# Patient Record
Sex: FEMALE | Race: WHITE | Employment: FULL TIME | URBAN - METROPOLITAN AREA
[De-identification: names, ages, dates, MRNs, and addresses within clinical notes are randomized per-mention and may not be internally consistent; named-entity substitution may affect disease eponyms.]

---

## 2018-09-12 ENCOUNTER — OFFICE VISIT (OUTPATIENT)
Dept: FAMILY MEDICINE CLINIC | Age: 48
End: 2018-09-12

## 2018-09-12 VITALS
BODY MASS INDEX: 31.01 KG/M2 | WEIGHT: 175 LBS | HEIGHT: 63 IN | DIASTOLIC BLOOD PRESSURE: 70 MMHG | TEMPERATURE: 98.4 F | SYSTOLIC BLOOD PRESSURE: 110 MMHG

## 2018-09-12 DIAGNOSIS — F32.9 REACTIVE DEPRESSION: ICD-10-CM

## 2018-09-12 PROCEDURE — 99203 OFFICE O/P NEW LOW 30 MIN: CPT | Performed by: INTERNAL MEDICINE

## 2018-09-12 RX ORDER — IBUPROFEN 800 MG/1
800 TABLET ORAL EVERY 6 HOURS PRN
Status: ON HOLD | COMMUNITY
End: 2019-07-05 | Stop reason: SDDI

## 2018-09-12 RX ORDER — LANOLIN ALCOHOL/MO/W.PET/CERES
3 CREAM (GRAM) TOPICAL DAILY
Status: ON HOLD | COMMUNITY
End: 2018-10-15 | Stop reason: HOSPADM

## 2018-09-12 ASSESSMENT — ENCOUNTER SYMPTOMS
SHORTNESS OF BREATH: 0
SINUS PAIN: 0
COUGH: 0
SINUS PRESSURE: 0
SORE THROAT: 0
NAUSEA: 0
APNEA: 0
BLOOD IN STOOL: 0
DIARRHEA: 0
ABDOMINAL PAIN: 0
CONSTIPATION: 0
RHINORRHEA: 0
VOMITING: 0
WHEEZING: 0

## 2018-10-11 ENCOUNTER — APPOINTMENT (OUTPATIENT)
Dept: GENERAL RADIOLOGY | Age: 48
DRG: 897 | End: 2018-10-11
Payer: COMMERCIAL

## 2018-10-11 ENCOUNTER — HOSPITAL ENCOUNTER (INPATIENT)
Age: 48
LOS: 2 days | Discharge: PSYCHIATRIC OR SUBSTANCE USE FACILITY | DRG: 897 | End: 2018-10-13
Attending: EMERGENCY MEDICINE | Admitting: INTERNAL MEDICINE
Payer: COMMERCIAL

## 2018-10-11 DIAGNOSIS — R00.0 TACHYCARDIA: Primary | ICD-10-CM

## 2018-10-11 DIAGNOSIS — E87.20 LACTIC ACIDOSIS: ICD-10-CM

## 2018-10-11 DIAGNOSIS — R45.851 SUICIDAL IDEATION: ICD-10-CM

## 2018-10-11 DIAGNOSIS — F10.930 ALCOHOL WITHDRAWAL SYNDROME WITHOUT COMPLICATION (HCC): ICD-10-CM

## 2018-10-11 PROBLEM — G25.1 TREMOR DUE TO DRUG WITHDRAWAL (HCC): Status: ACTIVE | Noted: 2018-10-11

## 2018-10-11 PROBLEM — F10.939 ALCOHOL WITHDRAWAL (HCC): Status: ACTIVE | Noted: 2018-10-11

## 2018-10-11 PROBLEM — F19.939 TREMOR DUE TO DRUG WITHDRAWAL (HCC): Status: ACTIVE | Noted: 2018-10-11

## 2018-10-11 PROBLEM — F10.920 ALCOHOLIC INTOXICATION WITHOUT COMPLICATION (HCC): Status: ACTIVE | Noted: 2018-10-11

## 2018-10-11 LAB
ACETAMINOPHEN LEVEL: <5 UG/ML (ref 10–30)
ALBUMIN SERPL-MCNC: 4.6 G/DL (ref 3.4–5)
ALP BLD-CCNC: 78 U/L (ref 40–129)
ALT SERPL-CCNC: 37 U/L (ref 10–40)
AMPHETAMINE SCREEN, URINE: NORMAL
ANION GAP SERPL CALCULATED.3IONS-SCNC: 24 MMOL/L (ref 3–16)
AST SERPL-CCNC: 56 U/L (ref 15–37)
BARBITURATE SCREEN URINE: NORMAL
BENZODIAZEPINE SCREEN, URINE: NORMAL
BILIRUB SERPL-MCNC: <0.2 MG/DL (ref 0–1)
BILIRUBIN DIRECT: <0.2 MG/DL (ref 0–0.3)
BILIRUBIN URINE: NEGATIVE
BILIRUBIN, INDIRECT: ABNORMAL MG/DL (ref 0–1)
BLOOD, URINE: NEGATIVE
BUN BLDV-MCNC: 7 MG/DL (ref 7–20)
CALCIUM SERPL-MCNC: 8.6 MG/DL (ref 8.3–10.6)
CANNABINOID SCREEN URINE: NORMAL
CARBOXYHEMOGLOBIN: 4.2 %
CHLORIDE BLD-SCNC: 100 MMOL/L (ref 99–110)
CLARITY: CLEAR
CO2: 15 MMOL/L (ref 21–32)
COCAINE METABOLITE SCREEN URINE: NORMAL
COLOR: YELLOW
CREAT SERPL-MCNC: 0.5 MG/DL (ref 0.6–1.1)
ETHANOL: 259 MG/DL (ref 0–0.08)
ETHANOL: 415 MG/DL (ref 0–0.08)
GFR AFRICAN AMERICAN: >60
GFR NON-AFRICAN AMERICAN: >60
GLUCOSE BLD-MCNC: 99 MG/DL (ref 70–99)
GLUCOSE URINE: NEGATIVE MG/DL
HCG(URINE) PREGNANCY TEST: NEGATIVE
HCT VFR BLD CALC: 40 % (ref 36–48)
HEMOGLOBIN: 12.7 G/DL (ref 12–16)
KETONES, URINE: 15 MG/DL
LACTIC ACID: 6.5 MMOL/L (ref 0.4–2)
LACTIC ACID: 7.2 MMOL/L (ref 0.4–2)
LEUKOCYTE ESTERASE, URINE: NEGATIVE
Lab: NORMAL
MCH RBC QN AUTO: 22.4 PG (ref 26–34)
MCHC RBC AUTO-ENTMCNC: 31.8 G/DL (ref 31–36)
MCV RBC AUTO: 70.3 FL (ref 80–100)
METHADONE SCREEN, URINE: NORMAL
MICROSCOPIC EXAMINATION: ABNORMAL
NITRITE, URINE: NEGATIVE
OPIATE SCREEN URINE: NORMAL
OXYCODONE URINE: NORMAL
PDW BLD-RTO: 29.9 % (ref 12.4–15.4)
PH UA: 5
PH UA: 5
PHENCYCLIDINE SCREEN URINE: NORMAL
PLATELET # BLD: 494 K/UL (ref 135–450)
PMV BLD AUTO: 7.8 FL (ref 5–10.5)
POTASSIUM SERPL-SCNC: 4.2 MMOL/L (ref 3.5–5.1)
PROPOXYPHENE SCREEN: NORMAL
PROTEIN UA: NEGATIVE MG/DL
RBC # BLD: 5.69 M/UL (ref 4–5.2)
SALICYLATE, SERUM: 0.4 MG/DL (ref 15–30)
SODIUM BLD-SCNC: 139 MMOL/L (ref 136–145)
SPECIFIC GRAVITY UA: 1.01
TOTAL PROTEIN: 7.5 G/DL (ref 6.4–8.2)
URINE TYPE: ABNORMAL
UROBILINOGEN, URINE: 0.2 E.U./DL
WBC # BLD: 14.1 K/UL (ref 4–11)

## 2018-10-11 PROCEDURE — 2580000003 HC RX 258: Performed by: EMERGENCY MEDICINE

## 2018-10-11 PROCEDURE — 80076 HEPATIC FUNCTION PANEL: CPT

## 2018-10-11 PROCEDURE — 96361 HYDRATE IV INFUSION ADD-ON: CPT

## 2018-10-11 PROCEDURE — G0480 DRUG TEST DEF 1-7 CLASSES: HCPCS

## 2018-10-11 PROCEDURE — 93005 ELECTROCARDIOGRAM TRACING: CPT | Performed by: EMERGENCY MEDICINE

## 2018-10-11 PROCEDURE — 81003 URINALYSIS AUTO W/O SCOPE: CPT

## 2018-10-11 PROCEDURE — 96375 TX/PRO/DX INJ NEW DRUG ADDON: CPT

## 2018-10-11 PROCEDURE — 85027 COMPLETE CBC AUTOMATED: CPT

## 2018-10-11 PROCEDURE — 82375 ASSAY CARBOXYHB QUANT: CPT

## 2018-10-11 PROCEDURE — 1200000000 HC SEMI PRIVATE

## 2018-10-11 PROCEDURE — 83605 ASSAY OF LACTIC ACID: CPT

## 2018-10-11 PROCEDURE — 82550 ASSAY OF CK (CPK): CPT

## 2018-10-11 PROCEDURE — 96374 THER/PROPH/DIAG INJ IV PUSH: CPT

## 2018-10-11 PROCEDURE — 80307 DRUG TEST PRSMV CHEM ANLYZR: CPT

## 2018-10-11 PROCEDURE — 71046 X-RAY EXAM CHEST 2 VIEWS: CPT

## 2018-10-11 PROCEDURE — 84703 CHORIONIC GONADOTROPIN ASSAY: CPT

## 2018-10-11 PROCEDURE — 6360000002 HC RX W HCPCS: Performed by: EMERGENCY MEDICINE

## 2018-10-11 PROCEDURE — 80048 BASIC METABOLIC PNL TOTAL CA: CPT

## 2018-10-11 PROCEDURE — 36415 COLL VENOUS BLD VENIPUNCTURE: CPT

## 2018-10-11 PROCEDURE — 99285 EMERGENCY DEPT VISIT HI MDM: CPT

## 2018-10-11 RX ORDER — SODIUM CHLORIDE 0.9 % (FLUSH) 0.9 %
10 SYRINGE (ML) INJECTION EVERY 12 HOURS SCHEDULED
Status: DISCONTINUED | OUTPATIENT
Start: 2018-10-11 | End: 2018-10-11

## 2018-10-11 RX ORDER — SODIUM CHLORIDE 0.9 % (FLUSH) 0.9 %
10 SYRINGE (ML) INJECTION PRN
Status: DISCONTINUED | OUTPATIENT
Start: 2018-10-11 | End: 2018-10-11

## 2018-10-11 RX ORDER — DIAZEPAM 5 MG/1
10 TABLET ORAL EVERY 6 HOURS PRN
Status: DISCONTINUED | OUTPATIENT
Start: 2018-10-11 | End: 2018-10-13 | Stop reason: HOSPADM

## 2018-10-11 RX ORDER — M-VIT,TX,IRON,MINS/CALC/FOLIC 27MG-0.4MG
1 TABLET ORAL DAILY
Status: DISCONTINUED | OUTPATIENT
Start: 2018-10-12 | End: 2018-10-13 | Stop reason: HOSPADM

## 2018-10-11 RX ORDER — SODIUM CHLORIDE 0.9 % (FLUSH) 0.9 %
10 SYRINGE (ML) INJECTION PRN
Status: DISCONTINUED | OUTPATIENT
Start: 2018-10-11 | End: 2018-10-12

## 2018-10-11 RX ORDER — THIAMINE MONONITRATE (VIT B1) 100 MG
100 TABLET ORAL DAILY
Status: DISCONTINUED | OUTPATIENT
Start: 2018-10-12 | End: 2018-10-13 | Stop reason: HOSPADM

## 2018-10-11 RX ORDER — LORAZEPAM 1 MG/1
2 TABLET ORAL
Status: DISCONTINUED | OUTPATIENT
Start: 2018-10-11 | End: 2018-10-12

## 2018-10-11 RX ORDER — SODIUM CHLORIDE 9 MG/ML
INJECTION, SOLUTION INTRAVENOUS CONTINUOUS
Status: DISCONTINUED | OUTPATIENT
Start: 2018-10-11 | End: 2018-10-13 | Stop reason: HOSPADM

## 2018-10-11 RX ORDER — ONDANSETRON 2 MG/ML
4 INJECTION INTRAMUSCULAR; INTRAVENOUS EVERY 6 HOURS PRN
Status: DISCONTINUED | OUTPATIENT
Start: 2018-10-11 | End: 2018-10-11

## 2018-10-11 RX ORDER — LORAZEPAM 2 MG/ML
3 INJECTION INTRAMUSCULAR
Status: DISCONTINUED | OUTPATIENT
Start: 2018-10-11 | End: 2018-10-12

## 2018-10-11 RX ORDER — LORAZEPAM 1 MG/1
3 TABLET ORAL
Status: DISCONTINUED | OUTPATIENT
Start: 2018-10-11 | End: 2018-10-12

## 2018-10-11 RX ORDER — SODIUM CHLORIDE 0.9 % (FLUSH) 0.9 %
10 SYRINGE (ML) INJECTION EVERY 12 HOURS SCHEDULED
Status: DISCONTINUED | OUTPATIENT
Start: 2018-10-11 | End: 2018-10-12

## 2018-10-11 RX ORDER — NICOTINE 21 MG/24HR
1 PATCH, TRANSDERMAL 24 HOURS TRANSDERMAL DAILY
Status: DISCONTINUED | OUTPATIENT
Start: 2018-10-12 | End: 2018-10-12

## 2018-10-11 RX ORDER — FOLIC ACID 1 MG/1
1 TABLET ORAL DAILY
Status: DISCONTINUED | OUTPATIENT
Start: 2018-10-12 | End: 2018-10-13 | Stop reason: HOSPADM

## 2018-10-11 RX ORDER — 0.9 % SODIUM CHLORIDE 0.9 %
1000 INTRAVENOUS SOLUTION INTRAVENOUS ONCE
Status: COMPLETED | OUTPATIENT
Start: 2018-10-11 | End: 2018-10-11

## 2018-10-11 RX ORDER — LORAZEPAM 1 MG/1
4 TABLET ORAL
Status: DISCONTINUED | OUTPATIENT
Start: 2018-10-11 | End: 2018-10-12

## 2018-10-11 RX ORDER — THIAMINE HYDROCHLORIDE 100 MG/ML
100 INJECTION, SOLUTION INTRAMUSCULAR; INTRAVENOUS DAILY
Status: DISCONTINUED | OUTPATIENT
Start: 2018-10-12 | End: 2018-10-12 | Stop reason: SDUPTHER

## 2018-10-11 RX ORDER — ONDANSETRON 2 MG/ML
4 INJECTION INTRAMUSCULAR; INTRAVENOUS ONCE
Status: COMPLETED | OUTPATIENT
Start: 2018-10-11 | End: 2018-10-11

## 2018-10-11 RX ORDER — LORAZEPAM 2 MG/ML
1 INJECTION INTRAMUSCULAR
Status: DISCONTINUED | OUTPATIENT
Start: 2018-10-11 | End: 2018-10-12

## 2018-10-11 RX ORDER — ONDANSETRON 2 MG/ML
4 INJECTION INTRAMUSCULAR; INTRAVENOUS EVERY 6 HOURS PRN
Status: DISCONTINUED | OUTPATIENT
Start: 2018-10-11 | End: 2018-10-13 | Stop reason: HOSPADM

## 2018-10-11 RX ORDER — ACETAMINOPHEN 325 MG/1
650 TABLET ORAL ONCE
Status: DISCONTINUED | OUTPATIENT
Start: 2018-10-11 | End: 2018-10-11

## 2018-10-11 RX ORDER — LORAZEPAM 2 MG/ML
1 INJECTION INTRAMUSCULAR ONCE
Status: COMPLETED | OUTPATIENT
Start: 2018-10-11 | End: 2018-10-11

## 2018-10-11 RX ORDER — LORAZEPAM 1 MG/1
1 TABLET ORAL
Status: DISCONTINUED | OUTPATIENT
Start: 2018-10-11 | End: 2018-10-12

## 2018-10-11 RX ORDER — LORAZEPAM 2 MG/ML
4 INJECTION INTRAMUSCULAR
Status: DISCONTINUED | OUTPATIENT
Start: 2018-10-11 | End: 2018-10-12

## 2018-10-11 RX ORDER — 0.9 % SODIUM CHLORIDE 0.9 %
1000 INTRAVENOUS SOLUTION INTRAVENOUS ONCE
Status: COMPLETED | OUTPATIENT
Start: 2018-10-11 | End: 2018-10-12

## 2018-10-11 RX ORDER — LORAZEPAM 2 MG/ML
2 INJECTION INTRAMUSCULAR
Status: DISCONTINUED | OUTPATIENT
Start: 2018-10-11 | End: 2018-10-12

## 2018-10-11 RX ADMIN — SODIUM CHLORIDE 1000 ML: 9 INJECTION, SOLUTION INTRAVENOUS at 20:00

## 2018-10-11 RX ADMIN — ONDANSETRON 4 MG: 2 INJECTION INTRAMUSCULAR; INTRAVENOUS at 21:21

## 2018-10-11 RX ADMIN — SODIUM CHLORIDE 1000 ML: 9 INJECTION, SOLUTION INTRAVENOUS at 21:13

## 2018-10-11 RX ADMIN — LORAZEPAM 1 MG: 2 INJECTION INTRAMUSCULAR; INTRAVENOUS at 21:21

## 2018-10-11 ASSESSMENT — PATIENT HEALTH QUESTIONNAIRE - PHQ9: SUM OF ALL RESPONSES TO PHQ QUESTIONS 1-9: 27

## 2018-10-11 ASSESSMENT — ENCOUNTER SYMPTOMS
SHORTNESS OF BREATH: 0
TROUBLE SWALLOWING: 0
ABDOMINAL PAIN: 0
COLOR CHANGE: 0
COUGH: 0
NAUSEA: 0
PHOTOPHOBIA: 0
RHINORRHEA: 0
VOMITING: 0

## 2018-10-11 ASSESSMENT — PAIN SCALES - GENERAL: PAINLEVEL_OUTOF10: 3

## 2018-10-11 ASSESSMENT — PAIN DESCRIPTION - LOCATION: LOCATION: BACK

## 2018-10-11 NOTE — ED PROVIDER NOTES
absence of a cardiologist.    EKG demonstrates a sinus rhythm with a ventricular rate of 88. Beats per minute.   Patient's axes are within normal limits, soreness that he can ST elevations or depressions is nondiagnostic for ACS     RADIOLOGY:   Non-plain filmimages such as CT, Ultrasound and MRI are read by the radiologist. Plain radiographic images are visualized and preliminarily interpreted by the emergency physician with the below findings:      Interpretation per the Radiologist below, if available at the time ofthis note:    No orders to display         ED BEDSIDE ULTRASOUND:   Performed by ED Physician - none    LABS:  Labs Reviewed   CBC - Abnormal; Notable for the following:        Result Value    WBC 14.1 (*)     RBC 5.69 (*)     MCV 70.3 (*)     MCH 22.4 (*)     RDW 29.9 (*)     Platelets 918 (*)     All other components within normal limits    Narrative:     Performed at:  [address] [employer] [address]   Phone [phone]   BASIC METABOLIC PANEL - Abnormal; Notable for the following:     CO2 15 (*)     Anion Gap 24 (*)     CREATININE 0.5 (*)     All other components within normal limits    Narrative:     Performed at:  [address] [employer] [address]   Phone [phone]   URINALYSIS - Abnormal; Notable for the following:     Ketones, Urine 15 (*)     All other components within normal limits    Narrative:     Performed at:  [address] [facility] [address]   Phone [phone]   SALICYLATE LEVEL - Abnormal; Notable for the following:     Salicylate, Serum 0.4 (*)     All other components within normal limits    Narrative:     Performed at:  [address] [employer] [address]   Phone [phone]   PREGNANCY, URINE    Narrative:     Performed at:  [facility]

## 2018-10-12 PROBLEM — F32.A DEPRESSION: Status: ACTIVE | Noted: 2018-09-12

## 2018-10-12 LAB
ANION GAP SERPL CALCULATED.3IONS-SCNC: 11 MMOL/L (ref 3–16)
BUN BLDV-MCNC: 7 MG/DL (ref 7–20)
CALCIUM SERPL-MCNC: 7.8 MG/DL (ref 8.3–10.6)
CHLORIDE BLD-SCNC: 105 MMOL/L (ref 99–110)
CO2: 22 MMOL/L (ref 21–32)
CREAT SERPL-MCNC: <0.5 MG/DL (ref 0.6–1.1)
ETHANOL: NORMAL MG/DL (ref 0–0.08)
GFR AFRICAN AMERICAN: >60
GFR NON-AFRICAN AMERICAN: >60
GLUCOSE BLD-MCNC: 74 MG/DL (ref 70–99)
LACTIC ACID: 0.7 MMOL/L (ref 0.4–2)
LACTIC ACID: 1.4 MMOL/L (ref 0.4–2)
LACTIC ACID: 4.1 MMOL/L (ref 0.4–2)
LACTIC ACID: 6.1 MMOL/L (ref 0.4–2)
MAGNESIUM: 1.5 MG/DL (ref 1.8–2.4)
POTASSIUM SERPL-SCNC: 3.9 MMOL/L (ref 3.5–5.1)
SODIUM BLD-SCNC: 138 MMOL/L (ref 136–145)
TOTAL CK: 567 U/L (ref 26–192)

## 2018-10-12 PROCEDURE — 83605 ASSAY OF LACTIC ACID: CPT

## 2018-10-12 PROCEDURE — G0480 DRUG TEST DEF 1-7 CLASSES: HCPCS

## 2018-10-12 PROCEDURE — 2580000003 HC RX 258: Performed by: NURSE PRACTITIONER

## 2018-10-12 PROCEDURE — 2500000003 HC RX 250 WO HCPCS: Performed by: NURSE PRACTITIONER

## 2018-10-12 PROCEDURE — 36415 COLL VENOUS BLD VENIPUNCTURE: CPT

## 2018-10-12 PROCEDURE — 83735 ASSAY OF MAGNESIUM: CPT

## 2018-10-12 PROCEDURE — 6370000000 HC RX 637 (ALT 250 FOR IP): Performed by: NURSE PRACTITIONER

## 2018-10-12 PROCEDURE — 6360000002 HC RX W HCPCS: Performed by: INTERNAL MEDICINE

## 2018-10-12 PROCEDURE — 1200000000 HC SEMI PRIVATE

## 2018-10-12 PROCEDURE — 6360000002 HC RX W HCPCS: Performed by: NURSE PRACTITIONER

## 2018-10-12 PROCEDURE — 80048 BASIC METABOLIC PNL TOTAL CA: CPT

## 2018-10-12 PROCEDURE — 94760 N-INVAS EAR/PLS OXIMETRY 1: CPT

## 2018-10-12 PROCEDURE — 2580000003 HC RX 258: Performed by: INTERNAL MEDICINE

## 2018-10-12 PROCEDURE — 6370000000 HC RX 637 (ALT 250 FOR IP): Performed by: INTERNAL MEDICINE

## 2018-10-12 PROCEDURE — 99223 1ST HOSP IP/OBS HIGH 75: CPT | Performed by: PSYCHIATRY & NEUROLOGY

## 2018-10-12 PROCEDURE — 93010 ELECTROCARDIOGRAM REPORT: CPT | Performed by: INTERNAL MEDICINE

## 2018-10-12 RX ORDER — MAGNESIUM SULFATE IN WATER 40 MG/ML
2 INJECTION, SOLUTION INTRAVENOUS ONCE
Status: COMPLETED | OUTPATIENT
Start: 2018-10-12 | End: 2018-10-12

## 2018-10-12 RX ORDER — LORAZEPAM 2 MG/ML
2 INJECTION INTRAMUSCULAR
Status: DISCONTINUED | OUTPATIENT
Start: 2018-10-12 | End: 2018-10-13 | Stop reason: HOSPADM

## 2018-10-12 RX ORDER — LORAZEPAM 2 MG/ML
3 INJECTION INTRAMUSCULAR
Status: DISCONTINUED | OUTPATIENT
Start: 2018-10-12 | End: 2018-10-13 | Stop reason: HOSPADM

## 2018-10-12 RX ORDER — NICOTINE 21 MG/24HR
1 PATCH, TRANSDERMAL 24 HOURS TRANSDERMAL DAILY
Status: DISCONTINUED | OUTPATIENT
Start: 2018-10-12 | End: 2018-10-13

## 2018-10-12 RX ORDER — LORAZEPAM 1 MG/1
1 TABLET ORAL
Status: DISCONTINUED | OUTPATIENT
Start: 2018-10-12 | End: 2018-10-13 | Stop reason: HOSPADM

## 2018-10-12 RX ORDER — LORAZEPAM 2 MG/ML
4 INJECTION INTRAMUSCULAR
Status: DISCONTINUED | OUTPATIENT
Start: 2018-10-12 | End: 2018-10-13 | Stop reason: HOSPADM

## 2018-10-12 RX ORDER — LORAZEPAM 1 MG/1
3 TABLET ORAL
Status: DISCONTINUED | OUTPATIENT
Start: 2018-10-12 | End: 2018-10-13 | Stop reason: HOSPADM

## 2018-10-12 RX ORDER — SODIUM CHLORIDE 0.9 % (FLUSH) 0.9 %
10 SYRINGE (ML) INJECTION EVERY 12 HOURS SCHEDULED
Status: DISCONTINUED | OUTPATIENT
Start: 2018-10-12 | End: 2018-10-13 | Stop reason: HOSPADM

## 2018-10-12 RX ORDER — SODIUM CHLORIDE 9 MG/ML
INJECTION, SOLUTION INTRAVENOUS
Status: DISPENSED
Start: 2018-10-12 | End: 2018-10-13

## 2018-10-12 RX ORDER — SODIUM CHLORIDE 0.9 % (FLUSH) 0.9 %
10 SYRINGE (ML) INJECTION PRN
Status: DISCONTINUED | OUTPATIENT
Start: 2018-10-12 | End: 2018-10-13 | Stop reason: HOSPADM

## 2018-10-12 RX ORDER — IBUPROFEN 400 MG/1
400 TABLET ORAL EVERY 6 HOURS PRN
Status: DISCONTINUED | OUTPATIENT
Start: 2018-10-12 | End: 2018-10-13 | Stop reason: HOSPADM

## 2018-10-12 RX ORDER — LORAZEPAM 1 MG/1
2 TABLET ORAL
Status: DISCONTINUED | OUTPATIENT
Start: 2018-10-12 | End: 2018-10-13 | Stop reason: HOSPADM

## 2018-10-12 RX ORDER — LORAZEPAM 1 MG/1
4 TABLET ORAL
Status: DISCONTINUED | OUTPATIENT
Start: 2018-10-12 | End: 2018-10-13 | Stop reason: HOSPADM

## 2018-10-12 RX ORDER — LORAZEPAM 2 MG/ML
1 INJECTION INTRAMUSCULAR
Status: DISCONTINUED | OUTPATIENT
Start: 2018-10-12 | End: 2018-10-13 | Stop reason: HOSPADM

## 2018-10-12 RX ADMIN — ENOXAPARIN SODIUM 40 MG: 40 INJECTION SUBCUTANEOUS at 09:12

## 2018-10-12 RX ADMIN — DIAZEPAM 10 MG: 5 TABLET ORAL at 21:41

## 2018-10-12 RX ADMIN — SODIUM CHLORIDE: 9 INJECTION, SOLUTION INTRAVENOUS at 21:42

## 2018-10-12 RX ADMIN — IBUPROFEN 400 MG: 400 TABLET ORAL at 21:41

## 2018-10-12 RX ADMIN — MAGNESIUM SULFATE HEPTAHYDRATE 2 G: 40 INJECTION, SOLUTION INTRAVENOUS at 15:01

## 2018-10-12 RX ADMIN — SODIUM CHLORIDE: 9 INJECTION, SOLUTION INTRAVENOUS at 00:00

## 2018-10-12 RX ADMIN — FOLIC ACID 1 MG: 1 TABLET ORAL at 09:12

## 2018-10-12 RX ADMIN — Medication 10 ML: at 09:20

## 2018-10-12 RX ADMIN — Medication 10 ML: at 00:00

## 2018-10-12 RX ADMIN — LORAZEPAM 2 MG: 1 TABLET ORAL at 00:00

## 2018-10-12 RX ADMIN — Medication 10 ML: at 09:12

## 2018-10-12 RX ADMIN — LORAZEPAM 2 MG: 1 TABLET ORAL at 05:01

## 2018-10-12 RX ADMIN — Medication 100 MG: at 09:12

## 2018-10-12 RX ADMIN — SERTRALINE HYDROCHLORIDE 50 MG: 50 TABLET ORAL at 09:13

## 2018-10-12 RX ADMIN — IBUPROFEN 400 MG: 400 TABLET ORAL at 16:14

## 2018-10-12 RX ADMIN — LORAZEPAM 3 MG: 1 TABLET ORAL at 01:12

## 2018-10-12 RX ADMIN — MULTIPLE VITAMINS W/ MINERALS TAB 1 TABLET: TAB at 09:13

## 2018-10-12 RX ADMIN — FOLIC ACID: 5 INJECTION, SOLUTION INTRAMUSCULAR; INTRAVENOUS; SUBCUTANEOUS at 10:12

## 2018-10-12 ASSESSMENT — PAIN SCALES - GENERAL
PAINLEVEL_OUTOF10: 6
PAINLEVEL_OUTOF10: 5

## 2018-10-12 NOTE — CONSULTS
LORazepam **OR** LORazepam **OR** LORazepam **OR** LORazepam **OR** LORazepam, magnesium hydroxide, ondansetron, diazepam    Examination  Review of Systems - craving a cigarette, no tremor    Recent Results (from the past 168 hour(s))   CBC    Collection Time: 10/11/18  5:35 PM   Result Value Ref Range    WBC 14.1 (H) 4.0 - 11.0 K/uL    RBC 5.69 (H) 4.00 - 5.20 M/uL    Hemoglobin 12.7 12.0 - 16.0 g/dL    Hematocrit 40.0 36.0 - 48.0 %    MCV 70.3 (L) 80.0 - 100.0 fL    MCH 22.4 (L) 26.0 - 34.0 pg    MCHC 31.8 31.0 - 36.0 g/dL    RDW 29.9 (H) 12.4 - 15.4 %    Platelets 767 (H) 683 - 450 K/uL    MPV 7.8 5.0 - 10.5 fL   Acetaminophen Level    Collection Time: 10/11/18  5:35 PM   Result Value Ref Range    Acetaminophen Level <5 (L) 10 - 30 ug/mL   Hepatic Function Panel    Collection Time: 10/11/18  5:35 PM   Result Value Ref Range    Total Protein 7.5 6.4 - 8.2 g/dL    Alb 4.6 3.4 - 5.0 g/dL    Alkaline Phosphatase 78 40 - 129 U/L    ALT 37 10 - 40 U/L    AST 56 (H) 15 - 37 U/L    Total Bilirubin <0.2 0.0 - 1.0 mg/dL    Bilirubin, Direct <0.2 0.0 - 0.3 mg/dL    Bilirubin, Indirect see below 0.0 - 1.0 mg/dL   Basic Metabolic Panel    Collection Time: 10/11/18  5:36 PM   Result Value Ref Range    Sodium 139 136 - 145 mmol/L    Potassium 4.2 3.5 - 5.1 mmol/L    Chloride 100 99 - 110 mmol/L    CO2 15 (L) 21 - 32 mmol/L    Anion Gap 24 (H) 3 - 16    Glucose 99 70 - 99 mg/dL    BUN 7 7 - 20 mg/dL    CREATININE 0.5 (L) 0.6 - 1.1 mg/dL    GFR Non-African American >60 >60    GFR African American >60 >60    Calcium 8.6 8.3 - 10.6 mg/dL   Urinalysis    Collection Time: 10/11/18  5:36 PM   Result Value Ref Range    Color, UA YELLOW Straw/Yellow    Clarity, UA Clear Clear    Glucose, Ur Negative Negative mg/dL    Bilirubin Urine Negative Negative    Ketones, Urine 15 (A) Negative mg/dL    Specific Gravity, UA 1.008 1.005 - 1.030    Blood, Urine Negative Negative    pH, UA 5.0 5.0 - 8.0    Protein, UA Negative Negative mg/dL Urobilinogen, Urine 0.2 <2.0 E.U./dL    Nitrite, Urine Negative Negative    Leukocyte Esterase, Urine Negative Negative    Microscopic Examination Not Indicated     Urine Type Not Specified    Pregnancy, Urine    Collection Time: 10/11/18  5:36 PM   Result Value Ref Range    HCG(Urine) Pregnancy Test Negative Detects HCG level >20 MIU/mL   Ethanol    Collection Time: 10/11/18  5:36 PM   Result Value Ref Range    Ethanol Lvl 387 mg/dL   Salicylate    Collection Time: 10/11/18  5:36 PM   Result Value Ref Range    Salicylate, Serum 0.4 (L) 15.0 - 30.0 mg/dL   Urine Drug Screen    Collection Time: 10/11/18  5:36 PM   Result Value Ref Range    Amphetamine Screen, Urine Neg Negative <1000ng/mL    Barbiturate Screen, Ur Neg Negative <200 ng/mL    Benzodiazepine Screen, Urine Neg Negative <200 ng/mL    Cannabinoid Scrn, Ur Neg Negative <50 ng/mL    Cocaine Metabolite Screen, Urine Neg Negative <300 ng/mL    Opiate Scrn, Ur Neg Negative <300 ng/mL    PCP Screen, Urine Neg Negative <25 ng/mL    Methadone Screen, Urine Neg Negative <300 ng/mL    Propoxyphene Scrn, Ur Neg Negative <300 ng/mL    pH, UA 5.0     Drug Screen Comment: see below     Oxycodone Urine Neg Negative <100 ng/ml   EKG 12 Lead    Collection Time: 10/11/18  6:40 PM   Result Value Ref Range    Ventricular Rate 88 BPM    Atrial Rate 88 BPM    P-R Interval 124 ms    QRS Duration 76 ms    Q-T Interval 370 ms    QTc Calculation (Bazett) 447 ms    P Axis 61 degrees    R Axis 65 degrees    T Axis 55 degrees    Diagnosis       Normal sinus rhythmNormal ECGNo previous ECGs availableConfirmed by WOLF CONTRERAS MD ([id number]) on 10/12/2018 8:04:31 AM   Lactic Acid, Plasma    Collection Time: 10/11/18  6:59 PM   Result Value Ref Range    Lactic Acid 7.2 (HH) 0.4 - 2.0 mmol/L   Carboxyhemoglobin    Collection Time: 10/11/18  8:11 PM   Result Value Ref Range    Carboxyhemoglobin 4.2 %   Lactic Acid, Plasma    Collection Time: 10/11/18  9:44 PM   Result Value Ref Range

## 2018-10-12 NOTE — ED NOTES
Pt undergoing mood swings. Calm to aggressive to ripping off monitor stickers.        Emaline Garland  10/11/18 2029

## 2018-10-12 NOTE — H&P
Hospital Medicine History & Physical      PCP: Kolton Paris DO    Date of Admission: 10/11/2018    Date of Service: Pt seen/examined on 10/11/2018  and Admitted to Inpatient with expected LOS greater than two midnights due to medical therapy. Chief Complaint:    Chief Complaint   Patient presents with    Suicidal     Mom went to patient house, patient had knife in her hand. Mom is concerned that patient sarita harm self. Patient had her last drink about one hour ago          History Of Present Illness: The patient is a 46 y.o. female alcohol abuse, anxiety depression who presents with history of expressing self-harm and alcohol intoxication. She denies any pain anywhere. While in the ER she started expressing some anxiety with tremors concerning for alcohol withdrawal.    Past Medical History:        Diagnosis Date    Anxiety     Depression     Sleeping difficulties        Past Surgical History:        Procedure Laterality Date    BREAST SURGERY      sailine breast      SECTION     [facility] FRACTURE SURGERY      right wrist       Medications Prior to Admission:    Prior to Admission medications    Medication Sig Start Date End Date Taking? Authorizing Provider   Multiple Vitamins-Minerals (MULTIVITAL-M PO) Take by mouth   Yes Historical Provider, MD   ibuprofen (ADVIL;MOTRIN) 800 MG tablet Take 800 mg by mouth every 6 hours as needed for Pain   Yes Historical Provider, MD   melatonin 3 MG TABS tablet Take 3 mg by mouth daily   Yes Historical Provider, MD   sertraline (ZOLOFT) 50 MG tablet Take 1 tablet by mouth daily 18  Yes Madhavi Abarca DO       Allergies:  Patient has no known allergies. Social History:  The patient currently lives With mother    TOBACCO:   reports that she has been smoking. She has a 4.00 pack-year smoking history. She has never used smokeless tobacco.  ETOH:   reports that she drinks alcohol.       Family History:  Reviewed in detail and negative

## 2018-10-12 NOTE — ED NOTES
Bedside report given to Keesha Vaz. Pt is drinking soda waiting for a room.      Bessy Frazier  10/11/18 7727

## 2018-10-12 NOTE — PROGRESS NOTES
Report received from Stephanie Kaur for . Assisted patient back into bed from toileting. Gait steady. Will continue to monitor for suicide and safety reasons.     Electronically signed by Ronna Cabrera RN on 10/12/2018 at 3:33 PM

## 2018-10-13 ENCOUNTER — HOSPITAL ENCOUNTER (INPATIENT)
Age: 48
LOS: 2 days | Discharge: HOME | DRG: 885 | End: 2018-10-15
Attending: PSYCHIATRY & NEUROLOGY | Admitting: PSYCHIATRY & NEUROLOGY
Payer: COMMERCIAL

## 2018-10-13 VITALS
SYSTOLIC BLOOD PRESSURE: 138 MMHG | RESPIRATION RATE: 18 BRPM | HEART RATE: 80 BPM | OXYGEN SATURATION: 96 % | WEIGHT: 180.56 LBS | HEIGHT: 64 IN | TEMPERATURE: 97.7 F | DIASTOLIC BLOOD PRESSURE: 87 MMHG | BODY MASS INDEX: 30.83 KG/M2

## 2018-10-13 PROBLEM — F33.9 EPISODE OF RECURRENT MAJOR DEPRESSIVE DISORDER (HCC): Status: ACTIVE | Noted: 2018-10-13

## 2018-10-13 LAB
ANION GAP SERPL CALCULATED.3IONS-SCNC: 10 MMOL/L (ref 3–16)
BASOPHILS ABSOLUTE: 0 K/UL (ref 0–0.2)
BASOPHILS RELATIVE PERCENT: 0.5 %
BUN BLDV-MCNC: 2 MG/DL (ref 7–20)
CALCIUM SERPL-MCNC: 7.9 MG/DL (ref 8.3–10.6)
CHLORIDE BLD-SCNC: 105 MMOL/L (ref 99–110)
CO2: 23 MMOL/L (ref 21–32)
CREAT SERPL-MCNC: <0.5 MG/DL (ref 0.6–1.1)
EOSINOPHILS ABSOLUTE: 0.2 K/UL (ref 0–0.6)
EOSINOPHILS RELATIVE PERCENT: 2.9 %
GFR AFRICAN AMERICAN: >60
GFR NON-AFRICAN AMERICAN: >60
GLUCOSE BLD-MCNC: 87 MG/DL (ref 70–99)
HCT VFR BLD CALC: 30.7 % (ref 36–48)
HEMOGLOBIN: 9.7 G/DL (ref 12–16)
LYMPHOCYTES ABSOLUTE: 1.8 K/UL (ref 1–5.1)
LYMPHOCYTES RELATIVE PERCENT: 32.5 %
MAGNESIUM: 1.7 MG/DL (ref 1.8–2.4)
MAGNESIUM: 1.7 MG/DL (ref 1.8–2.4)
MCH RBC QN AUTO: 22.8 PG (ref 26–34)
MCHC RBC AUTO-ENTMCNC: 31.7 G/DL (ref 31–36)
MCV RBC AUTO: 71.9 FL (ref 80–100)
MONOCYTES ABSOLUTE: 0.4 K/UL (ref 0–1.3)
MONOCYTES RELATIVE PERCENT: 6.5 %
NEUTROPHILS ABSOLUTE: 3.2 K/UL (ref 1.7–7.7)
NEUTROPHILS RELATIVE PERCENT: 57.6 %
PDW BLD-RTO: 28.8 % (ref 12.4–15.4)
PLATELET # BLD: 295 K/UL (ref 135–450)
PMV BLD AUTO: 7.9 FL (ref 5–10.5)
POTASSIUM SERPL-SCNC: 3.7 MMOL/L (ref 3.5–5.1)
RBC # BLD: 4.27 M/UL (ref 4–5.2)
SODIUM BLD-SCNC: 138 MMOL/L (ref 136–145)
WBC # BLD: 5.6 K/UL (ref 4–11)

## 2018-10-13 PROCEDURE — 36415 COLL VENOUS BLD VENIPUNCTURE: CPT

## 2018-10-13 PROCEDURE — 6370000000 HC RX 637 (ALT 250 FOR IP): Performed by: INTERNAL MEDICINE

## 2018-10-13 PROCEDURE — 83540 ASSAY OF IRON: CPT

## 2018-10-13 PROCEDURE — 83735 ASSAY OF MAGNESIUM: CPT

## 2018-10-13 PROCEDURE — 94760 N-INVAS EAR/PLS OXIMETRY 1: CPT

## 2018-10-13 PROCEDURE — 82746 ASSAY OF FOLIC ACID SERUM: CPT

## 2018-10-13 PROCEDURE — 6370000000 HC RX 637 (ALT 250 FOR IP): Performed by: NURSE PRACTITIONER

## 2018-10-13 PROCEDURE — 1240000000 HC EMOTIONAL WELLNESS R&B

## 2018-10-13 PROCEDURE — 83550 IRON BINDING TEST: CPT

## 2018-10-13 PROCEDURE — 80048 BASIC METABOLIC PNL TOTAL CA: CPT

## 2018-10-13 PROCEDURE — 2580000003 HC RX 258: Performed by: INTERNAL MEDICINE

## 2018-10-13 PROCEDURE — 85025 COMPLETE CBC W/AUTO DIFF WBC: CPT

## 2018-10-13 PROCEDURE — 82607 VITAMIN B-12: CPT

## 2018-10-13 PROCEDURE — 82728 ASSAY OF FERRITIN: CPT

## 2018-10-13 RX ORDER — LANOLIN ALCOHOL/MO/W.PET/CERES
100 CREAM (GRAM) TOPICAL DAILY
Qty: 30 TABLET | Refills: 0 | Status: SHIPPED | OUTPATIENT
Start: 2018-10-13 | End: 2018-10-24

## 2018-10-13 RX ORDER — LORAZEPAM 1 MG/1
1 TABLET ORAL EVERY 4 HOURS PRN
Qty: 18 TABLET | Refills: 0 | Status: ON HOLD | OUTPATIENT
Start: 2018-10-13 | End: 2018-10-14

## 2018-10-13 RX ORDER — TRAZODONE HYDROCHLORIDE 50 MG/1
50 TABLET ORAL NIGHTLY
Qty: 30 TABLET | Refills: 0 | Status: ON HOLD | OUTPATIENT
Start: 2018-10-13 | End: 2018-10-15 | Stop reason: HOSPADM

## 2018-10-13 RX ORDER — NICOTINE 21 MG/24HR
1 PATCH, TRANSDERMAL 24 HOURS TRANSDERMAL DAILY
Status: DISCONTINUED | OUTPATIENT
Start: 2018-10-13 | End: 2018-10-13 | Stop reason: HOSPADM

## 2018-10-13 RX ORDER — NICOTINE 21 MG/24HR
1 PATCH, TRANSDERMAL 24 HOURS TRANSDERMAL DAILY
Qty: 30 PATCH | Refills: 0 | Status: SHIPPED | OUTPATIENT
Start: 2018-10-13 | End: 2018-10-24

## 2018-10-13 RX ORDER — FOLIC ACID 1 MG/1
1 TABLET ORAL DAILY
Qty: 30 TABLET | Refills: 0 | Status: SHIPPED | OUTPATIENT
Start: 2018-10-13 | End: 2019-07-05

## 2018-10-13 RX ADMIN — Medication 400 MG: at 20:02

## 2018-10-13 RX ADMIN — SODIUM CHLORIDE: 9 INJECTION, SOLUTION INTRAVENOUS at 05:59

## 2018-10-13 RX ADMIN — FOLIC ACID 1 MG: 1 TABLET ORAL at 10:04

## 2018-10-13 RX ADMIN — Medication 400 MG: at 10:04

## 2018-10-13 RX ADMIN — DIAZEPAM 10 MG: 5 TABLET ORAL at 20:02

## 2018-10-13 RX ADMIN — DIAZEPAM 10 MG: 5 TABLET ORAL at 05:58

## 2018-10-13 RX ADMIN — IBUPROFEN 400 MG: 400 TABLET ORAL at 05:58

## 2018-10-13 RX ADMIN — Medication 100 MG: at 10:04

## 2018-10-13 RX ADMIN — LORAZEPAM 1 MG: 1 TABLET ORAL at 10:11

## 2018-10-13 RX ADMIN — MULTIPLE VITAMINS W/ MINERALS TAB 1 TABLET: TAB at 10:03

## 2018-10-13 RX ADMIN — SERTRALINE HYDROCHLORIDE 50 MG: 50 TABLET ORAL at 10:04

## 2018-10-13 RX ADMIN — NICOTINE POLACRILEX 2 MG: 2 GUM, CHEWING BUCCAL at 11:48

## 2018-10-13 ASSESSMENT — PAIN SCALES - GENERAL: PAINLEVEL_OUTOF10: 7

## 2018-10-13 NOTE — PROGRESS NOTES
Attempted to contact the patient's mother Ascencion Zhao ([phone]), at the patient's request, to discuss the plan to DC to  psych for continued treatment. I attempted to reach her twice this morning but was unable to get a hold of her.      DWIGHT Boland - ONDINA  10/13/18  9:47 AM

## 2018-10-13 NOTE — CARE COORDINATION
Pre-certed inpatient behavioral health admission for [address] [place] [address] - reference # [id number]    Clinical update 10/17/18  Tate Joe RN

## 2018-10-13 NOTE — PROGRESS NOTES
Pt resting in bed calmly. Appears comfortable. Sitter at bedside. Aware of  time around 6pm. Pt requesting gum be switched back to patch d/t gum leaving bad taste in mouth.  Will send message to MD.

## 2018-10-13 NOTE — DISCHARGE SUMMARY
Associated Diagnoses: Alcohol withdrawal syndrome without complication (HCC)      traZODone (DESYREL) 50 MG tablet Take 1 tablet by mouth nightly  Qty: 30 tablet, Refills: 0              Details   Multiple Vitamins-Minerals (MULTIVITAL-M PO) Take by mouth      ibuprofen (ADVIL;MOTRIN) 800 MG tablet Take 800 mg by mouth every 6 hours as needed for Pain      melatonin 3 MG TABS tablet Take 3 mg by mouth daily      sertraline (ZOLOFT) 50 MG tablet Take 1 tablet by mouth daily  Qty: 30 tablet, Refills: 3             The patient was seen and examined on day of discharge and this discharge summary is in conjunction with any daily progress note from day of discharge. Hospital Course: The patient is a 46year old,  female, with a hx of ETOH abuse, anxiety and depression, who presented to [facility] after being seen with a knife and expressing suicidal ideation to her mother, as well as ETOH abuse/intoxication. In the ER, the patient began to experience anxiety and tremors concerning for ETOH withdrawal. She was admitted for further workup and treatment. Psych was consulted. Psych noted that the patient is very depressed, and has been thinking more and more about suicide. This culminated with her threatening her own life with a knife. Psych also recommended that the patient reinvest herself in ETOH treatment, as this has become a progressively worse problem for the patient. Psych noted that the patient may be a candidate for naltrexone treatment. I personally discussed the patient's case with Psych Dr. Brian Mcfarland. He had discussed with both the patient and her mother and felt that because this was becoming a progressively worsening problem, she would benefit from IP psychiatric treatment at the time of discharge. The patient's anxiety and depression has been very severe. She is taking Zoloft. ETOH abuse/WD was treated with folic acid, thiamine, MVI and PRN ativan.  She did not show and S/S of ETOH WD while

## 2018-10-14 PROBLEM — D50.9 MICROCYTIC ANEMIA: Status: ACTIVE | Noted: 2018-10-14

## 2018-10-14 LAB
FERRITIN: 72.1 NG/ML (ref 15–150)
FOLATE: 14.59 NG/ML (ref 4.78–24.2)
IRON SATURATION: 14 % (ref 15–50)
IRON: 43 UG/DL (ref 37–145)
TOTAL IRON BINDING CAPACITY: 301 UG/DL (ref 260–445)
VITAMIN B-12: 482 PG/ML (ref 211–911)

## 2018-10-14 PROCEDURE — 6370000000 HC RX 637 (ALT 250 FOR IP): Performed by: PSYCHIATRY & NEUROLOGY

## 2018-10-14 PROCEDURE — 1240000000 HC EMOTIONAL WELLNESS R&B

## 2018-10-14 PROCEDURE — 99223 1ST HOSP IP/OBS HIGH 75: CPT | Performed by: PSYCHIATRY & NEUROLOGY

## 2018-10-14 RX ORDER — ACETAMINOPHEN 325 MG/1
650 TABLET ORAL EVERY 4 HOURS PRN
Status: DISCONTINUED | OUTPATIENT
Start: 2018-10-14 | End: 2018-10-14

## 2018-10-14 RX ORDER — NALTREXONE HYDROCHLORIDE 50 MG/1
50 TABLET, FILM COATED ORAL
Status: DISCONTINUED | OUTPATIENT
Start: 2018-10-14 | End: 2018-10-15

## 2018-10-14 RX ORDER — IBUPROFEN 800 MG/1
800 TABLET ORAL EVERY 6 HOURS PRN
Status: DISCONTINUED | OUTPATIENT
Start: 2018-10-14 | End: 2018-10-15 | Stop reason: HOSPADM

## 2018-10-14 RX ORDER — FOLIC ACID 1 MG/1
1 TABLET ORAL DAILY
Status: DISCONTINUED | OUTPATIENT
Start: 2018-10-14 | End: 2018-10-15 | Stop reason: HOSPADM

## 2018-10-14 RX ORDER — QUETIAPINE FUMARATE 25 MG/1
50 TABLET, FILM COATED ORAL NIGHTLY
Status: DISCONTINUED | OUTPATIENT
Start: 2018-10-14 | End: 2018-10-15 | Stop reason: HOSPADM

## 2018-10-14 RX ORDER — DOCUSATE SODIUM 100 MG/1
100 CAPSULE, LIQUID FILLED ORAL 2 TIMES DAILY
Status: DISCONTINUED | OUTPATIENT
Start: 2018-10-14 | End: 2018-10-15 | Stop reason: HOSPADM

## 2018-10-14 RX ORDER — TRAZODONE HYDROCHLORIDE 50 MG/1
50 TABLET ORAL NIGHTLY
Status: DISCONTINUED | OUTPATIENT
Start: 2018-10-14 | End: 2018-10-14

## 2018-10-14 RX ORDER — NICOTINE 21 MG/24HR
1 PATCH, TRANSDERMAL 24 HOURS TRANSDERMAL DAILY
Status: DISCONTINUED | OUTPATIENT
Start: 2018-10-14 | End: 2018-10-15 | Stop reason: HOSPADM

## 2018-10-14 RX ORDER — BUPROPION HYDROCHLORIDE 150 MG/1
150 TABLET ORAL DAILY
Status: DISCONTINUED | OUTPATIENT
Start: 2018-10-14 | End: 2018-10-15 | Stop reason: HOSPADM

## 2018-10-14 RX ORDER — MAGNESIUM HYDROXIDE/ALUMINUM HYDROXICE/SIMETHICONE 120; 1200; 1200 MG/30ML; MG/30ML; MG/30ML
30 SUSPENSION ORAL EVERY 6 HOURS PRN
Status: DISCONTINUED | OUTPATIENT
Start: 2018-10-14 | End: 2018-10-15 | Stop reason: HOSPADM

## 2018-10-14 RX ORDER — BISACODYL 10 MG
10 SUPPOSITORY, RECTAL RECTAL DAILY PRN
Status: DISCONTINUED | OUTPATIENT
Start: 2018-10-14 | End: 2018-10-15 | Stop reason: HOSPADM

## 2018-10-14 RX ORDER — THIAMINE MONONITRATE (VIT B1) 100 MG
100 TABLET ORAL DAILY
Status: DISCONTINUED | OUTPATIENT
Start: 2018-10-14 | End: 2018-10-15 | Stop reason: HOSPADM

## 2018-10-14 RX ORDER — HYDROXYZINE PAMOATE 50 MG/1
50 CAPSULE ORAL 3 TIMES DAILY PRN
Status: DISCONTINUED | OUTPATIENT
Start: 2018-10-14 | End: 2018-10-15 | Stop reason: HOSPADM

## 2018-10-14 RX ADMIN — Medication 100 MG: at 10:19

## 2018-10-14 RX ADMIN — IBUPROFEN 800 MG: 800 TABLET ORAL at 11:40

## 2018-10-14 RX ADMIN — SERTRALINE HYDROCHLORIDE 50 MG: 50 TABLET ORAL at 10:20

## 2018-10-14 RX ADMIN — BUPROPION HYDROCHLORIDE 150 MG: 150 TABLET, FILM COATED, EXTENDED RELEASE ORAL at 13:03

## 2018-10-14 RX ADMIN — DOCUSATE SODIUM 100 MG: 100 CAPSULE, LIQUID FILLED ORAL at 10:19

## 2018-10-14 RX ADMIN — FOLIC ACID 1 MG: 1 TABLET ORAL at 10:19

## 2018-10-14 RX ADMIN — NALTREXONE HYDROCHLORIDE 50 MG: 50 TABLET, FILM COATED ORAL at 13:03

## 2018-10-14 RX ADMIN — QUETIAPINE FUMARATE 50 MG: 25 TABLET ORAL at 21:22

## 2018-10-14 ASSESSMENT — SLEEP AND FATIGUE QUESTIONNAIRES
DO YOU HAVE DIFFICULTY SLEEPING: YES
RESTFUL SLEEP: NO
DIFFICULTY FALLING ASLEEP: YES
DO YOU USE A SLEEP AID: YES
DIFFICULTY STAYING ASLEEP: YES
DIFFICULTY ARISING: NO
AVERAGE NUMBER OF SLEEP HOURS: 6
SLEEP PATTERN: DIFFICULTY FALLING ASLEEP;DISTURBED/INTERRUPTED SLEEP;INSOMNIA;RESTLESSNESS

## 2018-10-14 ASSESSMENT — LIFESTYLE VARIABLES: HISTORY_ALCOHOL_USE: YES

## 2018-10-14 ASSESSMENT — PAIN SCALES - GENERAL: PAINLEVEL_OUTOF10: 6

## 2018-10-14 NOTE — H&P
INITIAL PSYCHIATRIC HISTORY AND PHYSICAL      Patient name: Kash Ross  Admit date: 10/13/2018  Today's date: 10/14/2018           CC:  depression    HPI:   Patient seen in room on Adult Behavioral Unit. Patient is a 46 y.o. female who presents  After been stabilized at [address] [place] [address] for alcohol w/d. Pt reported feeling depressed. When I spoke with pt she stated that she is aware that she has drinking problem and stated that she started drinking at age 41 when she was having relationsla problems with . Pt stated that it was a good relationship but they grew apart. Pt stated that she has had failed relationships and currently ended a relationship with a jace she met on line form [facility]. Pt stated that he was more interested in coming to [place] than having a relationship. Pt stated that once here he started lying to her and cheating on her. Pt stated that she feels betrayed and not worthy. Pt stated that she feels she has lost her life and herself in the process of divorce and this dysfunctional toxic relationships. Pt stated that she feels alone and stated that she is scared of doing things on her own and feels she needs someone by her side. Pt stated that she drinks 6 pack and stated that she is willing to go to [facility]. Pt stated that  She feels dep, anhedonia, hopeless, no si and stated that she was never suicidal she wanted for her mom to stay with her, dec sleep, inc anxiety. No crow, no psychosis. PAST PSYCHIATRIC HISTORY:  No previous hospitalizations and no previous attempts. FAMILY PSYCHIATRIC HISTORY:     No completed suicides and no mental illness reported.    Family History   Problem Relation Age of Onset    No Known Problems Mother     No Known Problems Father     Other Brother         anxiety    Cancer Maternal Grandmother         lung cancer    Cancer Maternal Grandfather         lung       ALLERGIES:  No Known Allergies    CURRENT MEDICATIONS:     nicotine  1 patch Transdermal Daily    docusate sodium  100 mg Oral BID    folic acid  1 mg Oral Daily    thiamine  100 mg Oral Daily    naltrexone  50 mg Oral Daily with breakfast    QUEtiapine  50 mg Oral Nightly    buPROPion  150 mg Oral Daily       PAST MEDICAL HISTORY:    Past Medical History:   Diagnosis Date    Anxiety     Depression     Sleeping difficulties         PAST SURGICAL HISTORY:    Past Surgical History:   Procedure Laterality Date    BREAST SURGERY      sailine breast      SECTION  1988    FRACTURE SURGERY  2006    right wrist       PROBLEM LIST:  Active Problems:    Episode of recurrent major depressive disorder ([address]  Resolved Problems:    * No resolved hospital problems. *       SOCIAL HISTORY:  Pt lives by herself in apt. And she works full time. Pt is rn but needs to reinstate license. Pt denies hx of abuse. Pt is  and she has 4 grown kids which she is estrange due to alcoholism. No legal issues. Social History     Social History    Marital status: Single     Spouse name: N/A    Number of children: N/A    Years of education: N/A     Occupational History    Not on file. Social History Main Topics    Smoking status: Current Every Day Smoker     Packs/day: 0.50     Years: 8.00     Types: Cigarettes    Smokeless tobacco: Never Used    Alcohol use 3.6 oz/week     6 Cans of beer per week    Drug use: No    Sexual activity: Not Currently     Other Topics Concern    Not on file     Social History Narrative    No narrative on file       OBJECTIVE:   Vitals:    10/14/18 1046   BP:    Pulse: 88   Resp:    Temp:        REVIEW OF SYSTEMS:   Reports no current cardiovascular, respiratory, gastrointestinal, genitourinary,   integumentary, neurological, musculoskeletal, or immunological symptoms today.   PSYCHIATRIC:  See HPI above     PSYCHIATRIC EXAMINATION / MENTAL STATUS EXAM:     CONSTITUTIONAL:    Vitals:    Blood pressure 139/87, pulse 88, temperature 98.7 °F (37.1 °C), temperature source Oral, resp. rate 16, last menstrual period 09/06/2018, not currently breastfeeding.   General appearance:  [x]  appears age, []  appears older than stated age,     [x]  adequately dressed and groomed, [] disheveled,                [x]  in no acute distress, [] appears mildly distressed,      []  Other:      MUSCULOSKELETAL:   Gait:   [x] normal, [] antalgic, [] unsteady, [] in bed, gait not evaluated   Station:  [] erect, [] sitting, [] recumbent, [] other        Strength/tone:  [x] muscle strength and tone appear consistent with age and condition     [] atrophy      [] abnormal movements  PSYCHIATRIC:    Relatedness:  [x] cooperative, [] guarded, [] indifferent, [] hostile,      [] sedated  Speech:  [x] normal prosody, [] pressured, [] decreased volume,    [] slurred, [] halting, [] slowed, [] other     [] echolalia, [] incoherent, [] stuttering   Eye contact:  [x] direct, [] avoidant, [] intense  Kinetics:  [x] normal, [] increased, [] decreased  Mood:   [] stable, [x] depressed, [] anxious, [] irritable,     [] labile  Affect:   [] normal range, [] constricted, [x] depressed, [] anxious,     [] angry, [] blunted  Hallucinations  [] denies, [] auditory,  [] visual,  [] olfactory, [] tactile  Delusions  [x] none, [] grandiose,  [] jealous,  [] persecutory,  [] somatic,     [] bizarre  Preoccupations  [x] none, [] violence, [] obsessions, [] other     Suicidal ideation  [x] denies, [] endorses  Homicidal ideation [x] denies, [] endorses  Thought process: [x] logical, [] circumstantial, [] tangential, [] EDUARDO,     [] simplistic, [] disorganized  Associations:  [x] logical and coherent, [] loosening, [] disorganized  Insight:   [] good, [x] fair, [] poor  Judgment:  [] good, [] fair, [x] poor  Attention and concentration:     [x] intact, [] limited, [] able to focus on interview,     [] grossly impaired  Orientation:  [x] person, place, time, situation     [] disoriented to:     Memory:  Remote memory

## 2018-10-14 NOTE — PROGRESS NOTES
First care transport here at this time. Discharge paperwork and patients belongings/bags collected and packed with patient. Report given to transports. Patient discharged to [facility] unit at this time.

## 2018-10-14 NOTE — BH NOTE
(benefits of quitting, techniques in how to quit, available resources  ( ) Referral for counseling faxed to [facility]                                           ( ) Patient refused counseling  ( ) Patient has not smoked in the last 30 days    Metabolic Screening:    No results found for: LABA1C    No results for input(s): CHOL, TRIG, HDL, LDLCALC, LABVLDL in the last 72 hours. There is no height or weight on file to calculate BMI. BP Readings from Last 2 Encounters:   10/13/18 (!) 141/94   10/13/18 138/87           Pt admitted with followings belongings:  Dentures: None  Vision - Corrective Lenses: Glasses  Hearing Aid: None  Jewelry: None  Body Piercings Removed: N/A  Clothing: Footwear, Pants, Shirt, Sweater, Socks, Undergarments (Comment), Pajamas, Other (Comment) (2 baseball caps)  Were All Patient Medications Collected?: Yes  Other Valuables: Cell phone, Purse, [address], Other (Comment) (hair dryer, makeup, carry on bag, hospital supplies)     Valuables in [place]. Patient's box of nicotine gum placed in pharmacy bag in basket in med. Room. No Valuables in the safe. Patient oriented to surroundings and program expectations and copy of patient rights given. Received admission packet Yes. Consents reviewed, signed *Yes**. Patient verbalize understanding:  Yes. Patient education on precautions: Yes.                    Tremaine Echevarria RN

## 2018-10-15 VITALS
DIASTOLIC BLOOD PRESSURE: 92 MMHG | RESPIRATION RATE: 16 BRPM | TEMPERATURE: 97.4 F | HEART RATE: 108 BPM | SYSTOLIC BLOOD PRESSURE: 135 MMHG

## 2018-10-15 LAB
EKG ATRIAL RATE: 88 BPM
EKG DIAGNOSIS: NORMAL
EKG P AXIS: 61 DEGREES
EKG P-R INTERVAL: 124 MS
EKG Q-T INTERVAL: 370 MS
EKG QRS DURATION: 76 MS
EKG QTC CALCULATION (BAZETT): 447 MS
EKG R AXIS: 65 DEGREES
EKG T AXIS: 55 DEGREES
EKG VENTRICULAR RATE: 88 BPM

## 2018-10-15 PROCEDURE — 6370000000 HC RX 637 (ALT 250 FOR IP): Performed by: PSYCHIATRY & NEUROLOGY

## 2018-10-15 PROCEDURE — 5130000000 HC BRIDGE APPOINTMENT

## 2018-10-15 PROCEDURE — 99239 HOSP IP/OBS DSCHRG MGMT >30: CPT | Performed by: PSYCHIATRY & NEUROLOGY

## 2018-10-15 RX ORDER — QUETIAPINE FUMARATE 50 MG/1
50 TABLET, FILM COATED ORAL NIGHTLY
Qty: 30 TABLET | Refills: 0 | Status: SHIPPED | OUTPATIENT
Start: 2018-10-15 | End: 2019-07-05

## 2018-10-15 RX ORDER — BUPROPION HYDROCHLORIDE 150 MG/1
150 TABLET ORAL DAILY
Qty: 30 TABLET | Refills: 0 | Status: ON HOLD | OUTPATIENT
Start: 2018-10-16 | End: 2019-07-05 | Stop reason: SDDI

## 2018-10-15 RX ADMIN — BUPROPION HYDROCHLORIDE 150 MG: 150 TABLET, FILM COATED, EXTENDED RELEASE ORAL at 09:36

## 2018-10-15 RX ADMIN — Medication 100 MG: at 09:36

## 2018-10-15 RX ADMIN — NALTREXONE HYDROCHLORIDE 50 MG: 50 TABLET, FILM COATED ORAL at 09:36

## 2018-10-15 RX ADMIN — FOLIC ACID 1 MG: 1 TABLET ORAL at 09:36

## 2018-10-15 NOTE — PLAN OF CARE
Problem: Depressive Behavior With or Without Suicide Precautions:  Goal: Able to verbalize acceptance of life and situations over which he or she has no control  Able to verbalize acceptance of life and situations over which he or she has no control   Outcome: Ongoing                                                                      Group Therapy Note    Date: 10/15/2018  Start Time: 11:00 am   End Time:  11:50 am   Number of Participants: 12     Type of Group: Psychotherapy     Patient's Goal:  Pt will improve interpersonal interactions through group processing and agenda setting.      Notes:  Pt participated in group discussion, provided supportive feedback, and addressed her agenda within the group. Pt believes she can be her own higher power. Status After Intervention:  Unchanged    Participation Level:  Active Listener and Interactive    Participation Quality: Appropriate, Attentive and Sharing      Speech:  normal      Thought Process/Content: Logical  Linear      Affective Functioning: Congruent      Mood: depressed      Level of consciousness:  Alert      Response to Learning: Able to verbalize current knowledge/experience and Resistant      Endings: None Reported    Modes of Intervention: Education, Support, Socialization, Exploration, Clarifying, Problem-solving, Activity and Movement      Discipline Responsible: /Counselor      Signature:  Sheyla Us, [facility]

## 2018-10-15 NOTE — PROGRESS NOTES
Patient with microcytic anemia and borderline low iron. I discussed with José Luis Dozier prior to her discharge. She will f/u with PCP, I recommended GI eval, she stated understanding.       Shawn Queen PA-C  10/15/2018 2:39 PM

## 2018-10-16 ENCOUNTER — TELEPHONE (OUTPATIENT)
Dept: FAMILY MEDICINE CLINIC | Age: 48
End: 2018-10-16

## 2018-10-24 ENCOUNTER — OFFICE VISIT (OUTPATIENT)
Dept: FAMILY MEDICINE CLINIC | Age: 48
End: 2018-10-24
Payer: COMMERCIAL

## 2018-10-24 VITALS
BODY MASS INDEX: 29.74 KG/M2 | TEMPERATURE: 98.1 F | DIASTOLIC BLOOD PRESSURE: 70 MMHG | SYSTOLIC BLOOD PRESSURE: 110 MMHG | HEIGHT: 64 IN | WEIGHT: 174.2 LBS

## 2018-10-24 DIAGNOSIS — F33.1 MODERATE EPISODE OF RECURRENT MAJOR DEPRESSIVE DISORDER (HCC): Primary | ICD-10-CM

## 2018-10-24 PROCEDURE — 99213 OFFICE O/P EST LOW 20 MIN: CPT | Performed by: INTERNAL MEDICINE

## 2018-10-24 ASSESSMENT — ENCOUNTER SYMPTOMS
CONSTIPATION: 0
APNEA: 0
STRIDOR: 0
ABDOMINAL PAIN: 0
COUGH: 0
SINUS PAIN: 0
RHINORRHEA: 0
SHORTNESS OF BREATH: 0
BLOOD IN STOOL: 0

## 2019-06-25 ENCOUNTER — HOSPITAL ENCOUNTER (EMERGENCY)
Age: 49
Discharge: HOME | End: 2019-06-25
Payer: MEDICAID

## 2019-06-25 VITALS
WEIGHT: 180.6 LBS | BODY MASS INDEX: 30.83 KG/M2 | HEIGHT: 64 IN | SYSTOLIC BLOOD PRESSURE: 145 MMHG | DIASTOLIC BLOOD PRESSURE: 81 MMHG | RESPIRATION RATE: 20 BRPM | TEMPERATURE: 98.9 F | HEART RATE: 107 BPM | OXYGEN SATURATION: 95 %

## 2019-06-25 DIAGNOSIS — R11.2 NON-INTRACTABLE VOMITING WITH NAUSEA, UNSPECIFIED VOMITING TYPE: Primary | ICD-10-CM

## 2019-06-25 DIAGNOSIS — F10.10 ALCOHOL ABUSE: ICD-10-CM

## 2019-06-25 LAB
A/G RATIO: 1.5 (ref 1.1–2.2)
ALBUMIN SERPL-MCNC: 4.1 G/DL (ref 3.4–5)
ALP BLD-CCNC: 82 U/L (ref 40–129)
ALT SERPL-CCNC: 107 U/L (ref 10–40)
ANION GAP SERPL CALCULATED.3IONS-SCNC: 17 MMOL/L (ref 3–16)
AST SERPL-CCNC: 134 U/L (ref 15–37)
BASOPHILS ABSOLUTE: 0 K/UL (ref 0–0.2)
BASOPHILS RELATIVE PERCENT: 0.3 %
BILIRUB SERPL-MCNC: 1.4 MG/DL (ref 0–1)
BILIRUBIN URINE: NEGATIVE
BLOOD, URINE: ABNORMAL
BUN BLDV-MCNC: 2 MG/DL (ref 7–20)
CALCIUM SERPL-MCNC: 8.4 MG/DL (ref 8.3–10.6)
CHLORIDE BLD-SCNC: 85 MMOL/L (ref 99–110)
CLARITY: CLEAR
CO2: 24 MMOL/L (ref 21–32)
COLOR: YELLOW
CREAT SERPL-MCNC: <0.5 MG/DL (ref 0.6–1.1)
EOSINOPHILS ABSOLUTE: 0 K/UL (ref 0–0.6)
EOSINOPHILS RELATIVE PERCENT: 0.3 %
ETHANOL: NORMAL MG/DL (ref 0–0.08)
GFR AFRICAN AMERICAN: >60
GFR NON-AFRICAN AMERICAN: >60
GLOBULIN: 2.7 G/DL
GLUCOSE BLD-MCNC: 93 MG/DL (ref 70–99)
GLUCOSE URINE: NEGATIVE MG/DL
HCT VFR BLD CALC: 38.6 % (ref 36–48)
HEMOGLOBIN: 13.2 G/DL (ref 12–16)
KETONES, URINE: 40 MG/DL
LACTIC ACID: 1.2 MMOL/L (ref 0.4–2)
LEUKOCYTE ESTERASE, URINE: NEGATIVE
LYMPHOCYTES ABSOLUTE: 1.5 K/UL (ref 1–5.1)
LYMPHOCYTES RELATIVE PERCENT: 13.6 %
MAGNESIUM: 1.1 MG/DL (ref 1.8–2.4)
MCH RBC QN AUTO: 30.1 PG (ref 26–34)
MCHC RBC AUTO-ENTMCNC: 34.2 G/DL (ref 31–36)
MCV RBC AUTO: 88 FL (ref 80–100)
MICROSCOPIC EXAMINATION: YES
MONOCYTES ABSOLUTE: 0.7 K/UL (ref 0–1.3)
MONOCYTES RELATIVE PERCENT: 6.3 %
NEUTROPHILS ABSOLUTE: 8.6 K/UL (ref 1.7–7.7)
NEUTROPHILS RELATIVE PERCENT: 79.5 %
NITRITE, URINE: NEGATIVE
PDW BLD-RTO: 13.1 % (ref 12.4–15.4)
PH UA: 7 (ref 5–8)
PLATELET # BLD: 239 K/UL (ref 135–450)
PLATELET SLIDE REVIEW: ADEQUATE
PMV BLD AUTO: 8.2 FL (ref 5–10.5)
POTASSIUM REFLEX MAGNESIUM: 3 MMOL/L (ref 3.5–5.1)
PROTEIN UA: ABNORMAL MG/DL
RBC # BLD: 4.39 M/UL (ref 4–5.2)
RBC UA: ABNORMAL /HPF (ref 0–2)
SLIDE REVIEW: ABNORMAL
SODIUM BLD-SCNC: 126 MMOL/L (ref 136–145)
SPECIFIC GRAVITY UA: <1.005 (ref 1–1.03)
TOTAL PROTEIN: 6.8 G/DL (ref 6.4–8.2)
URINE REFLEX TO CULTURE: ABNORMAL
URINE TYPE: ABNORMAL
UROBILINOGEN, URINE: 0.2 E.U./DL
WBC # BLD: 10.9 K/UL (ref 4–11)
WBC UA: ABNORMAL /HPF (ref 0–5)

## 2019-06-25 PROCEDURE — 96365 THER/PROPH/DIAG IV INF INIT: CPT

## 2019-06-25 PROCEDURE — 83605 ASSAY OF LACTIC ACID: CPT

## 2019-06-25 PROCEDURE — 81001 URINALYSIS AUTO W/SCOPE: CPT

## 2019-06-25 PROCEDURE — G0480 DRUG TEST DEF 1-7 CLASSES: HCPCS

## 2019-06-25 PROCEDURE — 96361 HYDRATE IV INFUSION ADD-ON: CPT

## 2019-06-25 PROCEDURE — 85025 COMPLETE CBC W/AUTO DIFF WBC: CPT

## 2019-06-25 PROCEDURE — 80053 COMPREHEN METABOLIC PANEL: CPT

## 2019-06-25 PROCEDURE — 96375 TX/PRO/DX INJ NEW DRUG ADDON: CPT

## 2019-06-25 PROCEDURE — 2580000003 HC RX 258: Performed by: PHYSICIAN ASSISTANT

## 2019-06-25 PROCEDURE — 99284 EMERGENCY DEPT VISIT MOD MDM: CPT

## 2019-06-25 PROCEDURE — 6370000000 HC RX 637 (ALT 250 FOR IP): Performed by: PHYSICIAN ASSISTANT

## 2019-06-25 PROCEDURE — 83735 ASSAY OF MAGNESIUM: CPT

## 2019-06-25 PROCEDURE — 6360000002 HC RX W HCPCS: Performed by: PHYSICIAN ASSISTANT

## 2019-06-25 RX ORDER — ONDANSETRON 4 MG/1
4 TABLET, ORALLY DISINTEGRATING ORAL EVERY 8 HOURS PRN
Qty: 20 TABLET | Refills: 0 | Status: SHIPPED | OUTPATIENT
Start: 2019-06-25 | End: 2019-06-25 | Stop reason: SDUPTHER

## 2019-06-25 RX ORDER — CHLORDIAZEPOXIDE HYDROCHLORIDE 25 MG/1
50 CAPSULE, GELATIN COATED ORAL 3 TIMES DAILY PRN
Qty: 18 CAPSULE | Refills: 3 | Status: SHIPPED | OUTPATIENT
Start: 2019-06-25 | End: 2019-06-25 | Stop reason: SDUPTHER

## 2019-06-25 RX ORDER — LORAZEPAM 1 MG/1
1 TABLET ORAL ONCE
Status: COMPLETED | OUTPATIENT
Start: 2019-06-25 | End: 2019-06-25

## 2019-06-25 RX ORDER — ONDANSETRON 4 MG/1
4 TABLET, ORALLY DISINTEGRATING ORAL EVERY 8 HOURS PRN
Qty: 20 TABLET | Refills: 0 | Status: SHIPPED | OUTPATIENT
Start: 2019-06-25 | End: 2019-07-05

## 2019-06-25 RX ORDER — ONDANSETRON 2 MG/ML
4 INJECTION INTRAMUSCULAR; INTRAVENOUS ONCE
Status: COMPLETED | OUTPATIENT
Start: 2019-06-25 | End: 2019-06-25

## 2019-06-25 RX ORDER — CHLORDIAZEPOXIDE HYDROCHLORIDE 25 MG/1
50 CAPSULE, GELATIN COATED ORAL 3 TIMES DAILY PRN
Qty: 18 CAPSULE | Refills: 3 | Status: SHIPPED | OUTPATIENT
Start: 2019-06-25 | End: 2019-06-28

## 2019-06-25 RX ORDER — 0.9 % SODIUM CHLORIDE 0.9 %
1000 INTRAVENOUS SOLUTION INTRAVENOUS ONCE
Status: COMPLETED | OUTPATIENT
Start: 2019-06-25 | End: 2019-06-25

## 2019-06-25 RX ORDER — MAGNESIUM SULFATE 1 G/100ML
1 INJECTION INTRAVENOUS ONCE
Status: COMPLETED | OUTPATIENT
Start: 2019-06-25 | End: 2019-06-25

## 2019-06-25 RX ADMIN — ONDANSETRON 4 MG: 2 INJECTION INTRAMUSCULAR; INTRAVENOUS at 15:45

## 2019-06-25 RX ADMIN — LORAZEPAM 1 MG: 1 TABLET ORAL at 15:46

## 2019-06-25 RX ADMIN — SODIUM CHLORIDE 1000 ML: 9 INJECTION, SOLUTION INTRAVENOUS at 15:46

## 2019-06-25 RX ADMIN — MAGNESIUM SULFATE HEPTAHYDRATE 1 G: 1 INJECTION, SOLUTION INTRAVENOUS at 17:17

## 2019-06-25 ASSESSMENT — ENCOUNTER SYMPTOMS
DIARRHEA: 1
ABDOMINAL PAIN: 0
CHEST TIGHTNESS: 0
COLOR CHANGE: 0
VOMITING: 1
SHORTNESS OF BREATH: 0
NAUSEA: 1

## 2019-06-25 ASSESSMENT — PAIN DESCRIPTION - PAIN TYPE: TYPE: ACUTE PAIN

## 2019-06-25 ASSESSMENT — PAIN SCALES - GENERAL
PAINLEVEL_OUTOF10: 6
PAINLEVEL_OUTOF10: 3
PAINLEVEL_OUTOF10: 2

## 2019-06-25 ASSESSMENT — PAIN - FUNCTIONAL ASSESSMENT: PAIN_FUNCTIONAL_ASSESSMENT: 0-10

## 2019-06-25 ASSESSMENT — PAIN DESCRIPTION - PROGRESSION: CLINICAL_PROGRESSION: GRADUALLY IMPROVING

## 2019-06-25 ASSESSMENT — PAIN DESCRIPTION - LOCATION
LOCATION: GENERALIZED

## 2019-06-25 ASSESSMENT — PAIN DESCRIPTION - DESCRIPTORS
DESCRIPTORS: ACHING
DESCRIPTORS: DISCOMFORT

## 2019-06-25 NOTE — ED TRIAGE NOTES
Patient arrived to ED via [facility]. EMS reports patient is here for alcohol withdrawal and her last drink was 6pm last night. Patient denies daily drinking and states last night she \"just drank too much. \" Patient states she \"feels like something bad is going to happen\" and has generalized pain all over. Patient is alert and oriented X4 at this time. Sinus tach on the monitor and warm to touch. Oral temp of 100.3 at this time.

## 2019-06-25 NOTE — ED NOTES
Patient ambulated to restroom with steady gait and returned to bed without incident. Denies additional needs at this time. Will continue to monitor.      Iva Awad RN  06/25/19 5588

## 2019-06-25 NOTE — ED NOTES
D/C: Order noted for d/c. Pt confirmed d/c paperwork and two prescriptions have correct name. Discharge and education instructions reviewed with patient. Teach-back successful. Pt verbalized understanding and signed d/c papers. Pt denied questions at this time. No acute distress noted. Patient instructed to follow-up as noted - return to emergency department if symptoms worsen. Patient verbalized understanding. Discharged per EDMD with discharge instructions. Pt discharged to private vehicle. Patient stable upon departure. Thanked patient for choosing [facility] for care.         Kenia Wheeler, RN  06/25/19 4410

## 2019-06-25 NOTE — LETTER
[facility] Emergency Department  [facility] [facility] [address]  Phone: [phone]             June 25, 2019    Patient: Helen Claudio   YOB: 1970   Date of Visit: 6/25/2019       To Whom It May Concern:    Helen Claudio was seen and treated in our emergency department on 6/25/2019 for nausea and vomiting. She may return to work on 06/27/2019.       Sincerely,             Signature:__________________________________

## 2019-06-25 NOTE — ED NOTES
Patient resting comfortably, respirations easy, unlabored. Patient in no acute distress. Denies needs at this time. Call light within reach, bed in lowest position, side rails up x 2.         Hillary Bliss RN  06/25/19 8088

## 2019-06-25 NOTE — ED NOTES
Addiction Navigator discussed with the pt her drinking habits. Pt does not want to go to inpatient or outpatient and doesn't like AA. Pt was very interested in counseling and psychiatry. Addiction Navigator gave the pt information about [facility]. Addiction navigator will follow up within the week.       Trevor Frank  06/25/19 1522

## 2019-06-25 NOTE — ED NOTES
Patient resting comfortably, respirations easy, unlabored. Patient in no acute distress. Denies needs at this time. Call light within reach, bed in lowest position, side rails up x 2.         Tiffanie Gu RN  06/25/19 3903

## 2019-06-25 NOTE — ED NOTES
Patient resting comfortably, respirations easy, unlabored. Patient in no acute distress. Denies needs at this time. Call light within reach, bed in lowest position, side rails up x 2.         Maribell Aponte RN  06/25/19 5361

## 2019-06-25 NOTE — ED PROVIDER NOTES
[address] [facility]        Pt Name: Kenrick Santos  MRN: [id number]  [place] 1970  Date of evaluation: 6/25/2019  Provider: Marisabel Dickson PA-C  PCP: Tremaine Roman DO    This patient was not seen and evaluated by the attending physician     [address] [place] [address]       Chief Complaint   Patient presents with    Other     Patient states \"I feel like something bad is going to happen. \" EMS report was alcohol withdrawl. Patient states she drank \"too much last night\" but denies daily drinking       HISTORY OF PRESENT ILLNESS   (Location/Symptom, Timing/Onset, Context/Setting, Quality, Duration, Modifying Factors, Severity)  Note limiting factors. Kenrick Santos is a 46 y.o. female presents emergency department by EMS with concerns for alcohol withdrawal.  Patient with history of alcohol abuse. States she began drinking alcohol around age 34. Patient was previously a daily drinker, went to inpatient rehab for 2 months last July. Has been clean from alcohol for 6 months. Saturday and Sunday patient drank a small bottle of vodka each day. States she recently switched jobs and has been under a lot of stress. Has not drank since Sunday. Yesterday she has had numerous episodes of nausea, vomiting diarrhea. Patient feels slightly dehydrated. Patient has some numbness tingling to her face and fingers last night, denies currently. Patient feels slightly anxious. Did have tremors to her hands yesterday. Patient does not want to go to inpatient alcohol rehab again. Patient plans on not drinking again. No history of alcohol withdrawal seizures or DTs. Nursing Notes were all reviewed and agreed with or any disagreements were addressed  in the HPI. REVIEW OF SYSTEMS    (2-9 systems for level 4, 10 or more for level 5)     Review of Systems   Constitutional: Negative for chills and fever. HENT: Negative.     Respiratory: Negative for chest tightness and shortness of breath. Cardiovascular: Negative. Gastrointestinal: Positive for diarrhea, nausea and vomiting. Negative for abdominal pain. Genitourinary: Negative. Musculoskeletal: Negative for arthralgias and myalgias. Skin: Negative for color change, pallor, rash and wound. Neurological: Negative. Psychiatric/Behavioral: Negative for behavioral problems and confusion. The patient is nervous/anxious. Positives and Pertinent negatives as per HPI. Except as noted abovein the ROS, all other systems were reviewed and negative. PAST MEDICAL HISTORY     Past Medical History:   Diagnosis Date    Anxiety     Depression     Sleeping difficulties          SURGICAL HISTORY     Past Surgical History:   Procedure Laterality Date    BREAST SURGERY      sailine breast      SECTION  1988    FRACTURE SURGERY      right wrist         CURRENTMEDICATIONS       Discharge Medication List as of 2019  6:54 PM      CONTINUE these medications which have NOT CHANGED    Details   buPROPion (WELLBUTRIN XL) 150 MG extended release tablet Take 1 tablet by mouth daily, Disp-30 tablet, R-0Normal      QUEtiapine (SEROQUEL) 50 MG tablet Take 1 tablet by mouth nightly, Disp-30 tablet, [id number]      folic acid (FOLVITE) 1 MG tablet Take 1 tablet by mouth daily, Disp-30 tablet, R-0Normal      magnesium oxide (MAG-OX) 400 (241.3 Mg) MG TABS tablet Take 1 tablet by mouth 2 times daily, Disp-60 tablet, R-0Normal      ibuprofen (ADVIL;MOTRIN) 800 MG tablet Take 800 mg by mouth every 6 hours as needed for PainHistorical Med               ALLERGIES     Patient has no known allergies.     FAMILYHISTORY       Family History   Problem Relation Age of Onset    No Known Problems Mother     No Known Problems Father     Other Brother         anxiety    Cancer Maternal Grandmother         lung cancer    Cancer Maternal Grandfather         lung          SOCIAL HISTORY       Social History     Socioeconomic History    Marital status: Single     Spouse name: None    Number of children: None    Years of education: None    Highest education level: None   Occupational History    None   Social Needs    Financial resource strain: None    Food insecurity:     Worry: None     Inability: None    Transportation needs:     Medical: None     Non-medical: None   Tobacco Use    Smoking status: Current Every Day Smoker     Packs/day: 0.50     Years: 8.00     Pack years: 4.00     Types: Cigarettes    Smokeless tobacco: Never Used   Substance and Sexual Activity    Alcohol use: Yes     Alcohol/week: 3.6 oz     Types: 6 Cans of beer per week    Drug use: No    Sexual activity: Not Currently   Lifestyle    Physical activity:     Days per week: None     Minutes per session: None    Stress: None   Relationships    Social connections:     Talks on phone: None     Gets together: None     Attends Scientologist service: None     Active member of club or organization: None     Attends meetings of clubs or organizations: None     Relationship status: None    Intimate partner violence:     Fear of current or ex partner: None     Emotionally abused: None     Physically abused: None     Forced sexual activity: None   Other Topics Concern    None   Social History Narrative    None       SCREENINGS    [place] Coma Scale  Eye Opening: Spontaneous  Best Verbal Response: Oriented  Best Motor Response: Obeys commands  [place] Coma Scale Score: 15        PHYSICAL EXAM    (up to 7 for level 4, 8 or more for level 5)     ED Triage Vitals [06/25/19 1349]   BP Temp Temp Source Pulse Resp SpO2 Height Weight   124/79 100.3 °F (37.9 °C) Oral 107 16 98 % 5' 4\" (1.626 m) 180 lb 9.6 oz (81.9 kg)       Physical Exam   Constitutional: She is oriented to person, place, and time. She appears well-developed and well-nourished. HENT:   Head: Normocephalic and atraumatic.    Eyes: Conjunctivae and EOM are normal.   Neck: Normal range of motion. Neck supple. Cardiovascular: Normal rate and regular rhythm. Pulmonary/Chest: Effort normal and breath sounds normal. No stridor. No respiratory distress. She has no wheezes. Abdominal: Soft. There is no tenderness. There is no rebound and no guarding. Musculoskeletal: Normal range of motion. Neurological: She is alert and oriented to person, place, and time. Skin: Skin is warm. She is not diaphoretic. Psychiatric: She has a normal mood and affect. Her behavior is normal.   Vitals reviewed.       DIAGNOSTIC RESULTS   LABS:    Labs Reviewed   CBC WITH AUTO DIFFERENTIAL - Abnormal; Notable for the following components:       Result Value    Neutrophils # 8.6 (*)     All other components within normal limits    Narrative:     Performed at:  [address] [place] [address]   Phone [phone]   COMPREHENSIVE METABOLIC PANEL W/ REFLEX TO MG FOR LOW K - Abnormal; Notable for the following components:    Sodium 126 (*)     Potassium reflex Magnesium 3.0 (*)     Chloride 85 (*)     Anion Gap 17 (*)     BUN 2 (*)     CREATININE <0.5 (*)     Total Bilirubin 1.4 (*)      (*)      (*)     All other components within normal limits    Narrative:     Performed at:  [address] [place] [address]   Phone [phone]   URINE RT REFLEX TO CULTURE - Abnormal; Notable for the following components:    Ketones, Urine 40 (*)     Blood, Urine LARGE (*)     Protein, UA TRACE (*)     All other components within normal limits    Narrative:     Performed at:  [address] [place] [address]   Phone [phone]   MICROSCOPIC URINALYSIS - Abnormal; Notable for the following components:    RBC, UA 5-10 (*)     All other components within normal limits    Narrative:     Performed at:  [facility]  [address] [facility] [place] [address]   Phone [phone]   MAGNESIUM - Abnormal; Notable for the following components:    Magnesium 1.10 (*)     All other components within normal limits    Narrative:     Performed at:  [facility]  [address] [place] [address]   Phone [phone]   LACTIC ACID, PLASMA    Narrative:     Performed at:  [facility]  [address] [facility] [place] [facility] [address]   Phone [phone]   ETHANOL    Narrative:     Performed at:  [facility]  [address] [facility] [address]   Phone [phone]       All other labs were within normal range or not returned as of this dictation. EKG: All EKG's are interpreted by the Emergency Department Physician in the absence of a cardiologist.  Please see their note for interpretation of EKG. RADIOLOGY:   Non-plain film images such as CT, Ultrasound and MRI are read by the radiologist. Plain radiographic images are visualized andpreliminarily interpreted by the  ED Provider with the below findings:        Interpretation perthe Radiologist below, if available at the time of this note:    No orders to display     No results found.         PROCEDURES   Unless otherwise noted below, none     Procedures    CRITICAL CARE TIME   N/A    CONSULTS:  None      EMERGENCY DEPARTMENT COURSE and DIFFERENTIAL DIAGNOSIS/MDM:   Vitals:    Vitals:    06/25/19 1746 06/25/19 1801 06/25/19 1817 06/25/19 1920   BP: 114/68 112/60 112/66 (!) 145/81   Pulse: 102 96 95 107   Resp: 20 23 19 20   Temp:       TempSrc:       SpO2: 96%  96% 95%   Weight:       Height:           Patient was given thefollowing medications:  Medications   0.9 % sodium chloride bolus (0 mLs Intravenous Stopped 6/25/19 1824)   ondansetron (ZOFRAN) injection 4 mg (4 mg Intravenous Given 6/25/19 1545)   LORazepam (ATIVAN) tablet 1 mg (1 mg Oral Given 6/25/19 1546) magnesium sulfate 1 g in dextrose 5% 100 mL IVPB (0 g Intravenous Stopped 6/25/19 8050)       Patient presents ED with HPI noted above. She nontoxic-appearing. Tachycardic upon arrival but that resolved in the ED with IV fluid and rest.  Temperature initially noted to be 100.3. Patient denies any reported fevers at home. Repeat temperature after reevaluation without antipyretics 98.9. Possible impairment rating. Apart from nausea and vomiting which I suspect secondary to mild alcohol withdrawal no symptoms consistent with acute illness. She is not hypoxic with oxygen saturation of 98% on room air. Patient with no history of withdrawal seizures or DTs. At this time patient does not appear anxious, she has no active emesis or tremor. She is well-appearing. Patient states she feels anxious and that she is having mild withdrawal, given 1 mg oral Ativan. Patient did have symptoms consistent with withdrawal last night including mild hallucinations, mild nausea vomiting, paresthesias. Patient would not this at this time. Patient given 1 L IV fluid, 1 mg Ativan in the ED as well as Zofran. Patient with significant improvement of symptoms at reevaluation. Labs as above. CBC showed no leukocytosis or anemia. Ethanol showed none detected. CMP showed hyponatremia with a sodium 126, given 1 L IV fluid in the ED, patient has been vomiting. Patient with hypo kalemia and hypomagnesia. Given IV mag in ED and educated on potassium . No renal impairment. Addiction specialist spoke with patient regarding her education services. Patient plans on quitting alcohol at this time, resources provided to patient. Patient does not want inpatient treatment. Will give a couple days of librium with education on use, will only take if begins with symptoms of withdrawal.  Zofran given for nausea. Patient voiced improvement of symptoms and that she is ready to be discharged home. Will provide note for work.      Patient return to ED should she have any acute worsening symptoms. Patient comfortable plan. Discharged home in stable condition. Follow up with PCP in 2 days for reevaluation or return to ED if worsening. FINAL IMPRESSION      1. Non-intractable vomiting with nausea, unspecified vomiting type    2. Alcohol abuse          DISPOSITION/PLAN   DISPOSITION Decision To Discharge 06/25/2019 06:51:47 PM      PATIENT REFERREDTO:  [facility] Emergency Department  [address] [address]  [phone]  Go to   If symptoms worsen    Diane Burns [address] [facility]  [phone]    Schedule an appointment as soon as possible for a visit in 3 days  For follow up and reevaluation. DISCHARGE MEDICATIONS:  Discharge Medication List as of 6/25/2019  6:54 PM      START taking these medications    Details   ondansetron (ZOFRAN ODT) 4 MG disintegrating tablet Take 1 tablet by mouth every 8 hours as needed for Nausea, Disp-20 tablet, R-0Print      chlordiazePOXIDE (LIBRIUM) 25 MG capsule Take 2 capsules by mouth 3 times daily as needed (alcohol withdrawal) for up to 3 days. , Disp-18 capsule, R-3Print             DISCONTINUED MEDICATIONS:  Discharge Medication List as of 6/25/2019  6:54 PM                 (Please note that portions ofthis note were completed with a voice recognition program.  Efforts were made to edit the dictations but occasionally words are mis-transcribed.)    Morris Mcgowan PA-C (electronically signed)         Morris Mcgowan PA-C  06/25/19 4747

## 2019-07-05 ENCOUNTER — HOSPITAL ENCOUNTER (INPATIENT)
Age: 49
LOS: 4 days | Discharge: PSYCHIATRIC OR SUBSTANCE USE FACILITY | End: 2019-07-09
Attending: EMERGENCY MEDICINE | Admitting: INTERNAL MEDICINE
Payer: MEDICAID

## 2019-07-05 DIAGNOSIS — F10.239 ALCOHOL DEPENDENCE WITH WITHDRAWAL WITH COMPLICATION (HCC): ICD-10-CM

## 2019-07-05 DIAGNOSIS — R45.851 SUICIDAL IDEATION: Primary | ICD-10-CM

## 2019-07-05 LAB
A/G RATIO: 1.4 (ref 1.1–2.2)
ACETAMINOPHEN LEVEL: <5 UG/ML (ref 10–30)
ALBUMIN SERPL-MCNC: 4.3 G/DL (ref 3.4–5)
ALP BLD-CCNC: 108 U/L (ref 40–129)
ALT SERPL-CCNC: 204 U/L (ref 10–40)
AMPHETAMINE SCREEN, URINE: ABNORMAL
ANION GAP SERPL CALCULATED.3IONS-SCNC: 20 MMOL/L (ref 3–16)
AST SERPL-CCNC: 118 U/L (ref 15–37)
BACTERIA: ABNORMAL /HPF
BARBITURATE SCREEN URINE: ABNORMAL
BASOPHILS ABSOLUTE: 0.1 K/UL (ref 0–0.2)
BASOPHILS RELATIVE PERCENT: 1.1 %
BENZODIAZEPINE SCREEN, URINE: POSITIVE
BILIRUB SERPL-MCNC: <0.2 MG/DL (ref 0–1)
BILIRUBIN URINE: NEGATIVE
BLOOD, URINE: NEGATIVE
BUN BLDV-MCNC: 5 MG/DL (ref 7–20)
CALCIUM SERPL-MCNC: 8.3 MG/DL (ref 8.3–10.6)
CANNABINOID SCREEN URINE: ABNORMAL
CASTS 2: ABNORMAL /LPF
CASTS: ABNORMAL /LPF
CHLORIDE BLD-SCNC: 97 MMOL/L (ref 99–110)
CLARITY: ABNORMAL
CO2: 19 MMOL/L (ref 21–32)
COCAINE METABOLITE SCREEN URINE: ABNORMAL
COLOR: YELLOW
CREAT SERPL-MCNC: 0.6 MG/DL (ref 0.6–1.1)
EOSINOPHILS ABSOLUTE: 0.1 K/UL (ref 0–0.6)
EOSINOPHILS RELATIVE PERCENT: 0.7 %
EPITHELIAL CELLS, UA: ABNORMAL /HPF
ETHANOL: 204 MG/DL (ref 0–0.08)
GFR AFRICAN AMERICAN: >60
GFR NON-AFRICAN AMERICAN: >60
GLOBULIN: 3 G/DL
GLUCOSE BLD-MCNC: 89 MG/DL (ref 70–99)
GLUCOSE URINE: NEGATIVE MG/DL
HCG QUALITATIVE: NEGATIVE
HCT VFR BLD CALC: 41.8 % (ref 36–48)
HEMOGLOBIN: 14.3 G/DL (ref 12–16)
KETONES, URINE: NEGATIVE MG/DL
LEUKOCYTE ESTERASE, URINE: NEGATIVE
LIPASE: 28 U/L (ref 13–60)
LYMPHOCYTES ABSOLUTE: 1.9 K/UL (ref 1–5.1)
LYMPHOCYTES RELATIVE PERCENT: 22.7 %
Lab: ABNORMAL
MCH RBC QN AUTO: 30.9 PG (ref 26–34)
MCHC RBC AUTO-ENTMCNC: 34.1 G/DL (ref 31–36)
MCV RBC AUTO: 90.6 FL (ref 80–100)
METHADONE SCREEN, URINE: ABNORMAL
MICROSCOPIC EXAMINATION: YES
MONOCYTES ABSOLUTE: 0.8 K/UL (ref 0–1.3)
MONOCYTES RELATIVE PERCENT: 10.3 %
MUCUS: ABNORMAL /LPF
NEUTROPHILS ABSOLUTE: 5.3 K/UL (ref 1.7–7.7)
NEUTROPHILS RELATIVE PERCENT: 65.2 %
NITRITE, URINE: POSITIVE
OPIATE SCREEN URINE: ABNORMAL
OXYCODONE URINE: ABNORMAL
PDW BLD-RTO: 13.8 % (ref 12.4–15.4)
PH UA: 5.5
PH UA: 5.5 (ref 5–8)
PHENCYCLIDINE SCREEN URINE: ABNORMAL
PLATELET # BLD: 341 K/UL (ref 135–450)
PMV BLD AUTO: 7.3 FL (ref 5–10.5)
POTASSIUM REFLEX MAGNESIUM: 4.1 MMOL/L (ref 3.5–5.1)
PROPOXYPHENE SCREEN: ABNORMAL
PROTEIN UA: NEGATIVE MG/DL
RBC # BLD: 4.61 M/UL (ref 4–5.2)
RBC UA: ABNORMAL /HPF (ref 0–2)
SALICYLATE, SERUM: <0.3 MG/DL (ref 15–30)
SODIUM BLD-SCNC: 136 MMOL/L (ref 136–145)
SPECIFIC GRAVITY UA: 1.01 (ref 1–1.03)
TOTAL PROTEIN: 7.3 G/DL (ref 6.4–8.2)
URINE REFLEX TO CULTURE: YES
URINE TYPE: ABNORMAL
UROBILINOGEN, URINE: 0.2 E.U./DL
WBC # BLD: 8.2 K/UL (ref 4–11)
WBC UA: ABNORMAL /HPF (ref 0–5)

## 2019-07-05 PROCEDURE — G0480 DRUG TEST DEF 1-7 CLASSES: HCPCS

## 2019-07-05 PROCEDURE — 6370000000 HC RX 637 (ALT 250 FOR IP): Performed by: INTERNAL MEDICINE

## 2019-07-05 PROCEDURE — 80053 COMPREHEN METABOLIC PANEL: CPT

## 2019-07-05 PROCEDURE — 2580000003 HC RX 258: Performed by: PHYSICIAN ASSISTANT

## 2019-07-05 PROCEDURE — 85025 COMPLETE CBC W/AUTO DIFF WBC: CPT

## 2019-07-05 PROCEDURE — 87077 CULTURE AEROBIC IDENTIFY: CPT

## 2019-07-05 PROCEDURE — 83690 ASSAY OF LIPASE: CPT

## 2019-07-05 PROCEDURE — 80307 DRUG TEST PRSMV CHEM ANLYZR: CPT

## 2019-07-05 PROCEDURE — 87086 URINE CULTURE/COLONY COUNT: CPT

## 2019-07-05 PROCEDURE — 2060000000 HC ICU INTERMEDIATE R&B

## 2019-07-05 PROCEDURE — 96365 THER/PROPH/DIAG IV INF INIT: CPT

## 2019-07-05 PROCEDURE — 87186 SC STD MICRODIL/AGAR DIL: CPT

## 2019-07-05 PROCEDURE — 96366 THER/PROPH/DIAG IV INF ADDON: CPT

## 2019-07-05 PROCEDURE — 2580000003 HC RX 258: Performed by: INTERNAL MEDICINE

## 2019-07-05 PROCEDURE — 81001 URINALYSIS AUTO W/SCOPE: CPT

## 2019-07-05 PROCEDURE — 6370000000 HC RX 637 (ALT 250 FOR IP): Performed by: PHYSICIAN ASSISTANT

## 2019-07-05 PROCEDURE — 6360000002 HC RX W HCPCS: Performed by: PHYSICIAN ASSISTANT

## 2019-07-05 PROCEDURE — 96375 TX/PRO/DX INJ NEW DRUG ADDON: CPT

## 2019-07-05 PROCEDURE — 96376 TX/PRO/DX INJ SAME DRUG ADON: CPT

## 2019-07-05 PROCEDURE — 99285 EMERGENCY DEPT VISIT HI MDM: CPT

## 2019-07-05 PROCEDURE — 94760 N-INVAS EAR/PLS OXIMETRY 1: CPT

## 2019-07-05 PROCEDURE — 2500000003 HC RX 250 WO HCPCS: Performed by: PHYSICIAN ASSISTANT

## 2019-07-05 PROCEDURE — 84703 CHORIONIC GONADOTROPIN ASSAY: CPT

## 2019-07-05 RX ORDER — SODIUM CHLORIDE 9 MG/ML
INJECTION, SOLUTION INTRAVENOUS CONTINUOUS
Status: DISCONTINUED | OUTPATIENT
Start: 2019-07-05 | End: 2019-07-08

## 2019-07-05 RX ORDER — LORAZEPAM 2 MG/ML
1 INJECTION INTRAMUSCULAR
Status: DISCONTINUED | OUTPATIENT
Start: 2019-07-05 | End: 2019-07-05 | Stop reason: SDUPTHER

## 2019-07-05 RX ORDER — LORAZEPAM 2 MG/ML
2 INJECTION INTRAMUSCULAR
Status: DISCONTINUED | OUTPATIENT
Start: 2019-07-05 | End: 2019-07-10 | Stop reason: HOSPADM

## 2019-07-05 RX ORDER — ONDANSETRON 2 MG/ML
4 INJECTION INTRAMUSCULAR; INTRAVENOUS ONCE
Status: COMPLETED | OUTPATIENT
Start: 2019-07-05 | End: 2019-07-05

## 2019-07-05 RX ORDER — 0.9 % SODIUM CHLORIDE 0.9 %
1000 INTRAVENOUS SOLUTION INTRAVENOUS ONCE
Status: COMPLETED | OUTPATIENT
Start: 2019-07-05 | End: 2019-07-05

## 2019-07-05 RX ORDER — LORAZEPAM 1 MG/1
4 TABLET ORAL
Status: DISCONTINUED | OUTPATIENT
Start: 2019-07-05 | End: 2019-07-05 | Stop reason: SDUPTHER

## 2019-07-05 RX ORDER — NICOTINE 21 MG/24HR
1 PATCH, TRANSDERMAL 24 HOURS TRANSDERMAL DAILY
Status: DISCONTINUED | OUTPATIENT
Start: 2019-07-05 | End: 2019-07-10 | Stop reason: HOSPADM

## 2019-07-05 RX ORDER — LORAZEPAM 1 MG/1
2 TABLET ORAL
Status: DISCONTINUED | OUTPATIENT
Start: 2019-07-05 | End: 2019-07-10 | Stop reason: HOSPADM

## 2019-07-05 RX ORDER — LORAZEPAM 1 MG/1
4 TABLET ORAL
Status: DISCONTINUED | OUTPATIENT
Start: 2019-07-05 | End: 2019-07-10 | Stop reason: HOSPADM

## 2019-07-05 RX ORDER — LORAZEPAM 2 MG/ML
1 INJECTION INTRAMUSCULAR
Status: DISCONTINUED | OUTPATIENT
Start: 2019-07-05 | End: 2019-07-10 | Stop reason: HOSPADM

## 2019-07-05 RX ORDER — LORAZEPAM 1 MG/1
3 TABLET ORAL
Status: DISCONTINUED | OUTPATIENT
Start: 2019-07-05 | End: 2019-07-10 | Stop reason: HOSPADM

## 2019-07-05 RX ORDER — LORAZEPAM 2 MG/ML
3 INJECTION INTRAMUSCULAR
Status: DISCONTINUED | OUTPATIENT
Start: 2019-07-05 | End: 2019-07-05 | Stop reason: SDUPTHER

## 2019-07-05 RX ORDER — LORAZEPAM 1 MG/1
1 TABLET ORAL
Status: DISCONTINUED | OUTPATIENT
Start: 2019-07-05 | End: 2019-07-05 | Stop reason: SDUPTHER

## 2019-07-05 RX ORDER — ONDANSETRON 2 MG/ML
4 INJECTION INTRAMUSCULAR; INTRAVENOUS EVERY 6 HOURS PRN
Status: DISCONTINUED | OUTPATIENT
Start: 2019-07-05 | End: 2019-07-10 | Stop reason: HOSPADM

## 2019-07-05 RX ORDER — SODIUM CHLORIDE 0.9 % (FLUSH) 0.9 %
10 SYRINGE (ML) INJECTION EVERY 12 HOURS SCHEDULED
Status: DISCONTINUED | OUTPATIENT
Start: 2019-07-05 | End: 2019-07-10 | Stop reason: HOSPADM

## 2019-07-05 RX ORDER — ACETAMINOPHEN 500 MG
1000 TABLET ORAL EVERY 6 HOURS PRN
Status: DISCONTINUED | OUTPATIENT
Start: 2019-07-05 | End: 2019-07-10 | Stop reason: HOSPADM

## 2019-07-05 RX ORDER — SODIUM CHLORIDE 0.9 % (FLUSH) 0.9 %
10 SYRINGE (ML) INJECTION PRN
Status: DISCONTINUED | OUTPATIENT
Start: 2019-07-05 | End: 2019-07-10 | Stop reason: HOSPADM

## 2019-07-05 RX ORDER — LORAZEPAM 1 MG/1
3 TABLET ORAL
Status: DISCONTINUED | OUTPATIENT
Start: 2019-07-05 | End: 2019-07-05 | Stop reason: SDUPTHER

## 2019-07-05 RX ORDER — BUPROPION HYDROCHLORIDE 150 MG/1
150 TABLET ORAL DAILY
Status: DISCONTINUED | OUTPATIENT
Start: 2019-07-06 | End: 2019-07-08

## 2019-07-05 RX ORDER — LORAZEPAM 2 MG/ML
4 INJECTION INTRAMUSCULAR
Status: DISCONTINUED | OUTPATIENT
Start: 2019-07-05 | End: 2019-07-05 | Stop reason: SDUPTHER

## 2019-07-05 RX ORDER — LORAZEPAM 1 MG/1
1 TABLET ORAL
Status: DISCONTINUED | OUTPATIENT
Start: 2019-07-05 | End: 2019-07-10 | Stop reason: HOSPADM

## 2019-07-05 RX ORDER — LORAZEPAM 2 MG/ML
4 INJECTION INTRAMUSCULAR
Status: DISCONTINUED | OUTPATIENT
Start: 2019-07-05 | End: 2019-07-10 | Stop reason: HOSPADM

## 2019-07-05 RX ORDER — LORAZEPAM 2 MG/ML
3 INJECTION INTRAMUSCULAR
Status: DISCONTINUED | OUTPATIENT
Start: 2019-07-05 | End: 2019-07-10 | Stop reason: HOSPADM

## 2019-07-05 RX ORDER — LORAZEPAM 1 MG/1
2 TABLET ORAL
Status: DISCONTINUED | OUTPATIENT
Start: 2019-07-05 | End: 2019-07-05 | Stop reason: SDUPTHER

## 2019-07-05 RX ORDER — LORAZEPAM 2 MG/ML
2 INJECTION INTRAMUSCULAR
Status: DISCONTINUED | OUTPATIENT
Start: 2019-07-05 | End: 2019-07-05 | Stop reason: SDUPTHER

## 2019-07-05 RX ADMIN — ACETAMINOPHEN 1000 MG: 500 TABLET, FILM COATED ORAL at 23:07

## 2019-07-05 RX ADMIN — LORAZEPAM 4 MG: 2 INJECTION INTRAMUSCULAR; INTRAVENOUS at 14:36

## 2019-07-05 RX ADMIN — SODIUM CHLORIDE: 9 INJECTION, SOLUTION INTRAVENOUS at 20:12

## 2019-07-05 RX ADMIN — LORAZEPAM 1 MG: 1 TABLET ORAL at 20:12

## 2019-07-05 RX ADMIN — FOLIC ACID: 5 INJECTION, SOLUTION INTRAMUSCULAR; INTRAVENOUS; SUBCUTANEOUS at 14:31

## 2019-07-05 RX ADMIN — FAMOTIDINE 20 MG: 10 INJECTION, SOLUTION INTRAVENOUS at 14:31

## 2019-07-05 RX ADMIN — ONDANSETRON 4 MG: 2 INJECTION INTRAMUSCULAR; INTRAVENOUS at 14:31

## 2019-07-05 RX ADMIN — SODIUM CHLORIDE 1000 ML: 9 INJECTION, SOLUTION INTRAVENOUS at 14:31

## 2019-07-05 RX ADMIN — LORAZEPAM 3 MG: 1 TABLET ORAL at 16:29

## 2019-07-05 RX ADMIN — LORAZEPAM 4 MG: 2 INJECTION INTRAMUSCULAR; INTRAVENOUS at 15:37

## 2019-07-05 ASSESSMENT — ENCOUNTER SYMPTOMS
DIARRHEA: 0
BLOOD IN STOOL: 0
CONSTIPATION: 0
NAUSEA: 1
VOMITING: 0
ABDOMINAL PAIN: 0
RESPIRATORY NEGATIVE: 1
SHORTNESS OF BREATH: 0
COUGH: 0

## 2019-07-05 ASSESSMENT — PAIN DESCRIPTION - LOCATION: LOCATION: HEAD

## 2019-07-05 ASSESSMENT — PAIN DESCRIPTION - PAIN TYPE: TYPE: ACUTE PAIN

## 2019-07-05 ASSESSMENT — PAIN SCALES - GENERAL
PAINLEVEL_OUTOF10: 0
PAINLEVEL_OUTOF10: 5
PAINLEVEL_OUTOF10: 0

## 2019-07-05 NOTE — H&P
Hospital Medicine History & Physical      PCP: Javon Mckeon DO    Date of Admission: 7/5/2019    Date of Service: Pt seen/examined on 7/5/2019 and Admitted to Inpatient \    Chief Complaint: Alcohol detox and suicidal ideation      History Of Present Illness:      Aleisha Ayers is a 46 y.o. female with past medical history of anxiety, depression and sleeping difficulties who presents to the ED with complaint of alcohol intoxication and possible detox and also associated suicide ideation. Patient states she is a daily drinker. Patient states she drinks about 6-9 \"tall\" beers a day. Patient states she been doing this for about 12 years. Patient states she started drinking after significant other cheated on her. Patient states has only progressed. Patient states she has stopped in the past and was recently clean for about 3 months about a year ago. Patient states she stopped going to [place] [address] and started drinking again. Patient states her family is not talking to her and her children \"do not want to be around her\". Patient states she is in \"embarrassment\" to her family and she does not want to be here. Patient states she has no point in living and wishes she was dead. Patient apparently wrote a suicidal note to her children. Patient states she has had numerous plans of harming herself including hanging herself, overdosing or cutting her wrist.  Patient states she has tried cutting her wrists over the past couple of days but states the knife has been \"dull\". Patient states she has had suicidal thoughts and attempt in the past and has been admitted to a psychiatric damian she states numerous years ago. States that the time she was diagnosed with depression. Patient states she was placed on Wellbutrin. Patient states she has been off the Wellbutrin for the past month. home  TOBACCO:   reports that she has been smoking cigarettes. She has a 4.00 pack-year smoking history. She has never used smokeless tobacco.  ETOH:   reports that she drinks about 3.6 oz of alcohol per week. Family History:  Reviewed in detail and negative for DM, Early CAD, Cancer, CVA. Positive as follows:        Problem Relation Age of Onset    No Known Problems Mother     No Known Problems Father     Other Brother         anxiety    Cancer Maternal Grandmother         lung cancer    Cancer Maternal Grandfather         lung       REVIEW OF SYSTEMS:   Positive for depression and suicidal ideations she is not showing any physical signs of withdrawal at the moment and as noted in the HPI. All other systems reviewed and negative. PHYSICAL EXAM:    /69   Pulse 98   Temp 97.6 °F (36.4 °C) (Temporal)   Resp 16   Ht 5' 4\" (1.626 m)   Wt 168 lb (76.2 kg)   LMP 06/24/2019   SpO2 95%   BMI 28.84 kg/m²     General appearance: No apparent distress appears stated age and cooperative. HEENT Normal cephalic, atraumatic without obvious deformity. Pupils equal, round, and reactive to light. Extra ocular muscles intact. Conjunctivae/corneas clear. Neck: Supple, No jugular venous distention/bruits. Trachea midline without thyromegaly or adenopathy with full range of motion. Lungs: Clear to auscultation, bilaterally without Rales/Wheezes/Rhonchi with good respiratory effort. Heart: Regular rate and rhythm with Normal S1/S2 without murmurs, rubs or gallops, point of maximum impulse non-displaced  Abdomen: Soft, non-tender or non-distended without rigidity or guarding and positive bowel sounds all four quadrants. Extremities: No clubbing, cyanosis, or edema bilaterally. Full range of motion without deformity and normal gait intact. Skin: Skin color, texture, turgor normal.  No rashes or lesions.   Neurologic: Alert and oriented X 3, neurovascularly intact with sensory/motor intact upper

## 2019-07-05 NOTE — ED NOTES
PT asking to leave AMA. Talked to PT and agreed to let the medications try to work. Let PT know we have a nicotine patch ordered for her and pharmacy has been called because our pyxis is out. PT remains very anxious and asking to go out and smoke. PT family remains at bedside. 1:1 remains at bedside.       Abraham Bernard RN  07/05/19 0917

## 2019-07-05 NOTE — ED PROVIDER NOTES
distress. She has no wheezes. She has no rales. She exhibits no tenderness. Abdominal: Soft. Bowel sounds are normal. She exhibits no distension and no mass. There is no hepatosplenomegaly. There is no tenderness. There is no rigidity, no rebound, no guarding, no CVA tenderness, no tenderness at McBurney's point and negative Layton's sign. No hernia. Musculoskeletal: Normal range of motion. Lymphadenopathy:     She has no cervical adenopathy. Neurological: She is alert and oriented to person, place, and time. She has normal strength. She displays tremor. No cranial nerve deficit or sensory deficit. GCS eye subscore is 4. GCS verbal subscore is 5. GCS motor subscore is 6. Gait deferred. Skin: Skin is warm and dry. She is not diaphoretic. No erythema. No pallor. Psychiatric: Her behavior is normal. Judgment normal. Her speech is not rapid and/or pressured, not delayed, not tangential and not slurred. She is not agitated, not aggressive, not slowed and not withdrawn. Cognition and memory are normal. She exhibits a depressed mood. She expresses suicidal ideation. She expresses no homicidal ideation. She expresses suicidal plans. She expresses no homicidal plans. She is communicative. Patient tearful and crying upon exam.  Appears depressed with thoughts of suicide and plan in place.        DIAGNOSTIC RESULTS   LABS:    Labs Reviewed   COMPREHENSIVE METABOLIC PANEL W/ REFLEX TO MG FOR LOW K - Abnormal; Notable for the following components:       Result Value    Chloride 97 (*)     CO2 19 (*)     Anion Gap 20 (*)     BUN 5 (*)      (*)      (*)     All other components within normal limits    Narrative:     Performed at:  [facility] [address], [place], [address]   Phone [phone]   URINE RT REFLEX TO CULTURE - Abnormal; Notable for the following components:    Clarity, UA CLOUDY (*)     Nitrite, Urine POSITIVE (*)     All other components within require admission for further management of alcohol withdrawal and management of suicidal ideation/plan. Patient on 72-hour hold at this time. Will talk to hospitalist service given the fact the patient is suffering some mild withdrawal and will not be clear for psychiatric admission at this time. Patient was given Pepcid, Zofran, fluids, banana bag, CIWA protocol, and nicotine patch at this time. FINAL IMPRESSION      1. Suicidal ideation    2. Alcohol dependence with withdrawal with complication [facility])          DISPOSITION/PLAN   DISPOSITION Decision To Admit 07/05/2019 03:41:00 PM      PATIENT REFERREDTO:  No follow-up provider specified.     DISCHARGE MEDICATIONS:  New Prescriptions    No medications on file       DISCONTINUED MEDICATIONS:  Discontinued Medications    FOLIC ACID (FOLVITE) 1 MG TABLET    Take 1 tablet by mouth daily    MAGNESIUM OXIDE (MAG-OX) 400 (241.3 MG) MG TABS TABLET    Take 1 tablet by mouth 2 times daily    ONDANSETRON (ZOFRAN ODT) 4 MG DISINTEGRATING TABLET    Take 1 tablet by mouth every 8 hours as needed for Nausea    QUETIAPINE (SEROQUEL) 50 MG TABLET    Take 1 tablet by mouth nightly              (Please note that portions ofthis note were completed with a voice recognition program.  Efforts were made to edit the dictations but occasionally words are mis-transcribed.)    LUPILLO Garber (electronically signed)          LUPILLO Parikh  07/05/19 1329

## 2019-07-06 LAB
ANION GAP SERPL CALCULATED.3IONS-SCNC: 11 MMOL/L (ref 3–16)
BUN BLDV-MCNC: 5 MG/DL (ref 7–20)
CALCIUM SERPL-MCNC: 8 MG/DL (ref 8.3–10.6)
CHLORIDE BLD-SCNC: 104 MMOL/L (ref 99–110)
CO2: 22 MMOL/L (ref 21–32)
CREAT SERPL-MCNC: 0.6 MG/DL (ref 0.6–1.1)
GFR AFRICAN AMERICAN: >60
GFR NON-AFRICAN AMERICAN: >60
GLUCOSE BLD-MCNC: 93 MG/DL (ref 70–99)
HCT VFR BLD CALC: 38.4 % (ref 36–48)
HEMOGLOBIN: 12.7 G/DL (ref 12–16)
MCH RBC QN AUTO: 29.9 PG (ref 26–34)
MCHC RBC AUTO-ENTMCNC: 33.1 G/DL (ref 31–36)
MCV RBC AUTO: 90.3 FL (ref 80–100)
PDW BLD-RTO: 14.2 % (ref 12.4–15.4)
PLATELET # BLD: 273 K/UL (ref 135–450)
PMV BLD AUTO: 7.4 FL (ref 5–10.5)
POTASSIUM REFLEX MAGNESIUM: 3.9 MMOL/L (ref 3.5–5.1)
RBC # BLD: 4.25 M/UL (ref 4–5.2)
SODIUM BLD-SCNC: 137 MMOL/L (ref 136–145)
WBC # BLD: 5.4 K/UL (ref 4–11)

## 2019-07-06 PROCEDURE — 85027 COMPLETE CBC AUTOMATED: CPT

## 2019-07-06 PROCEDURE — 6370000000 HC RX 637 (ALT 250 FOR IP): Performed by: INTERNAL MEDICINE

## 2019-07-06 PROCEDURE — 2580000003 HC RX 258: Performed by: INTERNAL MEDICINE

## 2019-07-06 PROCEDURE — 2060000000 HC ICU INTERMEDIATE R&B

## 2019-07-06 PROCEDURE — 94760 N-INVAS EAR/PLS OXIMETRY 1: CPT

## 2019-07-06 PROCEDURE — 6370000000 HC RX 637 (ALT 250 FOR IP): Performed by: PHYSICIAN ASSISTANT

## 2019-07-06 PROCEDURE — 36415 COLL VENOUS BLD VENIPUNCTURE: CPT

## 2019-07-06 PROCEDURE — 2500000003 HC RX 250 WO HCPCS: Performed by: INTERNAL MEDICINE

## 2019-07-06 PROCEDURE — 6360000002 HC RX W HCPCS: Performed by: INTERNAL MEDICINE

## 2019-07-06 PROCEDURE — 80048 BASIC METABOLIC PNL TOTAL CA: CPT

## 2019-07-06 RX ORDER — CHLORDIAZEPOXIDE HYDROCHLORIDE 10 MG/1
10 CAPSULE, GELATIN COATED ORAL 3 TIMES DAILY
Status: DISCONTINUED | OUTPATIENT
Start: 2019-07-06 | End: 2019-07-07

## 2019-07-06 RX ORDER — LANOLIN ALCOHOL/MO/W.PET/CERES
3 CREAM (GRAM) TOPICAL NIGHTLY PRN
Status: DISCONTINUED | OUTPATIENT
Start: 2019-07-06 | End: 2019-07-06 | Stop reason: DRUGHIGH

## 2019-07-06 RX ORDER — IBUPROFEN 400 MG/1
400 TABLET ORAL EVERY 4 HOURS PRN
Status: DISCONTINUED | OUTPATIENT
Start: 2019-07-06 | End: 2019-07-10 | Stop reason: HOSPADM

## 2019-07-06 RX ORDER — ZOLPIDEM TARTRATE 5 MG/1
5 TABLET ORAL NIGHTLY PRN
Status: DISCONTINUED | OUTPATIENT
Start: 2019-07-07 | End: 2019-07-06

## 2019-07-06 RX ORDER — LANOLIN ALCOHOL/MO/W.PET/CERES
6 CREAM (GRAM) TOPICAL NIGHTLY PRN
Status: DISCONTINUED | OUTPATIENT
Start: 2019-07-07 | End: 2019-07-06

## 2019-07-06 RX ORDER — LANOLIN ALCOHOL/MO/W.PET/CERES
6 CREAM (GRAM) TOPICAL NIGHTLY PRN
Status: DISCONTINUED | OUTPATIENT
Start: 2019-07-06 | End: 2019-07-10 | Stop reason: HOSPADM

## 2019-07-06 RX ADMIN — ACETAMINOPHEN 1000 MG: 500 TABLET, FILM COATED ORAL at 10:04

## 2019-07-06 RX ADMIN — LORAZEPAM 4 MG: 1 TABLET ORAL at 14:20

## 2019-07-06 RX ADMIN — LORAZEPAM 3 MG: 1 TABLET ORAL at 10:03

## 2019-07-06 RX ADMIN — ACETAMINOPHEN 1000 MG: 500 TABLET, FILM COATED ORAL at 16:37

## 2019-07-06 RX ADMIN — Medication 10 ML: at 21:58

## 2019-07-06 RX ADMIN — SODIUM CHLORIDE: 9 INJECTION, SOLUTION INTRAVENOUS at 04:59

## 2019-07-06 RX ADMIN — LORAZEPAM 3 MG: 1 TABLET ORAL at 16:37

## 2019-07-06 RX ADMIN — Medication 1 G: at 10:53

## 2019-07-06 RX ADMIN — CHLORDIAZEPOXIDE HYDROCHLORIDE 10 MG: 10 CAPSULE ORAL at 21:57

## 2019-07-06 RX ADMIN — ONDANSETRON 4 MG: 2 INJECTION INTRAMUSCULAR; INTRAVENOUS at 10:04

## 2019-07-06 RX ADMIN — LORAZEPAM 1 MG: 1 TABLET ORAL at 19:17

## 2019-07-06 RX ADMIN — LORAZEPAM 2 MG: 1 TABLET ORAL at 20:45

## 2019-07-06 RX ADMIN — CHLORDIAZEPOXIDE HYDROCHLORIDE 10 MG: 10 CAPSULE ORAL at 14:20

## 2019-07-06 RX ADMIN — BUPROPION HYDROCHLORIDE 150 MG: 150 TABLET, FILM COATED, EXTENDED RELEASE ORAL at 10:04

## 2019-07-06 RX ADMIN — ENOXAPARIN SODIUM 40 MG: 40 INJECTION SUBCUTANEOUS at 21:58

## 2019-07-06 RX ADMIN — IBUPROFEN 400 MG: 400 TABLET, FILM COATED ORAL at 14:20

## 2019-07-06 RX ADMIN — MELATONIN TAB 3 MG 6 MG: 3 TAB at 21:57

## 2019-07-06 RX ADMIN — ONDANSETRON 4 MG: 2 INJECTION INTRAMUSCULAR; INTRAVENOUS at 16:37

## 2019-07-06 RX ADMIN — LORAZEPAM 2 MG: 1 TABLET ORAL at 18:08

## 2019-07-06 RX ADMIN — THIAMINE HYDROCHLORIDE: 100 INJECTION, SOLUTION INTRAMUSCULAR; INTRAVENOUS at 12:54

## 2019-07-06 ASSESSMENT — PAIN SCALES - GENERAL
PAINLEVEL_OUTOF10: 4
PAINLEVEL_OUTOF10: 1
PAINLEVEL_OUTOF10: 4
PAINLEVEL_OUTOF10: 0
PAINLEVEL_OUTOF10: 0
PAINLEVEL_OUTOF10: 4
PAINLEVEL_OUTOF10: 0
PAINLEVEL_OUTOF10: 3

## 2019-07-06 ASSESSMENT — PAIN DESCRIPTION - PROGRESSION
CLINICAL_PROGRESSION: RESOLVED
CLINICAL_PROGRESSION: RESOLVED

## 2019-07-06 ASSESSMENT — PAIN DESCRIPTION - DESCRIPTORS
DESCRIPTORS: PRESSURE
DESCRIPTORS: ACHING

## 2019-07-06 ASSESSMENT — PAIN DESCRIPTION - PAIN TYPE
TYPE: CHRONIC PAIN
TYPE: ACUTE PAIN

## 2019-07-06 ASSESSMENT — PAIN DESCRIPTION - LOCATION
LOCATION: GENERALIZED
LOCATION: HEAD

## 2019-07-06 NOTE — CARE COORDINATION
Psychiatrist recommended Inpatient Behavioral unit when patient is medically stable for discharge. Once documented by the MD, please notify [phone] ([phone]). Patient may need a follow up psychiatric visit within 24 hours prior to discharge.   Thank you

## 2019-07-06 NOTE — PLAN OF CARE
Problem: Falls - Risk of:  Goal: Will remain free from falls  Description  Will remain free from falls  Outcome: Ongoing   Pt remains free from falls. Pt is asleep in bed with 2/4 side rails up, bed in lowest position with brakes locked and bedside table and call light within reach. Bed alarm on. Sitter at bedside  Problem: Suicide risk  Goal: Provide patient with safe environment  Description  Provide patient with safe environment  Outcome: Ongoing   Suicide precautions placed for pt. Constant observer at bedside. Pt denies any feelings of suicidal ideations throughout the shift saying she only feels that way when she is drinking.

## 2019-07-06 NOTE — PLAN OF CARE
Assessment complete, see flow sheet. /84   Pulse 80   Temp 97.6 °F (36.4 °C) (Oral)   Resp 16   Ht 5' 4\" (1.626 m)   Wt 174 lb 11.2 oz (79.2 kg)   LMP 06/24/2019   SpO2 94%   BMI 29.99 kg/m² . Lung sounds diminished in posterior bases with fine rales in right posterior base. Discussed plan of care regarding turn, cough & deep breath every 2 hours while awake. Patient Verbalized understanding, turned coughed & deep breathed with good effort & decreased rales in lungs. CIWA score 16 with complaints of head pressure & nausea. Given zofran, tylenol & ativan 3 mg.  remains at bedside to to suicide precautions. Will continue to monitor. Sharyle Cota RN, BSN, PCCN.

## 2019-07-07 LAB
ORGANISM: ABNORMAL
URINE CULTURE, ROUTINE: ABNORMAL
URINE CULTURE, ROUTINE: ABNORMAL

## 2019-07-07 PROCEDURE — 94760 N-INVAS EAR/PLS OXIMETRY 1: CPT

## 2019-07-07 PROCEDURE — 6360000002 HC RX W HCPCS: Performed by: INTERNAL MEDICINE

## 2019-07-07 PROCEDURE — 2060000000 HC ICU INTERMEDIATE R&B

## 2019-07-07 PROCEDURE — 2580000003 HC RX 258: Performed by: INTERNAL MEDICINE

## 2019-07-07 PROCEDURE — 6370000000 HC RX 637 (ALT 250 FOR IP): Performed by: INTERNAL MEDICINE

## 2019-07-07 PROCEDURE — 6370000000 HC RX 637 (ALT 250 FOR IP): Performed by: PHYSICIAN ASSISTANT

## 2019-07-07 PROCEDURE — 2500000003 HC RX 250 WO HCPCS: Performed by: INTERNAL MEDICINE

## 2019-07-07 RX ORDER — NITROFURANTOIN 25; 75 MG/1; MG/1
100 CAPSULE ORAL EVERY 12 HOURS SCHEDULED
Status: DISCONTINUED | OUTPATIENT
Start: 2019-07-07 | End: 2019-07-10 | Stop reason: HOSPADM

## 2019-07-07 RX ORDER — CHLORDIAZEPOXIDE HYDROCHLORIDE 5 MG/1
5 CAPSULE, GELATIN COATED ORAL 3 TIMES DAILY
Status: DISCONTINUED | OUTPATIENT
Start: 2019-07-07 | End: 2019-07-10 | Stop reason: HOSPADM

## 2019-07-07 RX ADMIN — Medication 10 ML: at 10:14

## 2019-07-07 RX ADMIN — LORAZEPAM 4 MG: 2 INJECTION INTRAMUSCULAR; INTRAVENOUS at 12:49

## 2019-07-07 RX ADMIN — Medication 10 ML: at 18:44

## 2019-07-07 RX ADMIN — LORAZEPAM 2 MG: 2 INJECTION INTRAMUSCULAR; INTRAVENOUS at 04:44

## 2019-07-07 RX ADMIN — NITROFURANTOIN (MONOHYDRATE/MACROCRYSTALS) 100 MG: 75; 25 CAPSULE ORAL at 10:27

## 2019-07-07 RX ADMIN — Medication 10 ML: at 16:49

## 2019-07-07 RX ADMIN — IBUPROFEN 400 MG: 400 TABLET, FILM COATED ORAL at 10:27

## 2019-07-07 RX ADMIN — LORAZEPAM 4 MG: 1 TABLET ORAL at 12:04

## 2019-07-07 RX ADMIN — CHLORDIAZEPOXIDE HYDROCHLORIDE 5 MG: 10 CAPSULE ORAL at 16:49

## 2019-07-07 RX ADMIN — LORAZEPAM 4 MG: 1 TABLET ORAL at 21:52

## 2019-07-07 RX ADMIN — NITROFURANTOIN (MONOHYDRATE/MACROCRYSTALS) 100 MG: 75; 25 CAPSULE ORAL at 22:03

## 2019-07-07 RX ADMIN — THIAMINE HYDROCHLORIDE: 100 INJECTION, SOLUTION INTRAMUSCULAR; INTRAVENOUS at 14:00

## 2019-07-07 RX ADMIN — LORAZEPAM 1 MG: 1 TABLET ORAL at 01:04

## 2019-07-07 RX ADMIN — ONDANSETRON 4 MG: 2 INJECTION INTRAMUSCULAR; INTRAVENOUS at 21:52

## 2019-07-07 RX ADMIN — Medication 10 ML: at 04:44

## 2019-07-07 RX ADMIN — ONDANSETRON 4 MG: 2 INJECTION INTRAMUSCULAR; INTRAVENOUS at 10:14

## 2019-07-07 RX ADMIN — LORAZEPAM 4 MG: 2 INJECTION INTRAMUSCULAR; INTRAVENOUS at 16:49

## 2019-07-07 RX ADMIN — LORAZEPAM 4 MG: 2 INJECTION INTRAMUSCULAR; INTRAVENOUS at 14:00

## 2019-07-07 RX ADMIN — CHLORDIAZEPOXIDE HYDROCHLORIDE 5 MG: 10 CAPSULE ORAL at 21:52

## 2019-07-07 RX ADMIN — CHLORDIAZEPOXIDE HYDROCHLORIDE 5 MG: 10 CAPSULE ORAL at 10:16

## 2019-07-07 RX ADMIN — Medication 10 ML: at 16:51

## 2019-07-07 RX ADMIN — SODIUM CHLORIDE: 9 INJECTION, SOLUTION INTRAVENOUS at 04:57

## 2019-07-07 RX ADMIN — Medication 10 ML: at 21:52

## 2019-07-07 RX ADMIN — BUPROPION HYDROCHLORIDE 150 MG: 150 TABLET, FILM COATED, EXTENDED RELEASE ORAL at 10:17

## 2019-07-07 RX ADMIN — Medication 10 ML: at 12:49

## 2019-07-07 RX ADMIN — ONDANSETRON 4 MG: 2 INJECTION INTRAMUSCULAR; INTRAVENOUS at 04:57

## 2019-07-07 RX ADMIN — MELATONIN TAB 3 MG 6 MG: 3 TAB at 21:51

## 2019-07-07 RX ADMIN — LORAZEPAM 4 MG: 2 INJECTION INTRAMUSCULAR; INTRAVENOUS at 18:44

## 2019-07-07 RX ADMIN — ENOXAPARIN SODIUM 40 MG: 40 INJECTION SUBCUTANEOUS at 21:52

## 2019-07-07 RX ADMIN — LORAZEPAM 2 MG: 2 INJECTION INTRAMUSCULAR; INTRAVENOUS at 10:14

## 2019-07-07 ASSESSMENT — PAIN SCALES - GENERAL
PAINLEVEL_OUTOF10: 4
PAINLEVEL_OUTOF10: 0

## 2019-07-07 NOTE — PLAN OF CARE
Problem: Falls - Risk of:  Goal: Absence of physical injury  Description  Absence of physical injury  7/7/2019 0608 by Elan Souza RN  Outcome: Met This Shift  Note:   Pt remains free from falls and accidental injury at this time. Bed in lowest position with wheels locked. Non skid footwear on feet. Pt instructed to call out prior to ambulating, verbalizes understanding. Denies needs at this time. Call light within reach. Will cont to monitor.    Javier Acevedo RN

## 2019-07-08 PROCEDURE — 6360000002 HC RX W HCPCS: Performed by: INTERNAL MEDICINE

## 2019-07-08 PROCEDURE — 99231 SBSQ HOSP IP/OBS SF/LOW 25: CPT | Performed by: PSYCHIATRY & NEUROLOGY

## 2019-07-08 PROCEDURE — 6370000000 HC RX 637 (ALT 250 FOR IP): Performed by: INTERNAL MEDICINE

## 2019-07-08 PROCEDURE — 6370000000 HC RX 637 (ALT 250 FOR IP): Performed by: PHYSICIAN ASSISTANT

## 2019-07-08 PROCEDURE — 2580000003 HC RX 258: Performed by: INTERNAL MEDICINE

## 2019-07-08 PROCEDURE — 2060000000 HC ICU INTERMEDIATE R&B

## 2019-07-08 RX ORDER — LORAZEPAM 2 MG/ML
2 INJECTION INTRAMUSCULAR ONCE
Status: COMPLETED | OUTPATIENT
Start: 2019-07-08 | End: 2019-07-08

## 2019-07-08 RX ORDER — MULTIVITAMIN WITH FOLIC ACID 400 MCG
1 TABLET ORAL DAILY
Status: DISCONTINUED | OUTPATIENT
Start: 2019-07-08 | End: 2019-07-10 | Stop reason: HOSPADM

## 2019-07-08 RX ORDER — FOLIC ACID 1 MG/1
1 TABLET ORAL DAILY
Status: DISCONTINUED | OUTPATIENT
Start: 2019-07-08 | End: 2019-07-10 | Stop reason: HOSPADM

## 2019-07-08 RX ORDER — THIAMINE HCL 100 MG
100 TABLET ORAL DAILY
Status: DISCONTINUED | OUTPATIENT
Start: 2019-07-08 | End: 2019-07-10 | Stop reason: HOSPADM

## 2019-07-08 RX ORDER — NICOTINE 21 MG/24HR
1 PATCH, TRANSDERMAL 24 HOURS TRANSDERMAL ONCE
Status: COMPLETED | OUTPATIENT
Start: 2019-07-08 | End: 2019-07-09

## 2019-07-08 RX ADMIN — Medication 10 ML: at 21:08

## 2019-07-08 RX ADMIN — LORAZEPAM 2 MG: 1 TABLET ORAL at 00:54

## 2019-07-08 RX ADMIN — IBUPROFEN 400 MG: 400 TABLET, FILM COATED ORAL at 10:53

## 2019-07-08 RX ADMIN — LORAZEPAM 3 MG: 1 TABLET ORAL at 04:02

## 2019-07-08 RX ADMIN — NITROFURANTOIN (MONOHYDRATE/MACROCRYSTALS) 100 MG: 75; 25 CAPSULE ORAL at 21:07

## 2019-07-08 RX ADMIN — FOLIC ACID 1 MG: 1 TABLET ORAL at 10:54

## 2019-07-08 RX ADMIN — LORAZEPAM 1 MG: 1 TABLET ORAL at 12:33

## 2019-07-08 RX ADMIN — Medication 10 ML: at 00:54

## 2019-07-08 RX ADMIN — CHLORDIAZEPOXIDE HYDROCHLORIDE 5 MG: 10 CAPSULE ORAL at 10:54

## 2019-07-08 RX ADMIN — LORAZEPAM 2 MG: 2 INJECTION INTRAMUSCULAR; INTRAVENOUS at 17:38

## 2019-07-08 RX ADMIN — Medication 100 MG: at 10:54

## 2019-07-08 RX ADMIN — ONDANSETRON 4 MG: 2 INJECTION INTRAMUSCULAR; INTRAVENOUS at 16:32

## 2019-07-08 RX ADMIN — MELATONIN TAB 3 MG 6 MG: 3 TAB at 21:17

## 2019-07-08 RX ADMIN — THERA TABS 1 TABLET: TAB at 10:54

## 2019-07-08 RX ADMIN — LORAZEPAM 1 MG: 1 TABLET ORAL at 21:17

## 2019-07-08 RX ADMIN — BUPROPION HYDROCHLORIDE 150 MG: 150 TABLET, FILM COATED, EXTENDED RELEASE ORAL at 10:54

## 2019-07-08 RX ADMIN — CHLORDIAZEPOXIDE HYDROCHLORIDE 5 MG: 10 CAPSULE ORAL at 21:07

## 2019-07-08 RX ADMIN — NITROFURANTOIN (MONOHYDRATE/MACROCRYSTALS) 100 MG: 75; 25 CAPSULE ORAL at 10:54

## 2019-07-08 RX ADMIN — CHLORDIAZEPOXIDE HYDROCHLORIDE 5 MG: 10 CAPSULE ORAL at 16:32

## 2019-07-08 ASSESSMENT — PAIN SCALES - GENERAL
PAINLEVEL_OUTOF10: 0

## 2019-07-09 LAB
A/G RATIO: 1.3 (ref 1.1–2.2)
ALBUMIN SERPL-MCNC: 3.7 G/DL (ref 3.4–5)
ALP BLD-CCNC: 92 U/L (ref 40–129)
ALT SERPL-CCNC: 108 U/L (ref 10–40)
ANION GAP SERPL CALCULATED.3IONS-SCNC: 11 MMOL/L (ref 3–16)
AST SERPL-CCNC: 75 U/L (ref 15–37)
BILIRUB SERPL-MCNC: 0.3 MG/DL (ref 0–1)
BUN BLDV-MCNC: <2 MG/DL (ref 7–20)
CALCIUM SERPL-MCNC: 8.8 MG/DL (ref 8.3–10.6)
CHLORIDE BLD-SCNC: 104 MMOL/L (ref 99–110)
CO2: 23 MMOL/L (ref 21–32)
CREAT SERPL-MCNC: 0.6 MG/DL (ref 0.6–1.1)
GFR AFRICAN AMERICAN: >60
GFR NON-AFRICAN AMERICAN: >60
GLOBULIN: 2.9 G/DL
GLUCOSE BLD-MCNC: 121 MG/DL (ref 70–99)
HCT VFR BLD CALC: 40.3 % (ref 36–48)
HEMOGLOBIN: 13.3 G/DL (ref 12–16)
MCH RBC QN AUTO: 30.2 PG (ref 26–34)
MCHC RBC AUTO-ENTMCNC: 33 G/DL (ref 31–36)
MCV RBC AUTO: 91.3 FL (ref 80–100)
PDW BLD-RTO: 13.7 % (ref 12.4–15.4)
PLATELET # BLD: 242 K/UL (ref 135–450)
PMV BLD AUTO: 7.9 FL (ref 5–10.5)
POTASSIUM REFLEX MAGNESIUM: 3.7 MMOL/L (ref 3.5–5.1)
RBC # BLD: 4.41 M/UL (ref 4–5.2)
SODIUM BLD-SCNC: 138 MMOL/L (ref 136–145)
TOTAL PROTEIN: 6.6 G/DL (ref 6.4–8.2)
WBC # BLD: 6.1 K/UL (ref 4–11)

## 2019-07-09 PROCEDURE — 2580000003 HC RX 258: Performed by: INTERNAL MEDICINE

## 2019-07-09 PROCEDURE — 94760 N-INVAS EAR/PLS OXIMETRY 1: CPT

## 2019-07-09 PROCEDURE — 6370000000 HC RX 637 (ALT 250 FOR IP): Performed by: INTERNAL MEDICINE

## 2019-07-09 PROCEDURE — 80053 COMPREHEN METABOLIC PANEL: CPT

## 2019-07-09 PROCEDURE — 2060000000 HC ICU INTERMEDIATE R&B

## 2019-07-09 PROCEDURE — 6370000000 HC RX 637 (ALT 250 FOR IP): Performed by: PHYSICIAN ASSISTANT

## 2019-07-09 PROCEDURE — 85027 COMPLETE CBC AUTOMATED: CPT

## 2019-07-09 PROCEDURE — 36415 COLL VENOUS BLD VENIPUNCTURE: CPT

## 2019-07-09 RX ADMIN — THERA TABS 1 TABLET: TAB at 08:47

## 2019-07-09 RX ADMIN — CHLORDIAZEPOXIDE HYDROCHLORIDE 5 MG: 10 CAPSULE ORAL at 08:47

## 2019-07-09 RX ADMIN — Medication 10 ML: at 08:47

## 2019-07-09 RX ADMIN — NITROFURANTOIN (MONOHYDRATE/MACROCRYSTALS) 100 MG: 75; 25 CAPSULE ORAL at 08:47

## 2019-07-09 RX ADMIN — FOLIC ACID 1 MG: 1 TABLET ORAL at 08:47

## 2019-07-09 RX ADMIN — Medication 100 MG: at 08:47

## 2019-07-09 RX ADMIN — CHLORDIAZEPOXIDE HYDROCHLORIDE 5 MG: 10 CAPSULE ORAL at 16:41

## 2019-07-09 NOTE — CONSULTS
Method  of evaluation, long-term memory with date of birth, short-term memory  with 3 objects with repetition. DIAGNOSES:  AXIS I:  Major depressive disorder, severe. AXIS II:  Deferred. AXIS III:  None. AXIS IV:  Poor coping skills. AXIS V:  Current global assessment of functioning is 28. TREATMENT RECOMMENDATIONS:  As the patient is acutely suicidal,  I would recommend inpatient hospitalization. This will give an opportunity for us to reassess the  patient's current medications and appropriate followup. We may be able to identify additional family  support. Thank you for consulting me on this interesting patient.         Viola Waller MD    D: 07/09/2019 8:18:46       T: 07/09/2019 9:32:00     [email or web address]  Job#: [id number]     Doc#: [id number]    CC:

## 2019-07-09 NOTE — PLAN OF CARE
Problem: Suicide risk  Goal: Provide patient with safe environment  Description  Provide patient with safe environment  7/9/2019 1030 by Betty Chong RN  Outcome: Ongoing  Note:   SUICIDE PRECAUTIONS  - sitter at bedside. LIGATURE ITEMS removed from room.

## 2019-07-10 VITALS
OXYGEN SATURATION: 96 % | RESPIRATION RATE: 16 BRPM | DIASTOLIC BLOOD PRESSURE: 90 MMHG | HEIGHT: 64 IN | BODY MASS INDEX: 28.79 KG/M2 | WEIGHT: 168.6 LBS | HEART RATE: 96 BPM | TEMPERATURE: 98.3 F | SYSTOLIC BLOOD PRESSURE: 129 MMHG

## 2019-07-10 PROCEDURE — 6370000000 HC RX 637 (ALT 250 FOR IP): Performed by: INTERNAL MEDICINE

## 2019-07-10 PROCEDURE — 2580000003 HC RX 258: Performed by: INTERNAL MEDICINE

## 2019-07-10 RX ADMIN — CHLORDIAZEPOXIDE HYDROCHLORIDE 5 MG: 10 CAPSULE ORAL at 00:45

## 2019-07-10 RX ADMIN — Medication 10 ML: at 00:46

## 2019-07-10 ASSESSMENT — PAIN SCALES - GENERAL: PAINLEVEL_OUTOF10: 0

## 2019-07-10 NOTE — DISCHARGE INSTR - COC
· Address:  · Phone:  · Fax:    Dialysis Facility (if applicable)   · Name:  · Address:  · Dialysis Schedule:  · Phone:  · Fax:    / signature: {Esignature:[id number]}    PHYSICIAN SECTION    Prognosis: {Prognosis:7409034722}    Condition at Discharge: Morris Shin Patient Condition:226163555}    Rehab Potential (if transferring to Rehab): {Prognosis:5859790778}    Recommended Labs or Other Treatments After Discharge: ***    Physician Certification: I certify the above information and transfer of Julián Dillon  is necessary for the continuing treatment of the diagnosis listed and that she requires {Admit to Appropriate Level of Care:90781} for {GREATER/LESS:631238010} 30 days.      Update Admission H&P: {CHP DME Changes in YYDPZ:133376563}    PHYSICIAN SIGNATURE:  {Esignature:[id number]}

## 2019-07-10 NOTE — PROGRESS NOTES
Assessment complete. Vitals stable. Denies any thoughts of suicide. Scored 8 on CIWA, prn ativan given. Denies any other needs at this time, continue with sitter at bedside and suicide precautions.   Yasmany Burgess RN
At noon pt called me in the room. She stated she wanted a new IV because she was bored and wanted IV ativan. Now pt is visually upset about possibly not going home and c/o nausea.  Pt denies ativan now
Hospitalist Progress Note      PCP: Ansley Orosco DO    Date of Admission: 7/5/2019    Chief Complaint: Alcohol detox and suicidal ideation    Hospital Course: Presented with of the above she was admitted to the inpatient service due to the above  -He was placed on scheduled Librium and CIWA protocol  -She was evaluated by psychiatry who are recommending inpatient placement  -Patient continues to be on CIWA and being treated for alcohol withdrawal      Subjective:   Patient was sleeping at the time of my evaluation as well was not able to talk to her    Medications:  Reviewed    Infusion Medications    sodium chloride 125 mL/hr at 07/07/19 0457     Scheduled Medications    nitrofurantoin (macrocrystal-monohydrate)  100 mg Oral 2 times per day    chlordiazePOXIDE  5 mg Oral TID    nicotine  1 patch Transdermal Daily    buPROPion  150 mg Oral Daily    sodium chloride flush  10 mL Intravenous 2 times per day    enoxaparin  40 mg Subcutaneous Nightly    folic acid, thiamine, multi-vitamin with vitamin K infusion   Intravenous Daily     PRN Meds: ibuprofen, melatonin, sodium chloride flush, ondansetron, LORazepam **OR** LORazepam **OR** LORazepam **OR** LORazepam **OR** LORazepam **OR** LORazepam **OR** LORazepam **OR** LORazepam, acetaminophen      Intake/Output Summary (Last 24 hours) at 7/7/2019 1639  Last data filed at 7/7/2019 1133  Gross per 24 hour   Intake 1325.25 ml   Output 400 ml   Net 925.25 ml       Physical Exam Performed:    BP (!) 140/93   Pulse 81   Temp 97.1 °F (36.2 °C) (Oral)   Resp 20   Ht 5' 4\" (1.626 m)   Wt 171 lb 14.4 oz (78 kg)   LMP 06/24/2019   SpO2 98%   BMI 29.51 kg/m²     General appearance: No apparent distress, appears stated age and cooperative. HEENT: Normo-cephalic and atraumatic  Neck: Supple, with full range of motion. No jugular venous distention. Trachea midline. Respiratory:  Normal respiratory effort.  Clear to auscultation, bilaterally without
Pt is very tearful and anxious. Pt is requesting to go outside and have a cigarette. When told that she would not be allowed she became agitated and states that when her 72 hour hold is completed she plans on signing out AMA. Pt also states  that she is upset because the therapist told her she needs inpatient help after becoming medically stable, however the patient doesn't feel like she wants to go to a place where '' crazy people are walking around banging their heads, and where nurses don't have more than two minutes to sit down and talk about your problems. ''    Prior to this, the patient stated to me that she just wants someone to talk to.  When I pointed out to her that an inpatient facility would have doctors and therapist there to speak with she stated  \" I've been there and done that before they never help me.'' When I asked the patient exactly what she was looking for she stated '' I don't know, I know I wouldn't hurt myself, this was just a cry for help.'' Janas Habermann, RN
Pt notified of transfer and is now refusing transport to facility.  Charge nurse and transport team notified
Received notification that pt has placement at [facility] for psychiatry.  Needs new md signature for pink slip. md on call notified
Shift assessment complete and morning medications given. Patient is resting in bed at this time. Denies additional needs. Call light is in reach. Will continue to monitor.  Faustina Lopez RN
Spoke with risk management representative Orlando Villela who stated that pt is on a statement of belief until placed at facility, which will then have 72 hour hold on pt. Pt cannot reasonably refuse to go to a facility that will accomodate her needs.  Placement process to begin again in am. Charge rn notified
The patient is resting with eyes closed, doesn't arouse to soft voice.  remains @ bedside. Will continue to monitor. Irma Amos RN, BSN.
hospitalist notified via perfect serve \"pt is now refusing transport to facility. transport has been cancelled. what is it we are legally bound to do? there was a 72 hr hold signed on the 5th, but she was just medically cleared today. we could use some clarification on what we are obligated to do. Thanks! \" awaiting reply
(ATIVAN) tablet 3 mg  3 mg Oral Q1H PRN Srinivasa Denis MD   3 mg at 07/08/19 0402    Or    LORazepam (ATIVAN) injection 3 mg  3 mg Intravenous Q1H PRN Srinivasa Denis MD        Or    LORazepam (ATIVAN) tablet 4 mg  4 mg Oral Q1H PRN Srinivasa Denis MD   4 mg at 07/07/19 2152    Or    LORazepam (ATIVAN) injection 4 mg  4 mg Intravenous Q1H PRN Srinivasa Denis MD   4 mg at 07/07/19 1844    acetaminophen (TYLENOL) tablet 1,000 mg  1,000 mg Oral Q6H PRN Ree Shah PA-C   1,000 mg at 07/06/19 1637     No Known Allergies  Active Problems:    Suicidal ideation    Alcohol dependence with withdrawal with complication ([facility] [address]  Resolved Problems:    * No resolved hospital problems. *    Blood pressure (!) 129/90, pulse 96, temperature 98.3 °F (36.8 °C), temperature source Oral, resp. rate 16, height 5' 4\" (1.626 m), weight 168 lb 9.6 oz (76.5 kg), last menstrual period 06/24/2019, SpO2 96 %, not currently breastfeeding.     Subjective  Objective  Assessment & Plan    Pollo Rhodes RN  6/20/0838
General appearance:  Appears comfortable  Eyes: Sclera clear. Pupils equal.  ENT: Moist oral mucosa. Trachea midline, no adenopathy. Cardiovascular: Regular rhythm, normal S1, S2. No murmur. No edema in lower extremities  Respiratory: Not using accessory muscles. Good inspiratory effort. Clear to auscultation bilaterally, no wheeze or crackles. GI: Abdomen soft, no tenderness, not distended, normal bowel sounds  Musculoskeletal: No cyanosis in digits, neck supple  Neurology: CN 2-12 grossly intact. No speech or motor deficits  Psych: Normal affect. Alert and oriented in time, place and person  Skin: Warm, dry, normal turgor  Extremity exam shows brisk capillary refill. Peripheral pulses are palpable in lower extremities     Labs and Tests:  CBC:   Recent Labs     07/09/19  0443   WBC 6.1   HGB 13.3        BMP:    Recent Labs     07/09/19 0443      K 3.7      CO2 23   BUN <2*   CREATININE 0.6   GLUCOSE 121*     Hepatic:   Recent Labs     07/09/19 0443   AST 75*   *   BILITOT 0.3   ALKPHOS 92     No orders to display           Problem List  Active Problems:    Suicidal ideation    Alcohol dependence with withdrawal with complication (HCC)  Resolved Problems:    * No resolved hospital problems.  *       Assessment & Plan:   Alcohol withdrawal   Prn ativan, ciwa, scheduled librium     UTI sec to E coli  macrobid sens reviewed, will finish the abx course in house     Suicidal ideation with plan  Psych on board, sitter at bedside    Will stop iv fluids     IV Access: peripheral  Miller: no  Diet: DIET GENERAL; Safety Tray  Code:Full Code  DVT PPX lovenox  Disposition  Medically stable for discharge, await placement       Marlo Palomino MD   7/9/2019 1:36 PM
arrival is concerning for risk of self harm. It was previously recommended by Dr. Rosemary Batista that pt requires inpatient psychiatric admission for safety and I think this plan continues to remain the safest course of action d/t the potential imminent risk of self harm. 1. Unspecified depressive disorder  2. Alcohol use disorder, severe    Recommendations:   1. Will hold the wellbutrin XL 150mg right now given the risk of seizure, particular as patient is being monitored/treated for alcohol w/d. Alternative antidepressant may need to be considered given her alcoholism. 2. Continues to require inpatient psychiatric admission for safety. 3. Continue 1:1 sitter, suicide precautions  4. CIWA protocol, prn lorazepam per CIWA. Dispo: Inpatient psychiatric admission when medically stable  Safety: RF include mood disorder, substance use disorder, /, recent suicidal ideation w/plan. Pt at this time remains a potential imminent safety risk and requires inpatient psychiatric admission for safety. Thank you for this consult, please call the psychiatry consult line for further questions.  I will continue to follow at this time    Bonita Castle MD  Psychiatrist

## 2019-10-21 ENCOUNTER — HOSPITAL ENCOUNTER (OUTPATIENT)
Dept: GENERAL RADIOLOGY | Age: 49
Discharge: HOME | End: 2019-10-21
Payer: MEDICAID

## 2019-10-21 ENCOUNTER — OFFICE VISIT (OUTPATIENT)
Dept: FAMILY MEDICINE CLINIC | Age: 49
End: 2019-10-21
Payer: MEDICAID

## 2019-10-21 ENCOUNTER — HOSPITAL ENCOUNTER (OUTPATIENT)
Age: 49
Discharge: HOME | End: 2019-10-21
Payer: MEDICAID

## 2019-10-21 VITALS
TEMPERATURE: 98.8 F | HEIGHT: 64 IN | BODY MASS INDEX: 31.92 KG/M2 | SYSTOLIC BLOOD PRESSURE: 118 MMHG | DIASTOLIC BLOOD PRESSURE: 76 MMHG | WEIGHT: 187 LBS

## 2019-10-21 DIAGNOSIS — M25.552 PAIN OF LEFT HIP JOINT: ICD-10-CM

## 2019-10-21 DIAGNOSIS — M25.552 PAIN OF LEFT HIP JOINT: Primary | ICD-10-CM

## 2019-10-21 PROCEDURE — 4004F PT TOBACCO SCREEN RCVD TLK: CPT | Performed by: INTERNAL MEDICINE

## 2019-10-21 PROCEDURE — G8484 FLU IMMUNIZE NO ADMIN: HCPCS | Performed by: INTERNAL MEDICINE

## 2019-10-21 PROCEDURE — G8417 CALC BMI ABV UP PARAM F/U: HCPCS | Performed by: INTERNAL MEDICINE

## 2019-10-21 PROCEDURE — 99213 OFFICE O/P EST LOW 20 MIN: CPT | Performed by: INTERNAL MEDICINE

## 2019-10-21 PROCEDURE — 3017F COLORECTAL CA SCREEN DOC REV: CPT | Performed by: INTERNAL MEDICINE

## 2019-10-21 PROCEDURE — 73502 X-RAY EXAM HIP UNI 2-3 VIEWS: CPT

## 2019-10-21 PROCEDURE — G8427 DOCREV CUR MEDS BY ELIG CLIN: HCPCS | Performed by: INTERNAL MEDICINE

## 2019-10-21 RX ORDER — METHYLPREDNISOLONE 4 MG/1
TABLET ORAL
Qty: 1 KIT | Refills: 0 | Status: SHIPPED | OUTPATIENT
Start: 2019-10-21 | End: 2020-02-11

## 2019-10-21 ASSESSMENT — ENCOUNTER SYMPTOMS
SINUS PAIN: 0
COUGH: 0
RHINORRHEA: 0
WHEEZING: 0
APNEA: 0
SHORTNESS OF BREATH: 0
ABDOMINAL PAIN: 0
SINUS PRESSURE: 0

## 2020-02-11 ENCOUNTER — HOSPITAL ENCOUNTER (INPATIENT)
Age: 50
LOS: 1 days | Discharge: HOME | End: 2020-02-12
Attending: EMERGENCY MEDICINE | Admitting: INTERNAL MEDICINE
Payer: MEDICAID

## 2020-02-11 ENCOUNTER — APPOINTMENT (OUTPATIENT)
Dept: GENERAL RADIOLOGY | Age: 50
End: 2020-02-11
Payer: MEDICAID

## 2020-02-11 PROBLEM — E87.29 HIGH ANION GAP METABOLIC ACIDOSIS: Status: ACTIVE | Noted: 2020-02-11

## 2020-02-11 PROBLEM — F17.200 TOBACCO USE DISORDER: Status: ACTIVE | Noted: 2020-02-11

## 2020-02-11 PROBLEM — F10.930 ALCOHOL WITHDRAWAL, UNCOMPLICATED (HCC): Status: ACTIVE | Noted: 2020-02-11

## 2020-02-11 PROBLEM — E83.42 HYPOMAGNESEMIA: Status: ACTIVE | Noted: 2020-02-11

## 2020-02-11 PROBLEM — E87.6 HYPOKALEMIA: Status: ACTIVE | Noted: 2020-02-11

## 2020-02-11 PROBLEM — E87.1 HYPONATREMIA: Status: ACTIVE | Noted: 2020-02-11

## 2020-02-11 PROBLEM — K52.9 ACUTE GASTROENTERITIS: Status: ACTIVE | Noted: 2020-02-11

## 2020-02-11 LAB
A/G RATIO: 1.3 (ref 1.1–2.2)
ALBUMIN SERPL-MCNC: 3.7 G/DL (ref 3.4–5)
ALBUMIN SERPL-MCNC: 4.1 G/DL (ref 3.4–5)
ALP BLD-CCNC: 84 U/L (ref 40–129)
ALT SERPL-CCNC: 23 U/L (ref 10–40)
AMPHETAMINE SCREEN, URINE: NORMAL
ANION GAP SERPL CALCULATED.3IONS-SCNC: 11 MMOL/L (ref 3–16)
ANION GAP SERPL CALCULATED.3IONS-SCNC: 21 MMOL/L (ref 3–16)
AST SERPL-CCNC: 31 U/L (ref 15–37)
BARBITURATE SCREEN URINE: NORMAL
BASE EXCESS VENOUS: -0.6 MMOL/L
BASOPHILS ABSOLUTE: 0.1 K/UL (ref 0–0.2)
BASOPHILS RELATIVE PERCENT: 0.3 %
BENZODIAZEPINE SCREEN, URINE: NORMAL
BILIRUB SERPL-MCNC: 0.5 MG/DL (ref 0–1)
BILIRUBIN URINE: NEGATIVE
BLOOD, URINE: ABNORMAL
BUN BLDV-MCNC: 3 MG/DL (ref 7–20)
BUN BLDV-MCNC: 6 MG/DL (ref 7–20)
C DIFF TOXIN/ANTIGEN: NORMAL
CALCIUM SERPL-MCNC: 8.1 MG/DL (ref 8.3–10.6)
CALCIUM SERPL-MCNC: 8.7 MG/DL (ref 8.3–10.6)
CANNABINOID SCREEN URINE: NORMAL
CARBOXYHEMOGLOBIN: 4.9 %
CHLORIDE BLD-SCNC: 103 MMOL/L (ref 99–110)
CHLORIDE BLD-SCNC: 94 MMOL/L (ref 99–110)
CLARITY: ABNORMAL
CO2: 20 MMOL/L (ref 21–32)
CO2: 23 MMOL/L (ref 21–32)
COCAINE METABOLITE SCREEN URINE: NORMAL
COLOR: ABNORMAL
CREAT SERPL-MCNC: 0.6 MG/DL (ref 0.6–1.1)
CREAT SERPL-MCNC: 0.6 MG/DL (ref 0.6–1.1)
EKG ATRIAL RATE: 90 BPM
EKG DIAGNOSIS: NORMAL
EKG P AXIS: 31 DEGREES
EKG P-R INTERVAL: 122 MS
EKG Q-T INTERVAL: 380 MS
EKG QRS DURATION: 76 MS
EKG QTC CALCULATION (BAZETT): 464 MS
EKG R AXIS: 64 DEGREES
EKG T AXIS: 66 DEGREES
EKG VENTRICULAR RATE: 90 BPM
EOSINOPHILS ABSOLUTE: 0 K/UL (ref 0–0.6)
EOSINOPHILS RELATIVE PERCENT: 0.1 %
EPITHELIAL CELLS, UA: 4 /HPF (ref 0–5)
ETHANOL: NORMAL MG/DL (ref 0–0.08)
GFR AFRICAN AMERICAN: >60
GFR AFRICAN AMERICAN: >60
GFR NON-AFRICAN AMERICAN: >60
GFR NON-AFRICAN AMERICAN: >60
GLOBULIN: 3.1 G/DL
GLUCOSE BLD-MCNC: 86 MG/DL (ref 70–99)
GLUCOSE BLD-MCNC: 98 MG/DL (ref 70–99)
GLUCOSE URINE: NEGATIVE MG/DL
HCO3 VENOUS: 22 MMOL/L (ref 23–29)
HCT VFR BLD CALC: 39 % (ref 36–48)
HEMOGLOBIN: 12.9 G/DL (ref 12–16)
HYALINE CASTS: 0 /LPF (ref 0–8)
KETONES, URINE: NEGATIVE MG/DL
LACTIC ACID: 1.2 MMOL/L (ref 0.4–2)
LACTIC ACID: 1.8 MMOL/L (ref 0.4–2)
LACTIC ACID: 3.2 MMOL/L (ref 0.4–2)
LACTIC ACID: 3.6 MMOL/L (ref 0.4–2)
LEUKOCYTE ESTERASE, URINE: ABNORMAL
LYMPHOCYTES ABSOLUTE: 1.7 K/UL (ref 1–5.1)
LYMPHOCYTES RELATIVE PERCENT: 7 %
Lab: NORMAL
MAGNESIUM: 1.4 MG/DL (ref 1.8–2.4)
MAGNESIUM: 2.1 MG/DL (ref 1.8–2.4)
MCH RBC QN AUTO: 26.5 PG (ref 26–34)
MCHC RBC AUTO-ENTMCNC: 33.1 G/DL (ref 31–36)
MCV RBC AUTO: 80 FL (ref 80–100)
METHADONE SCREEN, URINE: NORMAL
METHEMOGLOBIN VENOUS: 1 %
MICROSCOPIC EXAMINATION: YES
MONOCYTES ABSOLUTE: 0.9 K/UL (ref 0–1.3)
MONOCYTES RELATIVE PERCENT: 3.9 %
NEUTROPHILS ABSOLUTE: 21.3 K/UL (ref 1.7–7.7)
NEUTROPHILS RELATIVE PERCENT: 88.7 %
NITRITE, URINE: NEGATIVE
O2 CONTENT, VEN: 16 ML/DL
O2 SAT, VEN: 99 %
O2 THERAPY: ABNORMAL
OPIATE SCREEN URINE: NORMAL
OXYCODONE URINE: NORMAL
PCO2, VEN: 33.1 MMHG (ref 40–50)
PDW BLD-RTO: 14.5 % (ref 12.4–15.4)
PH UA: 6.5
PH UA: 6.5 (ref 5–8)
PH VENOUS: 7.45 (ref 7.35–7.45)
PHENCYCLIDINE SCREEN URINE: NORMAL
PHOSPHORUS: 2.4 MG/DL (ref 2.5–4.9)
PLATELET # BLD: 322 K/UL (ref 135–450)
PMV BLD AUTO: 8 FL (ref 5–10.5)
PO2, VEN: 147 MMHG
POTASSIUM REFLEX MAGNESIUM: 3.4 MMOL/L (ref 3.5–5.1)
POTASSIUM SERPL-SCNC: 4.1 MMOL/L (ref 3.5–5.1)
PROPOXYPHENE SCREEN: NORMAL
PROTEIN UA: 100 MG/DL
RBC # BLD: 4.87 M/UL (ref 4–5.2)
RBC UA: >100 /HPF (ref 0–4)
SODIUM BLD-SCNC: 135 MMOL/L (ref 136–145)
SODIUM BLD-SCNC: 137 MMOL/L (ref 136–145)
SPECIFIC GRAVITY UA: 1.01 (ref 1–1.03)
TCO2 CALC VENOUS: 23 MMOL/L
TOTAL CK: 358 U/L (ref 26–192)
TOTAL PROTEIN: 7.2 G/DL (ref 6.4–8.2)
URINE REFLEX TO CULTURE: YES
URINE TYPE: ABNORMAL
UROBILINOGEN, URINE: 0.2 E.U./DL
WBC # BLD: 24 K/UL (ref 4–11)
WBC UA: 45 /HPF (ref 0–5)

## 2020-02-11 PROCEDURE — 87449 NOS EACH ORGANISM AG IA: CPT

## 2020-02-11 PROCEDURE — 6360000002 HC RX W HCPCS: Performed by: EMERGENCY MEDICINE

## 2020-02-11 PROCEDURE — 6370000000 HC RX 637 (ALT 250 FOR IP): Performed by: EMERGENCY MEDICINE

## 2020-02-11 PROCEDURE — 96376 TX/PRO/DX INJ SAME DRUG ADON: CPT

## 2020-02-11 PROCEDURE — 96368 THER/DIAG CONCURRENT INF: CPT

## 2020-02-11 PROCEDURE — 82803 BLOOD GASES ANY COMBINATION: CPT

## 2020-02-11 PROCEDURE — 85025 COMPLETE CBC W/AUTO DIFF WBC: CPT

## 2020-02-11 PROCEDURE — 83605 ASSAY OF LACTIC ACID: CPT

## 2020-02-11 PROCEDURE — 96372 THER/PROPH/DIAG INJ SC/IM: CPT

## 2020-02-11 PROCEDURE — 87324 CLOSTRIDIUM AG IA: CPT

## 2020-02-11 PROCEDURE — 87086 URINE CULTURE/COLONY COUNT: CPT

## 2020-02-11 PROCEDURE — 87040 BLOOD CULTURE FOR BACTERIA: CPT

## 2020-02-11 PROCEDURE — 83735 ASSAY OF MAGNESIUM: CPT

## 2020-02-11 PROCEDURE — 2580000003 HC RX 258: Performed by: EMERGENCY MEDICINE

## 2020-02-11 PROCEDURE — 82550 ASSAY OF CK (CPK): CPT

## 2020-02-11 PROCEDURE — 81001 URINALYSIS AUTO W/SCOPE: CPT

## 2020-02-11 PROCEDURE — 2580000003 HC RX 258: Performed by: INTERNAL MEDICINE

## 2020-02-11 PROCEDURE — 96375 TX/PRO/DX INJ NEW DRUG ADDON: CPT

## 2020-02-11 PROCEDURE — G0480 DRUG TEST DEF 1-7 CLASSES: HCPCS

## 2020-02-11 PROCEDURE — 36415 COLL VENOUS BLD VENIPUNCTURE: CPT

## 2020-02-11 PROCEDURE — 96365 THER/PROPH/DIAG IV INF INIT: CPT

## 2020-02-11 PROCEDURE — 6360000002 HC RX W HCPCS: Performed by: INTERNAL MEDICINE

## 2020-02-11 PROCEDURE — 6370000000 HC RX 637 (ALT 250 FOR IP): Performed by: INTERNAL MEDICINE

## 2020-02-11 PROCEDURE — G0378 HOSPITAL OBSERVATION PER HR: HCPCS

## 2020-02-11 PROCEDURE — 99285 EMERGENCY DEPT VISIT HI MDM: CPT

## 2020-02-11 PROCEDURE — 96361 HYDRATE IV INFUSION ADD-ON: CPT

## 2020-02-11 PROCEDURE — 80307 DRUG TEST PRSMV CHEM ANLYZR: CPT

## 2020-02-11 PROCEDURE — 80053 COMPREHEN METABOLIC PANEL: CPT

## 2020-02-11 PROCEDURE — 93005 ELECTROCARDIOGRAM TRACING: CPT | Performed by: EMERGENCY MEDICINE

## 2020-02-11 PROCEDURE — 87505 NFCT AGENT DETECTION GI: CPT

## 2020-02-11 PROCEDURE — 96366 THER/PROPH/DIAG IV INF ADDON: CPT

## 2020-02-11 PROCEDURE — 2060000000 HC ICU INTERMEDIATE R&B

## 2020-02-11 PROCEDURE — 93010 ELECTROCARDIOGRAM REPORT: CPT | Performed by: INTERNAL MEDICINE

## 2020-02-11 PROCEDURE — 71046 X-RAY EXAM CHEST 2 VIEWS: CPT

## 2020-02-11 RX ORDER — FOLIC ACID 1 MG/1
1 TABLET ORAL DAILY
Status: DISCONTINUED | OUTPATIENT
Start: 2020-02-11 | End: 2020-02-12 | Stop reason: HOSPADM

## 2020-02-11 RX ORDER — SODIUM CHLORIDE 0.9 % (FLUSH) 0.9 %
10 SYRINGE (ML) INJECTION EVERY 12 HOURS SCHEDULED
Status: DISCONTINUED | OUTPATIENT
Start: 2020-02-11 | End: 2020-02-11 | Stop reason: SDUPTHER

## 2020-02-11 RX ORDER — MAGNESIUM SULFATE IN WATER 40 MG/ML
2 INJECTION, SOLUTION INTRAVENOUS ONCE
Status: COMPLETED | OUTPATIENT
Start: 2020-02-11 | End: 2020-02-12

## 2020-02-11 RX ORDER — ONDANSETRON 2 MG/ML
4 INJECTION INTRAMUSCULAR; INTRAVENOUS ONCE
Status: COMPLETED | OUTPATIENT
Start: 2020-02-11 | End: 2020-02-11

## 2020-02-11 RX ORDER — LORAZEPAM 1 MG/1
1 TABLET ORAL
Status: DISCONTINUED | OUTPATIENT
Start: 2020-02-11 | End: 2020-02-12

## 2020-02-11 RX ORDER — 0.9 % SODIUM CHLORIDE 0.9 %
1000 INTRAVENOUS SOLUTION INTRAVENOUS ONCE
Status: COMPLETED | OUTPATIENT
Start: 2020-02-11 | End: 2020-02-11

## 2020-02-11 RX ORDER — POTASSIUM CHLORIDE 750 MG/1
40 TABLET, FILM COATED, EXTENDED RELEASE ORAL ONCE
Status: COMPLETED | OUTPATIENT
Start: 2020-02-11 | End: 2020-02-11

## 2020-02-11 RX ORDER — SODIUM CHLORIDE, SODIUM LACTATE, POTASSIUM CHLORIDE, CALCIUM CHLORIDE 600; 310; 30; 20 MG/100ML; MG/100ML; MG/100ML; MG/100ML
1000 INJECTION, SOLUTION INTRAVENOUS ONCE
Status: COMPLETED | OUTPATIENT
Start: 2020-02-11 | End: 2020-02-11

## 2020-02-11 RX ORDER — LORAZEPAM 1 MG/1
2 TABLET ORAL
Status: DISCONTINUED | OUTPATIENT
Start: 2020-02-11 | End: 2020-02-12

## 2020-02-11 RX ORDER — LORAZEPAM 2 MG/ML
1 INJECTION INTRAMUSCULAR
Status: DISCONTINUED | OUTPATIENT
Start: 2020-02-11 | End: 2020-02-12

## 2020-02-11 RX ORDER — ACETAMINOPHEN 325 MG/1
650 TABLET ORAL EVERY 4 HOURS PRN
Status: DISCONTINUED | OUTPATIENT
Start: 2020-02-11 | End: 2020-02-12 | Stop reason: HOSPADM

## 2020-02-11 RX ORDER — NICOTINE 21 MG/24HR
1 PATCH, TRANSDERMAL 24 HOURS TRANSDERMAL DAILY
Status: DISCONTINUED | OUTPATIENT
Start: 2020-02-11 | End: 2020-02-12 | Stop reason: HOSPADM

## 2020-02-11 RX ORDER — LORAZEPAM 2 MG/ML
2 INJECTION INTRAMUSCULAR
Status: DISCONTINUED | OUTPATIENT
Start: 2020-02-11 | End: 2020-02-12

## 2020-02-11 RX ORDER — LORAZEPAM 2 MG/ML
4 INJECTION INTRAMUSCULAR
Status: DISCONTINUED | OUTPATIENT
Start: 2020-02-11 | End: 2020-02-12

## 2020-02-11 RX ORDER — LORAZEPAM 2 MG/ML
3 INJECTION INTRAMUSCULAR
Status: DISCONTINUED | OUTPATIENT
Start: 2020-02-11 | End: 2020-02-12

## 2020-02-11 RX ORDER — MULTIVITAMIN WITH FOLIC ACID 400 MCG
1 TABLET ORAL DAILY
Status: DISCONTINUED | OUTPATIENT
Start: 2020-02-11 | End: 2020-02-12 | Stop reason: HOSPADM

## 2020-02-11 RX ORDER — THIAMINE MONONITRATE (VIT B1) 100 MG
100 TABLET ORAL DAILY
Status: DISCONTINUED | OUTPATIENT
Start: 2020-02-11 | End: 2020-02-12 | Stop reason: HOSPADM

## 2020-02-11 RX ORDER — SODIUM CHLORIDE 0.9 % (FLUSH) 0.9 %
10 SYRINGE (ML) INJECTION EVERY 12 HOURS SCHEDULED
Status: DISCONTINUED | OUTPATIENT
Start: 2020-02-11 | End: 2020-02-12 | Stop reason: HOSPADM

## 2020-02-11 RX ORDER — LORAZEPAM 1 MG/1
3 TABLET ORAL
Status: DISCONTINUED | OUTPATIENT
Start: 2020-02-11 | End: 2020-02-12

## 2020-02-11 RX ORDER — SODIUM CHLORIDE 0.9 % (FLUSH) 0.9 %
10 SYRINGE (ML) INJECTION PRN
Status: DISCONTINUED | OUTPATIENT
Start: 2020-02-11 | End: 2020-02-12 | Stop reason: HOSPADM

## 2020-02-11 RX ORDER — LIDOCAINE HYDROCHLORIDE 10 MG/ML
5 INJECTION, SOLUTION EPIDURAL; INFILTRATION; INTRACAUDAL; PERINEURAL ONCE
Status: DISCONTINUED | OUTPATIENT
Start: 2020-02-11 | End: 2020-02-12

## 2020-02-11 RX ORDER — SERTRALINE HYDROCHLORIDE 100 MG/1
200 TABLET, FILM COATED ORAL NIGHTLY
COMMUNITY
End: 2021-10-02

## 2020-02-11 RX ORDER — LORAZEPAM 1 MG/1
4 TABLET ORAL
Status: DISCONTINUED | OUTPATIENT
Start: 2020-02-11 | End: 2020-02-12

## 2020-02-11 RX ORDER — POTASSIUM CHLORIDE AND SODIUM CHLORIDE 900; 300 MG/100ML; MG/100ML
INJECTION, SOLUTION INTRAVENOUS CONTINUOUS
Status: DISCONTINUED | OUTPATIENT
Start: 2020-02-11 | End: 2020-02-12 | Stop reason: HOSPADM

## 2020-02-11 RX ORDER — MAGNESIUM SULFATE IN WATER 40 MG/ML
2 INJECTION, SOLUTION INTRAVENOUS ONCE
Status: COMPLETED | OUTPATIENT
Start: 2020-02-11 | End: 2020-02-11

## 2020-02-11 RX ORDER — ONDANSETRON 2 MG/ML
4 INJECTION INTRAMUSCULAR; INTRAVENOUS EVERY 6 HOURS PRN
Status: DISCONTINUED | OUTPATIENT
Start: 2020-02-11 | End: 2020-02-12 | Stop reason: HOSPADM

## 2020-02-11 RX ORDER — SODIUM CHLORIDE 0.9 % (FLUSH) 0.9 %
10 SYRINGE (ML) INJECTION PRN
Status: DISCONTINUED | OUTPATIENT
Start: 2020-02-11 | End: 2020-02-11 | Stop reason: SDUPTHER

## 2020-02-11 RX ADMIN — POTASSIUM CHLORIDE 40 MEQ: 750 TABLET, FILM COATED, EXTENDED RELEASE ORAL at 13:42

## 2020-02-11 RX ADMIN — THERA TABS 1 TABLET: TAB at 11:38

## 2020-02-11 RX ADMIN — LORAZEPAM 1 MG: 2 INJECTION INTRAMUSCULAR; INTRAVENOUS at 20:14

## 2020-02-11 RX ADMIN — POTASSIUM CHLORIDE AND SODIUM CHLORIDE: 900; 300 INJECTION, SOLUTION INTRAVENOUS at 23:31

## 2020-02-11 RX ADMIN — Medication 100 MG: at 11:38

## 2020-02-11 RX ADMIN — LORAZEPAM 1 MG: 2 INJECTION INTRAMUSCULAR; INTRAVENOUS at 11:37

## 2020-02-11 RX ADMIN — SODIUM CHLORIDE 1000 ML: 9 INJECTION, SOLUTION INTRAVENOUS at 11:35

## 2020-02-11 RX ADMIN — ONDANSETRON 4 MG: 2 INJECTION INTRAMUSCULAR; INTRAVENOUS at 17:45

## 2020-02-11 RX ADMIN — FOLIC ACID 1 MG: 1 TABLET ORAL at 11:38

## 2020-02-11 RX ADMIN — SODIUM CHLORIDE, PRESERVATIVE FREE 10 ML: 5 INJECTION INTRAVENOUS at 20:14

## 2020-02-11 RX ADMIN — LORAZEPAM 1 MG: 2 INJECTION INTRAMUSCULAR; INTRAVENOUS at 17:45

## 2020-02-11 RX ADMIN — SODIUM CHLORIDE, POTASSIUM CHLORIDE, SODIUM LACTATE AND CALCIUM CHLORIDE 1000 ML: 600; 310; 30; 20 INJECTION, SOLUTION INTRAVENOUS at 14:03

## 2020-02-11 RX ADMIN — ONDANSETRON 4 MG: 2 INJECTION INTRAMUSCULAR; INTRAVENOUS at 11:41

## 2020-02-11 RX ADMIN — ENOXAPARIN SODIUM 40 MG: 40 INJECTION SUBCUTANEOUS at 20:14

## 2020-02-11 RX ADMIN — LORAZEPAM 1 MG: 2 INJECTION INTRAMUSCULAR; INTRAVENOUS at 12:39

## 2020-02-11 RX ADMIN — SERTRALINE HYDROCHLORIDE 50 MG: 50 TABLET ORAL at 20:14

## 2020-02-11 RX ADMIN — MAGNESIUM SULFATE HEPTAHYDRATE 2 G: 40 INJECTION, SOLUTION INTRAVENOUS at 13:42

## 2020-02-11 RX ADMIN — POTASSIUM CHLORIDE AND SODIUM CHLORIDE: 900; 300 INJECTION, SOLUTION INTRAVENOUS at 15:56

## 2020-02-11 RX ADMIN — MAGNESIUM SULFATE HEPTAHYDRATE 2 G: 40 INJECTION, SOLUTION INTRAVENOUS at 22:56

## 2020-02-11 ASSESSMENT — PAIN DESCRIPTION - LOCATION
LOCATION: GENERALIZED
LOCATION: HEAD

## 2020-02-11 ASSESSMENT — PAIN DESCRIPTION - FREQUENCY
FREQUENCY: CONTINUOUS
FREQUENCY: CONTINUOUS

## 2020-02-11 ASSESSMENT — ENCOUNTER SYMPTOMS
NAUSEA: 1
ABDOMINAL PAIN: 0
RESPIRATORY NEGATIVE: 1
SHORTNESS OF BREATH: 0
VOMITING: 1
COUGH: 0
DIARRHEA: 1
EYES NEGATIVE: 1
CONSTIPATION: 0

## 2020-02-11 ASSESSMENT — PAIN DESCRIPTION - DESCRIPTORS
DESCRIPTORS: ACHING
DESCRIPTORS: ACHING

## 2020-02-11 ASSESSMENT — PAIN DESCRIPTION - PROGRESSION: CLINICAL_PROGRESSION: GRADUALLY IMPROVING

## 2020-02-11 ASSESSMENT — PAIN SCALES - GENERAL
PAINLEVEL_OUTOF10: 2
PAINLEVEL_OUTOF10: 7
PAINLEVEL_OUTOF10: 3

## 2020-02-11 ASSESSMENT — PAIN DESCRIPTION - ONSET: ONSET: ON-GOING

## 2020-02-11 ASSESSMENT — PAIN DESCRIPTION - PAIN TYPE
TYPE: ACUTE PAIN
TYPE: ACUTE PAIN

## 2020-02-11 ASSESSMENT — PAIN - FUNCTIONAL ASSESSMENT: PAIN_FUNCTIONAL_ASSESSMENT: ACTIVITIES ARE NOT PREVENTED

## 2020-02-11 NOTE — ED NOTES
Report called to [facility] tri WELCH to be transported via stretcher to room 129 [facility], RN  02/11/20 6570

## 2020-02-11 NOTE — H&P
Hospital Medicine History and Physical    2/11/2020    Date of Admission: 2/11/2020    Date of Service: Pt seen/examined on 2/11/2020 and admitted to inpatient. Assessment:  Principal Problem:    Alcohol withdrawal, uncomplicated (HCC)  Active Problems:    Lactic acidosis    Acute gastroenteritis    High anion gap metabolic acidosis    Alcohol withdrawal (HCC)    Hypokalemia    Hypomagnesemia    Hyponatremia    Tobacco use disorder    Leukocytosis  Resolved Problems:    * No resolved hospital problems. *    Plan:  1. Alcohol abuse with physiologic dependence, currently in withdrawal state. She has been counseled about cessation of alcohol use. Continue CIWA protocol with as needed Ativan. Continue thiamine, folate. 2. Acute gastroenteritis. Could be viral etiology. Check stool studies, including C. difficile. Continue intravenous fluid. Antiemetics ordered. 3. Leukocytosis. Likely reactive in the setting of dehydration from GI losses. Checking stool studies, rule out C diff. Monitor white count closely. 4. Anion gap metabolic acidosis, lactic acidosis. Likely secondary to GI losses. Continue serial lactic acid level. Continue to venous fluid. 5. Multiple electrolyte abnormalities, including hyponatremia, hypomagnesemia, hypokalemia. Secondary to GI losses. Continue ongoing electrolyte replacement. Recheck electrolytes tonight. 6. Tobacco use disorder. Patient has been counseled about cessation of tobacco use. Continue NicoDerm patch as ordered. Activities: Up with assist  Prophylaxis: Subcutaneous Lovenox  Code status: Full code    ==========================================================  Chief complaint:  Chief Complaint   Patient presents with    Withdrawal     last drink around 0300. has been sober since november. relapses- has been drinking 10-12 beers/day for the past 4 days. History of Presenting Illness:   This is a pleasant 44 y.o. female with history of anxiety 23   Ht 5' 4\" (1.626 m)   Wt 195 lb 15.8 oz (88.9 kg)   SpO2 94%   BMI 33.64 kg/m²   Gen/overall appearance: Not in acute distress. Alert. Head: Normocephalic, atraumatic  Eyes: EOMI, good acuity  ENT: Oral mucosa moist  Neck: No JVD, thyromegaly  CVS: Nml S1S2, no MRG, RRR  Pulm: Clear bilaterally. No crackles/wheezes  Gastrointestinal: Soft, NT/ND, +BS  Musculoskeletal: No edema. Warm  Neuro: No focal deficit. Moves extremity spontaneously. Psychiatry: Appropriate affect. Not agitated. Skin: Warm, dry with normal turgor. No rash  Capillary refill: Brisk,< 3 seconds   Peripheral Pulses: +2 palpable, equal bilaterally       Labs/imaging/EKG:  CBC:   Recent Labs     02/11/20  1134   WBC 24.0*   HGB 12.9        BMP:    Recent Labs     02/11/20  1134   *   K 3.4*   CL 94*   CO2 20*   BUN 6*   CREATININE 0.6   GLUCOSE 98     Hepatic:   Recent Labs     02/11/20  1134   AST 31   ALT 23   BILITOT 0.5   ALKPHOS 84       Xr Chest Standard (2 Vw)    Result Date: 2/11/2020  EXAMINATION: TWO XRAY VIEWS OF THE CHEST 2/11/2020 12:03 pm COMPARISON: 10/11/2018 HISTORY: ORDERING SYSTEM PROVIDED HISTORY: alcohol abuse, withdrawal TECHNOLOGIST PROVIDED HISTORY: Reason for exam:->alcohol abuse, withdrawal Reason for Exam: alcohol withdrawals Type of Exam: Initial Relevant Medical/Surgical History: withdrawals FINDINGS: Heart size and pulmonary vasculature within normal limits. Lungs clear. Costophrenic angles sharp     No active cardiopulmonary disease       EKG: Normal sinus rhythm with no acute ST/T changes. I reviewed EKG. Discussed with ER provider.       Thank you [facility] DO FREIDA for the opportunity to be involved in this patient's care.    -----------------------------  Nancy Cantu MD  Rounding hospitalist

## 2020-02-11 NOTE — PROGRESS NOTES
Medication Reconciliation    List of medications patient is currently taking is complete. Source of information: 1. Conversation with patient at bedside                                      2. EPIC records      Allergies  Patient has no known allergies. Notes regarding home medications:   1. Patient's only home medication is Sertraline 50 mg po daily, which she has not taken yet today.     Maria R Duran PharmD, BCPS  2/11/2020 12:41 PM

## 2020-02-11 NOTE — ED NOTES
[id number] sound asleep on arrival.  No acute distress. resp easy. Sinus rhythm on monitor. Awakens easily. Mild nausea. Up to [facility]. Unsteady with 2 loss of balances. Reinforced again importance of not getting up without assistance. Voided 800cc moderately hematuric urine. Pt states just started period today. No  Vomiting.   Explained new orders     Beba Deras RN  02/11/20 8551 Trauma Surgery Progress Note    Subjective:   Pt seen & examined at bedside, SBP dropped to 90's overnight but normalized without intervention.  Very drowsy overnight and this morning but responds to verbal cues and engages when stimulated.  No pain, no complaints.    Had one bowel mvmt this morning described as old blood mixed with feces.      Medications:  enoxaparin Injectable 40  lisinopril 20  metoprolol tartrate 25      Objective:  Vital Signs Last 24 Hrs  T(C): 36.8 (15 Dec 2018 10:05), Max: 38.2 (14 Dec 2018 15:42)  T(F): 98.3 (15 Dec 2018 10:05), Max: 100.8 (14 Dec 2018 15:42)  HR: 91 (15 Dec 2018 10:00) (91 - 105)  BP: 115/70 (15 Dec 2018 10:00) (97/61 - 148/78)  BP(mean): --  RR: 18 (15 Dec 2018 10:00) (18 - 19)  SpO2: 95% (15 Dec 2018 10:00) (95% - 98%)    I&O's Summary    14 Dec 2018 07:01  -  15 Dec 2018 07:00  --------------------------------------------------------  IN: 1950 mL / OUT: 1435 mL / NET: 515 mL    15 Dec 2018 07:01  -  15 Dec 2018 11:33  --------------------------------------------------------  IN: 0 mL / OUT: 300 mL / NET: -300 mL        Physical Exam:  Gen: NAD, resting comfortably in bed.  Drowsy, oriented x2, engages with conversation for short duration then returns to sleeping.    Resp: No increased WOB on NC 2L  Abd: Mildly distended, soft, compressible, mildly tender to palpation over epigastrum and L side  Extr: WWP distally, cap refill <2s    LABS:                        7.7    13.68 )-----------( 547      ( 15 Dec 2018 09:23 )             23.8     12-15    134<L>  |  100  |  8   ----------------------------<  153<H>  4.1   |  23  |  0.65    Ca    8.1<L>      15 Dec 2018 07:24  Phos  4.1     12-15  Mg     2.1     12-15    TPro  6.0  /  Alb  2.6<L>  /  TBili  0.3  /  DBili  0.1  /  AST  24  /  ALT  25  /  AlkPhos  138<H>  12-15    PT/INR - ( 15 Dec 2018 09:22 )   PT: 18.3 sec;   INR: 1.58 ratio         PTT - ( 15 Dec 2018 09:22 )  PTT:38.1 sec

## 2020-02-11 NOTE — ED PROVIDER NOTES
Negative. Skin: Negative. Negative for rash and wound. Neurological: Positive for headaches. Negative for syncope and weakness. Hematological: Negative. Psychiatric/Behavioral: Negative. PAST MEDICAL HISTORY     Past Medical History:   Diagnosis Date    Anxiety     Depression     Sleeping difficulties          SURGICAL HISTORY     Past Surgical History:   Procedure Laterality Date    BREAST SURGERY      sailine breast      SECTION  1988    FRACTURE SURGERY  2006    right wrist         CURRENTMEDICATIONS       Previous Medications    SERTRALINE (ZOLOFT) 50 MG TABLET    Take 50 mg by mouth daily       ALLERGIES     Patient has no known allergies.     FAMILY HISTORY       Family History   Problem Relation Age of Onset    No Known Problems Mother     No Known Problems Father     Other Brother         anxiety    Cancer Maternal Grandmother         lung cancer    Cancer Maternal Grandfather         lung          SOCIAL HISTORY       Social History     Socioeconomic History    Marital status: Single     Spouse name: None    Number of children: None    Years of education: None    Highest education level: None   Occupational History    None   Social Needs    Financial resource strain: None    Food insecurity:     Worry: None     Inability: None    Transportation needs:     Medical: None     Non-medical: None   Tobacco Use    Smoking status: Current Every Day Smoker     Packs/day: 0.50     Years: 8.00     Pack years: 4.00     Types: Cigarettes    Smokeless tobacco: Never Used   Substance and Sexual Activity    Alcohol use: Yes     Comment: 12 beers/day    Drug use: No    Sexual activity: Not Currently   Lifestyle    Physical activity:     Days per week: None     Minutes per session: None    Stress: None   Relationships    Social connections:     Talks on phone: None     Gets together: None     Attends [employer] service: None     Active member of club or organization: None     Attends meetings of clubs or organizations: None     Relationship status: None    Intimate partner violence:     Fear of current or ex partner: None     Emotionally abused: None     Physically abused: None     Forced sexual activity: None   Other Topics Concern    None   Social History Narrative    None       SCREENINGS             PHYSICAL EXAM    (up to 7 for level 4, 8 or more for level 5)     ED Triage Vitals [02/11/20 1115]   BP Temp Temp Source Pulse Resp SpO2 Height Weight   135/67 99.4 °F (37.4 °C) Oral 100 18 95 % 5' 4\" (1.626 m) 195 lb 15.8 oz (88.9 kg)      height is 5' 4\" (1.626 m) and weight is 195 lb 15.8 oz (88.9 kg). Her oral temperature is 99.4 °F (37.4 °C). Her blood pressure is 145/83 (abnormal) and her pulse is 99. Her respiration is 23 and oxygen saturation is 94%. Physical Exam  Constitutional: Appears well-developed and well-nourished. No distress. HENT:   Head: Normocephalic and atraumatic. Eyes: Conjunctivae and EOM are normal.   Neck: Neck supple. No JVD present. Cardiovascular: Tachycardic, intact distal pulses. Extremities warm and well perfused. Pulmonary/Chest: Lungs clear to auscultation. Effort normal. No respiratory distress. Speaking full sentences. Abdominal: Nontender, exhibits no distension. Neurological: Alert. Oriented to person, place, time. Tremulous. Answering questions appropriately. Skin: Skin is warm and dry. Psychiatric: Anxious. Attentive. Nursing note and vitals reviewed.       DIAGNOSTIC RESULTS   LABS:    Results for orders placed or performed during the hospital encounter of 02/11/20   CBC Auto Differential   Result Value Ref Range    WBC 24.0 (H) 4.0 - 11.0 K/uL    RBC 4.87 4.00 - 5.20 M/uL    Hemoglobin 12.9 12.0 - 16.0 g/dL    Hematocrit 39.0 36.0 - 48.0 %    MCV 80.0 80.0 - 100.0 fL    MCH 26.5 26.0 - 34.0 pg    MCHC 33.1 31.0 - 36.0 g/dL    RDW 14.5 12.4 - 15.4 %    Platelets 355 112 - 667 K/uL    MPV 8.0 5.0 - 10.5 fL admit. Discussed with hospitalist.         SEP-1 CORE MEASURE DATA    Classification: exclude from core measure    Amount of fluids ordered: at least 30mL/kg based on ideal body weight due to obesity defined as BMI >30 (patient's BMI is Body mass index is 33.64 kg/m².)    Time at which SIRS criteria were identified: 1330    Broad-spectrum antibiotics chosen: none at this time, to be monitored    Repeat lactate level: pending    Exclusion criteria: the patient is NOT to be included for sepsis due to: Alternative explanation for abnormal vital signs, specifically tachycardia more likely related to alcohol withdrawal.  Leukocytosis and lactic acidosis more likely related to nausea and vomiting, diarrhea rather than infection however will need to be closely monitored. FINAL IMPRESSION      1. Alcohol withdrawal syndrome, with delirium ([address]    2. Hypomagnesemia    3. Lactic acidosis    4. Nausea vomiting and diarrhea    5. Leukocytosis, unspecified type          DISPOSITION/PLAN   DISPOSITION Decision To Admit 02/11/2020 02:19:43 PM      PATIENT REFERRED TO:  No follow-up provider specified. DISCHARGE MEDICATIONS:  New Prescriptions    No medications on file       DISCONTINUED MEDICATIONS:  Discontinued Medications    METHYLPREDNISOLONE (MEDROL, FREYA,) 4 MG TABLET    Take by mouth.     SERTRALINE HCL (ZOLOFT PO)    Take by mouth daily                (Please note that portions of this note were completed with a voice recognition program.  Efforts were made to edit the dictations but occasionally words are mis-transcribed.)    Mary Beck MD (electronically signed)            Mary Beck MD  02/11/20 1827

## 2020-02-11 NOTE — ED NOTES
Tolerated jello. Mild nausea continues though. No vomiting. Rests with eyes closed when left alone.   Remains in sinus rhythm     Candelaria Bean, RN  02/11/20 1104

## 2020-02-11 NOTE — ED NOTES
Explained new orders. meds being given. Mild nausea but sips water and takes meds well.      Brooklyn Velez RN  02/11/20 0214

## 2020-02-11 NOTE — ED NOTES
Pt standing on floor at foot of bed. Pt put call light on and didn't wait for nurse to come. This RN not notified about call light. Pt concerned she might have diarrhea. Pt aware that stool specimen needed. Assisted pt to [facility] and reminded not to get up without assistance. Continues with mild HA/body aches at 4.         Kiana Roman RN  02/11/20 3082

## 2020-02-12 VITALS
RESPIRATION RATE: 17 BRPM | TEMPERATURE: 97.9 F | OXYGEN SATURATION: 97 % | SYSTOLIC BLOOD PRESSURE: 139 MMHG | HEIGHT: 64 IN | WEIGHT: 188.71 LBS | BODY MASS INDEX: 32.22 KG/M2 | HEART RATE: 98 BPM | DIASTOLIC BLOOD PRESSURE: 86 MMHG

## 2020-02-12 PROBLEM — D72.829 LEUKOCYTOSIS: Status: ACTIVE | Noted: 2020-02-12

## 2020-02-12 LAB
ALBUMIN SERPL-MCNC: 3.1 G/DL (ref 3.4–5)
ANION GAP SERPL CALCULATED.3IONS-SCNC: 11 MMOL/L (ref 3–16)
BASOPHILS ABSOLUTE: 0 K/UL (ref 0–0.2)
BASOPHILS RELATIVE PERCENT: 0.3 %
BUN BLDV-MCNC: 6 MG/DL (ref 7–20)
CALCIUM SERPL-MCNC: 7.5 MG/DL (ref 8.3–10.6)
CHLORIDE BLD-SCNC: 105 MMOL/L (ref 99–110)
CO2: 19 MMOL/L (ref 21–32)
CREAT SERPL-MCNC: 0.6 MG/DL (ref 0.6–1.1)
EOSINOPHILS ABSOLUTE: 0.2 K/UL (ref 0–0.6)
EOSINOPHILS RELATIVE PERCENT: 1.5 %
GFR AFRICAN AMERICAN: >60
GFR NON-AFRICAN AMERICAN: >60
GI BACTERIAL PATHOGENS BY PCR: NORMAL
GLUCOSE BLD-MCNC: 92 MG/DL (ref 70–99)
HCT VFR BLD CALC: 33 % (ref 36–48)
HEMOGLOBIN: 10.8 G/DL (ref 12–16)
LACTIC ACID: 1.6 MMOL/L (ref 0.4–2)
LYMPHOCYTES ABSOLUTE: 2.7 K/UL (ref 1–5.1)
LYMPHOCYTES RELATIVE PERCENT: 22.9 %
MAGNESIUM: 2.2 MG/DL (ref 1.8–2.4)
MCH RBC QN AUTO: 26.7 PG (ref 26–34)
MCHC RBC AUTO-ENTMCNC: 32.8 G/DL (ref 31–36)
MCV RBC AUTO: 81.4 FL (ref 80–100)
MONOCYTES ABSOLUTE: 0.7 K/UL (ref 0–1.3)
MONOCYTES RELATIVE PERCENT: 5.6 %
NEUTROPHILS ABSOLUTE: 8.3 K/UL (ref 1.7–7.7)
NEUTROPHILS RELATIVE PERCENT: 69.7 %
PDW BLD-RTO: 14.7 % (ref 12.4–15.4)
PHOSPHORUS: 2.3 MG/DL (ref 2.5–4.9)
PLATELET # BLD: 227 K/UL (ref 135–450)
PMV BLD AUTO: 8.7 FL (ref 5–10.5)
POTASSIUM SERPL-SCNC: 4.5 MMOL/L (ref 3.5–5.1)
RBC # BLD: 4.06 M/UL (ref 4–5.2)
SODIUM BLD-SCNC: 135 MMOL/L (ref 136–145)
URINE CULTURE, ROUTINE: NORMAL
WBC # BLD: 11.9 K/UL (ref 4–11)

## 2020-02-12 PROCEDURE — G0378 HOSPITAL OBSERVATION PER HR: HCPCS

## 2020-02-12 PROCEDURE — 96366 THER/PROPH/DIAG IV INF ADDON: CPT

## 2020-02-12 PROCEDURE — 80069 RENAL FUNCTION PANEL: CPT

## 2020-02-12 PROCEDURE — 2500000003 HC RX 250 WO HCPCS: Performed by: INTERNAL MEDICINE

## 2020-02-12 PROCEDURE — 83605 ASSAY OF LACTIC ACID: CPT

## 2020-02-12 PROCEDURE — 6370000000 HC RX 637 (ALT 250 FOR IP): Performed by: EMERGENCY MEDICINE

## 2020-02-12 PROCEDURE — 85025 COMPLETE CBC W/AUTO DIFF WBC: CPT

## 2020-02-12 PROCEDURE — 6370000000 HC RX 637 (ALT 250 FOR IP): Performed by: INTERNAL MEDICINE

## 2020-02-12 PROCEDURE — 36415 COLL VENOUS BLD VENIPUNCTURE: CPT

## 2020-02-12 PROCEDURE — 96376 TX/PRO/DX INJ SAME DRUG ADON: CPT

## 2020-02-12 PROCEDURE — 6360000002 HC RX W HCPCS: Performed by: INTERNAL MEDICINE

## 2020-02-12 PROCEDURE — 96367 TX/PROPH/DG ADDL SEQ IV INF: CPT

## 2020-02-12 PROCEDURE — 94761 N-INVAS EAR/PLS OXIMETRY MLT: CPT

## 2020-02-12 PROCEDURE — 83735 ASSAY OF MAGNESIUM: CPT

## 2020-02-12 PROCEDURE — 2580000003 HC RX 258: Performed by: INTERNAL MEDICINE

## 2020-02-12 RX ORDER — LABETALOL HYDROCHLORIDE 5 MG/ML
10 INJECTION, SOLUTION INTRAVENOUS EVERY 4 HOURS PRN
Status: DISCONTINUED | OUTPATIENT
Start: 2020-02-12 | End: 2020-02-12 | Stop reason: HOSPADM

## 2020-02-12 RX ORDER — LORAZEPAM 1 MG/1
4 TABLET ORAL EVERY 4 HOURS PRN
Status: DISCONTINUED | OUTPATIENT
Start: 2020-02-12 | End: 2020-02-12 | Stop reason: HOSPADM

## 2020-02-12 RX ORDER — LORAZEPAM 1 MG/1
2 TABLET ORAL EVERY 4 HOURS PRN
Status: DISCONTINUED | OUTPATIENT
Start: 2020-02-12 | End: 2020-02-12 | Stop reason: HOSPADM

## 2020-02-12 RX ORDER — CHLORDIAZEPOXIDE HYDROCHLORIDE 25 MG/1
25 CAPSULE, GELATIN COATED ORAL 3 TIMES DAILY
Status: DISCONTINUED | OUTPATIENT
Start: 2020-02-12 | End: 2020-02-12 | Stop reason: HOSPADM

## 2020-02-12 RX ORDER — LORAZEPAM 1 MG/1
1 TABLET ORAL EVERY 4 HOURS PRN
Status: DISCONTINUED | OUTPATIENT
Start: 2020-02-12 | End: 2020-02-12 | Stop reason: HOSPADM

## 2020-02-12 RX ORDER — LORAZEPAM 1 MG/1
3 TABLET ORAL EVERY 4 HOURS PRN
Status: DISCONTINUED | OUTPATIENT
Start: 2020-02-12 | End: 2020-02-12 | Stop reason: HOSPADM

## 2020-02-12 RX ADMIN — LORAZEPAM 4 MG: 1 TABLET ORAL at 16:41

## 2020-02-12 RX ADMIN — SODIUM PHOSPHATE, MONOBASIC, MONOHYDRATE 15 MMOL: 276; 142 INJECTION, SOLUTION INTRAVENOUS at 10:14

## 2020-02-12 RX ADMIN — CHLORDIAZEPOXIDE HYDROCHLORIDE 25 MG: 25 CAPSULE ORAL at 12:52

## 2020-02-12 RX ADMIN — LORAZEPAM 2 MG: 1 TABLET ORAL at 13:04

## 2020-02-12 RX ADMIN — FOLIC ACID 1 MG: 1 TABLET ORAL at 08:09

## 2020-02-12 RX ADMIN — Medication 100 MG: at 08:09

## 2020-02-12 RX ADMIN — POTASSIUM CHLORIDE AND SODIUM CHLORIDE: 900; 300 INJECTION, SOLUTION INTRAVENOUS at 05:59

## 2020-02-12 RX ADMIN — SERTRALINE HYDROCHLORIDE 50 MG: 50 TABLET ORAL at 08:09

## 2020-02-12 RX ADMIN — SODIUM CHLORIDE, PRESERVATIVE FREE 10 ML: 5 INJECTION INTRAVENOUS at 08:13

## 2020-02-12 RX ADMIN — ONDANSETRON 4 MG: 2 INJECTION INTRAMUSCULAR; INTRAVENOUS at 08:09

## 2020-02-12 RX ADMIN — THERA TABS 1 TABLET: TAB at 08:09

## 2020-02-12 ASSESSMENT — PAIN SCALES - GENERAL
PAINLEVEL_OUTOF10: 0

## 2020-02-12 NOTE — PROGRESS NOTES
Hospital Medicine Progress Note     Date:  2/12/2020    PCP: Joyce Osborn DO (Tel: [phone])    Date of Admission: 2/11/2020    Chief complaint:   Chief Complaint   Patient presents with    Withdrawal     last drink around 0300. has been sober since november. relapses- has been drinking 10-12 beers/day for the past 4 days. Brief hospital course: 49-year-old female with history of anxiety disorder, depression, insomnia, alcohol abuse with physiologic dependence, history of alcohol withdrawal seizure, who presents to the emergency room with palpitations, anxiety, tremulousness, nausea, visual hallucination and multiple episodes of vomiting and diarrhea. She denies recent antibiotic use. She also reports difficulty tolerating p.o. intake. Upon arrival to the emergency room, CIWA score was noted to be 9, given Ativan in ER. She has multiple electrolyte abnormalities, including lactic acidosis, hypomagnesemia, hypokalemia, hyponatremia, anion gap metabolic acidosis, and WBC of 24,000. Assessment:  Principal Problem:    Alcohol withdrawal, uncomplicated (HCC)  Active Problems:    Lactic acidosis    Acute gastroenteritis    High anion gap metabolic acidosis    Alcohol withdrawal (HCC)    Hypokalemia    Hypomagnesemia    Hyponatremia    Tobacco use disorder    Leukocytosis  Resolved Problems:    * No resolved hospital problems. *    Plan:  1. Alcohol withdrawal with delirium. Start scheduled Librium 25 mg 3 times daily. Adjusted PRN Ativan from every 1 hour to every 4 hours p.o.; discontinued IV route. Maintain on seizure precautions. Continue thiamine and folate. Patient has been counseled about cessation of alcohol use. 2. Acute gastroenteritis. Suspect viral etiology. Continue intravenous fluid; decreased fluid rate today. 3. Leukocytosis. Likely reactive. Overall, much improved with intravenous fluid. Stool studies so far unremarkable.   4. Anion gap metabolic acidosis, lactic acidosis. This have resolved with hydration. 5. Multiple electrolyte abnormalities. Hypokalemia resolved, hypomagnesemia resolved. Continue intravenous fluid for mild hyponatremia, metabolic acidosis. 6. Tobacco use disorder. Continue NicoDerm patch. Patient has been counseled about cessation of tobacco use. Diet: DIET GENERAL;    Code status: Full Code   ---------  Subjective  Patient inquiring why she is not allowed to walk around in the room; explained risk of fall. No cough or fever or chills or urinary symptoms. Most recent CIWA score of 19 noted. Objective  Physical exam:  Vitals: /72   Pulse 95   Temp 98.3 °F (36.8 °C) (Oral)   Resp 16   Ht 5' 4\" (1.626 m)   Wt 188 lb 11.4 oz (85.6 kg)   LMP 02/11/2020   SpO2 95%   Breastfeeding No   BMI 32.39 kg/m²   Gen/overall appearance: Not in acute distress. Alert. Oriented x3. Head: Normocephalic, atraumatic  Eyes: EOMI, good acuity  ENT: Oral mucosa moist  Neck: No JVD, thyromegaly  CVS: Nml S1S2, no MRG, RRR  Pulm: Clear bilaterally. No crackles/wheezes  Gastrointestinal: Soft, NT/ND, +BS  Musculoskeletal: No edema. Warm  Neuro: No focal deficit. Moves extremity spontaneously. Psychiatry: Appropriate affect. Not agitated. Skin: Warm, dry with normal turgor. No rash  Capillary refill: Brisk,< 3 seconds   Peripheral Pulses: +2 palpable, equal bilaterally      24HR INTAKE/OUTPUT:      Intake/Output Summary (Last 24 hours) at 2/12/2020 1216  Last data filed at 2/12/2020 0559  Gross per 24 hour   Intake 2299 ml   Output 300 ml   Net 1999 ml     I/O last 3 completed shifts: In: 2299 [P.O.:240; I.V.:1959; IV Piggyback:100]  Out: 300 [Urine:300]  No intake/output data recorded.     Meds:    sodium phosphate IVPB  15 mmol Intravenous Once    chlordiazePOXIDE  25 mg Oral TID    thiamine  100 mg Oral Daily    folic acid  1 mg Oral Daily    multivitamin  1 tablet Oral Daily    sodium chloride flush  10 mL Intravenous 2 times per day    sertraline  50 mg Oral Daily    enoxaparin  40 mg Subcutaneous Nightly    nicotine  1 patch Transdermal Daily     Infusions:    0.9% NaCl with KCl 40 mEq 150 mL/hr at 02/12/20 0559     PRN Meds: labetalol, LORazepam **OR** [DISCONTINUED] LORazepam **OR** LORazepam **OR** [DISCONTINUED] LORazepam **OR** LORazepam **OR** [DISCONTINUED] LORazepam **OR** LORazepam **OR** [DISCONTINUED] LORazepam, sodium chloride flush, magnesium hydroxide, ondansetron, acetaminophen    Labs/imaging:  CBC:   Recent Labs     02/11/20  1134 02/12/20  0442   WBC 24.0* 11.9*   HGB 12.9 10.8*    227     BMP:    Recent Labs     02/11/20  1134 02/11/20  1917 02/12/20  0442   * 137 135*   K 3.4* 4.1 4.5   CL 94* 103 105   CO2 20* 23 19*   BUN 6* 3* 6*   CREATININE 0.6 0.6 0.6   GLUCOSE 98 86 92     Hepatic:   Recent Labs     02/11/20  1134   AST 31   ALT 23   BILITOT 0.5   ALKPHOS 84     Paola Orellana MD  -------------------------------  Jaspal \[facility] [place] [facility]\""ist

## 2020-02-12 NOTE — PROGRESS NOTES
Pt quiet in bed, denies pain, safety precautions discussed with pt, reinforcement required, no acute distress noted at this time, will continue to monitor

## 2020-02-12 NOTE — PLAN OF CARE
Problem: Falls - Risk of:  Goal: Will remain free from falls  Description  Will remain free from falls  Outcome: Ongoing  Goal: Absence of physical injury  Description  Absence of physical injury  Outcome: Ongoing     Problem: Pain - Acute:  Goal: Control of acute pain  Description  Control of acute pain     Outcome: Ongoing     Problem: Discharge Planning:  Goal: Discharged to appropriate level of care  Description  Discharged to appropriate level of care  Outcome: Ongoing     Problem: Fluid Volume - Deficit:  Goal: Absence of fluid volume deficit signs and symptoms  Description  Absence of fluid volume deficit signs and symptoms  Outcome: Ongoing     Problem: Nutrition Deficit:  Goal: Ability to achieve adequate nutritional intake will improve  Description  Ability to achieve adequate nutritional intake will improve  Outcome: Ongoing     Problem: Sleep Pattern Disturbance:  Goal: Appears well-rested  Description  Appears well-rested  Outcome: Ongoing     Problem: Violence - Risk of, Self/Other-Directed:  Goal: Knowledge of developmental care interventions  Description  Absence of violence  Outcome: Ongoing     Problem: BH INEFFECTIVE COPING  Goal: Coping skills are improving  Outcome: Ongoing

## 2020-02-12 NOTE — PROGRESS NOTES
Patient arrived to room 5102, oriented to room, Tele monitor verified with CMUbox #1, call light in reach, food tray has been ordered, seizure pads placed.

## 2020-02-15 ENCOUNTER — HOSPITAL ENCOUNTER (OUTPATIENT)
Age: 50
Setting detail: OBSERVATION
Discharge: LEFT BEFORE DISCHARGE | DRG: 770 | End: 2020-02-16
Attending: INTERNAL MEDICINE | Admitting: INTERNAL MEDICINE
Payer: MEDICAID

## 2020-02-15 LAB
A/G RATIO: 1.4 (ref 1.1–2.2)
ALBUMIN SERPL-MCNC: 4.2 G/DL (ref 3.4–5)
ALP BLD-CCNC: 87 U/L (ref 40–129)
ALT SERPL-CCNC: 22 U/L (ref 10–40)
ANION GAP SERPL CALCULATED.3IONS-SCNC: 15 MMOL/L (ref 3–16)
AST SERPL-CCNC: 31 U/L (ref 15–37)
BACTERIA: ABNORMAL /HPF
BASOPHILS ABSOLUTE: 0.1 K/UL (ref 0–0.2)
BASOPHILS RELATIVE PERCENT: 1 %
BILIRUB SERPL-MCNC: 0.4 MG/DL (ref 0–1)
BILIRUBIN URINE: NEGATIVE
BLOOD CULTURE, ROUTINE: NORMAL
BLOOD, URINE: ABNORMAL
BUN BLDV-MCNC: 8 MG/DL (ref 7–20)
CALCIUM SERPL-MCNC: 9 MG/DL (ref 8.3–10.6)
CHLORIDE BLD-SCNC: 100 MMOL/L (ref 99–110)
CLARITY: ABNORMAL
CO2: 20 MMOL/L (ref 21–32)
COLOR: ABNORMAL
CREAT SERPL-MCNC: 0.5 MG/DL (ref 0.6–1.1)
CULTURE, BLOOD 2: NORMAL
EOSINOPHILS ABSOLUTE: 0.1 K/UL (ref 0–0.6)
EOSINOPHILS RELATIVE PERCENT: 0.9 %
EPITHELIAL CELLS, UA: 5 /HPF (ref 0–5)
ETHANOL: NORMAL MG/DL (ref 0–0.08)
GFR AFRICAN AMERICAN: >60
GFR NON-AFRICAN AMERICAN: >60
GLOBULIN: 3 G/DL
GLUCOSE BLD-MCNC: 90 MG/DL (ref 70–99)
GLUCOSE URINE: NEGATIVE MG/DL
HCG QUALITATIVE: NEGATIVE
HCT VFR BLD CALC: 35.5 % (ref 36–48)
HEMOGLOBIN: 11.7 G/DL (ref 12–16)
HYALINE CASTS: 2 /LPF (ref 0–8)
KETONES, URINE: NEGATIVE MG/DL
LEUKOCYTE ESTERASE, URINE: NEGATIVE
LIPASE: 50 U/L (ref 13–60)
LYMPHOCYTES ABSOLUTE: 2.9 K/UL (ref 1–5.1)
LYMPHOCYTES RELATIVE PERCENT: 22.2 %
MCH RBC QN AUTO: 26.8 PG (ref 26–34)
MCHC RBC AUTO-ENTMCNC: 33.1 G/DL (ref 31–36)
MCV RBC AUTO: 81.1 FL (ref 80–100)
MICROSCOPIC EXAMINATION: YES
MONOCYTES ABSOLUTE: 0.9 K/UL (ref 0–1.3)
MONOCYTES RELATIVE PERCENT: 6.9 %
MUCUS: ABNORMAL /LPF
NEUTROPHILS ABSOLUTE: 8.9 K/UL (ref 1.7–7.7)
NEUTROPHILS RELATIVE PERCENT: 69 %
NITRITE, URINE: NEGATIVE
PDW BLD-RTO: 15.1 % (ref 12.4–15.4)
PH UA: 6.5 (ref 5–8)
PLATELET # BLD: 240 K/UL (ref 135–450)
PMV BLD AUTO: 8.3 FL (ref 5–10.5)
POTASSIUM REFLEX MAGNESIUM: 4.6 MMOL/L (ref 3.5–5.1)
PROTEIN UA: 30 MG/DL
RBC # BLD: 4.37 M/UL (ref 4–5.2)
RBC UA: 12 /HPF (ref 0–4)
SODIUM BLD-SCNC: 135 MMOL/L (ref 136–145)
SPECIFIC GRAVITY UA: 1.01 (ref 1–1.03)
TOTAL PROTEIN: 7.2 G/DL (ref 6.4–8.2)
URINE REFLEX TO CULTURE: YES
URINE TYPE: ABNORMAL
UROBILINOGEN, URINE: 0.2 E.U./DL
WBC # BLD: 13 K/UL (ref 4–11)
WBC UA: 9 /HPF (ref 0–5)

## 2020-02-15 PROCEDURE — 6370000000 HC RX 637 (ALT 250 FOR IP): Performed by: INTERNAL MEDICINE

## 2020-02-15 PROCEDURE — 6370000000 HC RX 637 (ALT 250 FOR IP): Performed by: PHYSICIAN ASSISTANT

## 2020-02-15 PROCEDURE — 84703 CHORIONIC GONADOTROPIN ASSAY: CPT

## 2020-02-15 PROCEDURE — 6360000002 HC RX W HCPCS: Performed by: INTERNAL MEDICINE

## 2020-02-15 PROCEDURE — G0378 HOSPITAL OBSERVATION PER HR: HCPCS

## 2020-02-15 PROCEDURE — 2500000003 HC RX 250 WO HCPCS: Performed by: PHYSICIAN ASSISTANT

## 2020-02-15 PROCEDURE — 96375 TX/PRO/DX INJ NEW DRUG ADDON: CPT

## 2020-02-15 PROCEDURE — 6360000002 HC RX W HCPCS: Performed by: PHYSICIAN ASSISTANT

## 2020-02-15 PROCEDURE — 36415 COLL VENOUS BLD VENIPUNCTURE: CPT

## 2020-02-15 PROCEDURE — 81001 URINALYSIS AUTO W/SCOPE: CPT

## 2020-02-15 PROCEDURE — 96376 TX/PRO/DX INJ SAME DRUG ADON: CPT

## 2020-02-15 PROCEDURE — 87086 URINE CULTURE/COLONY COUNT: CPT

## 2020-02-15 PROCEDURE — 80053 COMPREHEN METABOLIC PANEL: CPT

## 2020-02-15 PROCEDURE — 2580000003 HC RX 258: Performed by: PHYSICIAN ASSISTANT

## 2020-02-15 PROCEDURE — 96366 THER/PROPH/DIAG IV INF ADDON: CPT

## 2020-02-15 PROCEDURE — 83690 ASSAY OF LIPASE: CPT

## 2020-02-15 PROCEDURE — G0480 DRUG TEST DEF 1-7 CLASSES: HCPCS

## 2020-02-15 PROCEDURE — 85025 COMPLETE CBC W/AUTO DIFF WBC: CPT

## 2020-02-15 PROCEDURE — 99285 EMERGENCY DEPT VISIT HI MDM: CPT

## 2020-02-15 PROCEDURE — 96365 THER/PROPH/DIAG IV INF INIT: CPT

## 2020-02-15 RX ORDER — LORAZEPAM 2 MG/ML
1 INJECTION INTRAMUSCULAR
Status: DISCONTINUED | OUTPATIENT
Start: 2020-02-15 | End: 2020-02-17 | Stop reason: HOSPADM

## 2020-02-15 RX ORDER — LORAZEPAM 1 MG/1
4 TABLET ORAL
Status: DISCONTINUED | OUTPATIENT
Start: 2020-02-15 | End: 2020-02-17 | Stop reason: HOSPADM

## 2020-02-15 RX ORDER — LORAZEPAM 1 MG/1
2 TABLET ORAL
Status: DISCONTINUED | OUTPATIENT
Start: 2020-02-15 | End: 2020-02-17 | Stop reason: HOSPADM

## 2020-02-15 RX ORDER — LORAZEPAM 2 MG/ML
2 INJECTION INTRAMUSCULAR
Status: DISCONTINUED | OUTPATIENT
Start: 2020-02-15 | End: 2020-02-17 | Stop reason: HOSPADM

## 2020-02-15 RX ORDER — ONDANSETRON 2 MG/ML
4 INJECTION INTRAMUSCULAR; INTRAVENOUS ONCE
Status: COMPLETED | OUTPATIENT
Start: 2020-02-15 | End: 2020-02-15

## 2020-02-15 RX ORDER — THIAMINE MONONITRATE (VIT B1) 100 MG
100 TABLET ORAL DAILY
Status: DISCONTINUED | OUTPATIENT
Start: 2020-02-15 | End: 2020-02-17 | Stop reason: HOSPADM

## 2020-02-15 RX ORDER — LORAZEPAM 1 MG/1
3 TABLET ORAL
Status: DISCONTINUED | OUTPATIENT
Start: 2020-02-15 | End: 2020-02-17 | Stop reason: HOSPADM

## 2020-02-15 RX ORDER — SODIUM CHLORIDE 0.9 % (FLUSH) 0.9 %
10 SYRINGE (ML) INJECTION EVERY 12 HOURS SCHEDULED
Status: DISCONTINUED | OUTPATIENT
Start: 2020-02-15 | End: 2020-02-17 | Stop reason: HOSPADM

## 2020-02-15 RX ORDER — SODIUM CHLORIDE 0.9 % (FLUSH) 0.9 %
10 SYRINGE (ML) INJECTION EVERY 12 HOURS SCHEDULED
Status: DISCONTINUED | OUTPATIENT
Start: 2020-02-15 | End: 2020-02-15 | Stop reason: SDUPTHER

## 2020-02-15 RX ORDER — LORAZEPAM 2 MG/ML
4 INJECTION INTRAMUSCULAR
Status: DISCONTINUED | OUTPATIENT
Start: 2020-02-15 | End: 2020-02-17 | Stop reason: HOSPADM

## 2020-02-15 RX ORDER — ACETAMINOPHEN 325 MG/1
650 TABLET ORAL EVERY 4 HOURS PRN
Status: DISCONTINUED | OUTPATIENT
Start: 2020-02-15 | End: 2020-02-17 | Stop reason: HOSPADM

## 2020-02-15 RX ORDER — NICOTINE 21 MG/24HR
1 PATCH, TRANSDERMAL 24 HOURS TRANSDERMAL DAILY
Status: DISCONTINUED | OUTPATIENT
Start: 2020-02-15 | End: 2020-02-17 | Stop reason: HOSPADM

## 2020-02-15 RX ORDER — SODIUM CHLORIDE 0.9 % (FLUSH) 0.9 %
10 SYRINGE (ML) INJECTION PRN
Status: DISCONTINUED | OUTPATIENT
Start: 2020-02-15 | End: 2020-02-15 | Stop reason: SDUPTHER

## 2020-02-15 RX ORDER — DOCUSATE SODIUM 100 MG/1
100 CAPSULE, LIQUID FILLED ORAL 2 TIMES DAILY PRN
Status: DISCONTINUED | OUTPATIENT
Start: 2020-02-15 | End: 2020-02-17 | Stop reason: HOSPADM

## 2020-02-15 RX ORDER — FOLIC ACID 1 MG/1
1 TABLET ORAL DAILY
Status: DISCONTINUED | OUTPATIENT
Start: 2020-02-15 | End: 2020-02-17 | Stop reason: HOSPADM

## 2020-02-15 RX ORDER — SODIUM CHLORIDE 0.9 % (FLUSH) 0.9 %
10 SYRINGE (ML) INJECTION PRN
Status: DISCONTINUED | OUTPATIENT
Start: 2020-02-15 | End: 2020-02-17 | Stop reason: HOSPADM

## 2020-02-15 RX ORDER — IBUPROFEN 200 MG
200 TABLET ORAL EVERY 6 HOURS PRN
COMMUNITY
End: 2022-10-09

## 2020-02-15 RX ORDER — LORAZEPAM 1 MG/1
1 TABLET ORAL
Status: DISCONTINUED | OUTPATIENT
Start: 2020-02-15 | End: 2020-02-17 | Stop reason: HOSPADM

## 2020-02-15 RX ORDER — MULTIVITAMIN WITH FOLIC ACID 400 MCG
1 TABLET ORAL DAILY
Status: DISCONTINUED | OUTPATIENT
Start: 2020-02-15 | End: 2020-02-17 | Stop reason: HOSPADM

## 2020-02-15 RX ORDER — LORAZEPAM 2 MG/ML
3 INJECTION INTRAMUSCULAR
Status: DISCONTINUED | OUTPATIENT
Start: 2020-02-15 | End: 2020-02-17 | Stop reason: HOSPADM

## 2020-02-15 RX ORDER — ONDANSETRON 2 MG/ML
4 INJECTION INTRAMUSCULAR; INTRAVENOUS EVERY 6 HOURS PRN
Status: DISCONTINUED | OUTPATIENT
Start: 2020-02-15 | End: 2020-02-17 | Stop reason: HOSPADM

## 2020-02-15 RX ADMIN — THIAMINE HCL TAB 100 MG 100 MG: 100 TAB at 18:35

## 2020-02-15 RX ADMIN — LORAZEPAM 2 MG: 1 TABLET ORAL at 14:20

## 2020-02-15 RX ADMIN — ONDANSETRON 4 MG: 2 INJECTION INTRAMUSCULAR; INTRAVENOUS at 14:20

## 2020-02-15 RX ADMIN — FOLIC ACID: 5 INJECTION, SOLUTION INTRAMUSCULAR; INTRAVENOUS; SUBCUTANEOUS at 14:30

## 2020-02-15 RX ADMIN — LORAZEPAM 1 MG: 1 TABLET ORAL at 17:05

## 2020-02-15 RX ADMIN — LORAZEPAM 1 MG: 2 INJECTION INTRAMUSCULAR; INTRAVENOUS at 18:45

## 2020-02-15 RX ADMIN — SODIUM CHLORIDE, PRESERVATIVE FREE 10 ML: 5 INJECTION INTRAVENOUS at 21:29

## 2020-02-15 RX ADMIN — FOLIC ACID 1 MG: 1 TABLET ORAL at 18:36

## 2020-02-15 RX ADMIN — THERA TABS 1 TABLET: TAB at 18:36

## 2020-02-15 RX ADMIN — LORAZEPAM 2 MG: 2 INJECTION INTRAMUSCULAR; INTRAVENOUS at 22:08

## 2020-02-15 RX ADMIN — ONDANSETRON 4 MG: 2 INJECTION INTRAMUSCULAR; INTRAVENOUS at 18:49

## 2020-02-15 ASSESSMENT — ENCOUNTER SYMPTOMS
NAUSEA: 1
DIARRHEA: 0
VOMITING: 0
COUGH: 0
ABDOMINAL PAIN: 0
SHORTNESS OF BREATH: 0
COLOR CHANGE: 0

## 2020-02-15 ASSESSMENT — PAIN SCALES - GENERAL
PAINLEVEL_OUTOF10: 0

## 2020-02-15 NOTE — ED PROVIDER NOTES
[address] [facility]        Pt Name: Julia Shah  MRN: [id number]  [place] 1970  Date of evaluation: 2/15/2020  Provider: LUPILLO Carter  PCP: Ye Wild DO    This patient was not seen and evaluated by the attending physician. CHIEF COMPLAINT       Chief Complaint   Patient presents with    Withdrawal     last drink last night around 1000       HISTORY OF PRESENT ILLNESS   (Location, Timing/Onset, Context/Setting, Quality, Duration, Modifying Factors, Severity, Associated Signs and Symptoms)  Note limiting factors. Julia Shah is a 44 y.o. female  history of alcohol abuse, last drink around 2200 yesterday. She has been drinking heavily over the last few weeks, 10-12 regular sized beer cans per day. She was recently admitted on February 11, but left on the 12th [address] She states that she is very embarrassed about leaving, but that she was having diarrhea and an alarm go off every time she got up off her bed to go to the bathroom to pass stool. She did not feel like the nurses responded to her fast enough and did not want to mess on herself. She states she was also feeling significantly better and felt like she could go home and do the rest of it by herself. Today she presents because she would like to quit drinking. She has an appointment with [facility] next week. She has required admission in the past, and reports she had \"one mild seizure\" from alcohol withdrawal before. Today she reports she is feeling very anxious, shaky, nauseous with dry heaves, and difficulty tolerating and fluids. The diarrhea she had last week resolved. She denies any known fevers. No falls, head trauma, or loss of consciousness. No known seizure activity today. She has no further complaints at this time.     Nursing Notes were all reviewed and agreed with or any disagreements were addressed in the HPI.    REVIEW OF SYSTEMS    (2-9 systems for level 4, 10 or more for level 5)     Review of Systems   Constitutional: Negative for chills and fever. Respiratory: Negative for cough and shortness of breath. Cardiovascular: Negative for chest pain and palpitations. Gastrointestinal: Positive for nausea. Negative for abdominal pain, diarrhea and vomiting. Genitourinary: Negative for dysuria, frequency and urgency. Skin: Negative for color change and rash. Neurological: Positive for tremors. Negative for dizziness, seizures, weakness and headaches. Psychiatric/Behavioral: Negative for agitation, confusion, self-injury and suicidal ideas. The patient is nervous/anxious. Positives and Pertinent negatives as per HPI. Except as noted above in the ROS, all other systems were reviewed and negative. PAST MEDICAL HISTORY     Past Medical History:   Diagnosis Date    Alcohol abuse     Anxiety     Depression     Sleeping difficulties          SURGICAL HISTORY     Past Surgical History:   Procedure Laterality Date    BREAST SURGERY      sailine breast      [address] [facility]   Larayne Leavens FRACTURE SURGERY  2006    right wrist         CURRENTMEDICATIONS       Previous Medications    DOXYLAMINE SUCCINATE PO    Take 1 tablet by mouth nightly as needed (sleep)    IBUPROFEN (ADVIL;MOTRIN) 200 MG TABLET    Take 200 mg by mouth every 6 hours as needed for Pain    SERTRALINE (ZOLOFT) 50 MG TABLET    Take 50 mg by mouth daily         ALLERGIES     Patient has no known allergies.     FAMILYHISTORY       Family History   Problem Relation Age of Onset    No Known Problems Mother     No Known Problems Father     Other Brother         anxiety    Cancer Maternal Grandmother         lung cancer    Cancer Maternal Grandfather         lung          SOCIAL HISTORY       Social History     Tobacco Use    Smoking status: Current Every Day Smoker     Packs/day: 0.50     Years: 8.00     Pack years: 4.00     Types: Cigarettes    Smokeless tobacco: Never Used   Substance Use Topics    Alcohol use: Yes     Comment: 12 beers/day    Drug use: No       SCREENINGS             PHYSICAL EXAM    (up to 7 for level 4, 8 or more for level 5)     ED Triage Vitals [02/15/20 1241]   BP Temp Temp Source Pulse Resp SpO2 Height Weight   (!) 139/94 97.1 °F (36.2 °C) Oral 103 16 95 % 5' 4\" (1.626 m) 181 lb 10.5 oz (82.4 kg)       Physical Exam  Vitals signs and nursing note reviewed. Constitutional:       General: She is not in acute distress. Appearance: She is well-developed. She is not ill-appearing, toxic-appearing or diaphoretic. HENT:      Head: Normocephalic and atraumatic. Mouth/Throat:      Mouth: Mucous membranes are moist.      Pharynx: Oropharynx is clear. Eyes:      Conjunctiva/sclera: Conjunctivae normal.      Pupils: Pupils are equal, round, and reactive to light. Cardiovascular:      Rate and Rhythm: Regular rhythm. Tachycardia present. Pulmonary:      Effort: Pulmonary effort is normal. No respiratory distress. Abdominal:      General: Bowel sounds are normal. There is no distension. Palpations: Abdomen is soft. Tenderness: There is no abdominal tenderness. Skin:     General: Skin is warm and dry. Neurological:      General: No focal deficit present. Mental Status: She is alert and oriented to person, place, and time. GCS: GCS eye subscore is 4. GCS verbal subscore is 5. GCS motor subscore is 6. Cranial Nerves: Cranial nerves are intact. Gait: Gait is intact. Gait normal.   Psychiatric:         Attention and Perception: Attention and perception normal. She does not perceive auditory or visual hallucinations. Mood and Affect: Mood is anxious. Speech: Speech normal.         Behavior: Behavior normal. Behavior is cooperative. Thought Content: Thought content normal. Thought content does not include homicidal or suicidal ideation.  Thought content does not include homicidal or suicidal plan.          DIAGNOSTIC RESULTS   LABS:    Labs Reviewed   CBC WITH AUTO DIFFERENTIAL - Abnormal; Notable for the following components:       Result Value    WBC 13.0 (*)     Hemoglobin 11.7 (*)     Hematocrit 35.5 (*)     Neutrophils Absolute 8.9 (*)     All other components within normal limits    Narrative:     Performed at:  [facility]  [address] [facility] [employer]   Phone [phone]   COMPREHENSIVE METABOLIC PANEL W/ REFLEX TO MG FOR LOW K - Abnormal; Notable for the following components:    Sodium 135 (*)     CO2 20 (*)     CREATININE 0.5 (*)     All other components within normal limits    Narrative:     Performed at:  [address] [employer]   Phone [phone]   URINE RT REFLEX TO CULTURE - Abnormal; Notable for the following components:    Clarity, UA SL CLOUDY (*)     Blood, Urine LARGE (*)     Protein, UA 30 (*)     All other components within normal limits    Narrative:     Performed at:  [address] [employer]   Phone [phone]   MICROSCOPIC URINALYSIS - Abnormal; Notable for the following components:    Mucus, UA 1+ (*)     Bacteria, UA 2+ (*)     WBC, UA 9 (*)     RBC, UA 12 (*)     All other components within normal limits    Narrative:     Performed at:  [facility]  [address] [employer]   Phone [phone]   URINE CULTURE   LIPASE    Narrative:     Performed at:  [facility]  [address] [place] [address] [employer]   Phone [phone]   HCG, SERUM, QUALITATIVE    Narrative:     Performed at:  [address] [employer]   Phone [phone]   ETHANOL    Narrative:     Performed at:  [facility] LORazepam (ATIVAN) tablet 1 mg ( Oral See Alternative 2/15/20 1420)     Or   LORazepam (ATIVAN) injection 1 mg ( Intravenous See Alternative 2/15/20 1420)     Or   LORazepam (ATIVAN) tablet 2 mg (2 mg Oral Given 2/15/20 1420)     Or   LORazepam (ATIVAN) injection 2 mg ( Intravenous See Alternative 2/15/20 1420)     Or   LORazepam (ATIVAN) tablet 3 mg ( Oral See Alternative 2/15/20 1420)     Or   LORazepam (ATIVAN) injection 3 mg ( Intravenous See Alternative 2/15/20 1420)     Or   LORazepam (ATIVAN) tablet 4 mg ( Oral See Alternative 2/15/20 1420)     Or   LORazepam (ATIVAN) injection 4 mg ( Intravenous See Alternative 2/15/20 1420)   sodium chloride flush 0.9 % injection 10 mL (has no administration in time range)   sodium chloride flush 0.9 % injection 10 mL (has no administration in time range)   vitamin B-1 (THIAMINE) tablet 100 mg (has no administration in time range)   folic acid (FOLVITE) tablet 1 mg (has no administration in time range)   multivitamin 1 tablet (has no administration in time range)   docusate sodium (COLACE) capsule 100 mg (has no administration in time range)   ondansetron (ZOFRAN) injection 4 mg (has no administration in time range)   enoxaparin (LOVENOX) injection 40 mg (has no administration in time range)   acetaminophen (TYLENOL) tablet 650 mg (has no administration in time range)   sodium chloride 0.9 % 1,235 mL with folic acid 1 mg, adult multi-vitamin with vitamin k 10 mL, thiamine 100 mg ( Intravenous New Bag 2/15/20 1430)   ondansetron (ZOFRAN) injection 4 mg (4 mg Intravenous Given 2/15/20 1420)       ED COURSE & MEDICAL DECISION MAKING    Pertinent Labs & Imaging studies reviewed. (See chart for details)   -  Patient seen and evaluated in the emergency department. -  Triage and nursing notes reviewed and incorporated. -  Old chart records reviewed and incorporated.   -  Differential diagnosis includes: Intoxication, alcohol withdrawal, substance abuse, electrolyte disturbance, dehydration, other  -  Work-up included:  See above  -  ED treatment included:  banana bag, ativan, zofran, nicotine patch  - Consults: Hospitalist for admission  -  Results discussed with patient. Labs show white blood cell count of 13, hemoglobin of 1.7, hematocrit of 35.5. The leukocytosis could be contributed to her frequent vomiting and dry heaving. metabolic panel is significant for sodium of 135, CO2 of 20. There is no alcohol detected. She is not pregnant. Her urine does have some bacteria present but does appear to be contaminated. It will be sent for culture. Patient feels improved after the ativan is given. The patient is agreeable with plan of care and disposition.  -  Disposition:  Admission    FINAL IMPRESSION      1.  Alcohol withdrawal syndrome with complication [facility])          DISPOSITION/PLAN   DISPOSITION Admitted 02/15/2020 04:07:35 PM      PATIENT REFERREDTO:  DO Monse Em [address] [place] [id number]  [phone]            DISCHARGE MEDICATIONS:  New Prescriptions    No medications on file       DISCONTINUED MEDICATIONS:  Discontinued Medications    No medications on file              (Please note that portions of this note were completed with a voice recognition program.  Efforts were made to edit the dictations but occasionally words are mis-transcribed.)    LUPILLO Jon (electronically signed)            Aleks Bishop, [address]  02/15/20 9310

## 2020-02-15 NOTE — H&P
Hospital Medicine History and Physical    2/15/2020    Date of Admission: 2/15/2020    Date of Service: Pt seen/examined on 2/15/2020 and admitted to observation. Assessment:  1. Alcohol withdrawal symptoms. 2. Alcohol abuse with physiologic dependence. 3. Other comorbidities: Anxiety disorder, depression, insomnia. Plan:  1. Continue thiamine, folate. 2. Continue CIWA protocol with as needed Ativan. 3. Counseled about cessation of alcohol use. 4. Monitor electrolytes closely and replace as needed. Activities: Up with assist  Prophylaxis: Subcutaneous Lovenox  Code status: Full code    ==========================================================  Chief complaint:  Chief Complaint   Patient presents with    Withdrawal     last drink last night around 1000     History of Presenting Illness: This is a pleasant 44 y.o. female with history of alcohol abuse with physiologic dependence, recently admitted on 2020 by me, signed out AMA the following day. She returns to the emergency room with report that she would like to go through withdrawal symptoms with plan to be discharged to alcohol rehab unit. Her last alcohol use was last night. She has had some nausea and dry heaves. No new issues otherwise. Past Medical History:      Diagnosis Date    Alcohol abuse     Anxiety     Depression     Sleeping difficulties        Past Surgical History:      Procedure Laterality Date    BREAST SURGERY      sailine breast      SECTION  1988    FRACTURE SURGERY      right wrist       Medications (prior to admission):  Prior to Admission medications    Medication Sig Start Date End Date Taking?  Authorizing Provider   DOXYLAMINE SUCCINATE PO Take 1 tablet by mouth nightly as needed (sleep)   Yes Historical Provider, MD   ibuprofen (ADVIL;MOTRIN) 200 MG tablet Take 200 mg by mouth every 6 hours as needed for Pain   Yes Historical Provider, MD   sertraline (ZOLOFT) 50 MG tablet Take 50 mg

## 2020-02-15 NOTE — PROGRESS NOTES
Pt to room 3115 via stretcher from ED. A&Ox4. Oriented to room and call light. VSS. Pt scared 9 on CIWA scale (see flowsheet), treated with PRN ativan, see MAR. IV site WDL. Call light within reach, will continue to monitor.   Electronically signed by Clotilde Pedersen RN on 2/15/2020 at 6:52 PM

## 2020-02-16 VITALS
RESPIRATION RATE: 18 BRPM | DIASTOLIC BLOOD PRESSURE: 88 MMHG | TEMPERATURE: 98.4 F | HEART RATE: 110 BPM | OXYGEN SATURATION: 94 % | SYSTOLIC BLOOD PRESSURE: 138 MMHG | BODY MASS INDEX: 31.28 KG/M2 | HEIGHT: 64 IN | WEIGHT: 183.2 LBS

## 2020-02-16 PROBLEM — F10.931 ALCOHOL WITHDRAWAL DELIRIUM (HCC): Status: ACTIVE | Noted: 2020-02-16

## 2020-02-16 LAB
A/G RATIO: 1.4 (ref 1.1–2.2)
ALBUMIN SERPL-MCNC: 3.6 G/DL (ref 3.4–5)
ALP BLD-CCNC: 85 U/L (ref 40–129)
ALT SERPL-CCNC: 17 U/L (ref 10–40)
ANION GAP SERPL CALCULATED.3IONS-SCNC: 12 MMOL/L (ref 3–16)
AST SERPL-CCNC: 19 U/L (ref 15–37)
BASOPHILS ABSOLUTE: 0 K/UL (ref 0–0.2)
BASOPHILS RELATIVE PERCENT: 0.4 %
BILIRUB SERPL-MCNC: 0.3 MG/DL (ref 0–1)
BUN BLDV-MCNC: 6 MG/DL (ref 7–20)
CALCIUM SERPL-MCNC: 8.5 MG/DL (ref 8.3–10.6)
CHLORIDE BLD-SCNC: 99 MMOL/L (ref 99–110)
CO2: 22 MMOL/L (ref 21–32)
CREAT SERPL-MCNC: 0.6 MG/DL (ref 0.6–1.1)
EOSINOPHILS ABSOLUTE: 0.3 K/UL (ref 0–0.6)
EOSINOPHILS RELATIVE PERCENT: 3.1 %
GFR AFRICAN AMERICAN: >60
GFR NON-AFRICAN AMERICAN: >60
GLOBULIN: 2.5 G/DL
GLUCOSE BLD-MCNC: 96 MG/DL (ref 70–99)
HCT VFR BLD CALC: 33.7 % (ref 36–48)
HEMOGLOBIN: 11.1 G/DL (ref 12–16)
LYMPHOCYTES ABSOLUTE: 3.2 K/UL (ref 1–5.1)
LYMPHOCYTES RELATIVE PERCENT: 35.1 %
MAGNESIUM: 1.7 MG/DL (ref 1.8–2.4)
MCH RBC QN AUTO: 27.2 PG (ref 26–34)
MCHC RBC AUTO-ENTMCNC: 33 G/DL (ref 31–36)
MCV RBC AUTO: 82.4 FL (ref 80–100)
MONOCYTES ABSOLUTE: 0.7 K/UL (ref 0–1.3)
MONOCYTES RELATIVE PERCENT: 7.9 %
NEUTROPHILS ABSOLUTE: 4.9 K/UL (ref 1.7–7.7)
NEUTROPHILS RELATIVE PERCENT: 53.5 %
PDW BLD-RTO: 15.3 % (ref 12.4–15.4)
PHOSPHORUS: 3.7 MG/DL (ref 2.5–4.9)
PLATELET # BLD: 202 K/UL (ref 135–450)
PMV BLD AUTO: 8.6 FL (ref 5–10.5)
POTASSIUM SERPL-SCNC: 3.8 MMOL/L (ref 3.5–5.1)
RBC # BLD: 4.09 M/UL (ref 4–5.2)
SODIUM BLD-SCNC: 133 MMOL/L (ref 136–145)
TOTAL PROTEIN: 6.1 G/DL (ref 6.4–8.2)
URINE CULTURE, ROUTINE: NORMAL
WBC # BLD: 9.2 K/UL (ref 4–11)

## 2020-02-16 PROCEDURE — 6370000000 HC RX 637 (ALT 250 FOR IP): Performed by: INTERNAL MEDICINE

## 2020-02-16 PROCEDURE — 96366 THER/PROPH/DIAG IV INF ADDON: CPT

## 2020-02-16 PROCEDURE — 84100 ASSAY OF PHOSPHORUS: CPT

## 2020-02-16 PROCEDURE — 85025 COMPLETE CBC W/AUTO DIFF WBC: CPT

## 2020-02-16 PROCEDURE — G0378 HOSPITAL OBSERVATION PER HR: HCPCS

## 2020-02-16 PROCEDURE — 83735 ASSAY OF MAGNESIUM: CPT

## 2020-02-16 PROCEDURE — 94760 N-INVAS EAR/PLS OXIMETRY 1: CPT

## 2020-02-16 PROCEDURE — 96365 THER/PROPH/DIAG IV INF INIT: CPT

## 2020-02-16 PROCEDURE — 6360000002 HC RX W HCPCS: Performed by: HOSPITALIST

## 2020-02-16 PROCEDURE — 96376 TX/PRO/DX INJ SAME DRUG ADON: CPT

## 2020-02-16 PROCEDURE — 2580000003 HC RX 258: Performed by: PHYSICIAN ASSISTANT

## 2020-02-16 PROCEDURE — 96372 THER/PROPH/DIAG INJ SC/IM: CPT

## 2020-02-16 PROCEDURE — 1200000000 HC SEMI PRIVATE

## 2020-02-16 PROCEDURE — 6360000002 HC RX W HCPCS: Performed by: INTERNAL MEDICINE

## 2020-02-16 PROCEDURE — 6360000002 HC RX W HCPCS: Performed by: PHYSICIAN ASSISTANT

## 2020-02-16 PROCEDURE — 36415 COLL VENOUS BLD VENIPUNCTURE: CPT

## 2020-02-16 PROCEDURE — 80053 COMPREHEN METABOLIC PANEL: CPT

## 2020-02-16 PROCEDURE — 6370000000 HC RX 637 (ALT 250 FOR IP): Performed by: PHYSICIAN ASSISTANT

## 2020-02-16 PROCEDURE — 6370000000 HC RX 637 (ALT 250 FOR IP): Performed by: HOSPITALIST

## 2020-02-16 PROCEDURE — 2580000003 HC RX 258: Performed by: HOSPITALIST

## 2020-02-16 RX ORDER — MAGNESIUM SULFATE IN WATER 40 MG/ML
2 INJECTION, SOLUTION INTRAVENOUS ONCE
Status: COMPLETED | OUTPATIENT
Start: 2020-02-16 | End: 2020-02-16

## 2020-02-16 RX ORDER — SODIUM CHLORIDE, SODIUM LACTATE, POTASSIUM CHLORIDE, CALCIUM CHLORIDE 600; 310; 30; 20 MG/100ML; MG/100ML; MG/100ML; MG/100ML
INJECTION, SOLUTION INTRAVENOUS CONTINUOUS
Status: DISCONTINUED | OUTPATIENT
Start: 2020-02-16 | End: 2020-02-17 | Stop reason: HOSPADM

## 2020-02-16 RX ORDER — CHLORDIAZEPOXIDE HYDROCHLORIDE 5 MG/1
10 CAPSULE, GELATIN COATED ORAL 3 TIMES DAILY
Status: DISCONTINUED | OUTPATIENT
Start: 2020-02-16 | End: 2020-02-17 | Stop reason: HOSPADM

## 2020-02-16 RX ADMIN — LORAZEPAM 4 MG: 2 INJECTION INTRAMUSCULAR; INTRAVENOUS at 22:15

## 2020-02-16 RX ADMIN — LORAZEPAM 1 MG: 2 INJECTION INTRAMUSCULAR; INTRAVENOUS at 17:22

## 2020-02-16 RX ADMIN — LORAZEPAM 2 MG: 2 INJECTION INTRAMUSCULAR; INTRAVENOUS at 14:20

## 2020-02-16 RX ADMIN — LORAZEPAM 2 MG: 2 INJECTION INTRAMUSCULAR; INTRAVENOUS at 04:18

## 2020-02-16 RX ADMIN — CHLORDIAZEPOXIDE HYDROCHLORIDE 10 MG: 5 CAPSULE ORAL at 20:56

## 2020-02-16 RX ADMIN — LORAZEPAM 2 MG: 2 INJECTION INTRAMUSCULAR; INTRAVENOUS at 10:17

## 2020-02-16 RX ADMIN — CHLORDIAZEPOXIDE HYDROCHLORIDE 10 MG: 5 CAPSULE ORAL at 14:20

## 2020-02-16 RX ADMIN — ENOXAPARIN SODIUM 40 MG: 40 INJECTION SUBCUTANEOUS at 09:56

## 2020-02-16 RX ADMIN — LORAZEPAM 3 MG: 2 INJECTION INTRAMUSCULAR; INTRAVENOUS at 20:57

## 2020-02-16 RX ADMIN — SERTRALINE HYDROCHLORIDE 50 MG: 50 TABLET ORAL at 09:55

## 2020-02-16 RX ADMIN — FOLIC ACID 1 MG: 1 TABLET ORAL at 09:55

## 2020-02-16 RX ADMIN — THIAMINE HCL TAB 100 MG 100 MG: 100 TAB at 09:55

## 2020-02-16 RX ADMIN — LORAZEPAM 2 MG: 2 INJECTION INTRAMUSCULAR; INTRAVENOUS at 19:16

## 2020-02-16 RX ADMIN — SODIUM CHLORIDE, PRESERVATIVE FREE 10 ML: 5 INJECTION INTRAVENOUS at 20:57

## 2020-02-16 RX ADMIN — ONDANSETRON 4 MG: 2 INJECTION INTRAMUSCULAR; INTRAVENOUS at 04:18

## 2020-02-16 RX ADMIN — SODIUM CHLORIDE, POTASSIUM CHLORIDE, SODIUM LACTATE AND CALCIUM CHLORIDE: 600; 310; 30; 20 INJECTION, SOLUTION INTRAVENOUS at 21:33

## 2020-02-16 RX ADMIN — MAGNESIUM SULFATE HEPTAHYDRATE 2 G: 40 INJECTION, SOLUTION INTRAVENOUS at 16:56

## 2020-02-16 RX ADMIN — THERA TABS 1 TABLET: TAB at 09:55

## 2020-02-16 RX ADMIN — SODIUM CHLORIDE, POTASSIUM CHLORIDE, SODIUM LACTATE AND CALCIUM CHLORIDE: 600; 310; 30; 20 INJECTION, SOLUTION INTRAVENOUS at 09:56

## 2020-02-16 NOTE — PROGRESS NOTES
Pt in bed eyes closed. Denies any pain or nausea. Seizure precautions in place. Call light in reach, bed low and locked, bed alarm on. No questions or concerns at this time. Will monitor and reassess. .Electronically signed by Max Levy RN on 2/15/2020 at 9:33 PM

## 2020-02-16 NOTE — PROGRESS NOTES
Pt. AM assessment complete. Pt morning medication administered. Pt withdrawal symptoms being managed with PRN Ativan. Will continue to monitor and assess.      Electronically signed by Harsha Prasad on 2/16/2020 at 12:27 PM

## 2020-02-17 NOTE — PROGRESS NOTES
Patient A&O in the bed. Patient tolerating PO intake well. PM medications given without complications. Pt withdrawal symptoms managed with PRN ativan. Call light within reach, able to make needs known, fall precautions in place. Will monitor and reassess. .Electronically signed by Candy Tran RN on 2/16/2020 at 10:39 PM

## 2020-03-02 ENCOUNTER — OFFICE VISIT (OUTPATIENT)
Dept: FAMILY MEDICINE CLINIC | Age: 50
End: 2020-03-02
Payer: MEDICAID

## 2020-03-02 VITALS
BODY MASS INDEX: 31.92 KG/M2 | TEMPERATURE: 98.6 F | HEIGHT: 64 IN | WEIGHT: 187 LBS | SYSTOLIC BLOOD PRESSURE: 128 MMHG | DIASTOLIC BLOOD PRESSURE: 80 MMHG

## 2020-03-02 PROCEDURE — G8484 FLU IMMUNIZE NO ADMIN: HCPCS | Performed by: INTERNAL MEDICINE

## 2020-03-02 PROCEDURE — 99396 PREV VISIT EST AGE 40-64: CPT | Performed by: INTERNAL MEDICINE

## 2020-03-02 ASSESSMENT — ENCOUNTER SYMPTOMS
BLOOD IN STOOL: 0
COUGH: 0
DIARRHEA: 0
SINUS PAIN: 0
SINUS PRESSURE: 0
APNEA: 0
ABDOMINAL PAIN: 0
RHINORRHEA: 0
WHEEZING: 0
SHORTNESS OF BREATH: 0
VOMITING: 0
SORE THROAT: 0
CONSTIPATION: 0
NAUSEA: 0

## 2020-03-25 ENCOUNTER — TELEPHONE (OUTPATIENT)
Dept: FAMILY MEDICINE CLINIC | Age: 50
End: 2020-03-25

## 2020-03-25 RX ORDER — METHYLPREDNISOLONE 4 MG/1
TABLET ORAL
Qty: 1 KIT | Refills: 0 | Status: SHIPPED | OUTPATIENT
Start: 2020-03-25 | End: 2020-03-31

## 2020-06-10 NOTE — PROGRESS NOTES
Left message to remind patient to complete fasting lab order.
Cancer Maternal Grandfather         lung     Social History     Tobacco Use    Smoking status: Current Every Day Smoker     Packs/day: 0.50     Years: 8.00     Pack years: 4.00     Types: Cigarettes    Smokeless tobacco: Never Used   Substance Use Topics    Alcohol use: Yes     Comment: 12 beers/day       Review of Systems   Constitutional: Negative for chills, diaphoresis, fatigue and fever. HENT: Negative for congestion, postnasal drip, rhinorrhea, sinus pressure, sinus pain and sore throat. Eyes: Negative for visual disturbance. Respiratory: Negative for apnea, cough, shortness of breath and wheezing. Cardiovascular: Negative for chest pain and palpitations. Gastrointestinal: Negative for abdominal pain, blood in stool, constipation, diarrhea, nausea and vomiting. Endocrine: Negative for polyuria. Genitourinary: Negative for dysuria, frequency, hematuria and urgency. Musculoskeletal: Negative for arthralgias and myalgias. Skin: Negative for rash. Neurological: Negative for dizziness, syncope, weakness, numbness and headaches. Hematological: Negative for adenopathy. Objective:   Physical Exam  Constitutional:       General: She is not in acute distress. Appearance: Normal appearance. She is not ill-appearing. HENT:      Head: Normocephalic and atraumatic. Right Ear: Tympanic membrane, ear canal and external ear normal. There is no impacted cerumen. Left Ear: Tympanic membrane, ear canal and external ear normal. There is no impacted cerumen. Nose: No congestion or rhinorrhea. Mouth/Throat:      Mouth: Mucous membranes are moist.      Pharynx: No oropharyngeal exudate. Eyes:      General:         Right eye: No discharge. Left eye: No discharge. Extraocular Movements: Extraocular movements intact. Pupils: Pupils are equal, round, and reactive to light. Neck:      Musculoskeletal: No neck rigidity.    Cardiovascular:      Rate and Rhythm:

## 2020-08-19 ENCOUNTER — OFFICE VISIT (OUTPATIENT)
Dept: PRIMARY CARE CLINIC | Age: 50
End: 2020-08-19
Payer: MEDICAID

## 2020-08-19 PROCEDURE — G8428 CUR MEDS NOT DOCUMENT: HCPCS | Performed by: NURSE PRACTITIONER

## 2020-08-19 PROCEDURE — G8417 CALC BMI ABV UP PARAM F/U: HCPCS | Performed by: NURSE PRACTITIONER

## 2020-08-19 PROCEDURE — 99211 OFF/OP EST MAY X REQ PHY/QHP: CPT | Performed by: NURSE PRACTITIONER

## 2020-08-19 NOTE — PATIENT INSTRUCTIONS
You have received a viral test for COVID-19. Below is education on quarantine per the CDC guidelines. For any symptoms, seek care from your PCP, call [phone] to establish care with a doctor, or go directly to an urgent care or the emergency room. Test results will take 2-7 days and will be sent to you in your [employer] account. If you test positive, you will be contacted via phone. If you test negative, the ONLY communication will be through [address] GO TO [place] AND SIGN UP FOR [employer]  (LOWER LEFT OF THE HOME PAGE)  No test is 100%. If you have symptoms, you should follow the guidance of quarantine as previously stated. You can still be contagious if you have symptoms. Your [facility] will reach out to you if you have a positive result. They will provide you with a return to work date and note. If you were tested for a pre-op, then you should remain in quarantine until your procedure. How do I know if I need to be in quarantine? If you live in a community where COVID-19 is or might be spreading (currently, that is virtually everywhere in the [place])  Be alert for symptoms. Watch for fever, cough, shortness of breath, or other symptoms of COVID-19.  Take your temperature if symptoms develop.  Practice social distancing. Maintain 6 feet of distance from others and stay out of crowded places.  Follow CDC guidance if symptoms develop. If you feel healthy but:   Recently had close contact with a person with COVID-19 you need to Quarantine:   Stay home until 14 days after your last exposure.  Check your temperature twice a day and watch for symptoms of COVID-19.  If possible, stay away from people who are at higher-risk for getting very sick from COVID-19.   Stay Home and Monitor Your Health if you:   Have been diagnosed with COVID-19, or   Are waiting for test results, or   Have cough, fever, or shortness of breath, or symptoms of COVID-19      When You Can

## 2020-08-20 LAB — SARS-COV-2, NAA: NOT DETECTED

## 2020-09-02 DIAGNOSIS — Z13.220 SCREENING FOR LIPOID DISORDERS: ICD-10-CM

## 2020-09-02 LAB
ALBUMIN SERPL-MCNC: 4.2 G/DL (ref 3.4–5)
ALP BLD-CCNC: 55 U/L (ref 40–129)
ALT SERPL-CCNC: 11 U/L (ref 10–40)
AST SERPL-CCNC: 16 U/L (ref 15–37)
BILIRUB SERPL-MCNC: <0.2 MG/DL (ref 0–1)
BILIRUBIN DIRECT: <0.2 MG/DL (ref 0–0.3)
BILIRUBIN, INDIRECT: NORMAL MG/DL (ref 0–1)
CHOLESTEROL, TOTAL: 179 MG/DL (ref 0–199)
HDLC SERPL-MCNC: 43 MG/DL (ref 40–60)
HEPATITIS C ANTIBODY INTERPRETATION: NORMAL
LDL CHOLESTEROL CALCULATED: 124 MG/DL
TOTAL PROTEIN: 6.4 G/DL (ref 6.4–8.2)
TRIGL SERPL-MCNC: 58 MG/DL (ref 0–150)
VLDLC SERPL CALC-MCNC: 12 MG/DL

## 2020-09-04 LAB — HEPATITIS B CORE TOTAL ANTIBODY: NEGATIVE

## 2020-11-04 ENCOUNTER — TELEPHONE (OUTPATIENT)
Dept: FAMILY MEDICINE CLINIC | Age: 50
End: 2020-11-04

## 2020-11-04 RX ORDER — CIPROFLOXACIN 250 MG/1
250 TABLET, FILM COATED ORAL 2 TIMES DAILY
Qty: 10 TABLET | Refills: 0 | Status: SHIPPED | OUTPATIENT
Start: 2020-11-04 | End: 2020-11-09

## 2020-11-04 NOTE — TELEPHONE ENCOUNTER
Patient feels like she has a uti, burning, frequency, odor and pressure. She is quarantined at home due to someone at work having covid. She is asking if something can be called into [facility].       Please call, [phone]

## 2021-03-06 ENCOUNTER — HOSPITAL ENCOUNTER (EMERGENCY)
Age: 51
Discharge: HOME | End: 2021-03-06
Attending: EMERGENCY MEDICINE
Payer: MEDICAID

## 2021-03-06 VITALS
RESPIRATION RATE: 16 BRPM | WEIGHT: 188.49 LBS | OXYGEN SATURATION: 99 % | BODY MASS INDEX: 32.18 KG/M2 | DIASTOLIC BLOOD PRESSURE: 71 MMHG | SYSTOLIC BLOOD PRESSURE: 126 MMHG | HEIGHT: 64 IN | TEMPERATURE: 99.1 F | HEART RATE: 98 BPM

## 2021-03-06 DIAGNOSIS — F10.29 ALCOHOL DEPENDENCE WITH UNSPECIFIED ALCOHOL-INDUCED DISORDER (HCC): Primary | ICD-10-CM

## 2021-03-06 PROCEDURE — 6370000000 HC RX 637 (ALT 250 FOR IP): Performed by: EMERGENCY MEDICINE

## 2021-03-06 PROCEDURE — 99284 EMERGENCY DEPT VISIT MOD MDM: CPT

## 2021-03-06 RX ORDER — DIAZEPAM 5 MG/1
10 TABLET ORAL ONCE
Status: COMPLETED | OUTPATIENT
Start: 2021-03-06 | End: 2021-03-06

## 2021-03-06 RX ORDER — ONDANSETRON HYDROCHLORIDE 8 MG/1
8 TABLET, FILM COATED ORAL EVERY 8 HOURS PRN
Qty: 20 TABLET | Refills: 0 | Status: ON HOLD | OUTPATIENT
Start: 2021-03-06 | End: 2021-05-25

## 2021-03-06 RX ORDER — HYDROXYZINE HYDROCHLORIDE 25 MG/1
25 TABLET, FILM COATED ORAL EVERY 6 HOURS PRN
Qty: 20 TABLET | Refills: 0 | Status: SHIPPED | OUTPATIENT
Start: 2021-03-06 | End: 2021-03-16

## 2021-03-06 RX ORDER — HYDROXYZINE PAMOATE 25 MG/1
25 CAPSULE ORAL ONCE
Status: COMPLETED | OUTPATIENT
Start: 2021-03-06 | End: 2021-03-06

## 2021-03-06 RX ORDER — ONDANSETRON 4 MG/1
8 TABLET, ORALLY DISINTEGRATING ORAL ONCE
Status: COMPLETED | OUTPATIENT
Start: 2021-03-06 | End: 2021-03-06

## 2021-03-06 RX ORDER — MULTIVITAMIN WITH IRON
1 TABLET ORAL DAILY
Status: DISCONTINUED | OUTPATIENT
Start: 2021-03-06 | End: 2021-03-06 | Stop reason: HOSPADM

## 2021-03-06 RX ORDER — ONDANSETRON 4 MG/1
4 TABLET, ORALLY DISINTEGRATING ORAL ONCE
Status: DISCONTINUED | OUTPATIENT
Start: 2021-03-06 | End: 2021-03-06

## 2021-03-06 RX ORDER — UBIDECARENONE 75 MG
50 CAPSULE ORAL ONCE
Status: COMPLETED | OUTPATIENT
Start: 2021-03-06 | End: 2021-03-06

## 2021-03-06 RX ADMIN — Medication 50 MCG: at 14:18

## 2021-03-06 RX ADMIN — DIAZEPAM 10 MG: 5 TABLET ORAL at 13:00

## 2021-03-06 RX ADMIN — ONDANSETRON 8 MG: 4 TABLET, ORALLY DISINTEGRATING ORAL at 13:47

## 2021-03-06 RX ADMIN — DIAZEPAM 10 MG: 5 TABLET ORAL at 13:48

## 2021-03-06 RX ADMIN — THERA TABS 1 TABLET: TAB at 13:47

## 2021-03-06 RX ADMIN — HYDROXYZINE PAMOATE 25 MG: 25 CAPSULE ORAL at 15:52

## 2021-03-06 ASSESSMENT — ENCOUNTER SYMPTOMS
NAUSEA: 0
SHORTNESS OF BREATH: 0
COUGH: 0
EYE PAIN: 0
ABDOMINAL PAIN: 0
VOMITING: 0

## 2021-03-06 NOTE — ED NOTES
Patient states she sometimes sees people when she closes her eyes. No other hallucinations. , no auditory hallucinations, no tactical disturbances.       Emilee Mckenna RN  03/06/21 5794

## 2021-03-06 NOTE — ED PROVIDER NOTES
[facility]      Pt Name: Liban Cheung  MRN: [id number]  [place] 1970  Date of evaluation: 3/6/2021  Provider: Davina Summers MD    CHIEF COMPLAINT       Chief Complaint   Patient presents with    Alcohol Intoxication    Withdrawal     last 3am     HISTORY OF PRESENT ILLNESS  (Location/Symptom, Timing/Onset,Context/Setting, Quality, Duration, Modifying Factors, Severity). Note limiting factors. Liban Cheung is a 44 y.o. female who presents to the emergency department secondary to concern for alcohol withdrawal. She states she had been clean since September and that three days ago she started drinking whiskey. She had initially told my nurse she had one bottle though she tells me she has had three bottles over 3 days. She says last time she was here she got medicine through the IV and that is what she wants. She tells me she has had a seizure in the past from alcohol withdrawal (thinks it was in September though she is not sure). Reports she has not been eating well because of the drinking. She denies any fevers or abdominal pain. Reports she has been urinating well and having regular BMs. Denies any known sick contacts. States she currently is in a rehab program.  Reports she quit her job recently because of differences. Past medical history noted below, significant for alcohol abuse, anxiety, depression, sleeping difficulties. Denies any suicidal ideations. Aside from what is stated above denies any other symptoms or modifying factors. Nursing Notes reviewed. REVIEW OF SYSTEMS  (2-9 systems for level 4, 10 or more for level 5)   Review of Systems   Constitutional: Negative for chills and fever. HENT: Negative for congestion. Eyes: Negative for pain. Respiratory: Negative for cough and shortness of breath. Cardiovascular: Negative for chest pain.    Gastrointestinal: Negative for abdominal pain, nausea and vomiting. Genitourinary: Negative for frequency and urgency. Neurological: Negative for headaches. PAST MEDICAL HISTORY     Past Medical History:   Diagnosis Date    Alcohol abuse     Anxiety     Depression     Sleeping difficulties        SURGICALHISTORY       Past Surgical History:   Procedure Laterality Date    BREAST SURGERY      sailine breast      SECTION  1988    FRACTURE SURGERY  2006    right wrist     CURRENT MEDICATIONS       Discharge Medication List as of 3/6/2021  3:50 PM      CONTINUE these medications which have NOT CHANGED    Details   DOXYLAMINE SUCCINATE PO Take 1 tablet by mouth nightly as needed (sleep)Historical Med      ibuprofen (ADVIL;MOTRIN) 200 MG tablet Take 200 mg by mouth every 6 hours as needed for PainHistorical Med      sertraline (ZOLOFT) 50 MG tablet Take 50 mg by mouth dailyHistorical Med            ALLERGIES     Patient has no known allergies.   FAMILY HISTORY       Family History   Problem Relation Age of Onset    No Known Problems Mother     No Known Problems Father     Other Brother         anxiety    Cancer Maternal Grandmother         lung cancer    Cancer Maternal Grandfather         lung     SOCIAL HISTORY       Social History     Socioeconomic History    Marital status: Single     Spouse name: None    Number of children: None    Years of education: None    Highest education level: None   Occupational History    None   Social Needs    Financial resource strain: None    Food insecurity     Worry: None     Inability: None    Transportation needs     Medical: None     Non-medical: None   Tobacco Use    Smoking status: Current Every Day Smoker     Packs/day: 1.00     Years: 8.00     Pack years: 8.00     Types: Cigarettes    Smokeless tobacco: Never Used   Substance and Sexual Activity    Alcohol use: Yes     Comment: was sober, just relapsed     Drug use: No    Sexual activity: Not Currently   Lifestyle    Physical activity Days per week: None     Minutes per session: None    Stress: None   Relationships    Social connections     Talks on phone: None     Gets together: None     Attends Quaker service: None     Active member of club or organization: None     Attends meetings of clubs or organizations: None     Relationship status: None    Intimate partner violence     Fear of current or ex partner: None     Emotionally abused: None     Physically abused: None     Forced sexual activity: None   Other Topics Concern    None   Social History Narrative    None     SCREENINGS         PHYSICAL EXAM  (up to 7 for level 4, 8 or more for level 5)   INITIAL VITALS: BP: (!) 150/84, Temp: 99.1 °F (37.3 °C), Pulse: 122, Resp: 16, SpO2: 96 %   Physical Exam  Vitals signs reviewed. Constitutional:       Appearance: She is ill-appearing (chronic). She is not toxic-appearing. HENT:      Head: Normocephalic and atraumatic. Right Ear: External ear normal.      Left Ear: External ear normal.      Nose: Nose normal.      Mouth/Throat:      Mouth: Mucous membranes are moist.   Eyes:      General: No scleral icterus. Right eye: No discharge. Left eye: No discharge. Extraocular Movements: Extraocular movements intact. Conjunctiva/sclera: Conjunctivae normal.      Pupils: Pupils are equal, round, and reactive to light. Neck:      Musculoskeletal: Normal range of motion. No neck rigidity. Trachea: No tracheal deviation. Cardiovascular:      Rate and Rhythm: Tachycardia present. Pulmonary:      Effort: Pulmonary effort is normal. No respiratory distress. Abdominal:      General: There is no distension. Tenderness: There is no abdominal tenderness. There is no guarding. Musculoskeletal:      Right lower leg: No edema. Left lower leg: No edema. Skin:     General: Skin is warm and dry. Capillary Refill: Capillary refill takes less than 2 seconds.    Neurological:      Mental Status: She is alert.      Cranial Nerves: Cranial nerves are intact. Motor: No weakness. Gait: Gait is intact. Psychiatric:         Attention and Perception: Attention normal.         Mood and Affect: Mood is anxious. Affect is labile. Behavior: Behavior is agitated (intermittent). Thought Content: Thought content does not include suicidal ideation. DIAGNOSTIC RESULTS     All other labs were within normal range or not returned as of this dictation. EMERGENCY DEPARTMENT COURSE and DIFFERENTIAL DIAGNOSIS/MDM:   Patient was given the following medications:  Orders Placed This Encounter   Medications    diazePAM (VALIUM) tablet 10 mg    multivitamin 1 tablet    DISCONTD: ondansetron (ZOFRAN-ODT) disintegrating tablet 4 mg    diazePAM (VALIUM) tablet 10 mg    ondansetron (ZOFRAN-ODT) disintegrating tablet 8 mg    vitamin B-12 (CYANOCOBALAMIN) tablet 50 mcg    hydrOXYzine (VISTARIL) capsule 25 mg    hydrOXYzine (ATARAX) 25 MG tablet     Sig: Take 1 tablet by mouth every 6 hours as needed for Itching     Dispense:  20 tablet     Refill:  0    ondansetron (ZOFRAN) 8 MG tablet     Sig: Take 1 tablet by mouth every 8 hours as needed for Nausea     Dispense:  20 tablet     Refill:  0     CONSULTS:  None  INITIAL VITALS: BP: (!) 150/84, Temp: 99.1 °F (37.3 °C), Pulse: 122, Resp: 16, SpO2: 96 %   RECENT VITALS:  BP: 126/71,Temp: 99.1 °F (37.3 °C), Pulse: 98, Resp: 16, SpO2: 99 %     Hanna Fang is a 44 y.o. female who presents to the emergency department secondary to concern for alcohol withdrawal.  We discussed how if she had been clean since September even 3 days of drinking were unlikely to put her into a withdrawal.  On arrival she tells me she needs IV benzos in order to feel better. Her initial CIWA score at noon was 11. She received 10 mg of p.o. Valium.      Physical exam at that time without clearly objective signs of dehydration including cap refill < 2 seconds and moist mucous membranes. Her HR was elevated though she was very mobile/agitated. When offered IVF she declined stating what she needed was IV medicine. 45 minutes later she continued to have similar symptoms with similar CIWA score at which time she received another 10 mg of p.o. Valium. When I went in to reassess the patient again she had left the room to go to the restroom. I watched her ambulate back to the room with a steady gait with no signs of tremor, no need for assistance and she made it back without any issues. She continued to insist she needed an IV for benzo medication. At that time her repeat CIWA was 7 and when I told her this meant we would continue to monitor her she asked me if she was seeing things if that would qualify her for IV benzos. She complained of nausea and was ordered zofran, also ordered PO rally pack. She told me she had vomited while she was here though no one had seen it. I asked RN who notified me patient had returned with emesis bag from the bathroom (she had gone a second time) filled about two inches (~200ml) with clear liquid that appeared consistent with water. No witnessed emesis episodes in the room. On reassessment patient was laying in bed and appeared to be sleeping. She awoke to my voice. She stated she still had tremors and needed more medicine. She tells me she might have a seizure if she doesn't. She did not have any tremors when she was distracted and was able to be verbally redirected. Repeat CIWA score at that time (15:20) was improved 5. Vitals had also improved HR 98, /71. O2 saturation remained % on RA. As she had two CIWA scores < 10 greater than 1 hour apart she is safe for discharge home. Mother showed up at the bedside, has been under a lot of stress recently and lost her  of 48 years in October. Doing well and with support from friends, neighbors, primary care and the [place] ( was a vet).  She tells me daughter will intermittently do well for long periods of time and then go on these benders. States she is currently in rehab and she will be taking her home with her today to watch her. 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 She states patient may be saying/doing things to get her more sympathy. I discussed at length with both patient and mom importance of sobriety as well as symptomatic treatment and follow up with her rehab program as well as her primary care. She will also be discharged with prescriptions for hydroxyzine and Zofran. Prior to discharge patient wanted another medication and she was ordered atarax at that time. It was noted she did leave the ED to go smoke prior to receiving the medication and mom had to bring her back in to get it. We reiterated importance of following up as well as return precautions and again mom and patient expressed understanding. FINAL IMPRESSION      1.  Alcohol dependence with unspecified alcohol-induced disorder [facility])        DISPOSITION/PLAN   DISPOSITION Decision To Discharge 03/06/2021 03:45:41 PM      PATIENT REFERRED TO:  Fe Jensen, P.OPraveen [address] [address]  [phone]    Schedule an appointment as soon as possible for a visit   For follow up appointment      DISCHARGE MEDICATIONS:  Discharge Medication List as of 3/6/2021  3:50 PM      START taking these medications    Details   hydrOXYzine (ATARAX) 25 MG tablet Take 1 tablet by mouth every 6 hours as needed for Itching, Disp-20 tablet, R-0Normal      ondansetron (ZOFRAN) 8 MG tablet Take 1 tablet by mouth every 8 hours as needed for Nausea, Disp-20 tablet, R-0Normal                  (Please note that portions of this note were completed with a voice recognition program. Efforts were made to edit the dictations but occasionally words are mis-transcribed.)    Yee Sal MD (electronically signed)  Attending Emergency Physician        Yee Sal MD  03/06/21 6660

## 2021-03-06 NOTE — ED NOTES
Patient repeatedly asking for an IV and IV medications. Patient has tolerated PO meds and has not had any N/V, she is still drinking water at this time.   Offered to bring patient more water, will updated MD Bernida Bamberger, RN  03/06/21 4493

## 2021-03-06 NOTE — ED NOTES
D/C: Order noted for d/c. Pt confirmed d/c paperwork and two prescriptions have correct name. Discharge and education instructions reviewed with patient. Teach-back successful. Pt verbalized understanding and signed d/c papers. Pt denied questions at this time. No acute distress noted. Patient instructed to follow-up as noted - return to emergency department if symptoms worsen. Patient verbalized understanding. Discharged per EDMD with discharge instructions. Pt discharged to private vehicle with mother. Patient stable upon departure. Thanked patient for choosing [facility]) for care. Provider aware of patient pain at time of discharge.        Stacia Vidal, RN  03/06/21 9879

## 2021-05-24 ENCOUNTER — HOSPITAL ENCOUNTER (INPATIENT)
Age: 51
LOS: 11 days | Discharge: HOME | End: 2021-06-04
Attending: EMERGENCY MEDICINE | Admitting: STUDENT IN AN ORGANIZED HEALTH CARE EDUCATION/TRAINING PROGRAM
Payer: MEDICAID

## 2021-05-24 ENCOUNTER — APPOINTMENT (OUTPATIENT)
Dept: CT IMAGING | Age: 51
End: 2021-05-24
Payer: MEDICAID

## 2021-05-24 ENCOUNTER — APPOINTMENT (OUTPATIENT)
Dept: GENERAL RADIOLOGY | Age: 51
End: 2021-05-24
Payer: MEDICAID

## 2021-05-24 DIAGNOSIS — R11.2 NAUSEA AND VOMITING, INTRACTABILITY OF VOMITING NOT SPECIFIED, UNSPECIFIED VOMITING TYPE: ICD-10-CM

## 2021-05-24 DIAGNOSIS — E87.20 METABOLIC ACIDOSIS: ICD-10-CM

## 2021-05-24 DIAGNOSIS — E86.0 DEHYDRATION: ICD-10-CM

## 2021-05-24 DIAGNOSIS — M62.82 NON-TRAUMATIC RHABDOMYOLYSIS: ICD-10-CM

## 2021-05-24 DIAGNOSIS — F10.10 ALCOHOL ABUSE: ICD-10-CM

## 2021-05-24 DIAGNOSIS — F10.939 ALCOHOL WITHDRAWAL SYNDROME WITH COMPLICATION (HCC): Primary | ICD-10-CM

## 2021-05-24 PROBLEM — R56.9 ALCOHOL RELATED SEIZURE (HCC): Status: ACTIVE | Noted: 2021-05-24

## 2021-05-24 LAB
ALBUMIN SERPL-MCNC: 4.8 G/DL (ref 3.4–5)
ALP BLD-CCNC: 86 U/L (ref 40–129)
ALT SERPL-CCNC: 36 U/L (ref 10–40)
ANION GAP SERPL CALCULATED.3IONS-SCNC: 34 MMOL/L (ref 3–16)
AST SERPL-CCNC: 64 U/L (ref 15–37)
BASOPHILS ABSOLUTE: 0.1 K/UL (ref 0–0.2)
BASOPHILS RELATIVE PERCENT: 0.4 %
BILIRUB SERPL-MCNC: <0.2 MG/DL (ref 0–1)
BILIRUBIN DIRECT: <0.2 MG/DL (ref 0–0.3)
BILIRUBIN, INDIRECT: ABNORMAL MG/DL (ref 0–1)
BUN BLDV-MCNC: 9 MG/DL (ref 7–20)
CALCIUM SERPL-MCNC: 8.4 MG/DL (ref 8.3–10.6)
CHLORIDE BLD-SCNC: 93 MMOL/L (ref 99–110)
CO2: 11 MMOL/L (ref 21–32)
CREAT SERPL-MCNC: 0.9 MG/DL (ref 0.6–1.1)
EKG ATRIAL RATE: 123 BPM
EKG DIAGNOSIS: NORMAL
EKG P AXIS: 56 DEGREES
EKG P-R INTERVAL: 118 MS
EKG Q-T INTERVAL: 306 MS
EKG QRS DURATION: 70 MS
EKG QTC CALCULATION (BAZETT): 438 MS
EKG R AXIS: 57 DEGREES
EKG T AXIS: 53 DEGREES
EKG VENTRICULAR RATE: 123 BPM
EOSINOPHILS ABSOLUTE: 0 K/UL (ref 0–0.6)
EOSINOPHILS RELATIVE PERCENT: 0 %
ETHANOL: 174 MG/DL (ref 0–0.08)
GFR AFRICAN AMERICAN: >60
GFR NON-AFRICAN AMERICAN: >60
GLUCOSE BLD-MCNC: 77 MG/DL (ref 70–99)
HCT VFR BLD CALC: 44.7 % (ref 36–48)
HEMOGLOBIN: 14.9 G/DL (ref 12–16)
LYMPHOCYTES ABSOLUTE: 2.1 K/UL (ref 1–5.1)
LYMPHOCYTES RELATIVE PERCENT: 10.2 %
MCH RBC QN AUTO: 29.2 PG (ref 26–34)
MCHC RBC AUTO-ENTMCNC: 33.3 G/DL (ref 31–36)
MCV RBC AUTO: 87.6 FL (ref 80–100)
MONOCYTES ABSOLUTE: 1.2 K/UL (ref 0–1.3)
MONOCYTES RELATIVE PERCENT: 5.7 %
NEUTROPHILS ABSOLUTE: 17.7 K/UL (ref 1.7–7.7)
NEUTROPHILS RELATIVE PERCENT: 83.7 %
PDW BLD-RTO: 16.3 % (ref 12.4–15.4)
PLATELET # BLD: 371 K/UL (ref 135–450)
PMV BLD AUTO: 7.5 FL (ref 5–10.5)
POTASSIUM REFLEX MAGNESIUM: 4.1 MMOL/L (ref 3.5–5.1)
RBC # BLD: 5.1 M/UL (ref 4–5.2)
SODIUM BLD-SCNC: 138 MMOL/L (ref 136–145)
TOTAL CK: 1086 U/L (ref 26–192)
TOTAL PROTEIN: 8 G/DL (ref 6.4–8.2)
WBC # BLD: 21.2 K/UL (ref 4–11)

## 2021-05-24 PROCEDURE — 96365 THER/PROPH/DIAG IV INF INIT: CPT

## 2021-05-24 PROCEDURE — 71045 X-RAY EXAM CHEST 1 VIEW: CPT

## 2021-05-24 PROCEDURE — 93010 ELECTROCARDIOGRAM REPORT: CPT | Performed by: INTERNAL MEDICINE

## 2021-05-24 PROCEDURE — 36415 COLL VENOUS BLD VENIPUNCTURE: CPT

## 2021-05-24 PROCEDURE — 80076 HEPATIC FUNCTION PANEL: CPT

## 2021-05-24 PROCEDURE — 2580000003 HC RX 258: Performed by: STUDENT IN AN ORGANIZED HEALTH CARE EDUCATION/TRAINING PROGRAM

## 2021-05-24 PROCEDURE — 96375 TX/PRO/DX INJ NEW DRUG ADDON: CPT

## 2021-05-24 PROCEDURE — 2500000003 HC RX 250 WO HCPCS: Performed by: EMERGENCY MEDICINE

## 2021-05-24 PROCEDURE — 94760 N-INVAS EAR/PLS OXIMETRY 1: CPT

## 2021-05-24 PROCEDURE — 6360000002 HC RX W HCPCS: Performed by: EMERGENCY MEDICINE

## 2021-05-24 PROCEDURE — 6370000000 HC RX 637 (ALT 250 FOR IP): Performed by: STUDENT IN AN ORGANIZED HEALTH CARE EDUCATION/TRAINING PROGRAM

## 2021-05-24 PROCEDURE — 93005 ELECTROCARDIOGRAM TRACING: CPT | Performed by: EMERGENCY MEDICINE

## 2021-05-24 PROCEDURE — 85025 COMPLETE CBC W/AUTO DIFF WBC: CPT

## 2021-05-24 PROCEDURE — 6360000002 HC RX W HCPCS

## 2021-05-24 PROCEDURE — 2060000000 HC ICU INTERMEDIATE R&B

## 2021-05-24 PROCEDURE — 2580000003 HC RX 258: Performed by: EMERGENCY MEDICINE

## 2021-05-24 PROCEDURE — 82550 ASSAY OF CK (CPK): CPT

## 2021-05-24 PROCEDURE — 99283 EMERGENCY DEPT VISIT LOW MDM: CPT

## 2021-05-24 PROCEDURE — 82077 ASSAY SPEC XCP UR&BREATH IA: CPT

## 2021-05-24 PROCEDURE — 70450 CT HEAD/BRAIN W/O DYE: CPT

## 2021-05-24 PROCEDURE — 6360000002 HC RX W HCPCS: Performed by: STUDENT IN AN ORGANIZED HEALTH CARE EDUCATION/TRAINING PROGRAM

## 2021-05-24 PROCEDURE — 96361 HYDRATE IV INFUSION ADD-ON: CPT

## 2021-05-24 PROCEDURE — 80048 BASIC METABOLIC PNL TOTAL CA: CPT

## 2021-05-24 RX ORDER — LORAZEPAM 2 MG/ML
4 INJECTION INTRAMUSCULAR
Status: DISCONTINUED | OUTPATIENT
Start: 2021-05-24 | End: 2021-05-26

## 2021-05-24 RX ORDER — SODIUM CHLORIDE 9 MG/ML
INJECTION, SOLUTION INTRAVENOUS CONTINUOUS
Status: ACTIVE | OUTPATIENT
Start: 2021-05-24 | End: 2021-05-25

## 2021-05-24 RX ORDER — ONDANSETRON 2 MG/ML
4 INJECTION INTRAMUSCULAR; INTRAVENOUS EVERY 6 HOURS PRN
Status: DISCONTINUED | OUTPATIENT
Start: 2021-05-24 | End: 2021-06-04 | Stop reason: HOSPADM

## 2021-05-24 RX ORDER — SODIUM CHLORIDE 0.9 % (FLUSH) 0.9 %
5-40 SYRINGE (ML) INJECTION EVERY 12 HOURS SCHEDULED
Status: DISCONTINUED | OUTPATIENT
Start: 2021-05-24 | End: 2021-05-26

## 2021-05-24 RX ORDER — LORAZEPAM 2 MG/ML
2 INJECTION INTRAMUSCULAR ONCE
Status: COMPLETED | OUTPATIENT
Start: 2021-05-24 | End: 2021-05-24

## 2021-05-24 RX ORDER — TRAZODONE HYDROCHLORIDE 100 MG/1
100 TABLET ORAL NIGHTLY PRN
COMMUNITY

## 2021-05-24 RX ORDER — SODIUM CHLORIDE 9 MG/ML
25 INJECTION, SOLUTION INTRAVENOUS PRN
Status: DISCONTINUED | OUTPATIENT
Start: 2021-05-24 | End: 2021-05-24 | Stop reason: SDUPTHER

## 2021-05-24 RX ORDER — CHLORDIAZEPOXIDE HYDROCHLORIDE 25 MG/1
25 CAPSULE, GELATIN COATED ORAL 3 TIMES DAILY
Status: DISCONTINUED | OUTPATIENT
Start: 2021-05-24 | End: 2021-05-25

## 2021-05-24 RX ORDER — LORAZEPAM 2 MG/ML
3 INJECTION INTRAMUSCULAR
Status: DISCONTINUED | OUTPATIENT
Start: 2021-05-24 | End: 2021-05-26

## 2021-05-24 RX ORDER — LORAZEPAM 1 MG/1
1 TABLET ORAL
Status: DISCONTINUED | OUTPATIENT
Start: 2021-05-24 | End: 2021-05-26

## 2021-05-24 RX ORDER — SODIUM CHLORIDE 0.9 % (FLUSH) 0.9 %
5-40 SYRINGE (ML) INJECTION PRN
Status: DISCONTINUED | OUTPATIENT
Start: 2021-05-24 | End: 2021-05-24 | Stop reason: SDUPTHER

## 2021-05-24 RX ORDER — SODIUM CHLORIDE 9 MG/ML
25 INJECTION, SOLUTION INTRAVENOUS PRN
Status: DISCONTINUED | OUTPATIENT
Start: 2021-05-24 | End: 2021-06-01

## 2021-05-24 RX ORDER — LORAZEPAM 1 MG/1
3 TABLET ORAL
Status: DISCONTINUED | OUTPATIENT
Start: 2021-05-24 | End: 2021-05-26

## 2021-05-24 RX ORDER — SODIUM CHLORIDE 0.9 % (FLUSH) 0.9 %
5-40 SYRINGE (ML) INJECTION EVERY 12 HOURS SCHEDULED
Status: DISCONTINUED | OUTPATIENT
Start: 2021-05-24 | End: 2021-05-24 | Stop reason: SDUPTHER

## 2021-05-24 RX ORDER — LORAZEPAM 2 MG/ML
INJECTION INTRAMUSCULAR
Status: COMPLETED
Start: 2021-05-24 | End: 2021-05-24

## 2021-05-24 RX ORDER — LORAZEPAM 2 MG/ML
2 INJECTION INTRAMUSCULAR
Status: DISCONTINUED | OUTPATIENT
Start: 2021-05-24 | End: 2021-05-26

## 2021-05-24 RX ORDER — 0.9 % SODIUM CHLORIDE 0.9 %
2000 INTRAVENOUS SOLUTION INTRAVENOUS ONCE
Status: COMPLETED | OUTPATIENT
Start: 2021-05-24 | End: 2021-05-24

## 2021-05-24 RX ORDER — METOCLOPRAMIDE HYDROCHLORIDE 5 MG/ML
10 INJECTION INTRAMUSCULAR; INTRAVENOUS ONCE
Status: COMPLETED | OUTPATIENT
Start: 2021-05-24 | End: 2021-05-24

## 2021-05-24 RX ORDER — LORAZEPAM 1 MG/1
4 TABLET ORAL
Status: DISCONTINUED | OUTPATIENT
Start: 2021-05-24 | End: 2021-05-26

## 2021-05-24 RX ORDER — LORAZEPAM 2 MG/ML
1 INJECTION INTRAMUSCULAR
Status: DISCONTINUED | OUTPATIENT
Start: 2021-05-24 | End: 2021-05-26

## 2021-05-24 RX ORDER — SODIUM CHLORIDE 0.9 % (FLUSH) 0.9 %
5-40 SYRINGE (ML) INJECTION PRN
Status: DISCONTINUED | OUTPATIENT
Start: 2021-05-24 | End: 2021-05-26

## 2021-05-24 RX ORDER — LORAZEPAM 1 MG/1
2 TABLET ORAL
Status: DISCONTINUED | OUTPATIENT
Start: 2021-05-24 | End: 2021-05-26

## 2021-05-24 RX ORDER — PHENOBARBITAL SODIUM 65 MG/ML
260 INJECTION INTRAMUSCULAR ONCE
Status: COMPLETED | OUTPATIENT
Start: 2021-05-24 | End: 2021-05-24

## 2021-05-24 RX ADMIN — SODIUM CHLORIDE, PRESERVATIVE FREE 10 ML: 5 INJECTION INTRAVENOUS at 10:01

## 2021-05-24 RX ADMIN — CHLORDIAZEPOXIDE HYDROCHLORIDE 25 MG: 25 CAPSULE ORAL at 21:18

## 2021-05-24 RX ADMIN — PHENOBARBITAL SODIUM 260 MG: 65 INJECTION INTRAMUSCULAR at 06:33

## 2021-05-24 RX ADMIN — ENOXAPARIN SODIUM 40 MG: 40 INJECTION SUBCUTANEOUS at 10:01

## 2021-05-24 RX ADMIN — FOLIC ACID: 5 INJECTION, SOLUTION INTRAMUSCULAR; INTRAVENOUS; SUBCUTANEOUS at 06:31

## 2021-05-24 RX ADMIN — LORAZEPAM 2 MG: 2 INJECTION INTRAMUSCULAR; INTRAVENOUS at 14:30

## 2021-05-24 RX ADMIN — SODIUM CHLORIDE 2000 ML: 9 INJECTION, SOLUTION INTRAVENOUS at 05:03

## 2021-05-24 RX ADMIN — LORAZEPAM 2 MG: 2 INJECTION INTRAMUSCULAR; INTRAVENOUS at 08:23

## 2021-05-24 RX ADMIN — LORAZEPAM 2 MG: 2 INJECTION INTRAMUSCULAR; INTRAVENOUS at 18:46

## 2021-05-24 RX ADMIN — CHLORDIAZEPOXIDE HYDROCHLORIDE 25 MG: 25 CAPSULE ORAL at 14:26

## 2021-05-24 RX ADMIN — SODIUM CHLORIDE: 9 INJECTION, SOLUTION INTRAVENOUS at 10:01

## 2021-05-24 RX ADMIN — LORAZEPAM 2 MG: 2 INJECTION INTRAMUSCULAR; INTRAVENOUS at 23:04

## 2021-05-24 RX ADMIN — METOCLOPRAMIDE 10 MG: 5 INJECTION, SOLUTION INTRAMUSCULAR; INTRAVENOUS at 05:22

## 2021-05-24 RX ADMIN — LORAZEPAM 2 MG: 2 INJECTION INTRAMUSCULAR; INTRAVENOUS at 10:01

## 2021-05-24 RX ADMIN — LORAZEPAM 2 MG: 2 INJECTION INTRAMUSCULAR; INTRAVENOUS at 15:45

## 2021-05-24 RX ADMIN — CHLORDIAZEPOXIDE HYDROCHLORIDE 25 MG: 25 CAPSULE ORAL at 10:01

## 2021-05-24 RX ADMIN — LORAZEPAM 2 MG: 2 INJECTION INTRAMUSCULAR; INTRAVENOUS at 06:05

## 2021-05-24 ASSESSMENT — ENCOUNTER SYMPTOMS
COLOR CHANGE: 0
ABDOMINAL PAIN: 0
SHORTNESS OF BREATH: 0
CONSTIPATION: 0
NAUSEA: 1
EYE ITCHING: 0
VOMITING: 1
EYE DISCHARGE: 0
COUGH: 0

## 2021-05-24 NOTE — ED PROVIDER NOTES
seizures. Negative for weakness. Psychiatric/Behavioral: Positive for hallucinations. Negative for dysphoric mood. Except as noted above the remainder of the review of systems was reviewed and negative. PAST MEDICAL HISTORY     Past Medical History:   Diagnosis Date    Alcohol abuse     Anxiety     Depression     Sleeping difficulties        SURGICAL HISTORY       Past Surgical History:   Procedure Laterality Date    BREAST SURGERY      sailine breast      [address] FRACTURE SURGERY  2006    right wrist       CURRENT MEDICATIONS       Previous Medications    DOXYLAMINE SUCCINATE PO    Take 1 tablet by mouth nightly as needed (sleep)    IBUPROFEN (ADVIL;MOTRIN) 200 MG TABLET    Take 200 mg by mouth every 6 hours as needed for Pain    ONDANSETRON (ZOFRAN) 8 MG TABLET    Take 1 tablet by mouth every 8 hours as needed for Nausea    SERTRALINE (ZOLOFT) 50 MG TABLET    Take 50 mg by mouth daily    TRAZODONE (DESYREL) 100 MG TABLET    Take 100 mg by mouth nightly       ALLERGIES     Patient has no known allergies.     FAMILY HISTORY        Family History   Problem Relation Age of Onset    No Known Problems Mother     No Known Problems Father     Other Brother         anxiety    Cancer Maternal Grandmother         lung cancer    Cancer Maternal Grandfather         lung       SOCIAL HISTORY       Social History     Socioeconomic History    Marital status: Single     Spouse name: None    Number of children: None    Years of education: None    Highest education level: None   Occupational History    None   Tobacco Use    Smoking status: Current Every Day Smoker     Packs/day: 1.00     Years: 8.00     Pack years: 8.00     Types: Cigarettes    Smokeless tobacco: Never Used   Vaping Use    Vaping Use: Unknown   Substance and Sexual Activity    Alcohol use: Yes     Comment: binge drinker, states she threw 3 bottles of vodka away    Drug use: No    Sexual activity: Not Currently Other Topics Concern    None   Social History Narrative    None     Social Determinants of Health     Financial Resource Strain:     Difficulty of Paying Living Expenses:    Food Insecurity:     Worried About Running Out of Food in the Last Year:     [address] in the Last Year:    Transportation Needs:     Lack of Transportation (Medical):  Lack of Transportation (Non-Medical):    Physical Activity:     Days of Exercise per Week:     Minutes of Exercise per Session:    Stress:     Feeling of Stress :    Social Connections:     Frequency of Communication with Friends and Family:     Frequency of Social Gatherings with Friends and Family:     Attends Jainism Services:     Active Member of Clubs or Organizations:     Attends Club or Organization Meetings:     Marital Status:    Intimate Partner Violence:     Fear of Current or Ex-Partner:     Emotionally Abused:     Physically Abused:     Sexually Abused:        PHYSICAL EXAM       ED Triage Vitals [05/24/21 0446]   BP Temp Temp Source Pulse Resp SpO2 Height Weight   99/72 98.3 °F (36.8 °C) Oral 131 22 95 % -- 185 lb 10 oz (84.2 kg)       Physical Exam  Vitals and nursing note reviewed. Constitutional:       General: She is not in acute distress. Appearance: She is well-developed. She is ill-appearing. She is not toxic-appearing or diaphoretic. HENT:      Head: Normocephalic and atraumatic. Right Ear: External ear normal.      Left Ear: External ear normal.   Eyes:      General:         Right eye: No discharge. Left eye: No discharge. Conjunctiva/sclera: Conjunctivae normal.      Pupils: Pupils are equal, round, and reactive to light. Cardiovascular:      Rate and Rhythm: Regular rhythm. Tachycardia present. Heart sounds: No murmur heard. Pulmonary:      Effort: Pulmonary effort is normal. No respiratory distress. Breath sounds: Normal breath sounds. No wheezing or rales.    Abdominal: (*)     CO2 11 (*)     Anion Gap 34 (*)     All other components within normal limits    Narrative:     [address],  Chemistry results called to and read back by Sandhya Worthy RN, 05/24/2021  05:49, by [facility]  Performed at:  [facility]  [address] [place], [place] [address]   Phone [phone]   CK - Abnormal; Notable for the following components: Total CK 1,086 (*)     All other components within normal limits    Narrative:     Rob Vaughan tel. [phone],  Chemistry results called to and read back by Sandhya Worthy RN, 05/24/2021  05:49, by [facility]  Performed at:  [facility]  [address] [email or web address] [address]   Phone [phone]   ETHAN[place]    Narrative:     Rob Gabrielam tel. [phone],  Chemistry results called to and read back by Sandhya Worthy RN, 05/24/2021  05:49, by [facility]  Performed at:  [facility]  [address] [facility] [address]   Phone [phone]   URINE RT REFLEX TO CULTURE       All other labs were withinnormal range or not returned as of this dictation. EMERGENCY DEPARTMENT COURSE and DIFFERENTIAL DIAGNOSIS/MDM:     PMH, Surgical Hx, FH, Social Hx reviewed by myself (ETOH usage, Tobacco usage, Drug usage reviewed by myself, no pertinent Hx)- No Pertinent Hx     Old records were reviewed by me     Alcohol withdrawal- CIWA scale, ATIVAN, Phenobarb, Fluids, Banana bag. Acidosis- 2/2 Seizure    Seizure- ETOH withdrawal induced    Rhabdo- Seizure related    N/V- Supportive care    WBC- Reactive from seizure     Admission    CRITICAL CARE TIME   Total Critical Caretime was 39 minutes, excluding separately reportable procedures. There was a high probability of clinically significant/life threatening deterioration in the patient's condition which required my urgent intervention.         PROCEDURES:  Unlessotherwise noted below, none    FINAL IMPRESSION      1. Alcohol withdrawal syndrome with complication ([facility] [address]    2. Metabolic acidosis    3. Non-traumatic rhabdomyolysis    4. Alcohol abuse    5. Nausea and vomiting, intractability of vomiting not specified, unspecified vomiting type    6.  Dehydration          DISPOSITION/PLAN   DISPOSITION Decision To Admit 05/24/2021 06:21:35 AM    (Please note that portions ofthis note were completed with a voice recognition program.  Efforts were made to edit the dictations but occasionally words are mis-transcribed.)    Shakira Lemus MD(electronically signed)  Attending Emergency Physician       Shakira Lemus MD  05/24/21 8091

## 2021-05-24 NOTE — H&P
Hospitalist  History and Physical    Patient:  Dorsey Harada  MRN: [id number]  PCP: Sara Nicole DO    CHIEF COMPLAINT:  Nausea, Emesis      HISTORY OF PRESENT ILLNESS:   The patient Dorsey Harada is a 44 y. o.female with medical history significant for alcohol abuse. Patient presented to the emergency room with nausea vomiting and possible seizure disorder. Patient reports that she does not drink on a regular basis but when she drinks she drinks a lot. The night before coming to the emergency room patient went to bed drinking and then woke up on the floor. Patient also reports visual hallucinations. Patient believes that she could have seized when she fell on the floor. Past Medical History:        Diagnosis Date    Alcohol abuse     Anxiety     Depression     Sleeping difficulties        Past Surgical History:        Procedure Laterality Date    BREAST SURGERY      sailine breast      SECTION     [facility] FRACTURE SURGERY      right wrist       Medications Prior to Admission:    Prior to Admission medications    Medication Sig Start Date End Date Taking? Authorizing Provider   traZODone (DESYREL) 100 MG tablet Take 100 mg by mouth nightly   Yes Historical Provider, MD   sertraline (ZOLOFT) 50 MG tablet Take 50 mg by mouth daily   Yes Historical Provider, MD   ondansetron (ZOFRAN) 8 MG tablet Take 1 tablet by mouth every 8 hours as needed for Nausea 3/6/21   Phil Gamez MD   DOXYLAMINE SUCCINATE PO Take 1 tablet by mouth nightly as needed (sleep)    Historical Provider, MD   ibuprofen (ADVIL;MOTRIN) 200 MG tablet Take 200 mg by mouth every 6 hours as needed for Pain    Historical Provider, MD       Allergies:  Patient has no known allergies. Social History:   TOBACCO:   reports that she has been smoking cigarettes. She has a 8.00 pack-year smoking history. She has never used smokeless tobacco.  ETOH:   reports current alcohol use.         Family History:      Problem Relation Age of Onset    No Known Problems Mother     No Known Problems Father     Other Brother         anxiety    Cancer Maternal Grandmother         lung cancer    Cancer Maternal Grandfather         lung           REVIEW OF SYSTEMS:     patients reported symptoms are in BOLD all other symptoms are negative. CONSTITUTIONAL:      fatigue, fever, chills or night sweats, recent weight gain, recent wt loss, insomnia,  General weakness, poor appetite, muscle aches and pains    HEAD: headache, dizziness    EYES:      blurriness,  double vision, dryness,  discharge, irritation,diplopia    EARS:      hearing loss, vertigo, ear discharge,  Earache. Ringing in the ears. NOSE:      Rhinorrhea, sneezing, epistaxis. Discharge, sinusitis,     MOUTH/THROAT:         sore throat, mouth ulcers, Hoarseness    RESPIRATORY:        Shortness of breath, wheezing,  cough, sputum, hemoptysis, obstructive sleep apnea,    CARDIOVASCULAR :      chest pain, palpitations, dyspnea on exercise, Lower extrimity edema (swelling),     GASTROINTESTINAL:       Dysphagia, Poor appetite,  Nausea, Vomiting, diarrhea, heartburn, abdominal pain. Blood in the stools, hematemesis. Pain with swallowing, constipation    GENITOURINARY:       Urinary frequency, hesitancy,  urgency, Dysuria, hematuria,  Urinary Incontinence. Urinary Retention. GYNECOLOGICAL: vaginal bleeding , vaginal discharge, menopause    MUSCULOSKELETAL:       joint swelling or stiffness, joint pain, muscle pain, balance problems, low back pain. NEUROLOGICAL:      Gait problems. Tremor. Dizziness. Pain and paresthesias, weakness in extremities.  Seizures, memory loss    PSYCHLOGICAL:        Anxiety, depression, halucinations    SKIN :      Rashes ulcers, skin color changes, easy bruisability, lymphadenopathy      Physical Exam:      Vitals: /61   Pulse 111   Temp 99.2 °F (37.3 °C) (Oral)   Resp 18   Wt 185 lb 10 oz (84.2 kg)   LMP 02/19/2021   SpO2 96%   BMI 31.86 kg/m²     Gen:          Alert and oriented x3  Eyes: PERRL. No sclera icterus. No conjunctival injection. ENT: No discharge. Pharynx clear. External appearance of ears and nose normal.  Neck: Trachea midline. No obvious mass. Resp: No accessory muscle use. No crackles. No wheezes. No rhonchi. CV: Regular rate. Regular rhythm. No murmur or rub. No edema. GI: Non-tender. Non-distended. No hernia. Skin: Warm, dry, normal texture and turgor. Lymph: No cervical LAD. No supraclavicular LAD. M/S: / Ext. No cyanosis. No clubbing. No joint deformity. Neuro: Moves all four extremities. CN 2-12 tested, no deficits noted. Peripheral pulses and capillary refill is intact. CBC:   Recent Labs     05/24/21 0451   WBC 21.2*   HGB 14.9        BMP:    Recent Labs     05/24/21 0451      K 4.1   CL 93*   CO2 11*   BUN 9   CREATININE 0.9   GLUCOSE 77     Hepatic:   Recent Labs     05/24/21 0451   AST 64*   ALT 36   BILITOT <0.2   ALKPHOS 86     Troponin: No results for input(s): TROPONINI in the last 72 hours. BNP: No results for input(s): BNP in the last 72 hours. INR: No results for input(s): INR in the last 72 hours. No results found for: LABA1C          Recent Labs     05/24/21 0451   CKTOTAL 1,086*     -----------------------------------------------------------------    XR CHEST PORTABLE  Negative portable chest.    CT HEAD WO CONTRAST  No acute intracranial abnormality. Assessment / Plan   Alcoholic gastritis   Likely cause of nausea vomiting  Start patient on Protonix    Delirium tremens  CIWA scale  Start patient on Librium  IV fluids    DVT and GI prophylaxis      Full Code      Roxy Hernandez MD M.D    This note was transcribed using [id number] [employer] Please disregard any translational errors.

## 2021-05-24 NOTE — ED TRIAGE NOTES
Pt states she has nausea with emesis and is having visual hallucinations. States she woke up on the floor before calling EMS, thinks she had a seizure. Pt states she drinks vodka and her last drink was at 2200 this evening. Pt also states she took 3 trazodone to help her sleep.

## 2021-05-25 LAB
ALBUMIN SERPL-MCNC: 3.3 G/DL (ref 3.4–5)
ANION GAP SERPL CALCULATED.3IONS-SCNC: 8 MMOL/L (ref 3–16)
BILIRUBIN URINE: NEGATIVE
BLOOD, URINE: NEGATIVE
BUN BLDV-MCNC: 4 MG/DL (ref 7–20)
CALCIUM SERPL-MCNC: 7.8 MG/DL (ref 8.3–10.6)
CHLORIDE BLD-SCNC: 101 MMOL/L (ref 99–110)
CLARITY: ABNORMAL
CO2: 23 MMOL/L (ref 21–32)
COLOR: YELLOW
CREAT SERPL-MCNC: 0.5 MG/DL (ref 0.6–1.1)
EPITHELIAL CELLS, UA: 1 /HPF (ref 0–5)
GFR AFRICAN AMERICAN: >60
GFR NON-AFRICAN AMERICAN: >60
GLUCOSE BLD-MCNC: 109 MG/DL (ref 70–99)
GLUCOSE URINE: NEGATIVE MG/DL
HCT VFR BLD CALC: 35.1 % (ref 36–48)
HEMOGLOBIN: 12.2 G/DL (ref 12–16)
HYALINE CASTS: 0 /LPF (ref 0–8)
KETONES, URINE: 15 MG/DL
LEUKOCYTE ESTERASE, URINE: NEGATIVE
MAGNESIUM: 1.6 MG/DL (ref 1.8–2.4)
MCH RBC QN AUTO: 29.8 PG (ref 26–34)
MCHC RBC AUTO-ENTMCNC: 34.7 G/DL (ref 31–36)
MCV RBC AUTO: 85.7 FL (ref 80–100)
MICROSCOPIC EXAMINATION: YES
NITRITE, URINE: NEGATIVE
PDW BLD-RTO: 15.9 % (ref 12.4–15.4)
PH UA: 6.5 (ref 5–8)
PHOSPHORUS: 0.8 MG/DL (ref 2.5–4.9)
PHOSPHORUS: 17.2 MG/DL (ref 2.5–4.9)
PLATELET # BLD: 238 K/UL (ref 135–450)
PMV BLD AUTO: 7.6 FL (ref 5–10.5)
POTASSIUM SERPL-SCNC: 2.7 MMOL/L (ref 3.5–5.1)
POTASSIUM SERPL-SCNC: 3.1 MMOL/L (ref 3.5–5.1)
PROTEIN UA: NEGATIVE MG/DL
RBC # BLD: 4.1 M/UL (ref 4–5.2)
RBC UA: 1 /HPF (ref 0–4)
SODIUM BLD-SCNC: 132 MMOL/L (ref 136–145)
SPECIFIC GRAVITY UA: 1.01 (ref 1–1.03)
TOTAL CK: 744 U/L (ref 26–192)
TOTAL CK: 792 U/L (ref 26–192)
TSH REFLEX: 1.68 UIU/ML (ref 0.27–4.2)
URINE REFLEX TO CULTURE: ABNORMAL
URINE TYPE: ABNORMAL
UROBILINOGEN, URINE: 1 E.U./DL
WBC # BLD: 9.8 K/UL (ref 4–11)
WBC UA: 2 /HPF (ref 0–5)

## 2021-05-25 PROCEDURE — 36415 COLL VENOUS BLD VENIPUNCTURE: CPT

## 2021-05-25 PROCEDURE — 6360000002 HC RX W HCPCS: Performed by: STUDENT IN AN ORGANIZED HEALTH CARE EDUCATION/TRAINING PROGRAM

## 2021-05-25 PROCEDURE — 2580000003 HC RX 258: Performed by: INTERNAL MEDICINE

## 2021-05-25 PROCEDURE — 94760 N-INVAS EAR/PLS OXIMETRY 1: CPT

## 2021-05-25 PROCEDURE — 2500000003 HC RX 250 WO HCPCS: Performed by: INTERNAL MEDICINE

## 2021-05-25 PROCEDURE — 2500000003 HC RX 250 WO HCPCS: Performed by: NURSE PRACTITIONER

## 2021-05-25 PROCEDURE — 6360000002 HC RX W HCPCS: Performed by: INTERNAL MEDICINE

## 2021-05-25 PROCEDURE — 84100 ASSAY OF PHOSPHORUS: CPT

## 2021-05-25 PROCEDURE — 82550 ASSAY OF CK (CPK): CPT

## 2021-05-25 PROCEDURE — 80069 RENAL FUNCTION PANEL: CPT

## 2021-05-25 PROCEDURE — 84132 ASSAY OF SERUM POTASSIUM: CPT

## 2021-05-25 PROCEDURE — 6370000000 HC RX 637 (ALT 250 FOR IP): Performed by: STUDENT IN AN ORGANIZED HEALTH CARE EDUCATION/TRAINING PROGRAM

## 2021-05-25 PROCEDURE — 51702 INSERT TEMP BLADDER CATH: CPT

## 2021-05-25 PROCEDURE — 81001 URINALYSIS AUTO W/SCOPE: CPT

## 2021-05-25 PROCEDURE — 83735 ASSAY OF MAGNESIUM: CPT

## 2021-05-25 PROCEDURE — 2000000000 HC ICU R&B

## 2021-05-25 PROCEDURE — 6370000000 HC RX 637 (ALT 250 FOR IP): Performed by: INTERNAL MEDICINE

## 2021-05-25 PROCEDURE — 2580000003 HC RX 258: Performed by: STUDENT IN AN ORGANIZED HEALTH CARE EDUCATION/TRAINING PROGRAM

## 2021-05-25 PROCEDURE — 85027 COMPLETE CBC AUTOMATED: CPT

## 2021-05-25 PROCEDURE — 2500000003 HC RX 250 WO HCPCS: Performed by: STUDENT IN AN ORGANIZED HEALTH CARE EDUCATION/TRAINING PROGRAM

## 2021-05-25 PROCEDURE — 2580000003 HC RX 258: Performed by: NURSE PRACTITIONER

## 2021-05-25 PROCEDURE — 84443 ASSAY THYROID STIM HORMONE: CPT

## 2021-05-25 RX ORDER — CHLORDIAZEPOXIDE HYDROCHLORIDE 25 MG/1
25 CAPSULE, GELATIN COATED ORAL 4 TIMES DAILY
Status: DISCONTINUED | OUTPATIENT
Start: 2021-05-25 | End: 2021-05-26

## 2021-05-25 RX ORDER — DISULFIRAM 250 MG/1
250 TABLET ORAL DAILY
COMMUNITY
End: 2021-07-22

## 2021-05-25 RX ORDER — SODIUM CHLORIDE 9 MG/ML
INJECTION, SOLUTION INTRAVENOUS CONTINUOUS
Status: DISCONTINUED | OUTPATIENT
Start: 2021-05-25 | End: 2021-05-26

## 2021-05-25 RX ORDER — PANTOPRAZOLE SODIUM 40 MG/1
40 TABLET, DELAYED RELEASE ORAL
Status: DISCONTINUED | OUTPATIENT
Start: 2021-05-26 | End: 2021-06-04 | Stop reason: HOSPADM

## 2021-05-25 RX ORDER — POTASSIUM CHLORIDE 7.45 MG/ML
10 INJECTION INTRAVENOUS PRN
Status: DISCONTINUED | OUTPATIENT
Start: 2021-05-25 | End: 2021-05-26

## 2021-05-25 RX ORDER — NALTREXONE HYDROCHLORIDE 50 MG/1
50 TABLET, FILM COATED ORAL DAILY
COMMUNITY
End: 2021-07-16

## 2021-05-25 RX ORDER — MAGNESIUM SULFATE 1 G/100ML
1000 INJECTION INTRAVENOUS PRN
Status: DISCONTINUED | OUTPATIENT
Start: 2021-05-25 | End: 2021-06-04 | Stop reason: HOSPADM

## 2021-05-25 RX ADMIN — LORAZEPAM 4 MG: 2 INJECTION INTRAMUSCULAR; INTRAVENOUS at 16:53

## 2021-05-25 RX ADMIN — LORAZEPAM 2 MG: 2 INJECTION INTRAMUSCULAR; INTRAVENOUS at 12:38

## 2021-05-25 RX ADMIN — SODIUM CHLORIDE: 9 INJECTION, SOLUTION INTRAVENOUS at 10:16

## 2021-05-25 RX ADMIN — ONDANSETRON 4 MG: 2 INJECTION INTRAMUSCULAR; INTRAVENOUS at 12:18

## 2021-05-25 RX ADMIN — SODIUM CHLORIDE, PRESERVATIVE FREE 10 ML: 5 INJECTION INTRAVENOUS at 08:34

## 2021-05-25 RX ADMIN — POTASSIUM CHLORIDE 10 MEQ: 7.46 INJECTION, SOLUTION INTRAVENOUS at 23:20

## 2021-05-25 RX ADMIN — LORAZEPAM 2 MG: 2 INJECTION INTRAMUSCULAR; INTRAVENOUS at 05:33

## 2021-05-25 RX ADMIN — CHLORDIAZEPOXIDE HYDROCHLORIDE 25 MG: 25 CAPSULE ORAL at 20:34

## 2021-05-25 RX ADMIN — POTASSIUM CHLORIDE 10 MEQ: 7.46 INJECTION, SOLUTION INTRAVENOUS at 15:36

## 2021-05-25 RX ADMIN — SODIUM PHOSPHATE, MONOBASIC, MONOHYDRATE AND SODIUM PHOSPHATE, DIBASIC, ANHYDROUS 28.53 MMOL: 276; 142 INJECTION, SOLUTION INTRAVENOUS at 12:52

## 2021-05-25 RX ADMIN — LORAZEPAM 2 MG: 1 TABLET ORAL at 11:37

## 2021-05-25 RX ADMIN — LORAZEPAM 2 MG: 1 TABLET ORAL at 15:47

## 2021-05-25 RX ADMIN — CHLORDIAZEPOXIDE HYDROCHLORIDE 25 MG: 25 CAPSULE ORAL at 08:33

## 2021-05-25 RX ADMIN — DEXMEDETOMIDINE 0.2 MCG/KG/HR: 100 INJECTION, SOLUTION, CONCENTRATE INTRAVENOUS at 21:18

## 2021-05-25 RX ADMIN — CHLORDIAZEPOXIDE HYDROCHLORIDE 25 MG: 25 CAPSULE ORAL at 12:57

## 2021-05-25 RX ADMIN — MAGNESIUM SULFATE HEPTAHYDRATE 1000 MG: 1 INJECTION, SOLUTION INTRAVENOUS at 12:11

## 2021-05-25 RX ADMIN — LORAZEPAM 4 MG: 2 INJECTION INTRAMUSCULAR; INTRAVENOUS at 19:15

## 2021-05-25 RX ADMIN — LORAZEPAM 2 MG: 1 TABLET ORAL at 14:44

## 2021-05-25 RX ADMIN — MAGNESIUM SULFATE HEPTAHYDRATE 1000 MG: 1 INJECTION, SOLUTION INTRAVENOUS at 13:03

## 2021-05-25 RX ADMIN — CHLORDIAZEPOXIDE HYDROCHLORIDE 25 MG: 25 CAPSULE ORAL at 16:51

## 2021-05-25 RX ADMIN — LORAZEPAM 2 MG: 1 TABLET ORAL at 08:33

## 2021-05-25 RX ADMIN — ENOXAPARIN SODIUM 40 MG: 40 INJECTION SUBCUTANEOUS at 08:33

## 2021-05-25 RX ADMIN — POTASSIUM CHLORIDE 10 MEQ: 7.46 INJECTION, SOLUTION INTRAVENOUS at 14:33

## 2021-05-25 RX ADMIN — POTASSIUM CHLORIDE 10 MEQ: 7.46 INJECTION, SOLUTION INTRAVENOUS at 12:08

## 2021-05-25 RX ADMIN — SODIUM CHLORIDE: 9 INJECTION, SOLUTION INTRAVENOUS at 20:40

## 2021-05-25 RX ADMIN — LORAZEPAM 4 MG: 2 INJECTION INTRAMUSCULAR; INTRAVENOUS at 17:53

## 2021-05-25 RX ADMIN — POTASSIUM CHLORIDE 10 MEQ: 7.46 INJECTION, SOLUTION INTRAVENOUS at 21:45

## 2021-05-25 RX ADMIN — THIAMINE HYDROCHLORIDE: 100 INJECTION, SOLUTION INTRAMUSCULAR; INTRAVENOUS at 08:35

## 2021-05-25 RX ADMIN — POTASSIUM CHLORIDE 10 MEQ: 7.46 INJECTION, SOLUTION INTRAVENOUS at 12:54

## 2021-05-25 ASSESSMENT — PAIN SCALES - GENERAL
PAINLEVEL_OUTOF10: 0
PAINLEVEL_OUTOF10: 0

## 2021-05-25 NOTE — PROGRESS NOTES
Physician Progress Note      Rishi Schmidt  CSN #:                  [id number]  :                       1970  ADMIT DATE:       2021 4:34 AM  [address] [place] DATE:  RESPONDING  PROVIDER #:        Genoveva Hollingsworth MD          QUERY TEXT:    Dear Dr Olayinka oJy,  Patient admitted with Alcohol withdrawal  and noted to have leukocytosis,   tachycardia, tachypnea. If possible, please document in progress notes and   discharge summary if you are evaluating and/or treating any of the following: The medical record reflects the following:  Risk Factors: alcohol withdrawal  Clinical Indicators: On admission hr-131, rr-30, wbc, 21.2. ETOH -174,  Treatment: monitor labs, ciwa scale, ivf    Thank you, Jordy Perea RN CDS CRCR  [email or web address]  [phone]  Options provided:  -- SIRS of non-infectious origin due to Alcohol withdrawal without acute organ   dysfunction  -- No SIRS  -- Other - I will add my own diagnosis  -- Disagree - Not applicable / Not valid  -- Disagree - Clinically unable to determine / Unknown  -- Refer to Clinical Documentation Reviewer    PROVIDER RESPONSE TEXT:    This patient has SIRS of non-infectious origin due to Alcohol withdrawal   without acute organ dysfunction. Query created by:  Kelsi Perea on 2021 11:04 AM      Electronically signed by:  Genoveva Hollingsworth MD 2021 12:30 PM

## 2021-05-25 NOTE — PROGRESS NOTES
Lab informed RN of critical lab results of phosphorus being 0.8. Josafat Scott MD notified. MD also notified of potassium and magnesium results. Will continue to monitor.     Electronically signed by Dave Plaza RN on 5/25/2021 at 11:12 AM

## 2021-05-25 NOTE — PROGRESS NOTES
seizure at home. - rally pack  -Continue Librium  -CIWA protocol  -As needed Ativan  -Seizure precautions    Rhabdo  Nontraumatic, possibly from being down at home versus possible seizure. CK 1000 on admission  -Trend CK  -IV fluids    Nausea, vomiting  Suspect alcoholic gastritis. -Start PPI  -As needed Zofran    Leukocytosis  White count 21 on admission. Possibly reactive-- sinilar I the past when admitted for withdrawal. Negative CXR  - UA pending  - trend CBC--resolved    DVT Prophylaxis: Lovenox  Diet: DIET GENERAL;  Code Status: Full Code    PT/OT Eval Status: deferred    Dispo - pending    Yuki Levy MD    This note was transcribed using [id number] [employer]. Please disregard any translational errors.

## 2021-05-25 NOTE — PROGRESS NOTES
Withdrawal symptoms are becoming worse. Pt is very restless (has had to have 3 IVs to restarted), hallucinating (auditory and visual), she is starting to become more agitated (has set her telesitter more frequently), and disoriented on date and place (thinks we are in [facility] ([facility] Requiring more ativan. Ti Atkinson NP notified to evaluate. Will continue to monitor.      Electronically signed by Rolando Alatorre RN on 5/25/2021 at 7:32 PM

## 2021-05-26 LAB
ALBUMIN SERPL-MCNC: 3.4 G/DL (ref 3.4–5)
ANION GAP SERPL CALCULATED.3IONS-SCNC: 11 MMOL/L (ref 3–16)
ANION GAP SERPL CALCULATED.3IONS-SCNC: 9 MMOL/L (ref 3–16)
BUN BLDV-MCNC: 3 MG/DL (ref 7–20)
BUN BLDV-MCNC: <2 MG/DL (ref 7–20)
CALCIUM SERPL-MCNC: 7.7 MG/DL (ref 8.3–10.6)
CALCIUM SERPL-MCNC: 7.8 MG/DL (ref 8.3–10.6)
CHLORIDE BLD-SCNC: 106 MMOL/L (ref 99–110)
CHLORIDE BLD-SCNC: 109 MMOL/L (ref 99–110)
CO2: 21 MMOL/L (ref 21–32)
CO2: 22 MMOL/L (ref 21–32)
CREAT SERPL-MCNC: <0.5 MG/DL (ref 0.6–1.1)
CREAT SERPL-MCNC: <0.5 MG/DL (ref 0.6–1.1)
GFR AFRICAN AMERICAN: >60
GFR AFRICAN AMERICAN: >60
GFR NON-AFRICAN AMERICAN: >60
GFR NON-AFRICAN AMERICAN: >60
GLUCOSE BLD-MCNC: 114 MG/DL (ref 70–99)
GLUCOSE BLD-MCNC: 121 MG/DL (ref 70–99)
HCT VFR BLD CALC: 34.9 % (ref 36–48)
HEMOGLOBIN: 12 G/DL (ref 12–16)
MAGNESIUM: 1.8 MG/DL (ref 1.8–2.4)
MAGNESIUM: 2 MG/DL (ref 1.8–2.4)
MCH RBC QN AUTO: 29.8 PG (ref 26–34)
MCHC RBC AUTO-ENTMCNC: 34.4 G/DL (ref 31–36)
MCV RBC AUTO: 86.8 FL (ref 80–100)
PDW BLD-RTO: 16.4 % (ref 12.4–15.4)
PHOSPHORUS: 1.8 MG/DL (ref 2.5–4.9)
PHOSPHORUS: 2.4 MG/DL (ref 2.5–4.9)
PLATELET # BLD: 192 K/UL (ref 135–450)
PMV BLD AUTO: 7.8 FL (ref 5–10.5)
POTASSIUM REFLEX MAGNESIUM: 3.5 MMOL/L (ref 3.5–5.1)
POTASSIUM SERPL-SCNC: 4.1 MMOL/L (ref 3.5–5.1)
RBC # BLD: 4.02 M/UL (ref 4–5.2)
SODIUM BLD-SCNC: 138 MMOL/L (ref 136–145)
SODIUM BLD-SCNC: 140 MMOL/L (ref 136–145)
WBC # BLD: 6.5 K/UL (ref 4–11)

## 2021-05-26 PROCEDURE — 6370000000 HC RX 637 (ALT 250 FOR IP): Performed by: INTERNAL MEDICINE

## 2021-05-26 PROCEDURE — 2000000000 HC ICU R&B

## 2021-05-26 PROCEDURE — 83735 ASSAY OF MAGNESIUM: CPT

## 2021-05-26 PROCEDURE — 85027 COMPLETE CBC AUTOMATED: CPT

## 2021-05-26 PROCEDURE — 2500000003 HC RX 250 WO HCPCS: Performed by: NURSE PRACTITIONER

## 2021-05-26 PROCEDURE — 6360000002 HC RX W HCPCS: Performed by: INTERNAL MEDICINE

## 2021-05-26 PROCEDURE — 80069 RENAL FUNCTION PANEL: CPT

## 2021-05-26 PROCEDURE — 94640 AIRWAY INHALATION TREATMENT: CPT

## 2021-05-26 PROCEDURE — 6360000002 HC RX W HCPCS: Performed by: STUDENT IN AN ORGANIZED HEALTH CARE EDUCATION/TRAINING PROGRAM

## 2021-05-26 PROCEDURE — 2580000003 HC RX 258: Performed by: NURSE PRACTITIONER

## 2021-05-26 PROCEDURE — 2580000003 HC RX 258: Performed by: INTERNAL MEDICINE

## 2021-05-26 PROCEDURE — 02HV33Z INSERTION OF INFUSION DEVICE INTO SUPERIOR VENA CAVA, PERCUTANEOUS APPROACH: ICD-10-PCS | Performed by: FAMILY MEDICINE

## 2021-05-26 PROCEDURE — 2500000003 HC RX 250 WO HCPCS: Performed by: INTERNAL MEDICINE

## 2021-05-26 PROCEDURE — 36415 COLL VENOUS BLD VENIPUNCTURE: CPT

## 2021-05-26 PROCEDURE — 36569 INSJ PICC 5 YR+ W/O IMAGING: CPT

## 2021-05-26 PROCEDURE — 6370000000 HC RX 637 (ALT 250 FOR IP): Performed by: NURSE PRACTITIONER

## 2021-05-26 PROCEDURE — 94761 N-INVAS EAR/PLS OXIMETRY MLT: CPT

## 2021-05-26 PROCEDURE — 99291 CRITICAL CARE FIRST HOUR: CPT | Performed by: INTERNAL MEDICINE

## 2021-05-26 PROCEDURE — 2580000003 HC RX 258: Performed by: STUDENT IN AN ORGANIZED HEALTH CARE EDUCATION/TRAINING PROGRAM

## 2021-05-26 PROCEDURE — APPNB15 APP NON BILLABLE TIME 0-15 MINS: Performed by: NURSE PRACTITIONER

## 2021-05-26 PROCEDURE — 84100 ASSAY OF PHOSPHORUS: CPT

## 2021-05-26 PROCEDURE — 6360000002 HC RX W HCPCS: Performed by: NURSE PRACTITIONER

## 2021-05-26 PROCEDURE — C1751 CATH, INF, PER/CENT/MIDLINE: HCPCS

## 2021-05-26 PROCEDURE — 2700000000 HC OXYGEN THERAPY PER DAY

## 2021-05-26 PROCEDURE — 76937 US GUIDE VASCULAR ACCESS: CPT

## 2021-05-26 RX ORDER — LORAZEPAM 2 MG/ML
2 INJECTION INTRAMUSCULAR
Status: DISCONTINUED | OUTPATIENT
Start: 2021-05-26 | End: 2021-05-27

## 2021-05-26 RX ORDER — LIDOCAINE HYDROCHLORIDE 10 MG/ML
5 INJECTION, SOLUTION EPIDURAL; INFILTRATION; INTRACAUDAL; PERINEURAL ONCE
Status: DISCONTINUED | OUTPATIENT
Start: 2021-05-26 | End: 2021-05-26

## 2021-05-26 RX ORDER — POTASSIUM CHLORIDE 29.8 MG/ML
20 INJECTION INTRAVENOUS PRN
Status: DISCONTINUED | OUTPATIENT
Start: 2021-05-26 | End: 2021-06-04 | Stop reason: HOSPADM

## 2021-05-26 RX ORDER — LORAZEPAM 2 MG/ML
1 INJECTION INTRAMUSCULAR
Status: DISCONTINUED | OUTPATIENT
Start: 2021-05-26 | End: 2021-05-27

## 2021-05-26 RX ORDER — PHENOBARBITAL 32.4 MG/1
64.8 TABLET ORAL 3 TIMES DAILY
Status: DISPENSED | OUTPATIENT
Start: 2021-05-26 | End: 2021-05-28

## 2021-05-26 RX ORDER — LORAZEPAM 1 MG/1
1 TABLET ORAL
Status: DISCONTINUED | OUTPATIENT
Start: 2021-05-26 | End: 2021-05-27

## 2021-05-26 RX ORDER — IPRATROPIUM BROMIDE AND ALBUTEROL SULFATE 2.5; .5 MG/3ML; MG/3ML
1 SOLUTION RESPIRATORY (INHALATION)
Status: DISCONTINUED | OUTPATIENT
Start: 2021-05-26 | End: 2021-06-01

## 2021-05-26 RX ORDER — SODIUM CHLORIDE 9 MG/ML
25 INJECTION, SOLUTION INTRAVENOUS PRN
Status: DISCONTINUED | OUTPATIENT
Start: 2021-05-26 | End: 2021-06-04 | Stop reason: HOSPADM

## 2021-05-26 RX ORDER — LORAZEPAM 1 MG/1
2 TABLET ORAL
Status: DISCONTINUED | OUTPATIENT
Start: 2021-05-26 | End: 2021-05-27

## 2021-05-26 RX ORDER — SODIUM CHLORIDE 0.9 % (FLUSH) 0.9 %
5-40 SYRINGE (ML) INJECTION PRN
Status: DISCONTINUED | OUTPATIENT
Start: 2021-05-26 | End: 2021-06-04 | Stop reason: HOSPADM

## 2021-05-26 RX ORDER — PHENOBARBITAL 32.4 MG/1
32.4 TABLET ORAL 3 TIMES DAILY
Status: DISCONTINUED | OUTPATIENT
Start: 2021-05-28 | End: 2021-05-28 | Stop reason: ALTCHOICE

## 2021-05-26 RX ORDER — SODIUM CHLORIDE 0.9 % (FLUSH) 0.9 %
5-40 SYRINGE (ML) INJECTION EVERY 12 HOURS SCHEDULED
Status: DISCONTINUED | OUTPATIENT
Start: 2021-05-26 | End: 2021-06-04 | Stop reason: HOSPADM

## 2021-05-26 RX ADMIN — LORAZEPAM 3 MG: 2 INJECTION INTRAMUSCULAR; INTRAVENOUS at 09:23

## 2021-05-26 RX ADMIN — IPRATROPIUM BROMIDE AND ALBUTEROL SULFATE 1 AMPULE: .5; 3 SOLUTION RESPIRATORY (INHALATION) at 15:55

## 2021-05-26 RX ADMIN — IPRATROPIUM BROMIDE AND ALBUTEROL SULFATE 1 AMPULE: .5; 3 SOLUTION RESPIRATORY (INHALATION) at 20:12

## 2021-05-26 RX ADMIN — POTASSIUM CHLORIDE 10 MEQ: 7.46 INJECTION, SOLUTION INTRAVENOUS at 01:10

## 2021-05-26 RX ADMIN — SODIUM PHOSPHATE, MONOBASIC, MONOHYDRATE AND SODIUM PHOSPHATE, DIBASIC, ANHYDROUS 20 MMOL: 276; 142 INJECTION, SOLUTION INTRAVENOUS at 06:12

## 2021-05-26 RX ADMIN — DEXMEDETOMIDINE 0.5 MCG/KG/HR: 100 INJECTION, SOLUTION, CONCENTRATE INTRAVENOUS at 04:36

## 2021-05-26 RX ADMIN — IPRATROPIUM BROMIDE AND ALBUTEROL SULFATE 1 AMPULE: .5; 3 SOLUTION RESPIRATORY (INHALATION) at 12:32

## 2021-05-26 RX ADMIN — DEXMEDETOMIDINE 0.6 MCG/KG/HR: 100 INJECTION, SOLUTION, CONCENTRATE INTRAVENOUS at 12:17

## 2021-05-26 RX ADMIN — PHENOBARBITAL 64.8 MG: 32.4 TABLET ORAL at 19:27

## 2021-05-26 RX ADMIN — LORAZEPAM 2 MG: 1 TABLET ORAL at 18:54

## 2021-05-26 RX ADMIN — POTASSIUM CHLORIDE 10 MEQ: 7.46 INJECTION, SOLUTION INTRAVENOUS at 04:11

## 2021-05-26 RX ADMIN — POTASSIUM CHLORIDE 10 MEQ: 7.46 INJECTION, SOLUTION INTRAVENOUS at 03:10

## 2021-05-26 RX ADMIN — Medication 10 ML: at 19:27

## 2021-05-26 RX ADMIN — ENOXAPARIN SODIUM 40 MG: 40 INJECTION SUBCUTANEOUS at 08:42

## 2021-05-26 RX ADMIN — CHLORDIAZEPOXIDE HYDROCHLORIDE 25 MG: 25 CAPSULE ORAL at 08:42

## 2021-05-26 RX ADMIN — LORAZEPAM 2 MG: 1 TABLET ORAL at 13:28

## 2021-05-26 RX ADMIN — CHLORDIAZEPOXIDE HYDROCHLORIDE 25 MG: 25 CAPSULE ORAL at 13:18

## 2021-05-26 RX ADMIN — LORAZEPAM 2 MG: 1 TABLET ORAL at 21:34

## 2021-05-26 RX ADMIN — SODIUM CHLORIDE, PRESERVATIVE FREE 10 ML: 5 INJECTION INTRAVENOUS at 07:40

## 2021-05-26 RX ADMIN — PHENOBARBITAL 64.8 MG: 32.4 TABLET ORAL at 13:18

## 2021-05-26 RX ADMIN — POTASSIUM CHLORIDE 10 MEQ: 7.46 INJECTION, SOLUTION INTRAVENOUS at 02:27

## 2021-05-26 RX ADMIN — Medication 10 ML: at 07:40

## 2021-05-26 RX ADMIN — LORAZEPAM 2 MG: 2 INJECTION INTRAMUSCULAR; INTRAVENOUS at 22:38

## 2021-05-26 RX ADMIN — DEXMEDETOMIDINE 0.7 MCG/KG/HR: 100 INJECTION, SOLUTION, CONCENTRATE INTRAVENOUS at 20:56

## 2021-05-26 RX ADMIN — PHENOBARBITAL 64.8 MG: 32.4 TABLET ORAL at 10:18

## 2021-05-26 RX ADMIN — THIAMINE HYDROCHLORIDE: 100 INJECTION, SOLUTION INTRAMUSCULAR; INTRAVENOUS at 10:55

## 2021-05-26 RX ADMIN — PANTOPRAZOLE SODIUM 40 MG: 40 TABLET, DELAYED RELEASE ORAL at 06:17

## 2021-05-26 ASSESSMENT — PAIN SCALES - GENERAL
PAINLEVEL_OUTOF10: 0

## 2021-05-26 NOTE — PROGRESS NOTES
Hospitalist Progress Note      PCP: Glenis Pitt DO    Date of Admission: 5/24/2021    Chief Complaint:   Chief Complaint   Patient presents with    Nausea    Emesis     Hospital Course: 47-year-old past medical history of alcohol abuse, anxiety, depression who presented with nausea, vomiting hallucinations and possible seizure. Reported drinking significant amount of alcohol and waking up on the floor, concerned that she may have had a seizure. Subjective:  Moved to ICU Overnight for worsening CIWA. Started on Precedex. When seen she was drowsy but arousable and oriented. Mom at bedside. Medications:  Reviewed    Infusion Medications    sodium chloride      dexmedetomidine 0.5 mcg/kg/hr (05/26/21 0436)    sodium chloride       Scheduled Medications    sodium chloride flush  5-40 mL Intravenous 2 times per day    sodium phosphate IVPB  20 mmol Intravenous Once    chlordiazePOXIDE  25 mg Oral 4x Daily    pantoprazole  40 mg Oral QAM AC    enoxaparin  40 mg Subcutaneous Daily     PRN Meds: sodium chloride flush, sodium chloride, potassium chloride, magnesium sulfate, sodium phosphate IVPB **OR** sodium phosphate IVPB, sodium chloride flush, sodium chloride, ondansetron, LORazepam **OR** LORazepam **OR** LORazepam **OR** LORazepam **OR** LORazepam **OR** LORazepam **OR** LORazepam **OR** LORazepam      Intake/Output Summary (Last 24 hours) at 5/26/2021 0827  Last data filed at 5/26/2021 0732  Gross per 24 hour   Intake 3093.74 ml   Output 1660 ml   Net 1433.74 ml       Physical Exam Performed:    /72   Pulse 65   Temp 97.6 °F (36.4 °C) (Axillary)   Resp 17   Ht 5' 4\" (1.626 m)   Wt 204 lb 5.9 oz (92.7 kg)   LMP 02/19/2021   SpO2 95%   BMI 35.08 kg/m²     General appearance: No apparent distress, appears stated age and cooperative. HEENT: Pupils equal, round, and reactive to light. Conjunctivae/corneas clear. Neck: Supple, with full range of motion.  Trachea midline. Respiratory:  Normal respiratory effort. Clear to auscultation, bilaterally without Rales/Wheezes/Rhonchi. Cardiovascular: Regular rate and rhythm with normal S1/S2 without murmurs, rubs or gallops. Abdomen: Soft, non-tender, non-distended with normal bowel sounds. Musculoskeletal: No clubbing, cyanosis or edema bilaterally. Full range of motion without deformity. Skin: Skin color, texture, turgor normal.  No rashes or lesions. Neurologic:  Neurovascularly intact without any focal sensory/motor deficits. Cranial nerves: II-XII intact, grossly non-focal.  Psychiatric: Drowsy, oriented, thought content appropriate, normal insight  Capillary Refill: Brisk,< 3 seconds   Peripheral Pulses: +2 palpable, equal bilaterally       Labs:   Recent Labs     05/24/21 0451 05/25/21  1010 05/26/21  0424   WBC 21.2* 9.8 6.5   HGB 14.9 12.2 12.0   HCT 44.7 35.1* 34.9*    238 192     Recent Labs     05/24/21 0451 05/25/21  1010 05/25/21  1940 05/26/21  0424    132*  --  140   K 4.1 3.1* 2.7* 4.1   CL 93* 101  --  109   CO2 11* 23  --  22   BUN 9 4*  --  <2*   CREATININE 0.9 0.5*  --  <0.5*   CALCIUM 8.4 7.8*  --  7.7*   PHOS  --  0.8* 17.2* 1.8*     Recent Labs     05/24/21 0451   AST 64*   ALT 36   BILIDIR <0.2   BILITOT <0.2   ALKPHOS 86     No results for input(s): INR in the last 72 hours. Recent Labs     05/24/21  0451 05/25/21  1010 05/25/21  1720   CKTOTAL 1,086* 792* 744*       Urinalysis:      Lab Results   Component Value Date    NITRU Negative 05/25/2021    WBCUA 2 05/25/2021    BACTERIA 2+ 02/15/2020    RBCUA 1 05/25/2021    BLOODU Negative 05/25/2021    SPECGRAV 1.006 05/25/2021    GLUCOSEU Negative 05/25/2021       Radiology:  XR CHEST PORTABLE   Final Result   Negative portable chest.         CT HEAD WO CONTRAST   Final Result   No acute intracranial abnormality.                Assessment/Plan:    Active Hospital Problems    Diagnosis     Dehydration [E86.0]     [facility] related seizure ([facility] [R56.9]     Alcohol withdrawal delirium ([facility] [F10.231]     Leukocytosis [D72.829]     High anion gap metabolic acidosis [N06.9]     Alcohol withdrawal (HCC) [F10.239]      Alcohol abuse, withdrawal  Reports heavy history of alcohol use with hallucinations and possible seizure at home. CIWA on the floor increasing, moved to ICU for precedex, started on phenobarb  - rally pack  -Continue Librium  -CIWA protocol  -As needed Ativan  -Seizure precautions    Rhabdo  Nontraumatic, possibly from being down at home versus possible seizure. CK 1000 on admission  -Trend CK-- downtrending  -IV fluids    Nausea, vomiting  Suspect alcoholic gastritis. -Start PPI  -As needed Zofran    Leukocytosis, resolved  White count 21 on admission. Possibly reactive-- sinilar I the past when admitted for withdrawal. Negative CXR  - UA pending  - trend CBC--resolved    DVT Prophylaxis: Lovenox  Diet: DIET GENERAL;  Code Status: Full Code    PT/OT Eval Status: deferred    Dispo - pending    Alex Jackson MD    This note was transcribed using [id number]. Please disregard any translational errors.

## 2021-05-26 NOTE — CONSULTS
REASON FOR CONSULTATION/CC: DTs      Consult at request of Alice Mixon MD     PCP: Barbara Soares DO  Established Pulmonologist: None    Chief Complaint   Patient presents with    Nausea    Emesis       HISTORY OF PRESENT ILLNESS: Caity Zuñiga is a 44y.o. year old female with a history of alcohol abuse who presents with progressive encephalopathy, visual hallucinations possible seizure. She had 1 dose of phenobarbital in the ED, transferred to the ICU overnight for Precedex infusion. During my visit she is somnolent arouses mildly to touch. She is encephalopathic unable to provide HPI, is obtained from bedside nursing report and EMR. Past Medical History:   Diagnosis Date    Alcohol abuse     Anxiety     Depression     Sleeping difficulties          Past Surgical History:   Procedure Laterality Date    BREAST SURGERY      sailine breast      [facility] FRACTURE SURGERY  2006    right wrist       Family Hx  family history includes Cancer in her maternal grandfather and maternal grandmother; No Known Problems in her father and mother; Other in her brother. Social Hx   reports that she has been smoking cigarettes. She has a 8.00 pack-year smoking history.  She has never used smokeless tobacco.    Scheduled Meds:   sodium chloride flush  5-40 mL Intravenous 2 times per day    PHENobarbital  64.8 mg Oral TID    Followed by   Krista Beltran ON 2021] PHENobarbital  32.4 mg Oral TID    chlordiazePOXIDE  25 mg Oral 4x Daily    pantoprazole  40 mg Oral QAM AC    enoxaparin  40 mg Subcutaneous Daily       Continuous Infusions:   sodium chloride      dexmedetomidine 0.6 mcg/kg/hr (21 1217)    sodium chloride         PRN Meds:  sodium chloride flush, sodium chloride, potassium chloride, LORazepam **OR** LORazepam **OR** LORazepam **OR** LORazepam, magnesium sulfate, sodium phosphate IVPB **OR** sodium phosphate IVPB, sodium chloride, ondansetron    ALLERGIES:  Patient has No Known Allergies. REVIEW OF SYSTEMS:  Able to obtain due to encephalopathy  Objective:   PHYSICAL EXAM:  Blood pressure (!) 134/91, pulse 54, temperature 97.6 °F (36.4 °C), temperature source Axillary, resp. rate 17, height 5' 4\" (1.626 m), weight 204 lb 5.9 oz (92.7 kg), last menstrual period 02/19/2021, SpO2 96 %, not currently breastfeeding.'  Gen:  No acute distress. Eyes: PERRL. Anicteric sclera. No conjunctival injection. ENT: No discharge. Posterior oropharynx clear. External appearance of ears and nose normal.  Neck: Trachea midline. No mass   Resp:  No crackles. No wheezes. No rhonchi. No dullness on percussion. CV: Regular rate. Regular rhythm. No murmur or rub. No edema. GI: Soft, Non-tender.  + BS. +BS  Skin: Warm, dry, w/o erythema. Lymph: No cervical or supraclavicular LAD. M/S: No cyanosis. No clubbing. Neuro:  no focal neurologic deficit, but is somnolent and confused. .  Moves all extremities      Data Reviewed:   LABS:  CBC:   Recent Labs     05/24/21 0451 05/25/21  1010 05/26/21  0424   WBC 21.2* 9.8 6.5   HGB 14.9 12.2 12.0   HCT 44.7 35.1* 34.9*   MCV 87.6 85.7 86.8    238 192     BMP:   Recent Labs     05/24/21 0451 05/25/21  1010 05/25/21  1940 05/26/21  0424    132*  --  140   K 4.1 3.1* 2.7* 4.1   CL 93* 101  --  109   CO2 11* 23  --  22   PHOS  --  0.8* 17.2* 1.8*   BUN 9 4*  --  <2*   CREATININE 0.9 0.5*  --  <0.5*     LIVER PROFILE:   Recent Labs     05/24/21 0451   AST 64*   ALT 36   BILIDIR <0.2   BILITOT <0.2   ALKPHOS 86     PT/INR: No results for input(s): PROTIME, INR in the last 72 hours. APTT: No results for input(s): APTT in the last 72 hours. UA:  Recent Labs     05/25/21  1524   COLORU YELLOW   PHUR 6.5   WBCUA 2   RBCUA 1   CLARITYU CLOUDY*   SPECGRAV 1.006   LEUKOCYTESUR Negative   UROBILINOGEN 1.0   BILIRUBINUR Negative   BLOODU Negative   GLUCOSEU Negative     No results for input(s): PHART, WYV7HEE, PO2ART in the last 72 hours.     Vent

## 2021-05-26 NOTE — PROGRESS NOTES
Order for PICC line per  Dr. Franck Mirza. Pre procedure and timeout done with pt's RN. Successful insertion of a double lumen PICC line into pt's right basilic vein. No issues gaining access or advancing guidewire/introducer/PICC line. PICC tip terminates in the SVC according to [employer] 3CG tip confirmation system. PICC was seen dropping into SVC with tip tracking technology and discernable peaked p waves were noted without negative deflection. A printout will be in pt's chart. PICC is cut at 43 cm and out externally 2 cm. Both lumens flush without resistance and draw back brisk blood return. PICC site CDI with hemostasis maintained and a biopatch applied to site. Pt instructed to stay in bed and keep arm flat and still for 30 minutes  to promote hemostasis.      Dg Kevin RN given handoff report

## 2021-05-26 NOTE — PLAN OF CARE
Problem: SAFETY  Goal: Free from accidental physical injury  5/26/2021 0501 by Mandeep Noble RN  Outcome: Ongoing  5/25/2021 1850 by Pamela Wharton RN  Outcome: Ongoing  Goal: Free from intentional harm  5/26/2021 0501 by Mandeep Noble RN  Outcome: Ongoing  5/25/2021 1850 by Pamela Wharton RN  Outcome: Ongoing     Problem: DAILY CARE  Goal: Daily care needs are met  5/26/2021 0501 by Mandeep Noble RN  Outcome: Ongoing  5/25/2021 1850 by Pamela Wharton RN  Outcome: Ongoing     Problem: PAIN  Goal: Patient's pain/discomfort is manageable  5/26/2021 0501 by Mandeep Noble RN  Outcome: Ongoing  5/25/2021 1850 by Pamela Wharton RN  Outcome: Ongoing     Problem: SKIN INTEGRITY  Goal: Skin integrity is maintained or improved  5/26/2021 0501 by Mandeep Noble RN  Outcome: Ongoing  5/25/2021 1850 by Pamela Wharton RN  Outcome: Ongoing     Problem: KNOWLEDGE DEFICIT  Goal: Patient/S.O. demonstrates understanding of disease process, treatment plan, medications, and discharge instructions.   5/26/2021 0501 by Mandeep Noble RN  Outcome: Ongoing  5/25/2021 1850 by Pamela Wharton RN  Outcome: Ongoing     Problem: DISCHARGE BARRIERS  Goal: Patient's continuum of care needs are met  5/26/2021 0501 by Mandeep Noble RN  Outcome: Ongoing  5/25/2021 1850 by Pamela Wharton RN  Outcome: Ongoing     Problem: Falls - Risk of:  Goal: Will remain free from falls  Description: Will remain free from falls  5/26/2021 0501 by Mandeep Noble RN  Outcome: Ongoing  5/25/2021 1850 by Pamela Wharton RN  Outcome: Ongoing  Goal: Absence of physical injury  Description: Absence of physical injury  5/26/2021 0501 by Mandeep Noble RN  Outcome: Ongoing  5/25/2021 1850 by Pamela Wharton RN  Outcome: Ongoing     Problem: Skin Integrity:  Goal: Will show no infection signs and symptoms  Description: Will show no infection signs and symptoms  Outcome: Ongoing  Goal: Absence of new skin breakdown  Description: Absence of new

## 2021-05-26 NOTE — PROGRESS NOTES
Bed alarming, patient attempting to get out of bed. Patient stating she is \"seeing people on the ceiling\". CIWA score complete, see emar.

## 2021-05-26 NOTE — CARE COORDINATION
INITIAL CASE MANAGEMENT ASSESSMENT     Reviewed chart, spoke with patient's mother to assess possible discharge needs. Explained Case Management role/services. Living Situation: Patient lives alone in an apartment. ADLs: Independent prior to admission      DME: None     PT/OT Recs: Not ordered      Active Services: Counseling services through [address] [place] [address] (counselor) # [phone]  Mother stated she and patient were working with counselor for rehab. Transportation: Family transport     Medications: [employer]   Will need to verify for weekend discharge. PCP: Josefa Field DO      HD/PD: n/a    PLAN/COMMENTS: Per patient's mother, discharge plan is inpatient ETOH rehab. Need to confirm discharge plan with patient when appropriate. SW/CM provided contact information for patient or family to call with any questions. SW/CM will follow and assist as needed. ROSEMARIE Haas, [facility], Social Work  [phone]  Electronically signed by ROSEMARIE Haas LSW on 5/26/2021 at 1:10 PM    [facility] ([phone]). SW/CM to complete JOESPH with patient when appropriate.    ROSEMARIE Haas LSW, Social Work/Case Management   [phone]  Electronically signed by ROSEMARIE Haas LSW on 5/26/2021 at 3:40 PM

## 2021-05-27 LAB
ALBUMIN SERPL-MCNC: 3.2 G/DL (ref 3.4–5)
ANION GAP SERPL CALCULATED.3IONS-SCNC: 11 MMOL/L (ref 3–16)
BASOPHILS ABSOLUTE: 0 K/UL (ref 0–0.2)
BASOPHILS RELATIVE PERCENT: 0.3 %
BUN BLDV-MCNC: 3 MG/DL (ref 7–20)
CALCIUM SERPL-MCNC: 7.8 MG/DL (ref 8.3–10.6)
CHLORIDE BLD-SCNC: 105 MMOL/L (ref 99–110)
CO2: 21 MMOL/L (ref 21–32)
CREAT SERPL-MCNC: <0.5 MG/DL (ref 0.6–1.1)
EOSINOPHILS ABSOLUTE: 0.3 K/UL (ref 0–0.6)
EOSINOPHILS RELATIVE PERCENT: 3.1 %
GFR AFRICAN AMERICAN: >60
GFR NON-AFRICAN AMERICAN: >60
GLUCOSE BLD-MCNC: 112 MG/DL (ref 70–99)
GLUCOSE BLD-MCNC: 117 MG/DL (ref 70–99)
HCT VFR BLD CALC: 34.2 % (ref 36–48)
HEMOGLOBIN: 11.7 G/DL (ref 12–16)
LYMPHOCYTES ABSOLUTE: 2.3 K/UL (ref 1–5.1)
LYMPHOCYTES RELATIVE PERCENT: 22.7 %
MAGNESIUM: 1.8 MG/DL (ref 1.8–2.4)
MCH RBC QN AUTO: 29.9 PG (ref 26–34)
MCHC RBC AUTO-ENTMCNC: 34.3 G/DL (ref 31–36)
MCV RBC AUTO: 87.2 FL (ref 80–100)
MONOCYTES ABSOLUTE: 0.4 K/UL (ref 0–1.3)
MONOCYTES RELATIVE PERCENT: 4 %
NEUTROPHILS ABSOLUTE: 7.1 K/UL (ref 1.7–7.7)
NEUTROPHILS RELATIVE PERCENT: 69.9 %
PDW BLD-RTO: 16.1 % (ref 12.4–15.4)
PERFORMED ON: ABNORMAL
PHOSPHORUS: 2.6 MG/DL (ref 2.5–4.9)
PHOSPHORUS: 2.6 MG/DL (ref 2.5–4.9)
PLATELET # BLD: 189 K/UL (ref 135–450)
PMV BLD AUTO: 8.1 FL (ref 5–10.5)
POTASSIUM SERPL-SCNC: 3.6 MMOL/L (ref 3.5–5.1)
RBC # BLD: 3.92 M/UL (ref 4–5.2)
SODIUM BLD-SCNC: 137 MMOL/L (ref 136–145)
WBC # BLD: 10.2 K/UL (ref 4–11)

## 2021-05-27 PROCEDURE — 6370000000 HC RX 637 (ALT 250 FOR IP): Performed by: INTERNAL MEDICINE

## 2021-05-27 PROCEDURE — 2580000003 HC RX 258: Performed by: INTERNAL MEDICINE

## 2021-05-27 PROCEDURE — 6370000000 HC RX 637 (ALT 250 FOR IP): Performed by: NURSE PRACTITIONER

## 2021-05-27 PROCEDURE — 2500000003 HC RX 250 WO HCPCS: Performed by: NURSE PRACTITIONER

## 2021-05-27 PROCEDURE — 85025 COMPLETE CBC W/AUTO DIFF WBC: CPT

## 2021-05-27 PROCEDURE — 94640 AIRWAY INHALATION TREATMENT: CPT

## 2021-05-27 PROCEDURE — 84100 ASSAY OF PHOSPHORUS: CPT

## 2021-05-27 PROCEDURE — APPNB15 APP NON BILLABLE TIME 0-15 MINS: Performed by: NURSE PRACTITIONER

## 2021-05-27 PROCEDURE — 36415 COLL VENOUS BLD VENIPUNCTURE: CPT

## 2021-05-27 PROCEDURE — 80069 RENAL FUNCTION PANEL: CPT

## 2021-05-27 PROCEDURE — 6360000002 HC RX W HCPCS: Performed by: INTERNAL MEDICINE

## 2021-05-27 PROCEDURE — 6360000002 HC RX W HCPCS: Performed by: NURSE PRACTITIONER

## 2021-05-27 PROCEDURE — 2580000003 HC RX 258: Performed by: NURSE PRACTITIONER

## 2021-05-27 PROCEDURE — 2700000000 HC OXYGEN THERAPY PER DAY

## 2021-05-27 PROCEDURE — 99291 CRITICAL CARE FIRST HOUR: CPT | Performed by: INTERNAL MEDICINE

## 2021-05-27 PROCEDURE — 6360000002 HC RX W HCPCS: Performed by: STUDENT IN AN ORGANIZED HEALTH CARE EDUCATION/TRAINING PROGRAM

## 2021-05-27 PROCEDURE — 2000000000 HC ICU R&B

## 2021-05-27 PROCEDURE — 83735 ASSAY OF MAGNESIUM: CPT

## 2021-05-27 PROCEDURE — 94761 N-INVAS EAR/PLS OXIMETRY MLT: CPT

## 2021-05-27 RX ORDER — POTASSIUM CHLORIDE 29.8 MG/ML
20 INJECTION INTRAVENOUS ONCE
Status: COMPLETED | OUTPATIENT
Start: 2021-05-27 | End: 2021-05-27

## 2021-05-27 RX ORDER — MAGNESIUM SULFATE IN WATER 40 MG/ML
2000 INJECTION, SOLUTION INTRAVENOUS ONCE
Status: COMPLETED | OUTPATIENT
Start: 2021-05-27 | End: 2021-05-27

## 2021-05-27 RX ORDER — LORAZEPAM 2 MG/ML
2 INJECTION INTRAMUSCULAR
Status: DISCONTINUED | OUTPATIENT
Start: 2021-05-27 | End: 2021-06-04 | Stop reason: HOSPADM

## 2021-05-27 RX ORDER — LORAZEPAM 1 MG/1
4 TABLET ORAL
Status: DISCONTINUED | OUTPATIENT
Start: 2021-05-27 | End: 2021-06-04 | Stop reason: HOSPADM

## 2021-05-27 RX ORDER — LORAZEPAM 1 MG/1
2 TABLET ORAL
Status: DISCONTINUED | OUTPATIENT
Start: 2021-05-27 | End: 2021-06-04 | Stop reason: HOSPADM

## 2021-05-27 RX ORDER — LORAZEPAM 1 MG/1
1 TABLET ORAL
Status: DISCONTINUED | OUTPATIENT
Start: 2021-05-27 | End: 2021-06-04 | Stop reason: HOSPADM

## 2021-05-27 RX ORDER — CALCIUM GLUCONATE 20 MG/ML
1000 INJECTION, SOLUTION INTRAVENOUS ONCE
Status: COMPLETED | OUTPATIENT
Start: 2021-05-27 | End: 2021-05-27

## 2021-05-27 RX ORDER — LORAZEPAM 2 MG/ML
1 INJECTION INTRAMUSCULAR
Status: DISCONTINUED | OUTPATIENT
Start: 2021-05-27 | End: 2021-06-04 | Stop reason: HOSPADM

## 2021-05-27 RX ORDER — LORAZEPAM 2 MG/ML
3 INJECTION INTRAMUSCULAR
Status: DISCONTINUED | OUTPATIENT
Start: 2021-05-27 | End: 2021-06-04 | Stop reason: HOSPADM

## 2021-05-27 RX ORDER — LORAZEPAM 1 MG/1
3 TABLET ORAL
Status: DISCONTINUED | OUTPATIENT
Start: 2021-05-27 | End: 2021-06-04 | Stop reason: HOSPADM

## 2021-05-27 RX ORDER — LORAZEPAM 2 MG/ML
4 INJECTION INTRAMUSCULAR
Status: DISCONTINUED | OUTPATIENT
Start: 2021-05-27 | End: 2021-06-04 | Stop reason: HOSPADM

## 2021-05-27 RX ADMIN — LORAZEPAM 4 MG: 2 INJECTION INTRAMUSCULAR; INTRAVENOUS at 09:27

## 2021-05-27 RX ADMIN — Medication 10 ML: at 21:15

## 2021-05-27 RX ADMIN — LORAZEPAM 2 MG: 2 INJECTION INTRAMUSCULAR; INTRAVENOUS at 15:48

## 2021-05-27 RX ADMIN — DEXMEDETOMIDINE 1 MCG/KG/HR: 100 INJECTION, SOLUTION, CONCENTRATE INTRAVENOUS at 22:20

## 2021-05-27 RX ADMIN — DEXMEDETOMIDINE 0.8 MCG/KG/HR: 100 INJECTION, SOLUTION, CONCENTRATE INTRAVENOUS at 12:33

## 2021-05-27 RX ADMIN — IPRATROPIUM BROMIDE AND ALBUTEROL SULFATE 1 AMPULE: .5; 3 SOLUTION RESPIRATORY (INHALATION) at 00:43

## 2021-05-27 RX ADMIN — DEXMEDETOMIDINE 0.9 MCG/KG/HR: 100 INJECTION, SOLUTION, CONCENTRATE INTRAVENOUS at 17:41

## 2021-05-27 RX ADMIN — LORAZEPAM 2 MG: 2 INJECTION INTRAMUSCULAR; INTRAVENOUS at 17:20

## 2021-05-27 RX ADMIN — ENOXAPARIN SODIUM 40 MG: 40 INJECTION SUBCUTANEOUS at 08:58

## 2021-05-27 RX ADMIN — DEXMEDETOMIDINE 1 MCG/KG/HR: 100 INJECTION, SOLUTION, CONCENTRATE INTRAVENOUS at 02:41

## 2021-05-27 RX ADMIN — PHENOBARBITAL 64.8 MG: 32.4 TABLET ORAL at 08:57

## 2021-05-27 RX ADMIN — LORAZEPAM 2 MG: 2 INJECTION INTRAMUSCULAR; INTRAVENOUS at 14:42

## 2021-05-27 RX ADMIN — IPRATROPIUM BROMIDE AND ALBUTEROL SULFATE 1 AMPULE: .5; 3 SOLUTION RESPIRATORY (INHALATION) at 16:31

## 2021-05-27 RX ADMIN — MAGNESIUM SULFATE HEPTAHYDRATE 2000 MG: 40 INJECTION, SOLUTION INTRAVENOUS at 09:32

## 2021-05-27 RX ADMIN — LORAZEPAM 2 MG: 2 INJECTION INTRAMUSCULAR; INTRAVENOUS at 14:21

## 2021-05-27 RX ADMIN — PHENOBARBITAL 64.8 MG: 32.4 TABLET ORAL at 21:13

## 2021-05-27 RX ADMIN — LORAZEPAM 2 MG: 2 INJECTION INTRAMUSCULAR; INTRAVENOUS at 04:33

## 2021-05-27 RX ADMIN — IPRATROPIUM BROMIDE AND ALBUTEROL SULFATE 1 AMPULE: .5; 3 SOLUTION RESPIRATORY (INHALATION) at 03:55

## 2021-05-27 RX ADMIN — PANTOPRAZOLE SODIUM 40 MG: 40 TABLET, DELAYED RELEASE ORAL at 05:42

## 2021-05-27 RX ADMIN — IPRATROPIUM BROMIDE AND ALBUTEROL SULFATE 1 AMPULE: .5; 3 SOLUTION RESPIRATORY (INHALATION) at 19:51

## 2021-05-27 RX ADMIN — IPRATROPIUM BROMIDE AND ALBUTEROL SULFATE 1 AMPULE: .5; 3 SOLUTION RESPIRATORY (INHALATION) at 08:07

## 2021-05-27 RX ADMIN — LORAZEPAM 4 MG: 2 INJECTION INTRAMUSCULAR; INTRAVENOUS at 18:18

## 2021-05-27 RX ADMIN — POTASSIUM CHLORIDE 20 MEQ: 29.8 INJECTION, SOLUTION INTRAVENOUS at 08:57

## 2021-05-27 RX ADMIN — IPRATROPIUM BROMIDE AND ALBUTEROL SULFATE 1 AMPULE: .5; 3 SOLUTION RESPIRATORY (INHALATION) at 11:54

## 2021-05-27 RX ADMIN — CALCIUM GLUCONATE 1000 MG: 20 INJECTION, SOLUTION INTRAVENOUS at 08:57

## 2021-05-27 RX ADMIN — Medication 10 ML: at 08:58

## 2021-05-27 RX ADMIN — LORAZEPAM 2 MG: 2 INJECTION INTRAMUSCULAR; INTRAVENOUS at 05:52

## 2021-05-27 RX ADMIN — DEXMEDETOMIDINE 0.8 MCG/KG/HR: 100 INJECTION, SOLUTION, CONCENTRATE INTRAVENOUS at 09:35

## 2021-05-27 ASSESSMENT — PAIN SCALES - GENERAL
PAINLEVEL_OUTOF10: 0

## 2021-05-27 NOTE — PROGRESS NOTES
0800: Pt resting in bed w/eyes closed, No facial grimace. Respirations easy/even on 4L O2. O2 via nasal cannula intact. Bilateral wrist restraints intact to prevent removal of equipment. Pt awakens to to touch and name spoken loudly, Pt states,\"This isnt my house, I wish my house was this clean. \" Nurse re-orients patient. Pt drowsy. Precedex drip rate decreased to 0.7mcg/kg/hr. Call light within reach, bed alarm intact. 1360: CIWA 27, 4mg ativan IV given. 1027: PT calm, resting w/eyes closed. CIWA 6 at this time. 1315: Pt resting w/eyes closed, no facial grimace. Pt verbally responds to name and opens eyes, but returns to resting easily when not stimulated. Will continue to monitor. 1425: Pt CIWA 25,  4mg IV ativan given at this time. Pt states,\"you need to untie me to let me out of bed to pee. \" Nurse advises patient there is a merritt catheter in place to assist with urination as patient is unsafe to be out of bed due to medications that are being administered to assist patient through withdrawal from alcohol. Pt states,\"Well, arent you just a little bitch, you are holding me here and my mom doesn't even know I am here. \" Nurse advises patient that her mother has been here to see her today and she(the patient) was resting and did speak with her mother. Pt states,\"You are lying, call her. \" Nurse calls pt mother, unrestrains patient left arm, and gives patient the phone. Pt mother does not answer, pt begins yelling into phone for mother to answer and when no answer, she throws the phone across the room. Nurse restrains patient left arm, repositions pt in bed, and advises pt that was unnecessary. Pt states,\"are you even a medical nurse? ? You dont seem like one. \" Nurse confirms pt safety in bed, bed alarm intact, nurse exits pt room. 1737: Pt talking w/mother on the phone. Crying/confused/yelling. Pt states,\"They are even really nurses, see how much you know. Take me from here, this is bullshit. \" Pt mother attempts to reason with patient, pt crying telling mother she will never forgive her. \"  Precedex rate increased to 1mcg/kg/hr. Pt positioned for comfort. Bilateral wrist restraints intact. 1818: CIWA 30, 4mg IV ativan given at this time.     Electronically signed by Stephanie Villeda RN on 5/27/2021 at 6:39 PM

## 2021-05-27 NOTE — PROGRESS NOTES
@2890: Patient got out of her restraints. Removed her ECG leads, BP cuff, Spo2 probe. This RN and [employer] in the room. Patient stated that all Africans are rude and she doesn't like my accent. She stated that she want another nurse rather than a black nurse. Patient back on the monitor and restraints. Bed pad changed. Will continue to monitor.

## 2021-05-27 NOTE — PLAN OF CARE
Problem: SAFETY  Goal: Free from accidental physical injury  Outcome: Ongoing  Goal: Free from intentional harm  Outcome: Ongoing     Problem: DAILY CARE  Goal: Daily care needs are met  Outcome: Ongoing     Problem: PAIN  Goal: Patient's pain/discomfort is manageable  Outcome: Ongoing     Problem: SKIN INTEGRITY  Goal: Skin integrity is maintained or improved  Outcome: Ongoing     Problem: KNOWLEDGE DEFICIT  Goal: Patient/S.O. demonstrates understanding of disease process, treatment plan, medications, and discharge instructions.   Outcome: Ongoing     Problem: DISCHARGE BARRIERS  Goal: Patient's continuum of care needs are met  Outcome: Ongoing     Problem: Falls - Risk of:  Goal: Will remain free from falls  Description: Will remain free from falls  Outcome: Ongoing  Goal: Absence of physical injury  Description: Absence of physical injury  Outcome: Ongoing     Problem: Skin Integrity:  Goal: Will show no infection signs and symptoms  Description: Will show no infection signs and symptoms  Outcome: Ongoing  Goal: Absence of new skin breakdown  Description: Absence of new skin breakdown  Outcome: Ongoing     Problem: Non-Violent Restraints  Goal: Removal from restraints as soon as assessed to be safe  Outcome: Ongoing  Goal: No harm/injury to patient while restraints in use  Outcome: Ongoing  Goal: Patient's dignity will be maintained  Outcome: Ongoing

## 2021-05-27 NOTE — PROGRESS NOTES
Hospitalist Progress Note      PCP: Lamberto Bhagat DO    Date of Admission: 5/24/2021    Chief Complaint:   Chief Complaint   Patient presents with    Nausea    Emesis     Hospital Course: 13-year-old past medical history of alcohol abuse, anxiety, depression who presented with nausea, vomiting hallucinations and possible seizure. Reported drinking significant amount of alcohol and waking up on the floor, concerned that she may have had a seizure. Subjective:  Agitated overnight, hallucinating. No significant clinical change. Medications:  Reviewed    Infusion Medications    sodium chloride      dexmedetomidine 0.8 mcg/kg/hr (05/27/21 0723)    sodium chloride       Scheduled Medications    sodium chloride flush  5-40 mL Intravenous 2 times per day    PHENobarbital  64.8 mg Oral TID    Followed by   Nany Jett ON 5/28/2021] PHENobarbital  32.4 mg Oral TID    ipratropium-albuterol  1 ampule Inhalation 6 times per day    pantoprazole  40 mg Oral QAM AC    enoxaparin  40 mg Subcutaneous Daily     PRN Meds: sodium chloride flush, sodium chloride, potassium chloride, LORazepam **OR** LORazepam **OR** LORazepam **OR** LORazepam, magnesium sulfate, sodium phosphate IVPB **OR** sodium phosphate IVPB, sodium chloride, ondansetron      Intake/Output Summary (Last 24 hours) at 5/27/2021 0746  Last data filed at 5/27/2021 0600  Gross per 24 hour   Intake 2616.24 ml   Output 1635 ml   Net 981.24 ml       Physical Exam Performed:    BP (!) 139/101   Pulse 57   Temp 99.3 °F (37.4 °C) (Oral)   Resp 24   Ht 5' 4\" (1.626 m)   Wt 202 lb 13.2 oz (92 kg)   LMP 02/19/2021   SpO2 (!) 89%   BMI 34.81 kg/m²     General appearance: No apparent distress, appears stated age and cooperative. HEENT: Pupils equal, round, and reactive to light. Conjunctivae/corneas clear. Neck: Supple, with full range of motion. Trachea midline. Respiratory:  Normal respiratory effort.  Clear to auscultation, bilaterally without Rales/Wheezes/Rhonchi. Cardiovascular: Regular rate and rhythm with normal S1/S2 without murmurs, rubs or gallops. Abdomen: Soft, non-tender, non-distended with normal bowel sounds. Musculoskeletal: No clubbing, cyanosis or edema bilaterally. Full range of motion without deformity. Skin: Skin color, texture, turgor normal.  No rashes or lesions. Neurologic:  Neurovascularly intact without any focal sensory/motor deficits. Cranial nerves: II-XII intact, grossly non-focal.  Psychiatric: Drowsy, oriented, thought content appropriate, normal insight  Capillary Refill: Brisk,< 3 seconds   Peripheral Pulses: +2 palpable, equal bilaterally       Labs:   Recent Labs     05/25/21  1010 05/26/21  0424 05/27/21  0320   WBC 9.8 6.5 10.2   HGB 12.2 12.0 11.7*   HCT 35.1* 34.9* 34.2*    192 189     Recent Labs     05/26/21  0424 05/26/21  2100 05/27/21  0320    138 137   K 4.1 3.5 3.6    106 105   CO2 22 21 21   BUN <2* 3* 3*   CREATININE <0.5* <0.5* <0.5*   CALCIUM 7.7* 7.8* 7.8*   PHOS 1.8* 2.4* 2.6  2.6     No results for input(s): AST, ALT, BILIDIR, BILITOT, ALKPHOS in the last 72 hours. No results for input(s): INR in the last 72 hours. Recent Labs     05/25/21  1010 05/25/21  1720   CKTOTAL 792* 744*       Urinalysis:      Lab Results   Component Value Date    NITRU Negative 05/25/2021    WBCUA 2 05/25/2021    BACTERIA 2+ 02/15/2020    RBCUA 1 05/25/2021    BLOODU Negative 05/25/2021    SPECGRAV 1.006 05/25/2021    GLUCOSEU Negative 05/25/2021       Radiology:  XR CHEST PORTABLE   Final Result   Negative portable chest.         CT HEAD WO CONTRAST   Final Result   No acute intracranial abnormality.                Assessment/Plan:    Active Hospital Problems    Diagnosis     Dehydration [E86.0]     Alcohol related seizure ([facility] [address] [R56.9]     Alcohol withdrawal delirium ([facility] [F10.231]     Leukocytosis [D72.829]     High anion gap metabolic acidosis [C34.4]     Alcohol withdrawal ([facility] [F10.239] Alcohol abuse, withdrawal  Reports heavy history of alcohol use with hallucinations and possible seizure at home. CIWA on the floor increasing, moved to ICU for precedex, started on phenobarb  - rally pack  -CIWA protocol  -As needed Ativan  -Seizure precautions    Rhabdo  Nontraumatic, possibly from being down at home versus possible seizure. CK 1000 on admission  -Trend CK-- downtrending  -IV fluids    Nausea, vomiting  Suspect alcoholic gastritis. -Start PPI  -As needed Zofran    Leukocytosis, resolved  White count 21 on admission. Possibly reactive-- sinilar I the past when admitted for withdrawal. Negative CXR  - UA pending  - trend CBC--resolved    DVT Prophylaxis: Lovenox  Diet: DIET GENERAL;  Code Status: Full Code    PT/OT Eval Status: deferred    Dispo - pending    Abdias Garza MD    This note was transcribed using [id number] [employer]. Please disregard any translational errors.

## 2021-05-27 NOTE — PROGRESS NOTES
Pulmonary Progress Note    Date of Admission: 5/24/2021   LOS: 3 days     CC:  Chief Complaint   Patient presents with    Nausea    Emesis           Assessment/Plan     Delirium tremens  -Possible seizure like activity   -Okay to continue Precedex but also need seizure prophylaxis,  -Start phenobarb protocol  -on CIWA protocol, inc ativan 1-4mgPRN due to uncontrolled agitation     Alcohol abuse     Acute hypoxemic respiratory failure with SPO2 less than 9% room air  -underlying DIPAK is likely  -Can try CPAP the patient may not be able to tolerate due to agitation  -Wean supplemental oxygen goal SPO2 90%     Due to the immediate potential for life-threatening deterioration due to delirium tremens, I spent 34 minutes providing critical care. This time is excluding time spent performing separately billable procedures. HPI/Subjective  No event so/n. Receiving intermittent ciwa ativan. Restless and confused today    ROS:   No nausea  No Vomiting  No chest pain      Intake/Output Summary (Last 24 hours) at 5/27/2021 1048  Last data filed at 5/27/2021 0755  Gross per 24 hour   Intake 2616.24 ml   Output 1545 ml   Net 1071.24 ml         PHYSICAL EXAM:   Blood pressure (!) 156/95, pulse 81, temperature 97.7 °F (36.5 °C), temperature source Axillary, resp. rate (!) 36, height 5' 4\" (1.626 m), weight 202 lb 13.2 oz (92 kg), last menstrual period 02/19/2021, SpO2 92 %, not currently breastfeeding.'  Gen:  No acute distress. Eyes: PERRL. Anicteric sclera. No conjunctival injection. ENT: No discharge. Posterior oropharynx clear. External appearance of ears and nose normal.  Neck: Trachea midline. No mass   Resp:  No crackles. No wheezes. No rhonchi. No dullness on percussion. CV: Regular rate. Regular rhythm. No murmur or rub. No edema. GI: Soft, Non-tender. Non-distended. +BS  Skin: Warm, dry, w/o erythema. Lymph: No cervical or supraclavicular LAD. M/S: No cyanosis. No clubbing.     Neuro:  no focal neurologic deficit. Moves all extremities  Psych: Awake but disoriented. Judgement and insightpoor  Mood stable. Medications:    Scheduled Meds:   magnesium sulfate  2,000 mg Intravenous Once    sodium chloride flush  5-40 mL Intravenous 2 times per day    PHENobarbital  64.8 mg Oral TID    Followed by   Ean Vee ON 5/28/2021] PHENobarbital  32.4 mg Oral TID    ipratropium-albuterol  1 ampule Inhalation 6 times per day    pantoprazole  40 mg Oral QAM AC    enoxaparin  40 mg Subcutaneous Daily       Continuous Infusions:   sodium chloride      dexmedetomidine 0.7 mcg/kg/hr (05/27/21 0758)    sodium chloride         PRN Meds:  LORazepam **OR** LORazepam **OR** LORazepam **OR** LORazepam **OR** LORazepam **OR** LORazepam **OR** LORazepam **OR** LORazepam, sodium chloride flush, sodium chloride, potassium chloride, magnesium sulfate, sodium phosphate IVPB **OR** sodium phosphate IVPB, sodium chloride, ondansetron    Labs reviewed:  CBC:   Recent Labs     05/25/21  1010 05/26/21  0424 05/27/21  0320   WBC 9.8 6.5 10.2   HGB 12.2 12.0 11.7*   HCT 35.1* 34.9* 34.2*   MCV 85.7 86.8 87.2    192 189     BMP:   Recent Labs     05/26/21  0424 05/26/21  2100 05/27/21  0320    138 137   K 4.1 3.5 3.6    106 105   CO2 22 21 21   PHOS 1.8* 2.4* 2.6  2.6   BUN <2* 3* 3*   CREATININE <0.5* <0.5* <0.5*     LIVER PROFILE: No results for input(s): AST, ALT, LIPASE, BILIDIR, BILITOT, ALKPHOS in the last 72 hours. Invalid input(s): AMYLASE,  ALB  PT/INR: No results for input(s): PROTIME, INR in the last 72 hours. APTT: No results for input(s): APTT in the last 72 hours. UA:  Recent Labs     05/25/21  1524   COLORU YELLOW   PHUR 6.5   WBCUA 2   RBCUA 1   CLARITYU CLOUDY*   SPECGRAV 1.006   LEUKOCYTESUR Negative   UROBILINOGEN 1.0   BILIRUBINUR Negative   BLOODU Negative   GLUCOSEU Negative     No results for input(s): PH, PCO2, PO2 in the last 72 hours.       Films:  Radiology Review:  Pertinent images / reports were reviewed as a part of this visit. CT HEAD WO CONTRAST  Narrative: EXAMINATION:  CT OF THE HEAD WITHOUT CONTRAST  5/24/2021 6:02 am    TECHNIQUE:  CT of the head was performed without the administration of intravenous  contrast. Dose modulation, iterative reconstruction, and/or weight based  adjustment of the mA/kV was utilized to reduce the radiation dose to as low  as reasonably achievable. COMPARISON:  None. HISTORY:  ORDERING SYSTEM PROVIDED HISTORY: Seizure  TECHNOLOGIST PROVIDED HISTORY:  Has a \"code stroke\" or \"stroke alert\" been called? ->No  Reason for exam:->Seizure  Decision Support Exception - unselect if not a suspected or confirmed  emergency medical condition->Emergency Medical Condition (MA)  Is the patient pregnant?->No  Reason for Exam: seizure n/v    FINDINGS:  BRAIN/VENTRICLES: There is no acute intracranial hemorrhage, mass effect or  midline shift. No abnormal extra-axial fluid collection. The gray-white  differentiation is maintained without evidence of an acute infarct. There is  no evidence of hydrocephalus. ORBITS: The visualized portion of the orbits demonstrate no acute abnormality. SINUSES: The visualized paranasal sinuses and mastoid air cells demonstrate  no acute abnormality. SOFT TISSUES/SKULL:  No acute abnormality of the visualized skull or soft  tissues. Nodules in the scalp likely represent sebaceous cysts. Impression: No acute intracranial abnormality. XR CHEST PORTABLE  Narrative: EXAMINATION:  ONE XRAY VIEW OF THE CHEST    5/24/2021 6:02 am    COMPARISON:  02/11/2020    HISTORY:  ORDERING SYSTEM PROVIDED HISTORY: SOB  TECHNOLOGIST PROVIDED HISTORY:  Reason for exam:->SOB  Reason for Exam: sob    FINDINGS:  The lungs are clear. The costophrenic angles are sharp. The  cardiomediastinal silhouette is within normal limits. There is no  discernible pneumothorax.   Impression: Negative portable chest.          Access  Arterial       PICC PICC Double Lumen 50/91/14 Right Basilic (Active)   Continued need for line? Yes 05/27/21 0752   Is this a power PICC? Yes 05/27/21 0752   Site Assessment Clean;Dry; Intact 05/27/21 0752   White Lumen Status Capped; Infusing 05/27/21 0752   Pink Lumen Status Capped;Normal saline locked 05/27/21 0752   Exposed Catheter (cm) 2 cm 05/26/21 0859   Extremity Circumference (cm) 32 cm 05/26/21 0859   Dressing Status Old drainage 05/27/21 0752   Dressing Type Transparent; Anti-microbial patch; Securing device 05/27/21 0752   Dressing Change Due 06/02/21 05/27/21 0752   Dressing Intervention New 05/26/21 0859   Reason Not Rotated Not due 05/27/21 6156   Number of days: 1        CVC             Thank you for this consult,    Dennie Maha, MD  [facility]

## 2021-05-27 NOTE — PLAN OF CARE
Problem: SAFETY  Goal: Free from accidental physical injury  5/27/2021 0806 by Mahamed Walker RN  Outcome: Ongoing  5/26/2021 2334 by Hillary Elias RN  Outcome: Ongoing  Goal: Free from intentional harm  5/27/2021 0806 by Mahamed Walker RN  Outcome: Ongoing  5/26/2021 2334 by Hillary Elias RN  Outcome: Ongoing     Problem: DAILY CARE  Goal: Daily care needs are met  5/27/2021 0806 by Mahamed Walker RN  Outcome: Ongoing  5/26/2021 2334 by Hillary Elias RN  Outcome: Ongoing     Problem: PAIN  Goal: Patient's pain/discomfort is manageable  5/27/2021 0806 by Mahamed Walker RN  Outcome: Ongoing  5/26/2021 2334 by Hillary Elias RN  Outcome: Ongoing     Problem: SKIN INTEGRITY  Goal: Skin integrity is maintained or improved  5/27/2021 0806 by Mahamed Walker RN  Outcome: Ongoing  5/26/2021 2334 by Hillary Elias RN  Outcome: Ongoing     Problem: KNOWLEDGE DEFICIT  Goal: Patient/S.O. demonstrates understanding of disease process, treatment plan, medications, and discharge instructions.   5/27/2021 0806 by Mahamed Walker RN  Outcome: Ongoing  5/26/2021 2334 by Hillary Elias RN  Outcome: Ongoing     Problem: DISCHARGE BARRIERS  Goal: Patient's continuum of care needs are met  5/27/2021 0806 by Mahamed Walker RN  Outcome: Ongoing  5/26/2021 2334 by Hillary Elias RN  Outcome: Ongoing     Problem: Falls - Risk of:  Goal: Will remain free from falls  Description: Will remain free from falls  5/27/2021 0806 by Mahamed Walker RN  Outcome: Ongoing  5/26/2021 2334 by Hillary Elias RN  Outcome: Ongoing  Goal: Absence of physical injury  Description: Absence of physical injury  5/27/2021 0806 by Mahamed Walker RN  Outcome: Ongoing  5/26/2021 2334 by Hillary Elias RN  Outcome: Ongoing     Problem: Skin Integrity:  Goal: Will show no infection signs and symptoms  Description: Will show no infection signs and symptoms  5/27/2021 0806 by Mahamed Walker RN  Outcome: Ongoing  5/26/2021 2334 by Hillary Webb, RN  Outcome: Ongoing  Goal: Absence of new skin breakdown  Description: Absence of new skin breakdown  5/27/2021 0806 by Christelle Chiang RN  Outcome: Ongoing  5/26/2021 2334 by Lc Delgado RN  Outcome: Ongoing     Problem: Non-Violent Restraints  Goal: Removal from restraints as soon as assessed to be safe  5/27/2021 0806 by Christelle Chiang RN  Outcome: Ongoing  5/26/2021 2334 by Lc Delgado RN  Outcome: Ongoing  Goal: No harm/injury to patient while restraints in use  5/27/2021 0806 by Christelle Chiang RN  Outcome: Ongoing  5/26/2021 2334 by Lc Delgado RN  Outcome: Ongoing  Goal: Patient's dignity will be maintained  5/27/2021 0806 by Christelle Chiang RN  Outcome: Ongoing  5/26/2021 2334 by Lc Delgado RN  Outcome: Ongoing

## 2021-05-28 LAB
ALBUMIN SERPL-MCNC: 3.4 G/DL (ref 3.4–5)
ANION GAP SERPL CALCULATED.3IONS-SCNC: 10 MMOL/L (ref 3–16)
BASOPHILS ABSOLUTE: 0.1 K/UL (ref 0–0.2)
BASOPHILS RELATIVE PERCENT: 0.5 %
BUN BLDV-MCNC: 3 MG/DL (ref 7–20)
CALCIUM SERPL-MCNC: 8.4 MG/DL (ref 8.3–10.6)
CHLORIDE BLD-SCNC: 103 MMOL/L (ref 99–110)
CO2: 24 MMOL/L (ref 21–32)
CREAT SERPL-MCNC: <0.5 MG/DL (ref 0.6–1.1)
EOSINOPHILS ABSOLUTE: 0.3 K/UL (ref 0–0.6)
EOSINOPHILS RELATIVE PERCENT: 2.4 %
GFR AFRICAN AMERICAN: >60
GFR NON-AFRICAN AMERICAN: >60
GLUCOSE BLD-MCNC: 109 MG/DL (ref 70–99)
HCT VFR BLD CALC: 34 % (ref 36–48)
HEMOGLOBIN: 12 G/DL (ref 12–16)
LYMPHOCYTES ABSOLUTE: 2.4 K/UL (ref 1–5.1)
LYMPHOCYTES RELATIVE PERCENT: 21.1 %
MAGNESIUM: 1.9 MG/DL (ref 1.8–2.4)
MCH RBC QN AUTO: 30.2 PG (ref 26–34)
MCHC RBC AUTO-ENTMCNC: 35.2 G/DL (ref 31–36)
MCV RBC AUTO: 85.8 FL (ref 80–100)
MONOCYTES ABSOLUTE: 0.7 K/UL (ref 0–1.3)
MONOCYTES RELATIVE PERCENT: 5.8 %
NEUTROPHILS ABSOLUTE: 7.9 K/UL (ref 1.7–7.7)
NEUTROPHILS RELATIVE PERCENT: 70.2 %
PDW BLD-RTO: 15.9 % (ref 12.4–15.4)
PHOSPHORUS: 3.6 MG/DL (ref 2.5–4.9)
PLATELET # BLD: 197 K/UL (ref 135–450)
PMV BLD AUTO: 7.8 FL (ref 5–10.5)
POTASSIUM SERPL-SCNC: 3.3 MMOL/L (ref 3.5–5.1)
POTASSIUM SERPL-SCNC: 3.8 MMOL/L (ref 3.5–5.1)
RBC # BLD: 3.97 M/UL (ref 4–5.2)
SODIUM BLD-SCNC: 137 MMOL/L (ref 136–145)
WBC # BLD: 11.3 K/UL (ref 4–11)

## 2021-05-28 PROCEDURE — 2700000000 HC OXYGEN THERAPY PER DAY

## 2021-05-28 PROCEDURE — 6370000000 HC RX 637 (ALT 250 FOR IP): Performed by: INTERNAL MEDICINE

## 2021-05-28 PROCEDURE — 2580000003 HC RX 258: Performed by: NURSE PRACTITIONER

## 2021-05-28 PROCEDURE — 2000000000 HC ICU R&B

## 2021-05-28 PROCEDURE — 6370000000 HC RX 637 (ALT 250 FOR IP): Performed by: NURSE PRACTITIONER

## 2021-05-28 PROCEDURE — 85025 COMPLETE CBC W/AUTO DIFF WBC: CPT

## 2021-05-28 PROCEDURE — 6360000002 HC RX W HCPCS: Performed by: NURSE PRACTITIONER

## 2021-05-28 PROCEDURE — 2580000003 HC RX 258: Performed by: INTERNAL MEDICINE

## 2021-05-28 PROCEDURE — 80069 RENAL FUNCTION PANEL: CPT

## 2021-05-28 PROCEDURE — 6360000002 HC RX W HCPCS: Performed by: STUDENT IN AN ORGANIZED HEALTH CARE EDUCATION/TRAINING PROGRAM

## 2021-05-28 PROCEDURE — 6360000002 HC RX W HCPCS: Performed by: INTERNAL MEDICINE

## 2021-05-28 PROCEDURE — 94761 N-INVAS EAR/PLS OXIMETRY MLT: CPT

## 2021-05-28 PROCEDURE — 2500000003 HC RX 250 WO HCPCS: Performed by: NURSE PRACTITIONER

## 2021-05-28 PROCEDURE — 84132 ASSAY OF SERUM POTASSIUM: CPT

## 2021-05-28 PROCEDURE — 99291 CRITICAL CARE FIRST HOUR: CPT | Performed by: INTERNAL MEDICINE

## 2021-05-28 PROCEDURE — 94640 AIRWAY INHALATION TREATMENT: CPT

## 2021-05-28 PROCEDURE — 83735 ASSAY OF MAGNESIUM: CPT

## 2021-05-28 RX ORDER — PHENOBARBITAL SODIUM 65 MG/ML
32.5 INJECTION INTRAMUSCULAR 3 TIMES DAILY
Status: DISCONTINUED | OUTPATIENT
Start: 2021-05-28 | End: 2021-05-29 | Stop reason: ALTCHOICE

## 2021-05-28 RX ADMIN — LORAZEPAM 4 MG: 2 INJECTION INTRAMUSCULAR; INTRAVENOUS at 11:55

## 2021-05-28 RX ADMIN — LORAZEPAM 4 MG: 2 INJECTION INTRAMUSCULAR; INTRAVENOUS at 08:36

## 2021-05-28 RX ADMIN — IPRATROPIUM BROMIDE AND ALBUTEROL SULFATE 1 AMPULE: .5; 3 SOLUTION RESPIRATORY (INHALATION) at 16:12

## 2021-05-28 RX ADMIN — LORAZEPAM 2 MG: 2 INJECTION INTRAMUSCULAR; INTRAVENOUS at 15:30

## 2021-05-28 RX ADMIN — PHENOBARBITAL SODIUM 32.5 MG: 65 INJECTION INTRAMUSCULAR at 14:24

## 2021-05-28 RX ADMIN — POTASSIUM CHLORIDE 20 MEQ: 29.8 INJECTION, SOLUTION INTRAVENOUS at 06:38

## 2021-05-28 RX ADMIN — PHENOBARBITAL SODIUM 32.5 MG: 65 INJECTION INTRAMUSCULAR at 10:09

## 2021-05-28 RX ADMIN — LORAZEPAM 4 MG: 2 INJECTION INTRAMUSCULAR; INTRAVENOUS at 18:26

## 2021-05-28 RX ADMIN — DEXMEDETOMIDINE 1 MCG/KG/HR: 100 INJECTION, SOLUTION, CONCENTRATE INTRAVENOUS at 09:14

## 2021-05-28 RX ADMIN — LORAZEPAM 2 MG: 2 INJECTION INTRAMUSCULAR; INTRAVENOUS at 16:32

## 2021-05-28 RX ADMIN — DEXMEDETOMIDINE 0.9 MCG/KG/HR: 100 INJECTION, SOLUTION, CONCENTRATE INTRAVENOUS at 21:34

## 2021-05-28 RX ADMIN — PHENOBARBITAL SODIUM 32.5 MG: 65 INJECTION INTRAMUSCULAR at 20:51

## 2021-05-28 RX ADMIN — IPRATROPIUM BROMIDE AND ALBUTEROL SULFATE 1 AMPULE: .5; 3 SOLUTION RESPIRATORY (INHALATION) at 20:28

## 2021-05-28 RX ADMIN — POTASSIUM CHLORIDE 20 MEQ: 29.8 INJECTION, SOLUTION INTRAVENOUS at 07:59

## 2021-05-28 RX ADMIN — LORAZEPAM 4 MG: 2 INJECTION INTRAMUSCULAR; INTRAVENOUS at 20:44

## 2021-05-28 RX ADMIN — Medication 10 ML: at 20:48

## 2021-05-28 RX ADMIN — DEXMEDETOMIDINE 0.7 MCG/KG/HR: 100 INJECTION, SOLUTION, CONCENTRATE INTRAVENOUS at 14:41

## 2021-05-28 RX ADMIN — LORAZEPAM 4 MG: 2 INJECTION INTRAMUSCULAR; INTRAVENOUS at 13:26

## 2021-05-28 RX ADMIN — Medication 10 ML: at 10:09

## 2021-05-28 RX ADMIN — DEXMEDETOMIDINE 1 MCG/KG/HR: 100 INJECTION, SOLUTION, CONCENTRATE INTRAVENOUS at 03:28

## 2021-05-28 RX ADMIN — LORAZEPAM 4 MG: 2 INJECTION INTRAMUSCULAR; INTRAVENOUS at 17:22

## 2021-05-28 RX ADMIN — IPRATROPIUM BROMIDE AND ALBUTEROL SULFATE 1 AMPULE: .5; 3 SOLUTION RESPIRATORY (INHALATION) at 00:18

## 2021-05-28 RX ADMIN — IPRATROPIUM BROMIDE AND ALBUTEROL SULFATE 1 AMPULE: .5; 3 SOLUTION RESPIRATORY (INHALATION) at 08:47

## 2021-05-28 RX ADMIN — LORAZEPAM 4 MG: 2 INJECTION INTRAMUSCULAR; INTRAVENOUS at 01:11

## 2021-05-28 RX ADMIN — IPRATROPIUM BROMIDE AND ALBUTEROL SULFATE 1 AMPULE: .5; 3 SOLUTION RESPIRATORY (INHALATION) at 04:19

## 2021-05-28 ASSESSMENT — PAIN SCALES - GENERAL
PAINLEVEL_OUTOF10: 0

## 2021-05-28 NOTE — PROGRESS NOTES
Comprehensive Nutrition Assessment    Type and Reason for Visit:  Initial (LOS)    Nutrition Recommendations/Plan:   Continue General diet. Start Ensure BID. Nutrition Assessment:  LOS. Pt with PMH of ETOH abuse presented with N/V. Pt going through withdrawl, on CIWA. On precedex. On general diet, poor intakes since admission. Will add ONS. Malnutrition Assessment:  Malnutrition Status: At risk for malnutrition       Estimated Daily Nutrient Needs:  Energy (kcal):  5486-8152 kcal (15-18 kcal/kg CBW)  Protein (g):   gm (1.2-2 gm/kg IBW)     Fluid (ml/day):  per provider    Nutrition Related Findings:  BM 5/27; K+ 3.3      Wounds:  None       Current Nutrition Therapies:    DIET GENERAL; Anthropometric Measures:  · Height: 5' 4\" (162.6 cm)  · Current Body Weight: 207 lb (93.9 kg)   · Admission Body Weight: 185 lb (83.9 kg)    · Ideal Body Weight: 120 lbs; % Ideal Body Weight 172.5 %   · BMI: 35.5  · BMI Categories: Obese Class 2 (BMI 35.0 -39.9)       Nutrition Diagnosis:   · Inadequate oral intake related to cognitive or neurological impairment (ETOH withdrawl) as evidenced by intake 0-25%      Nutrition Interventions:   Food and/or Nutrient Delivery:  Continue Current Diet, Start Oral Nutrition Supplement  Nutrition Education/Counseling:  No recommendation at this time   Coordination of Nutrition Care:  Continue to monitor while inpatient    Goals:  consume >50% of meals and ONS during admission       Nutrition Monitoring and Evaluation:   Food/Nutrient Intake Outcomes:  Food and Nutrient Intake, Supplement Intake  Physical Signs/Symptoms Outcomes:  Biochemical Data, Skin, Weight, Nutrition Focused Physical Findings, Nausea or Vomiting     Discharge Planning:     Too soon to determine     Electronically signed by Vijay Alegria RD, LD on 5/28/21 at 9:54 AM EDT    Contact: [phone]

## 2021-05-28 NOTE — PROGRESS NOTES
0809: Pt resting in bed w/eyes closed, no facial grimace. Pt awakens to name. Pt alert/oriented X4, pleasant. Pt on 3.5L O2 via nasal cannula, VSS. Respirations easy/even. Precedex infusing per MD order. Nurse orders breakfast for patient, pt denies any needs at this time. Bilateral wrist restraints intact. Call light within reach. 0840: CIWA 29, pt agitated, cursing a nurse. Nurse attempts to assist pt in eating breakfast, pt talking w/brother on phone and begging him to come pick her up. Pt advises brother that nurse is trying to feed her and \"demise\" her. RT in at this time for breathing tx.     0935: Maryellen Johnsonzier in to see patient at this time, nurse to continue to titrate precedex as able. 1030: Pt mother at bedside, updated that patient has been agitated this am. Pt resting now and nurse requests that mother allow patient to rest as much as possible. Mother agreeable. Pt mother apologizes for patient actions repeatedly. 1320: Nurse enters to find pt w/left leg over bottom side rail and bed saturated in large amount of liquid and formed stool. Pt cleaned for incontinence, linens changed, pt positioned for comfort, CIWA score 25. Nurse exits to retrieve 4mg Ativan.    1326: 4mg IV ativan given at this time for CIWA of 25.    1710: PT able to pull restraint loose, whiles moving self in bed and removes Rt upper arm PICC line, gown, pulse ox, tele leads. Pt re-restrained, peripheral IV placed in left forearm. 1722: Pt given 4mg IV ativan for CIWA 22.    1835: Pt requests to call mother, rt restraint unhooked, pt talks w/mother, confused. Pt hangs up on mother, nurse feeds pt dinner 25%. Re-restrained. Positioned for comfort. Call light within reach, bed alarm intact.     Electronically signed by Fiona Chou RN on 5/28/2021 at 6:50 PM

## 2021-05-28 NOTE — PROGRESS NOTES
Physician Progress Note      Tabitha Held  CSN #:                  [id number]  :                       1970  ADMIT DATE:       2021 4:34 AM  [address] [place] DATE:    PROVIDER #:        Nat Runner MD          QUERY TEXT:    Dear Dr Bjorn Franco,  Pt admitted with Alcohol withdrawal and has encephalopathy documented. If   possible, please document in progress notes and discharge summary further   specificity regarding the type of encephalopathy:    The medical record reflects the following:  Risk Factors: alcohol abuse, alcohol withdrawal ,seizure  Clinical Indicators: Documentation per pn  of encephalopathy. She is   encephalopathic unable to provide HPI   Per nursing notes - patient   attempting to get out of bed. Patient stating she is \"seeing people on the   ceiling\". . Patient very confused, tearful. Precedex gtt increased. -   Bilateral wrist restraints intact to prevent removal of equipment. Pt awakens   to to touch and name spoken loudly, Pt states,\"This isnt my house, I wish my   house was this clean. Treatment: restraints prn, ciwa scale, monitor mental status, reorient to   environment  ,precedex gtt  Options provided:  -- Alcoholic encephalopathy  -- Metabolic encephalopathy  -- Wernicke?s encephalopathy  -- Other - I will add my own diagnosis  -- Disagree - Not applicable / Not valid  -- Disagree - Clinically unable to determine / Unknown  -- Refer to Clinical Documentation Reviewer    PROVIDER RESPONSE TEXT:    This patient has alcoholic encephalopathy. Query created by:  Samir Perea on 2021 9:25 AM      Electronically signed by:  Nat Runner MD 2021 9:26 AM

## 2021-05-28 NOTE — PROGRESS NOTES
Pulmonary Progress Note    Date of Admission: 5/24/2021   LOS: 4 days     CC:  Chief Complaint   Patient presents with    Nausea    Emesis           Assessment/Plan     Delirium tremens  -Possible seizure like activity  -On phenobarbital wean  -Attempt to wean Precedex off as tolerated  -CIWA protocol, with Ativan 1-4mgPRN     Alcohol abuse  -Has had documented withdrawal in the past  Acute hypoxemic respiratory failure with SPO2 less than 9% room air  -underlying DIPAK is likely, will need outpatient sleep study  -Can try CPAP as the patient tolerates, right now she is too agitated  -Wean supplemental oxygen goal SPO2 90%     Due to the immediate potential for life-threatening deterioration due to delirium tremens, I spent 33 minutes providing critical care. This time is excluding time spent performing separately billable procedures. HPI/Subjective  No event so/n. Receiving intermittent ciwa ativan. Restless and confused today    ROS:   No nausea  No Vomiting  No chest pain      Intake/Output Summary (Last 24 hours) at 5/28/2021 0955  Last data filed at 5/28/2021 0636  Gross per 24 hour   Intake 1417.52 ml   Output 3925 ml   Net -2507.48 ml         PHYSICAL EXAM:   Blood pressure (!) 152/81, pulse 61, temperature 98 °F (36.7 °C), temperature source Axillary, resp. rate 22, height 5' 4\" (1.626 m), weight 207 lb 0.2 oz (93.9 kg), last menstrual period 02/19/2021, SpO2 96 %, not currently breastfeeding.'  Gen:  No acute distress. Eyes: PERRL. Anicteric sclera. No conjunctival injection. ENT: No discharge. Posterior oropharynx clear. External appearance of ears and nose normal.  Neck: Trachea midline. No mass   Resp:  No crackles. No wheezes. No rhonchi. No dullness on percussion. CV: Regular rate. Regular rhythm. No murmur or rub. No edema. GI: Soft, Non-tender. Non-distended. +BS  Skin: Warm, dry, w/o erythema. Lymph: No cervical or supraclavicular LAD. M/S: No cyanosis. No clubbing.     Neuro:  no

## 2021-05-28 NOTE — PROGRESS NOTES
Hospitalist Progress Note      PCP: Kenia Serrato DO    Date of Admission: 5/24/2021    Chief Complaint:   Chief Complaint   Patient presents with    Nausea    Emesis     Hospital Course: 64-year-old past medical history of alcohol abuse, anxiety, depression who presented with nausea, vomiting hallucinations and possible seizure. Reported drinking significant amount of alcohol and waking up on the floor, concerned that she may have had a seizure. Subjective:  Continues to be agitated, rude to nursing staff. High CIWAs. Large BM this AM.    Medications:  Reviewed    Infusion Medications    sodium chloride      dexmedetomidine 1 mcg/kg/hr (05/28/21 0328)    sodium chloride       Scheduled Medications    sodium chloride flush  5-40 mL Intravenous 2 times per day    PHENobarbital  32.4 mg Oral TID    ipratropium-albuterol  1 ampule Inhalation 6 times per day    pantoprazole  40 mg Oral QAM AC    enoxaparin  40 mg Subcutaneous Daily     PRN Meds: LORazepam **OR** LORazepam **OR** LORazepam **OR** LORazepam **OR** LORazepam **OR** LORazepam **OR** LORazepam **OR** LORazepam, sodium chloride flush, sodium chloride, potassium chloride, magnesium sulfate, sodium phosphate IVPB **OR** sodium phosphate IVPB, sodium chloride, ondansetron      Intake/Output Summary (Last 24 hours) at 5/28/2021 0906  Last data filed at 5/28/2021 0636  Gross per 24 hour   Intake 1417.52 ml   Output 3925 ml   Net -2507.48 ml       Physical Exam Performed:    BP (!) 143/89   Pulse 63   Temp 98.2 °F (36.8 °C) (Oral)   Resp 22   Ht 5' 4\" (1.626 m)   Wt 207 lb 0.2 oz (93.9 kg)   LMP 02/19/2021   SpO2 96%   BMI 35.53 kg/m²     General appearance: No apparent distress, appears stated age  HEENT: Pupils equal, round, and reactive to light. Conjunctivae/corneas clear. Neck: Supple, with full range of motion. Trachea midline. Respiratory:  Normal respiratory effort.  Clear to auscultation, bilaterally without Rales/Wheezes/Rhonchi. Cardiovascular: Regular rate and rhythm with normal S1/S2 without murmurs, rubs or gallops. Abdomen: Soft, non-tender, non-distended with normal bowel sounds. Musculoskeletal: No clubbing, cyanosis or edema bilaterally. Full range of motion without deformity. Skin: Skin color, texture, turgor normal.  No rashes or lesions. Neurologic:  Neurovascularly intact without any focal sensory/motor deficits. Cranial nerves: II-XII intact, grossly non-focal.  Psychiatric: Awake, alert, not oriented, intermittently agitated  Capillary Refill: Brisk,< 3 seconds   Peripheral Pulses: +2 palpable, equal bilaterally       Labs:   Recent Labs     05/26/21  0424 05/27/21  0320 05/28/21  0448   WBC 6.5 10.2 11.3*   HGB 12.0 11.7* 12.0   HCT 34.9* 34.2* 34.0*    189 197     Recent Labs     05/26/21  2100 05/27/21  0320 05/28/21  0448    137 137   K 3.5 3.6 3.3*    105 103   CO2 21 21 24   BUN 3* 3* 3*   CREATININE <0.5* <0.5* <0.5*   CALCIUM 7.8* 7.8* 8.4   PHOS 2.4* 2.6  2.6 3.6     No results for input(s): AST, ALT, BILIDIR, BILITOT, ALKPHOS in the last 72 hours. No results for input(s): INR in the last 72 hours. Recent Labs     05/25/21  1010 05/25/21  1720   CKTOTAL 792* 744*       Urinalysis:      Lab Results   Component Value Date    NITRU Negative 05/25/2021    WBCUA 2 05/25/2021    BACTERIA 2+ 02/15/2020    RBCUA 1 05/25/2021    BLOODU Negative 05/25/2021    SPECGRAV 1.006 05/25/2021    GLUCOSEU Negative 05/25/2021       Radiology:  XR CHEST PORTABLE   Final Result   Negative portable chest.         CT HEAD WO CONTRAST   Final Result   No acute intracranial abnormality.                Assessment/Plan:    Active Hospital Problems    Diagnosis     Dehydration [E86.0]     Alcohol related seizure ([address] [R56.9]     Alcohol withdrawal delirium ([address] [F10.231]     Leukocytosis [D72.829]     High anion gap metabolic acidosis [H09.9]     Alcohol withdrawal ([facility] [F10.239] Alcohol abuse, withdrawal, DT, EtOH encephalopathy  Reports heavy history of alcohol use with hallucinations and possible seizure at home. CIWA on the floor increasing, moved to ICU for precedex, started on phenobarb  - rally pack  -CIWA protocol  -As needed Ativan  -Seizure precautions    Rhabdo  Nontraumatic, possibly from being down at home versus possible seizure. CK 1000 on admission  -Trend CK-- downtrending  -IV fluids    Nausea, vomiting  Suspect alcoholic gastritis. -PPI  -As needed Zofran    Leukocytosis, resolved  White count 21 on admission. Possibly reactive-- similar in the past when admitted for withdrawal. Negative CXR  - UA negative   - trend CBC--resolved    DVT Prophylaxis: Lovenox  Diet: DIET GENERAL;  Code Status: Full Code    PT/OT Eval Status: deferred    Dispo - pending    Sung Cabello MD    This note was transcribed using [id number] [address]. Please disregard any translational errors.

## 2021-05-28 NOTE — PLAN OF CARE
Problem: Nutrition  Goal: Optimal nutrition therapy  Outcome: Ongoing   Nutrition Problem #1: Inadequate oral intake  Intervention: Food and/or Nutrient Delivery: Continue Current Diet, Start Oral Nutrition Supplement  Nutritional Goals: consume >50% of meals and ONS during admission

## 2021-05-28 NOTE — PROGRESS NOTES
Pt disoriented to place (says she's in [facility] but currently oriented to year and situation, states, \"There's gotta be a better way to detox than this. \" Initial CIWA score 7. Mildly agitated, tearful and repeatedly asking to go outside and smoke. States, \"I don't trust any of you. \" Falls back asleep when left alone. Precedex drip continues at 1.0 mcg/kg/hr. Electronically signed by Jeanie Olivares RN on 5/27/2021 at 10:08 PM      Pt increasingly agitated, pulling off leads and gown, kicking the foot of bed and saying, \"I want to get out of this hotel. .. this is bullsh*t. \" CIWA 27; 4 mg Ativan given per order set. Pt calmed and fell asleep afterward.      Electronically signed by Jeanie Olivares RN on 5/28/2021 at 1:56 AM

## 2021-05-29 LAB
ALBUMIN SERPL-MCNC: 3.4 G/DL (ref 3.4–5)
ANION GAP SERPL CALCULATED.3IONS-SCNC: 13 MMOL/L (ref 3–16)
BASOPHILS ABSOLUTE: 0 K/UL (ref 0–0.2)
BASOPHILS RELATIVE PERCENT: 0.3 %
BUN BLDV-MCNC: 4 MG/DL (ref 7–20)
CALCIUM SERPL-MCNC: 8.9 MG/DL (ref 8.3–10.6)
CHLORIDE BLD-SCNC: 104 MMOL/L (ref 99–110)
CO2: 20 MMOL/L (ref 21–32)
CREAT SERPL-MCNC: <0.5 MG/DL (ref 0.6–1.1)
EOSINOPHILS ABSOLUTE: 0.4 K/UL (ref 0–0.6)
EOSINOPHILS RELATIVE PERCENT: 3.6 %
GFR AFRICAN AMERICAN: >60
GFR NON-AFRICAN AMERICAN: >60
GLUCOSE BLD-MCNC: 107 MG/DL (ref 70–99)
GLUCOSE BLD-MCNC: 110 MG/DL (ref 70–99)
GLUCOSE BLD-MCNC: 98 MG/DL (ref 70–99)
HCT VFR BLD CALC: 41 % (ref 36–48)
HEMOGLOBIN: 13.9 G/DL (ref 12–16)
LYMPHOCYTES ABSOLUTE: 1.7 K/UL (ref 1–5.1)
LYMPHOCYTES RELATIVE PERCENT: 15 %
MAGNESIUM: 2.2 MG/DL (ref 1.8–2.4)
MCH RBC QN AUTO: 29.7 PG (ref 26–34)
MCHC RBC AUTO-ENTMCNC: 33.8 G/DL (ref 31–36)
MCV RBC AUTO: 87.8 FL (ref 80–100)
MONOCYTES ABSOLUTE: 0.7 K/UL (ref 0–1.3)
MONOCYTES RELATIVE PERCENT: 6.5 %
NEUTROPHILS ABSOLUTE: 8.4 K/UL (ref 1.7–7.7)
NEUTROPHILS RELATIVE PERCENT: 74.6 %
PDW BLD-RTO: 15.8 % (ref 12.4–15.4)
PERFORMED ON: ABNORMAL
PERFORMED ON: NORMAL
PHOSPHORUS: 3.7 MG/DL (ref 2.5–4.9)
PLATELET # BLD: 203 K/UL (ref 135–450)
PMV BLD AUTO: 8.3 FL (ref 5–10.5)
POTASSIUM SERPL-SCNC: 4 MMOL/L (ref 3.5–5.1)
RBC # BLD: 4.67 M/UL (ref 4–5.2)
SODIUM BLD-SCNC: 137 MMOL/L (ref 136–145)
WBC # BLD: 11.2 K/UL (ref 4–11)

## 2021-05-29 PROCEDURE — 2580000003 HC RX 258: Performed by: NURSE PRACTITIONER

## 2021-05-29 PROCEDURE — 80069 RENAL FUNCTION PANEL: CPT

## 2021-05-29 PROCEDURE — 94640 AIRWAY INHALATION TREATMENT: CPT

## 2021-05-29 PROCEDURE — 6370000000 HC RX 637 (ALT 250 FOR IP): Performed by: NURSE PRACTITIONER

## 2021-05-29 PROCEDURE — 83735 ASSAY OF MAGNESIUM: CPT

## 2021-05-29 PROCEDURE — 36415 COLL VENOUS BLD VENIPUNCTURE: CPT

## 2021-05-29 PROCEDURE — 6370000000 HC RX 637 (ALT 250 FOR IP): Performed by: INTERNAL MEDICINE

## 2021-05-29 PROCEDURE — 2000000000 HC ICU R&B

## 2021-05-29 PROCEDURE — 2580000003 HC RX 258: Performed by: INTERNAL MEDICINE

## 2021-05-29 PROCEDURE — 6360000002 HC RX W HCPCS: Performed by: STUDENT IN AN ORGANIZED HEALTH CARE EDUCATION/TRAINING PROGRAM

## 2021-05-29 PROCEDURE — 2500000003 HC RX 250 WO HCPCS: Performed by: NURSE PRACTITIONER

## 2021-05-29 PROCEDURE — 6360000002 HC RX W HCPCS: Performed by: INTERNAL MEDICINE

## 2021-05-29 PROCEDURE — 94761 N-INVAS EAR/PLS OXIMETRY MLT: CPT

## 2021-05-29 PROCEDURE — 99291 CRITICAL CARE FIRST HOUR: CPT | Performed by: INTERNAL MEDICINE

## 2021-05-29 PROCEDURE — 2700000000 HC OXYGEN THERAPY PER DAY

## 2021-05-29 PROCEDURE — 85025 COMPLETE CBC W/AUTO DIFF WBC: CPT

## 2021-05-29 RX ORDER — PHENOBARBITAL SODIUM 65 MG/ML
32.5 INJECTION INTRAMUSCULAR ONCE
Status: COMPLETED | OUTPATIENT
Start: 2021-05-29 | End: 2021-05-29

## 2021-05-29 RX ORDER — PHENOBARBITAL SODIUM 65 MG/ML
32.5 INJECTION INTRAMUSCULAR EVERY 8 HOURS SCHEDULED
Status: DISCONTINUED | OUTPATIENT
Start: 2021-05-31 | End: 2021-06-02

## 2021-05-29 RX ORDER — PHENOBARBITAL SODIUM 65 MG/ML
65 INJECTION INTRAMUSCULAR EVERY 8 HOURS SCHEDULED
Status: COMPLETED | OUTPATIENT
Start: 2021-05-29 | End: 2021-05-30

## 2021-05-29 RX ADMIN — PHENOBARBITAL SODIUM 32.5 MG: 65 INJECTION INTRAMUSCULAR at 09:22

## 2021-05-29 RX ADMIN — LORAZEPAM 4 MG: 2 INJECTION INTRAMUSCULAR; INTRAVENOUS at 00:47

## 2021-05-29 RX ADMIN — ENOXAPARIN SODIUM 40 MG: 40 INJECTION SUBCUTANEOUS at 08:01

## 2021-05-29 RX ADMIN — LORAZEPAM 4 MG: 2 INJECTION INTRAMUSCULAR; INTRAVENOUS at 17:17

## 2021-05-29 RX ADMIN — DEXMEDETOMIDINE 1.3 MCG/KG/HR: 100 INJECTION, SOLUTION, CONCENTRATE INTRAVENOUS at 10:21

## 2021-05-29 RX ADMIN — IPRATROPIUM BROMIDE AND ALBUTEROL SULFATE 1 AMPULE: .5; 3 SOLUTION RESPIRATORY (INHALATION) at 00:21

## 2021-05-29 RX ADMIN — LORAZEPAM 4 MG: 2 INJECTION INTRAMUSCULAR; INTRAVENOUS at 20:16

## 2021-05-29 RX ADMIN — DEXMEDETOMIDINE 1.4 MCG/KG/HR: 100 INJECTION, SOLUTION, CONCENTRATE INTRAVENOUS at 17:17

## 2021-05-29 RX ADMIN — PHENOBARBITAL SODIUM 65 MG: 65 INJECTION INTRAMUSCULAR at 21:57

## 2021-05-29 RX ADMIN — IPRATROPIUM BROMIDE AND ALBUTEROL SULFATE 1 AMPULE: .5; 3 SOLUTION RESPIRATORY (INHALATION) at 08:29

## 2021-05-29 RX ADMIN — IPRATROPIUM BROMIDE AND ALBUTEROL SULFATE 1 AMPULE: .5; 3 SOLUTION RESPIRATORY (INHALATION) at 04:40

## 2021-05-29 RX ADMIN — LORAZEPAM 4 MG: 2 INJECTION INTRAMUSCULAR; INTRAVENOUS at 01:48

## 2021-05-29 RX ADMIN — DEXMEDETOMIDINE 1.4 MCG/KG/HR: 100 INJECTION, SOLUTION, CONCENTRATE INTRAVENOUS at 23:57

## 2021-05-29 RX ADMIN — Medication 10 ML: at 20:23

## 2021-05-29 RX ADMIN — DEXMEDETOMIDINE 1.4 MCG/KG/HR: 100 INJECTION, SOLUTION, CONCENTRATE INTRAVENOUS at 20:21

## 2021-05-29 RX ADMIN — LORAZEPAM 4 MG: 2 INJECTION INTRAMUSCULAR; INTRAVENOUS at 05:47

## 2021-05-29 RX ADMIN — LORAZEPAM 4 MG: 2 INJECTION INTRAMUSCULAR; INTRAVENOUS at 04:27

## 2021-05-29 RX ADMIN — IPRATROPIUM BROMIDE AND ALBUTEROL SULFATE 1 AMPULE: .5; 3 SOLUTION RESPIRATORY (INHALATION) at 12:34

## 2021-05-29 RX ADMIN — PANTOPRAZOLE SODIUM 40 MG: 40 TABLET, DELAYED RELEASE ORAL at 08:02

## 2021-05-29 RX ADMIN — PHENOBARBITAL SODIUM 32.5 MG: 65 INJECTION INTRAMUSCULAR at 08:01

## 2021-05-29 RX ADMIN — Medication 10 ML: at 08:07

## 2021-05-29 RX ADMIN — DEXMEDETOMIDINE 1.4 MCG/KG/HR: 100 INJECTION, SOLUTION, CONCENTRATE INTRAVENOUS at 13:43

## 2021-05-29 RX ADMIN — LORAZEPAM 1 MG: 2 INJECTION INTRAMUSCULAR; INTRAVENOUS at 23:25

## 2021-05-29 RX ADMIN — PHENOBARBITAL SODIUM 65 MG: 65 INJECTION INTRAMUSCULAR at 13:27

## 2021-05-29 RX ADMIN — IPRATROPIUM BROMIDE AND ALBUTEROL SULFATE 1 AMPULE: .5; 3 SOLUTION RESPIRATORY (INHALATION) at 19:56

## 2021-05-29 RX ADMIN — LORAZEPAM 4 MG: 2 INJECTION INTRAMUSCULAR; INTRAVENOUS at 18:16

## 2021-05-29 RX ADMIN — DEXMEDETOMIDINE 1.2 MCG/KG/HR: 100 INJECTION, SOLUTION, CONCENTRATE INTRAVENOUS at 06:46

## 2021-05-29 RX ADMIN — DEXMEDETOMIDINE 1.1 MCG/KG/HR: 100 INJECTION, SOLUTION, CONCENTRATE INTRAVENOUS at 02:14

## 2021-05-29 RX ADMIN — IPRATROPIUM BROMIDE AND ALBUTEROL SULFATE 1 AMPULE: .5; 3 SOLUTION RESPIRATORY (INHALATION) at 16:17

## 2021-05-29 ASSESSMENT — PAIN SCALES - GENERAL
PAINLEVEL_OUTOF10: 0

## 2021-05-29 NOTE — PROGRESS NOTES
Hospitalist Progress Note      PCP: Salome No DO    Date of Admission: 5/24/2021    Chief Complaint:   Chief Complaint   Patient presents with    Nausea    Emesis     Hospital Course: 53-year-old past medical history of alcohol abuse, anxiety, depression who presented with nausea, vomiting hallucinations and possible seizure. Reported drinking significant amount of alcohol and waking up on the floor, concerned that she may have had a seizure. Subjective:  No significant change, high CIWA overnight with high ativan requirement. Sleeping this morning. Long conversation with mother about addiction, clinical course and prognosis. Medications:  Reviewed    Infusion Medications    sodium chloride      dexmedetomidine 1.2 mcg/kg/hr (05/29/21 0646)    sodium chloride       Scheduled Medications    PHENobarbital  32.5 mg Intravenous TID    sodium chloride flush  5-40 mL Intravenous 2 times per day    ipratropium-albuterol  1 ampule Inhalation 6 times per day    pantoprazole  40 mg Oral QAM AC    enoxaparin  40 mg Subcutaneous Daily     PRN Meds: LORazepam **OR** LORazepam **OR** LORazepam **OR** LORazepam **OR** LORazepam **OR** LORazepam **OR** LORazepam **OR** LORazepam, sodium chloride flush, sodium chloride, potassium chloride, magnesium sulfate, sodium phosphate IVPB **OR** sodium phosphate IVPB, sodium chloride, ondansetron      Intake/Output Summary (Last 24 hours) at 5/29/2021 0756  Last data filed at 5/29/2021 0600  Gross per 24 hour   Intake 1150.17 ml   Output 4150 ml   Net -2999.83 ml       Physical Exam Performed:    /85   Pulse 54   Temp 98 °F (36.7 °C) (Axillary)   Resp 15   Ht 5' 4\" (1.626 m)   Wt 203 lb 14.8 oz (92.5 kg)   LMP 02/19/2021   SpO2 97%   BMI 35.00 kg/m²     General appearance: No apparent distress, appears stated age  HEENT: Pupils equal, round, and reactive to light. Conjunctivae/corneas clear. Neck: Supple, with full range of motion.  Trachea delirium ([facility] [F10.231]     Leukocytosis [D72.829]     High anion gap metabolic acidosis [N61.2]     Alcohol withdrawal ([facility]) [F10.239]      Alcohol abuse, withdrawal, DT, EtOH encephalopathy  Reports heavy history of alcohol use with hallucinations and possible seizure at home. CIWA on the floor increasing, moved to ICU for precedex, started on phenobarb  - rally pack  - phenobarb dose increased  -CIWA protocol  -As needed Ativan  -Seizure precautions    Rhabdo  Nontraumatic, possibly from being down at home versus possible seizure. CK 1000 on admission  -Trend CK-- downtrending  -IV fluids    Acute Hypoxic Respiratory Failure  Requiring 2L NC  - wean O2 as able  - IS  - needs outpatient sleep study    Nausea, vomiting  Suspect alcoholic gastritis. -PPI  -As needed Zofran    Leukocytosis, resolved  White count 21 on admission. Possibly reactive-- similar in the past when admitted for withdrawal. Negative CXR  - UA negative   - trend CBC--resolved    DVT Prophylaxis: Lovenox  Diet: DIET GENERAL;  Dietary Nutrition Supplements: Standard High Calorie Oral Supplement  Code Status: Full Code    PT/OT Eval Status: deferred    Dispo - pending    Courtney Vargas MD    This note was transcribed using [id number] [address]. Please disregard any translational errors.

## 2021-05-29 NOTE — PLAN OF CARE
Problem: SAFETY  Goal: Free from accidental physical injury  Outcome: Ongoing   Falling star program remains in place. Call light and personal belongings within reach. Frequent visual monitoring continues. Miller catheter in place. Patient assisted in turning/repositioning  with pillow support at least once every 2 hours, and on a prn basis. Problem: DAILY CARE  Goal: Daily care needs are met  Outcome: Ongoing   Daily care needs continuing to be assessed. Assistance provided when necessary. Patient encouraged to express needs/concerns. Call light and personal belongings within reach. Problem: PAIN  Goal: Patient's pain/discomfort is manageable  Outcome: Ongoing   Continuing to monitor pain and discomfort. Monitoring pain level on scale of 0-10. Non- pharmacological measures encouraged to reduce discomfort/pain. PRN pain meds administeration continues when/if applicable as ordered by physician. Problem: SKIN INTEGRITY  Goal: Skin integrity is maintained or improved  Outcome: Ongoing   Monitoring patient skin integrity for skin breakdown, turning and repositioning with pillow support q2h per protocol. Patient demonstrates turning and repositioning self. Problem: KNOWLEDGE DEFICIT  Goal: Patient/S.O. demonstrates understanding of disease process, treatment plan, medications, and discharge instructions. Outcome: Ongoing   Continuing to monitoring patient's understanding and compliance with care plan. Patient demonstrates understanding of disease process, treatment plan and medications but exhibits poor safety judgment. Denies present needs/concerns. Problem: Non-Violent Restraints  Goal: Removal from restraints as soon as assessed to be safe  Outcome: Ongoing   Continues with bilateral soft wrist restraints as patient continues to display some behavior impeding care and removal of medical equipment. Will continue to monitor, see flow sheet.     Problem: Nutrition  Goal: Optimal nutrition

## 2021-05-29 NOTE — PROGRESS NOTES
Warm, dry, w/o erythema. Lymph: No cervical or supraclavicular LAD. M/S: No cyanosis. No clubbing. Neuro:  no focal neurologic deficit. Moves all extremities  Psych: Awake but irritable mood, also confused      Medications:    Scheduled Meds:   PHENobarbital  65 mg Intravenous 3 times per day    [START ON 5/31/2021] PHENobarbital  32.5 mg Intravenous 3 times per day    sodium chloride flush  5-40 mL Intravenous 2 times per day    ipratropium-albuterol  1 ampule Inhalation 6 times per day    pantoprazole  40 mg Oral QAM AC    enoxaparin  40 mg Subcutaneous Daily       Continuous Infusions:   sodium chloride      dexmedetomidine 1.3 mcg/kg/hr (05/29/21 0703)    sodium chloride         PRN Meds:  LORazepam **OR** LORazepam **OR** LORazepam **OR** LORazepam **OR** LORazepam **OR** LORazepam **OR** LORazepam **OR** LORazepam, sodium chloride flush, sodium chloride, potassium chloride, magnesium sulfate, sodium phosphate IVPB **OR** sodium phosphate IVPB, sodium chloride, ondansetron    Labs reviewed:  CBC:   Recent Labs     05/27/21  0320 05/28/21  0448 05/29/21  0436   WBC 10.2 11.3* 11.2*   HGB 11.7* 12.0 13.9   HCT 34.2* 34.0* 41.0   MCV 87.2 85.8 87.8    197 203     BMP:   Recent Labs     05/27/21  0320 05/28/21  0448 05/28/21  1040 05/29/21  0436    137  --  137   K 3.6 3.3* 3.8 4.0    103  --  104   CO2 21 24  --  20*   PHOS 2.6  2.6 3.6  --  3.7   BUN 3* 3*  --  4*   CREATININE <0.5* <0.5*  --  <0.5*     LIVER PROFILE: No results for input(s): AST, ALT, LIPASE, BILIDIR, BILITOT, ALKPHOS in the last 72 hours. Invalid input(s): AMYLASE,  ALB  PT/INR: No results for input(s): PROTIME, INR in the last 72 hours. APTT: No results for input(s): APTT in the last 72 hours.   UA:  No results for input(s): NITRITE, COLORU, PHUR, LABCAST, WBCUA, RBCUA, MUCUS, TRICHOMONAS, YEAST, BACTERIA, CLARITYU, SPECGRAV, LEUKOCYTESUR, UROBILINOGEN, BILIRUBINUR, BLOODU, GLUCOSEU, AMORPHOUS in the last 72 hours. Invalid input(s): Stewart Corcoran  No results for input(s): PH, PCO2, PO2 in the last 72 hours. Films:  Radiology Review:  Pertinent images / reports were reviewed as a part of this visit. CT HEAD WO CONTRAST  Narrative: EXAMINATION:  CT OF THE HEAD WITHOUT CONTRAST  5/24/2021 6:02 am    TECHNIQUE:  CT of the head was performed without the administration of intravenous  contrast. Dose modulation, iterative reconstruction, and/or weight based  adjustment of the mA/kV was utilized to reduce the radiation dose to as low  as reasonably achievable. COMPARISON:  None. HISTORY:  ORDERING SYSTEM PROVIDED HISTORY: Seizure  TECHNOLOGIST PROVIDED HISTORY:  Has a \"code stroke\" or \"stroke alert\" been called? ->No  Reason for exam:->Seizure  Decision Support Exception - unselect if not a suspected or confirmed  emergency medical condition->Emergency Medical Condition (MA)  Is the patient pregnant?->No  Reason for Exam: seizure n/v    FINDINGS:  BRAIN/VENTRICLES: There is no acute intracranial hemorrhage, mass effect or  midline shift. No abnormal extra-axial fluid collection. The gray-white  differentiation is maintained without evidence of an acute infarct. There is  no evidence of hydrocephalus. ORBITS: The visualized portion of the orbits demonstrate no acute abnormality. SINUSES: The visualized paranasal sinuses and mastoid air cells demonstrate  no acute abnormality. SOFT TISSUES/SKULL:  No acute abnormality of the visualized skull or soft  tissues. Nodules in the scalp likely represent sebaceous cysts. Impression: No acute intracranial abnormality. XR CHEST PORTABLE  Narrative: EXAMINATION:  ONE XRAY VIEW OF THE CHEST    5/24/2021 6:02 am    COMPARISON:  02/11/2020    HISTORY:  ORDERING SYSTEM PROVIDED HISTORY: SOB  TECHNOLOGIST PROVIDED HISTORY:  Reason for exam:->SOB  Reason for Exam: sob    FINDINGS:  The lungs are clear. The costophrenic angles are sharp. The  cardiomediastinal silhouette is within normal limits. There is no  discernible pneumothorax. Impression: Negative portable chest.          Access  Arterial       PICC       PICC Double Lumen 43/17/60 Right Basilic (Active)   Continued need for line? Yes 05/27/21 0752   Is this a power PICC? Yes 05/27/21 0752   Site Assessment Clean;Dry; Intact 05/27/21 0752   White Lumen Status Capped; Infusing 05/27/21 0752   Pink Lumen Status Capped;Normal saline locked 05/27/21 0752   Exposed Catheter (cm) 2 cm 05/26/21 0859   Extremity Circumference (cm) 32 cm 05/26/21 0859   Dressing Status Old drainage 05/27/21 0752   Dressing Type Transparent; Anti-microbial patch; Securing device 05/27/21 0752   Dressing Change Due 06/02/21 05/27/21 0752   Dressing Intervention New 05/26/21 0859   Reason Not Rotated Not due 05/27/21 5180   Number of days: 1        CVC             Thank you for this consult,    Dorinda Salguero MD  [facility] Pulmonary, Critical Care, and Sleep Medicine

## 2021-05-30 LAB
ALBUMIN SERPL-MCNC: 3.6 G/DL (ref 3.4–5)
ANION GAP SERPL CALCULATED.3IONS-SCNC: 13 MMOL/L (ref 3–16)
BASOPHILS ABSOLUTE: 0.1 K/UL (ref 0–0.2)
BASOPHILS RELATIVE PERCENT: 0.4 %
BUN BLDV-MCNC: 6 MG/DL (ref 7–20)
CALCIUM SERPL-MCNC: 9.4 MG/DL (ref 8.3–10.6)
CHLORIDE BLD-SCNC: 102 MMOL/L (ref 99–110)
CO2: 24 MMOL/L (ref 21–32)
CREAT SERPL-MCNC: <0.5 MG/DL (ref 0.6–1.1)
EOSINOPHILS ABSOLUTE: 0.5 K/UL (ref 0–0.6)
EOSINOPHILS RELATIVE PERCENT: 4.1 %
GFR AFRICAN AMERICAN: >60
GFR NON-AFRICAN AMERICAN: >60
GLUCOSE BLD-MCNC: 108 MG/DL (ref 70–99)
HCT VFR BLD CALC: 42.4 % (ref 36–48)
HEMOGLOBIN: 14.4 G/DL (ref 12–16)
LYMPHOCYTES ABSOLUTE: 2.1 K/UL (ref 1–5.1)
LYMPHOCYTES RELATIVE PERCENT: 18.2 %
MAGNESIUM: 1.9 MG/DL (ref 1.8–2.4)
MCH RBC QN AUTO: 29.5 PG (ref 26–34)
MCHC RBC AUTO-ENTMCNC: 33.9 G/DL (ref 31–36)
MCV RBC AUTO: 87.2 FL (ref 80–100)
MONOCYTES ABSOLUTE: 0.9 K/UL (ref 0–1.3)
MONOCYTES RELATIVE PERCENT: 8.2 %
NEUTROPHILS ABSOLUTE: 7.8 K/UL (ref 1.7–7.7)
NEUTROPHILS RELATIVE PERCENT: 69.1 %
PDW BLD-RTO: 16 % (ref 12.4–15.4)
PHOSPHORUS: 4.5 MG/DL (ref 2.5–4.9)
PLATELET # BLD: 233 K/UL (ref 135–450)
PMV BLD AUTO: 8.7 FL (ref 5–10.5)
POTASSIUM SERPL-SCNC: 4.5 MMOL/L (ref 3.5–5.1)
RBC # BLD: 4.87 M/UL (ref 4–5.2)
SODIUM BLD-SCNC: 139 MMOL/L (ref 136–145)
WBC # BLD: 11.3 K/UL (ref 4–11)

## 2021-05-30 PROCEDURE — 2500000003 HC RX 250 WO HCPCS: Performed by: NURSE PRACTITIONER

## 2021-05-30 PROCEDURE — 6370000000 HC RX 637 (ALT 250 FOR IP): Performed by: NURSE PRACTITIONER

## 2021-05-30 PROCEDURE — 2580000003 HC RX 258: Performed by: INTERNAL MEDICINE

## 2021-05-30 PROCEDURE — 80069 RENAL FUNCTION PANEL: CPT

## 2021-05-30 PROCEDURE — 83735 ASSAY OF MAGNESIUM: CPT

## 2021-05-30 PROCEDURE — 6360000002 HC RX W HCPCS: Performed by: STUDENT IN AN ORGANIZED HEALTH CARE EDUCATION/TRAINING PROGRAM

## 2021-05-30 PROCEDURE — 6360000002 HC RX W HCPCS: Performed by: INTERNAL MEDICINE

## 2021-05-30 PROCEDURE — 99291 CRITICAL CARE FIRST HOUR: CPT | Performed by: INTERNAL MEDICINE

## 2021-05-30 PROCEDURE — 94640 AIRWAY INHALATION TREATMENT: CPT

## 2021-05-30 PROCEDURE — 85025 COMPLETE CBC W/AUTO DIFF WBC: CPT

## 2021-05-30 PROCEDURE — 2580000003 HC RX 258: Performed by: NURSE PRACTITIONER

## 2021-05-30 PROCEDURE — 6370000000 HC RX 637 (ALT 250 FOR IP): Performed by: INTERNAL MEDICINE

## 2021-05-30 PROCEDURE — 36415 COLL VENOUS BLD VENIPUNCTURE: CPT

## 2021-05-30 PROCEDURE — 94761 N-INVAS EAR/PLS OXIMETRY MLT: CPT

## 2021-05-30 PROCEDURE — 2000000000 HC ICU R&B

## 2021-05-30 RX ORDER — SERTRALINE HYDROCHLORIDE 100 MG/1
200 TABLET, FILM COATED ORAL DAILY
Status: DISCONTINUED | OUTPATIENT
Start: 2021-05-30 | End: 2021-06-04 | Stop reason: HOSPADM

## 2021-05-30 RX ORDER — QUETIAPINE FUMARATE 25 MG/1
12.5 TABLET, FILM COATED ORAL 2 TIMES DAILY
Status: DISCONTINUED | OUTPATIENT
Start: 2021-05-30 | End: 2021-06-02

## 2021-05-30 RX ADMIN — DEXMEDETOMIDINE 1.4 MCG/KG/HR: 100 INJECTION, SOLUTION, CONCENTRATE INTRAVENOUS at 03:42

## 2021-05-30 RX ADMIN — SERTRALINE 200 MG: 100 TABLET, FILM COATED ORAL at 12:34

## 2021-05-30 RX ADMIN — IPRATROPIUM BROMIDE AND ALBUTEROL SULFATE 1 AMPULE: .5; 3 SOLUTION RESPIRATORY (INHALATION) at 20:12

## 2021-05-30 RX ADMIN — IPRATROPIUM BROMIDE AND ALBUTEROL SULFATE 1 AMPULE: .5; 3 SOLUTION RESPIRATORY (INHALATION) at 13:01

## 2021-05-30 RX ADMIN — DEXMEDETOMIDINE 1.4 MCG/KG/HR: 100 INJECTION, SOLUTION, CONCENTRATE INTRAVENOUS at 18:50

## 2021-05-30 RX ADMIN — Medication 10 ML: at 08:01

## 2021-05-30 RX ADMIN — LORAZEPAM 2 MG: 2 INJECTION INTRAMUSCULAR; INTRAVENOUS at 22:36

## 2021-05-30 RX ADMIN — PHENOBARBITAL SODIUM 65 MG: 65 INJECTION INTRAMUSCULAR at 06:18

## 2021-05-30 RX ADMIN — LORAZEPAM 4 MG: 2 INJECTION INTRAMUSCULAR; INTRAVENOUS at 03:45

## 2021-05-30 RX ADMIN — IPRATROPIUM BROMIDE AND ALBUTEROL SULFATE 1 AMPULE: .5; 3 SOLUTION RESPIRATORY (INHALATION) at 08:24

## 2021-05-30 RX ADMIN — LORAZEPAM 4 MG: 2 INJECTION INTRAMUSCULAR; INTRAVENOUS at 20:17

## 2021-05-30 RX ADMIN — PANTOPRAZOLE SODIUM 40 MG: 40 TABLET, DELAYED RELEASE ORAL at 06:18

## 2021-05-30 RX ADMIN — LORAZEPAM 2 MG: 2 INJECTION INTRAMUSCULAR; INTRAVENOUS at 01:07

## 2021-05-30 RX ADMIN — LORAZEPAM 2 MG: 2 INJECTION INTRAMUSCULAR; INTRAVENOUS at 18:04

## 2021-05-30 RX ADMIN — DEXMEDETOMIDINE 1.4 MCG/KG/HR: 100 INJECTION, SOLUTION, CONCENTRATE INTRAVENOUS at 14:49

## 2021-05-30 RX ADMIN — PHENOBARBITAL SODIUM 65 MG: 65 INJECTION INTRAMUSCULAR at 13:55

## 2021-05-30 RX ADMIN — IPRATROPIUM BROMIDE AND ALBUTEROL SULFATE 1 AMPULE: .5; 3 SOLUTION RESPIRATORY (INHALATION) at 04:14

## 2021-05-30 RX ADMIN — DEXMEDETOMIDINE 1.4 MCG/KG/HR: 100 INJECTION, SOLUTION, CONCENTRATE INTRAVENOUS at 07:51

## 2021-05-30 RX ADMIN — PHENOBARBITAL SODIUM 65 MG: 65 INJECTION INTRAMUSCULAR at 22:36

## 2021-05-30 RX ADMIN — ENOXAPARIN SODIUM 40 MG: 40 INJECTION SUBCUTANEOUS at 08:01

## 2021-05-30 RX ADMIN — DEXMEDETOMIDINE 1.4 MCG/KG/HR: 100 INJECTION, SOLUTION, CONCENTRATE INTRAVENOUS at 22:43

## 2021-05-30 RX ADMIN — IPRATROPIUM BROMIDE AND ALBUTEROL SULFATE 1 AMPULE: .5; 3 SOLUTION RESPIRATORY (INHALATION) at 00:04

## 2021-05-30 RX ADMIN — IPRATROPIUM BROMIDE AND ALBUTEROL SULFATE 1 AMPULE: .5; 3 SOLUTION RESPIRATORY (INHALATION) at 16:33

## 2021-05-30 RX ADMIN — DEXMEDETOMIDINE 1.4 MCG/KG/HR: 100 INJECTION, SOLUTION, CONCENTRATE INTRAVENOUS at 11:28

## 2021-05-30 RX ADMIN — QUETIAPINE FUMARATE 12.5 MG: 25 TABLET ORAL at 22:36

## 2021-05-30 RX ADMIN — Medication 10 ML: at 22:41

## 2021-05-30 RX ADMIN — QUETIAPINE FUMARATE 12.5 MG: 25 TABLET ORAL at 12:34

## 2021-05-30 ASSESSMENT — PAIN SCALES - GENERAL
PAINLEVEL_OUTOF10: 0

## 2021-05-30 ASSESSMENT — PAIN SCALES - WONG BAKER
WONGBAKER_NUMERICALRESPONSE: 0

## 2021-05-30 NOTE — CARE COORDINATION
Discharge Planning:  Patient remains disoriented and receiving precedex, phenobarbital and Ativan at this time. SW will not be able to have pt sign the release allowing communication between [facility] and the hospital until pt is alert and oriented. SW/CM team will continue to follow and will discuss options for ETOH treatment at D/C when pt is able to participate in d/c planning.   Jason Trevino  [phone]  Electronically signed by Emy Hampton on 5/30/2021 at 1:12 PM    .

## 2021-05-30 NOTE — PROGRESS NOTES
Pulmonary Progress Note    Date of Admission: 5/24/2021   LOS: 6 days     CC:  Chief Complaint   Patient presents with    Nausea    Emesis           Assessment/Plan     Delirium tremens  -Reported seizure-like activity  -On phenobarb protocol  -Successfully weaned off of Precedex  -CIWA protocol, with Ativan 1-4mgPRN  -Restart home Zoloft     Alcohol abuse  -Documented withdrawal in the past  Acute hypoxemic respiratory failure with SPO2 less than 9% room air  -underlying DIPAK is likely, needs outpatient PSG  -Trial CPAP, unable to tolerate at this time  -Wean supplemental oxygen goal SPO2 90%     Due to the immediate potential for life-threatening deterioration due to delirium tremens, I spent 33 minutes providing critical care. This time is excluding time spent performing separately billable procedures. HPI/Subjective  Intermittent episodes of visual hallucinations, confusion. Hemodynamically stable. ROS:   No nausea  No Vomiting  No chest pain      Intake/Output Summary (Last 24 hours) at 5/30/2021 1306  Last data filed at 5/30/2021 1128  Gross per 24 hour   Intake 1017.76 ml   Output 1350 ml   Net -332.24 ml         PHYSICAL EXAM:   Blood pressure 118/75, pulse 54, temperature 97.6 °F (36.4 °C), temperature source Axillary, resp. rate 18, height 5' 4\" (1.626 m), weight 204 lb 12.9 oz (92.9 kg), last menstrual period 02/19/2021, SpO2 92 %, not currently breastfeeding.'  Gen:  No acute distress. Eyes: PERRL. Anicteric sclera. No conjunctival injection. ENT: No discharge. Posterior oropharynx clear. External appearance of ears and nose normal.  Neck: Trachea midline. No mass   Resp:  No crackles. No wheezes. No rhonchi. No dullness on percussion. CV: Regular rate. Regular rhythm. No murmur or rub. No edema. GI: Soft, Non-tender. Non-distended. +BS  Skin: Warm, dry, w/o erythema. Lymph: No cervical or supraclavicular LAD. M/S: No cyanosis. No clubbing. Neuro:  no focal neurologic deficit. Moves all extremities  Psych: Awake, slightly more alert. Still confused and encephalopathic      Medications:    Scheduled Meds:   sertraline  200 mg Oral Daily    QUEtiapine  12.5 mg Oral BID    PHENobarbital  65 mg Intravenous 3 times per day    [START ON 5/31/2021] PHENobarbital  32.5 mg Intravenous 3 times per day    sodium chloride flush  5-40 mL Intravenous 2 times per day    ipratropium-albuterol  1 ampule Inhalation 6 times per day    pantoprazole  40 mg Oral QAM AC    enoxaparin  40 mg Subcutaneous Daily       Continuous Infusions:   sodium chloride      dexmedetomidine 1.3 mcg/kg/hr (05/30/21 1237)    sodium chloride         PRN Meds:  LORazepam **OR** LORazepam **OR** LORazepam **OR** LORazepam **OR** LORazepam **OR** LORazepam **OR** LORazepam **OR** LORazepam, sodium chloride flush, sodium chloride, potassium chloride, magnesium sulfate, sodium phosphate IVPB **OR** sodium phosphate IVPB, sodium chloride, ondansetron    Labs reviewed:  CBC:   Recent Labs     05/28/21  0448 05/29/21  0436 05/30/21  0517   WBC 11.3* 11.2* 11.3*   HGB 12.0 13.9 14.4   HCT 34.0* 41.0 42.4   MCV 85.8 87.8 87.2    203 233     BMP:   Recent Labs     05/28/21  0448 05/28/21  1040 05/29/21  0436 05/30/21  0517     --  137 139   K 3.3* 3.8 4.0 4.5     --  104 102   CO2 24  --  20* 24   PHOS 3.6  --  3.7 4.5   BUN 3*  --  4* 6*   CREATININE <0.5*  --  <0.5* <0.5*     LIVER PROFILE: No results for input(s): AST, ALT, LIPASE, BILIDIR, BILITOT, ALKPHOS in the last 72 hours. Invalid input(s): AMYLASE,  ALB  PT/INR: No results for input(s): PROTIME, INR in the last 72 hours. APTT: No results for input(s): APTT in the last 72 hours. UA:  No results for input(s): NITRITE, COLORU, PHUR, LABCAST, WBCUA, RBCUA, MUCUS, TRICHOMONAS, YEAST, BACTERIA, CLARITYU, SPECGRAV, LEUKOCYTESUR, UROBILINOGEN, BILIRUBINUR, BLOODU, GLUCOSEU, AMORPHOUS in the last 72 hours.     Invalid input(s): KETONESU  No results for input(s): PH, PCO2, PO2 in the last 72 hours. Films:  Radiology Review:  Pertinent images / reports were reviewed as a part of this visit. CT HEAD WO CONTRAST  Narrative: EXAMINATION:  CT OF THE HEAD WITHOUT CONTRAST  5/24/2021 6:02 am    TECHNIQUE:  CT of the head was performed without the administration of intravenous  contrast. Dose modulation, iterative reconstruction, and/or weight based  adjustment of the mA/kV was utilized to reduce the radiation dose to as low  as reasonably achievable. COMPARISON:  None. HISTORY:  ORDERING SYSTEM PROVIDED HISTORY: Seizure  TECHNOLOGIST PROVIDED HISTORY:  Has a \"code stroke\" or \"stroke alert\" been called? ->No  Reason for exam:->Seizure  Decision Support Exception - unselect if not a suspected or confirmed  emergency medical condition->Emergency Medical Condition (MA)  Is the patient pregnant?->No  Reason for Exam: seizure n/v    FINDINGS:  BRAIN/VENTRICLES: There is no acute intracranial hemorrhage, mass effect or  midline shift. No abnormal extra-axial fluid collection. The gray-white  differentiation is maintained without evidence of an acute infarct. There is  no evidence of hydrocephalus. ORBITS: The visualized portion of the orbits demonstrate no acute abnormality. SINUSES: The visualized paranasal sinuses and mastoid air cells demonstrate  no acute abnormality. SOFT TISSUES/SKULL:  No acute abnormality of the visualized skull or soft  tissues. Nodules in the scalp likely represent sebaceous cysts. Impression: No acute intracranial abnormality. XR CHEST PORTABLE  Narrative: EXAMINATION:  ONE XRAY VIEW OF THE CHEST    5/24/2021 6:02 am    COMPARISON:  02/11/2020    HISTORY:  ORDERING SYSTEM PROVIDED HISTORY: SOB  TECHNOLOGIST PROVIDED HISTORY:  Reason for exam:->SOB  Reason for Exam: sob    FINDINGS:  The lungs are clear. The costophrenic angles are sharp. The  cardiomediastinal silhouette is within normal limits.   There is no  discernible pneumothorax. Impression: Negative portable chest.          Access  Arterial       PICC       PICC Double Lumen 89/42/91 Right Basilic (Active)   Continued need for line? Yes 05/27/21 0752   Is this a power PICC? Yes 05/27/21 0752   Site Assessment Clean;Dry; Intact 05/27/21 0752   White Lumen Status Capped; Infusing 05/27/21 0752   Pink Lumen Status Capped;Normal saline locked 05/27/21 0752   Exposed Catheter (cm) 2 cm 05/26/21 0859   Extremity Circumference (cm) 32 cm 05/26/21 0859   Dressing Status Old drainage 05/27/21 0752   Dressing Type Transparent; Anti-microbial patch; Securing device 05/27/21 0752   Dressing Change Due 06/02/21 05/27/21 0752   Dressing Intervention New 05/26/21 0859   Reason Not Rotated Not due 05/27/21 0019   Number of days: 1        CVC             Thank you for this consult,    Yomi Mejia MD  [facility] Pulmonary, Critical Care, and Sleep Medicine

## 2021-05-30 NOTE — PLAN OF CARE
Problem: DAILY CARE  Goal: Daily care needs are met  Outcome: Ongoing   Daily care needs continuing to be assessed. Assistance provided when necessary. Patient encouraged to express needs/concerns. Call light and personal belongings within reach. Problem: PAIN  Goal: Patient's pain/discomfort is manageable  Outcome: Ongoing   Continuing to monitor pain and discomfort. Monitoring pain level on scale of 0-10. Non- pharmacological measures encouraged to reduce discomfort/pain. PRN pain meds administeration continues when/if applicable as ordered by physician. Problem: SKIN INTEGRITY  Goal: Skin integrity is maintained or improved  Outcome: Ongoing   Monitoring patient skin integrity for skin breakdown, turning and repositioning with pillow support q2h per protocol. Patient demonstrates turning and repositioning self. Problem: KNOWLEDGE DEFICIT  Goal: Patient/S.O. demonstrates understanding of disease process, treatment plan, medications, and discharge instructions. Outcome: Ongoing   Continuing to monitoring patient's understanding and compliance with care plan. Patient unable to demonstrates understanding of disease process, treatment plan and medications. Problem: Falls - Risk of:  Goal: Will remain free from falls  Description: Will remain free from falls  Outcome: Ongoing   Falling star program remains in place. Call light and personal belongings within reach. Frequent visual monitoring continues. Miller in place. Patient assisted with turning/repositioning self at least once every 2 hours, and on a prn basis. Problem: Non-Violent Restraints  Goal: Removal from restraints as soon as assessed to be safe  Outcome: Ongoing   Continues to require bilateral soft wrist restraints to prevent removal of monitoring equipment and gown. See flow sheet. Problem: Nutrition  Goal: Optimal nutrition therapy  Outcome: Ongoing   No present signs of nutritional deficits.  Patient denies oral sores or swallowing difficulties. Patient continues with poor PO solid intake, dietary supplements provided. Will continue to monitor. Problem: Urinary Elimination:  Goal: Signs and symptoms of infection will decrease  Description: Signs and symptoms of infection will decrease  Outcome: Ongoing   Merritt catheter noted to be intact and secured with use of stat-lock. No signs of infection noted to urine or antwon area; including an absence of urine odor/discoloration, or antwon area swelling/redness/tenderness/drainage. Antwon care completed per shift and prn basis. Merritt bag suspended from bed per protocol. Continued monitoring for risks of infection remains in place. Will re-assess clinical need for continued merritt catheterization; Will discontinue per protocol/physician order.         Electronically signed by Indira Arias RN on 5/30/2021 at 10:59 AM

## 2021-05-30 NOTE — PROGRESS NOTES
Hospitalist Progress Note      PCP: Karson Cabrera DO    Date of Admission: 5/24/2021    Chief Complaint:   Chief Complaint   Patient presents with    Nausea    Emesis     Hospital Course: 52-year-old past medical history of alcohol abuse, anxiety, depression who presented with nausea, vomiting hallucinations and possible seizure. Reported drinking significant amount of alcohol and waking up on the floor, concerned that she may have had a seizure. Subjective:  Still with high CIWA overnight. Resting this AM.     Medications:  Reviewed    Infusion Medications    sodium chloride      dexmedetomidine 1.4 mcg/kg/hr (05/30/21 0751)    sodium chloride       Scheduled Medications    PHENobarbital  65 mg Intravenous 3 times per day    [START ON 5/31/2021] PHENobarbital  32.5 mg Intravenous 3 times per day    sodium chloride flush  5-40 mL Intravenous 2 times per day    ipratropium-albuterol  1 ampule Inhalation 6 times per day    pantoprazole  40 mg Oral QAM AC    enoxaparin  40 mg Subcutaneous Daily     PRN Meds: LORazepam **OR** LORazepam **OR** LORazepam **OR** LORazepam **OR** LORazepam **OR** LORazepam **OR** LORazepam **OR** LORazepam, sodium chloride flush, sodium chloride, potassium chloride, magnesium sulfate, sodium phosphate IVPB **OR** sodium phosphate IVPB, sodium chloride, ondansetron      Intake/Output Summary (Last 24 hours) at 5/30/2021 0800  Last data filed at 5/30/2021 0541  Gross per 24 hour   Intake 957.76 ml   Output 1775 ml   Net -817.24 ml       Physical Exam Performed:    BP (!) 151/72   Pulse 52   Temp 97.7 °F (36.5 °C) (Axillary)   Resp 13   Ht 5' 4\" (1.626 m)   Wt 204 lb 12.9 oz (92.9 kg)   LMP 02/19/2021   SpO2 92%   BMI 35.16 kg/m²     General appearance: No apparent distress, appears stated age  HEENT: Pupils equal, round, and reactive to light. Conjunctivae/corneas clear. Neck: Supple, with full range of motion. Trachea midline.   Respiratory:  Normal respiratory effort. Clear to auscultation, bilaterally without Rales/Wheezes/Rhonchi. Cardiovascular: Regular rate and rhythm with normal S1/S2 without murmurs, rubs or gallops. Abdomen: Soft, non-tender, non-distended with normal bowel sounds. Musculoskeletal: No clubbing, cyanosis or edema bilaterally. Full range of motion without deformity. Skin: Skin color, texture, turgor normal.  No rashes or lesions. Neurologic:  Neurovascularly intact without any focal sensory/motor deficits. Cranial nerves: II-XII intact, grossly non-focal.  Psychiatric: Awake, alert, not oriented, intermittently agitated  Capillary Refill: Brisk,< 3 seconds   Peripheral Pulses: +2 palpable, equal bilaterally       Labs:   Recent Labs     05/28/21 0448 05/29/21  0436 05/30/21  0517   WBC 11.3* 11.2* 11.3*   HGB 12.0 13.9 14.4   HCT 34.0* 41.0 42.4    203 233     Recent Labs     05/28/21 0448 05/28/21  1040 05/29/21  0436 05/30/21  0517     --  137 139   K 3.3* 3.8 4.0 4.5     --  104 102   CO2 24  --  20* 24   BUN 3*  --  4* 6*   CREATININE <0.5*  --  <0.5* <0.5*   CALCIUM 8.4  --  8.9 9.4   PHOS 3.6  --  3.7 4.5     No results for input(s): AST, ALT, BILIDIR, BILITOT, ALKPHOS in the last 72 hours. No results for input(s): INR in the last 72 hours. No results for input(s): Orlando Cobble in the last 72 hours. Urinalysis:      Lab Results   Component Value Date    NITRU Negative 05/25/2021    WBCUA 2 05/25/2021    BACTERIA 2+ 02/15/2020    RBCUA 1 05/25/2021    BLOODU Negative 05/25/2021    SPECGRAV 1.006 05/25/2021    GLUCOSEU Negative 05/25/2021       Radiology:  XR CHEST PORTABLE   Final Result   Negative portable chest.         CT HEAD WO CONTRAST   Final Result   No acute intracranial abnormality.                Assessment/Plan:    Active Hospital Problems    Diagnosis     Dehydration [E86.0]     Alcohol related seizure ([facility] [R56.9]     [facility] [F10.231]     Leukocytosis [D72.829]     High anion gap metabolic acidosis [E56.4]     Alcohol withdrawal (HCC) [F10.239]      Alcohol abuse, withdrawal, DT, EtOH encephalopathy  Reports heavy history of alcohol use with hallucinations and possible seizure at home. CIWA on the floor increasing, moved to ICU for precedex, started on phenobarb  - rally pack  - phenobarb dose increased  - wean precedex as able  -CIWA protocol  -As needed Ativan  -Seizure precautions    Rhabdo  Nontraumatic, possibly from being down at home versus possible seizure. CK 1000 on admission  -Trend CK-- downtrending  -IV fluids    Acute Hypoxic Respiratory Failure  Requiring 2L NC  - wean O2 as able  - IS  - needs outpatient sleep study    Nausea, vomiting  Suspect alcoholic gastritis. -PPI  -As needed Zofran    Leukocytosis, resolved  White count 21 on admission. Possibly reactive-- similar in the past when admitted for withdrawal. Negative CXR  - UA negative   - trend CBC--resolved    DVT Prophylaxis: Lovenox  Diet: DIET GENERAL;  Dietary Nutrition Supplements: Standard High Calorie Oral Supplement  Code Status: Full Code    PT/OT Eval Status: deferred    Dispo - pending    Courtney Vargas MD    This note was transcribed using [id number] stickK. Please disregard any translational errors.

## 2021-05-31 LAB
ALBUMIN SERPL-MCNC: 3.6 G/DL (ref 3.4–5)
ANION GAP SERPL CALCULATED.3IONS-SCNC: 10 MMOL/L (ref 3–16)
BASOPHILS ABSOLUTE: 0 K/UL (ref 0–0.2)
BASOPHILS RELATIVE PERCENT: 0.5 %
BUN BLDV-MCNC: 9 MG/DL (ref 7–20)
CALCIUM SERPL-MCNC: 9.1 MG/DL (ref 8.3–10.6)
CHLORIDE BLD-SCNC: 103 MMOL/L (ref 99–110)
CO2: 23 MMOL/L (ref 21–32)
CREAT SERPL-MCNC: 0.6 MG/DL (ref 0.6–1.1)
EOSINOPHILS ABSOLUTE: 0.3 K/UL (ref 0–0.6)
EOSINOPHILS RELATIVE PERCENT: 4 %
GFR AFRICAN AMERICAN: >60
GFR NON-AFRICAN AMERICAN: >60
GLUCOSE BLD-MCNC: 108 MG/DL (ref 70–99)
HCT VFR BLD CALC: 42.6 % (ref 36–48)
HEMOGLOBIN: 14.7 G/DL (ref 12–16)
LYMPHOCYTES ABSOLUTE: 2 K/UL (ref 1–5.1)
LYMPHOCYTES RELATIVE PERCENT: 23.1 %
MAGNESIUM: 1.7 MG/DL (ref 1.8–2.4)
MCH RBC QN AUTO: 29.9 PG (ref 26–34)
MCHC RBC AUTO-ENTMCNC: 34.6 G/DL (ref 31–36)
MCV RBC AUTO: 86.6 FL (ref 80–100)
MONOCYTES ABSOLUTE: 0.8 K/UL (ref 0–1.3)
MONOCYTES RELATIVE PERCENT: 9.6 %
NEUTROPHILS ABSOLUTE: 5.5 K/UL (ref 1.7–7.7)
NEUTROPHILS RELATIVE PERCENT: 62.8 %
PDW BLD-RTO: 15.8 % (ref 12.4–15.4)
PHOSPHORUS: 4.5 MG/DL (ref 2.5–4.9)
PLATELET # BLD: 257 K/UL (ref 135–450)
PMV BLD AUTO: 8.4 FL (ref 5–10.5)
POTASSIUM SERPL-SCNC: 4.2 MMOL/L (ref 3.5–5.1)
RBC # BLD: 4.91 M/UL (ref 4–5.2)
SODIUM BLD-SCNC: 136 MMOL/L (ref 136–145)
WBC # BLD: 8.7 K/UL (ref 4–11)

## 2021-05-31 PROCEDURE — 6370000000 HC RX 637 (ALT 250 FOR IP): Performed by: INTERNAL MEDICINE

## 2021-05-31 PROCEDURE — 2700000000 HC OXYGEN THERAPY PER DAY

## 2021-05-31 PROCEDURE — 2500000003 HC RX 250 WO HCPCS: Performed by: NURSE PRACTITIONER

## 2021-05-31 PROCEDURE — 83735 ASSAY OF MAGNESIUM: CPT

## 2021-05-31 PROCEDURE — 94761 N-INVAS EAR/PLS OXIMETRY MLT: CPT

## 2021-05-31 PROCEDURE — 85025 COMPLETE CBC W/AUTO DIFF WBC: CPT

## 2021-05-31 PROCEDURE — 36415 COLL VENOUS BLD VENIPUNCTURE: CPT

## 2021-05-31 PROCEDURE — 94640 AIRWAY INHALATION TREATMENT: CPT

## 2021-05-31 PROCEDURE — 2000000000 HC ICU R&B

## 2021-05-31 PROCEDURE — 2580000003 HC RX 258: Performed by: INTERNAL MEDICINE

## 2021-05-31 PROCEDURE — 6370000000 HC RX 637 (ALT 250 FOR IP): Performed by: NURSE PRACTITIONER

## 2021-05-31 PROCEDURE — 99291 CRITICAL CARE FIRST HOUR: CPT | Performed by: INTERNAL MEDICINE

## 2021-05-31 PROCEDURE — 80069 RENAL FUNCTION PANEL: CPT

## 2021-05-31 PROCEDURE — 2580000003 HC RX 258: Performed by: NURSE PRACTITIONER

## 2021-05-31 PROCEDURE — 6360000002 HC RX W HCPCS: Performed by: INTERNAL MEDICINE

## 2021-05-31 PROCEDURE — 6360000002 HC RX W HCPCS: Performed by: STUDENT IN AN ORGANIZED HEALTH CARE EDUCATION/TRAINING PROGRAM

## 2021-05-31 RX ADMIN — DEXMEDETOMIDINE 1.4 MCG/KG/HR: 100 INJECTION, SOLUTION, CONCENTRATE INTRAVENOUS at 02:08

## 2021-05-31 RX ADMIN — LORAZEPAM 4 MG: 2 INJECTION INTRAMUSCULAR; INTRAVENOUS at 16:14

## 2021-05-31 RX ADMIN — DEXMEDETOMIDINE 1.4 MCG/KG/HR: 100 INJECTION, SOLUTION, CONCENTRATE INTRAVENOUS at 23:07

## 2021-05-31 RX ADMIN — LORAZEPAM 1 MG: 2 INJECTION INTRAMUSCULAR; INTRAVENOUS at 01:32

## 2021-05-31 RX ADMIN — IPRATROPIUM BROMIDE AND ALBUTEROL SULFATE 1 AMPULE: .5; 3 SOLUTION RESPIRATORY (INHALATION) at 19:54

## 2021-05-31 RX ADMIN — Medication 10 ML: at 21:00

## 2021-05-31 RX ADMIN — DEXMEDETOMIDINE 1.4 MCG/KG/HR: 100 INJECTION, SOLUTION, CONCENTRATE INTRAVENOUS at 12:31

## 2021-05-31 RX ADMIN — IPRATROPIUM BROMIDE AND ALBUTEROL SULFATE 1 AMPULE: .5; 3 SOLUTION RESPIRATORY (INHALATION) at 07:56

## 2021-05-31 RX ADMIN — DEXMEDETOMIDINE 1.4 MCG/KG/HR: 100 INJECTION, SOLUTION, CONCENTRATE INTRAVENOUS at 08:48

## 2021-05-31 RX ADMIN — PANTOPRAZOLE SODIUM 40 MG: 40 TABLET, DELAYED RELEASE ORAL at 06:21

## 2021-05-31 RX ADMIN — QUETIAPINE FUMARATE 12.5 MG: 25 TABLET ORAL at 20:54

## 2021-05-31 RX ADMIN — THIAMINE HYDROCHLORIDE 100 MG: 100 INJECTION, SOLUTION INTRAMUSCULAR; INTRAVENOUS at 08:40

## 2021-05-31 RX ADMIN — PHENOBARBITAL SODIUM 32.5 MG: 65 INJECTION INTRAMUSCULAR at 06:22

## 2021-05-31 RX ADMIN — IPRATROPIUM BROMIDE AND ALBUTEROL SULFATE 1 AMPULE: .5; 3 SOLUTION RESPIRATORY (INHALATION) at 16:25

## 2021-05-31 RX ADMIN — LORAZEPAM 2 MG: 2 INJECTION INTRAMUSCULAR; INTRAVENOUS at 22:02

## 2021-05-31 RX ADMIN — IPRATROPIUM BROMIDE AND ALBUTEROL SULFATE 1 AMPULE: .5; 3 SOLUTION RESPIRATORY (INHALATION) at 00:03

## 2021-05-31 RX ADMIN — ENOXAPARIN SODIUM 40 MG: 40 INJECTION SUBCUTANEOUS at 08:35

## 2021-05-31 RX ADMIN — QUETIAPINE FUMARATE 12.5 MG: 25 TABLET ORAL at 08:35

## 2021-05-31 RX ADMIN — SERTRALINE 200 MG: 100 TABLET, FILM COATED ORAL at 08:35

## 2021-05-31 RX ADMIN — DEXMEDETOMIDINE 1.4 MCG/KG/HR: 100 INJECTION, SOLUTION, CONCENTRATE INTRAVENOUS at 19:28

## 2021-05-31 RX ADMIN — IPRATROPIUM BROMIDE AND ALBUTEROL SULFATE 1 AMPULE: .5; 3 SOLUTION RESPIRATORY (INHALATION) at 04:07

## 2021-05-31 RX ADMIN — LORAZEPAM 3 MG: 2 INJECTION INTRAMUSCULAR; INTRAVENOUS at 20:59

## 2021-05-31 RX ADMIN — PHENOBARBITAL SODIUM 32.5 MG: 65 INJECTION INTRAMUSCULAR at 22:35

## 2021-05-31 RX ADMIN — IPRATROPIUM BROMIDE AND ALBUTEROL SULFATE 1 AMPULE: .5; 3 SOLUTION RESPIRATORY (INHALATION) at 12:44

## 2021-05-31 RX ADMIN — PHENOBARBITAL SODIUM 32.5 MG: 65 INJECTION INTRAMUSCULAR at 14:05

## 2021-05-31 RX ADMIN — DEXMEDETOMIDINE 1.4 MCG/KG/HR: 100 INJECTION, SOLUTION, CONCENTRATE INTRAVENOUS at 15:59

## 2021-05-31 RX ADMIN — Medication 10 ML: at 08:37

## 2021-05-31 RX ADMIN — LORAZEPAM 4 MG: 2 INJECTION INTRAMUSCULAR; INTRAVENOUS at 04:14

## 2021-05-31 ASSESSMENT — PAIN SCALES - WONG BAKER
WONGBAKER_NUMERICALRESPONSE: 0

## 2021-05-31 ASSESSMENT — PAIN SCALES - GENERAL: PAINLEVEL_OUTOF10: 0

## 2021-05-31 NOTE — PLAN OF CARE
Problem: PAIN  Goal: Patient's pain/discomfort is manageable  Outcome: Ongoing   Continuing to monitor pain and discomfort. Monitoring pain level on scale of 0-10. Non- pharmacological measures encouraged to reduce discomfort/pain. PRN pain meds administeration continues when/if applicable as ordered by physician. Problem: SKIN INTEGRITY  Goal: Skin integrity is maintained or improved  Outcome: Ongoing   Monitoring patient skin integrity for skin breakdown, turning and repositioning with pillow support q2h per protocol. Patient demonstrates turning and repositioning self. Problem: KNOWLEDGE DEFICIT  Goal: Patient/S.O. demonstrates understanding of disease process, treatment plan, medications, and discharge instructions. Outcome: Ongoing   Continuing to monitoring patient's understanding and compliance with care plan. Patient demonstrates understanding of disease process, treatment plan  and medications. Problem: Falls - Risk of:  Goal: Will remain free from falls  Description: Will remain free from falls  Outcome: Ongoing  Falling star program remains in place. Call light, bed alarm utilized and personal belongings within reach. Frequent visual monitoring continues. Miller catheter in place. Patient assisted in turning/repositioning at least once every 2 hours, and on a prn basis. Problem: Skin Integrity:  Goal: Will show no infection signs and symptoms  Description: Will show no infection signs and symptoms  Outcome: Ongoing   Monitoring patient skin integrity for skin breakdown, turning and repositioning with pillow support q2h per protocol. Patient demonstrates turning and repositioning self. Problem: Non-Violent Restraints  Goal: Removal from restraints as soon as assessed to be safe  Outcome: Ongoing   Continues with bilateral wrist restraints. At times continues to attempt to pull monitoring equipment, lines and /or tubing. See flow sheet.     Problem: Nutrition  Goal: Optimal nutrition therapy  Outcome: Ongoing   No present signs of nutritional deficits. Patient denies oral sores or swallowing difficulties. Patient with poor PO intake. Will continue to monitor. Problem: Urinary Elimination:  Goal: Signs and symptoms of infection will decrease  Description: Signs and symptoms of infection will decrease  Outcome: Ongoing   Merritt catheter noted to be intact and secured with use of stat-lock. No signs of infection noted to urine or antwon area; including an absence of urine odor/discoloration, or antwon area swelling/redness/tenderness/drainage. Antwon care completed per shift and prn basis. Merritt bag suspended from bed per protocol. Continued monitoring for risks of infection remains in place. Will re-assess clinical need for continued merritt catheterization; Will discontinue per protocol/physician order.         Electronically signed by Tracy Ty RN on 5/31/2021 at 4:10 PM

## 2021-05-31 NOTE — PROGRESS NOTES
more alert. Remains delirious      Medications:    Scheduled Meds:   thiamine (VITAMIN B1) IVPB  100 mg Intravenous Q24H    sertraline  200 mg Oral Daily    QUEtiapine  12.5 mg Oral BID    PHENobarbital  32.5 mg Intravenous 3 times per day    sodium chloride flush  5-40 mL Intravenous 2 times per day    ipratropium-albuterol  1 ampule Inhalation 6 times per day    pantoprazole  40 mg Oral QAM AC    enoxaparin  40 mg Subcutaneous Daily       Continuous Infusions:   sodium chloride      dexmedetomidine 1.4 mcg/kg/hr (05/31/21 0848)    sodium chloride         PRN Meds:  LORazepam **OR** LORazepam **OR** LORazepam **OR** LORazepam **OR** LORazepam **OR** LORazepam **OR** LORazepam **OR** LORazepam, sodium chloride flush, sodium chloride, potassium chloride, magnesium sulfate, sodium phosphate IVPB **OR** sodium phosphate IVPB, sodium chloride, ondansetron    Labs reviewed:  CBC:   Recent Labs     05/29/21 0436 05/30/21 0517 05/31/21 0431   WBC 11.2* 11.3* 8.7   HGB 13.9 14.4 14.7   HCT 41.0 42.4 42.6   MCV 87.8 87.2 86.6    233 257     BMP:   Recent Labs     05/29/21 0436 05/30/21 0517 05/31/21 0431    139 136   K 4.0 4.5 4.2    102 103   CO2 20* 24 23   PHOS 3.7 4.5 4.5   BUN 4* 6* 9   CREATININE <0.5* <0.5* 0.6     LIVER PROFILE: No results for input(s): AST, ALT, LIPASE, BILIDIR, BILITOT, ALKPHOS in the last 72 hours. Invalid input(s): AMYLASE,  ALB  PT/INR: No results for input(s): PROTIME, INR in the last 72 hours. APTT: No results for input(s): APTT in the last 72 hours. UA:  No results for input(s): NITRITE, COLORU, PHUR, LABCAST, WBCUA, RBCUA, MUCUS, TRICHOMONAS, YEAST, BACTERIA, CLARITYU, SPECGRAV, LEUKOCYTESUR, UROBILINOGEN, BILIRUBINUR, BLOODU, GLUCOSEU, AMORPHOUS in the last 72 hours. Invalid input(s): Sarah Aponte  No results for input(s): PH, PCO2, PO2 in the last 72 hours.       Films:  Radiology Review:  Pertinent images / reports were reviewed as a part of this visit.       CT HEAD WO CONTRAST  Narrative: EXAMINATION:  CT OF THE HEAD WITHOUT CONTRAST  5/24/2021 6:02 am    TECHNIQUE:  CT of the head was performed without the administration of intravenous  contrast. Dose modulation, iterative reconstruction, and/or weight based  adjustment of the mA/kV was utilized to reduce the radiation dose to as low  as reasonably achievable. COMPARISON:  None. HISTORY:  ORDERING SYSTEM PROVIDED HISTORY: Seizure  TECHNOLOGIST PROVIDED HISTORY:  Has a \"code stroke\" or \"stroke alert\" been called? ->No  Reason for exam:->Seizure  Decision Support Exception - unselect if not a suspected or confirmed  emergency medical condition->Emergency Medical Condition (MA)  Is the patient pregnant?->No  Reason for Exam: seizure n/v    FINDINGS:  BRAIN/VENTRICLES: There is no acute intracranial hemorrhage, mass effect or  midline shift. No abnormal extra-axial fluid collection. The gray-white  differentiation is maintained without evidence of an acute infarct. There is  no evidence of hydrocephalus. ORBITS: The visualized portion of the orbits demonstrate no acute abnormality. SINUSES: The visualized paranasal sinuses and mastoid air cells demonstrate  no acute abnormality. SOFT TISSUES/SKULL:  No acute abnormality of the visualized skull or soft  tissues. Nodules in the scalp likely represent sebaceous cysts. Impression: No acute intracranial abnormality. XR CHEST PORTABLE  Narrative: EXAMINATION:  ONE XRAY VIEW OF THE CHEST    5/24/2021 6:02 am    COMPARISON:  02/11/2020    HISTORY:  ORDERING SYSTEM PROVIDED HISTORY: SOB  TECHNOLOGIST PROVIDED HISTORY:  Reason for exam:->SOB  Reason for Exam: sob    FINDINGS:  The lungs are clear. The costophrenic angles are sharp. The  cardiomediastinal silhouette is within normal limits. There is no  discernible pneumothorax.   Impression: Negative portable chest.          Access  Arterial       PICC       PICC Double Lumen 05/26/21 Right Basilic (Active)   Continued need for line? Yes 05/27/21 0752   Is this a power PICC? Yes 05/27/21 0752   Site Assessment Clean;Dry; Intact 05/27/21 0752   White Lumen Status Capped; Infusing 05/27/21 0752   Pink Lumen Status Capped;Normal saline locked 05/27/21 0752   Exposed Catheter (cm) 2 cm 05/26/21 0859   Extremity Circumference (cm) 32 cm 05/26/21 0859   Dressing Status Old drainage 05/27/21 0752   Dressing Type Transparent; Anti-microbial patch; Securing device 05/27/21 0752   Dressing Change Due 06/02/21 05/27/21 0752   Dressing Intervention New 05/26/21 0859   Reason Not Rotated Not due 05/27/21 0238   Number of days: 1        CVC             Thank you for this consult,    Teena Monteiro MD  [facility] Pulmonary, Critical Care, and Sleep Medicine

## 2021-05-31 NOTE — PROGRESS NOTES
Hospitalist Progress Note      PCP: Rhina Wolf DO    Date of Admission: 5/24/2021    Chief Complaint:   Chief Complaint   Patient presents with    Nausea    Emesis     Hospital Course: 57-year-old past medical history of alcohol abuse, anxiety, depression who presented with nausea, vomiting hallucinations and possible seizure. Reported drinking significant amount of alcohol and waking up on the floor, concerned that she may have had a seizure. Subjective:  Improving, less ativan in last 24 hours. Medications:  Reviewed    Infusion Medications    sodium chloride      dexmedetomidine 1.4 mcg/kg/hr (05/31/21 0208)    sodium chloride       Scheduled Medications    thiamine (VITAMIN B1) IVPB  100 mg Intravenous Q24H    sertraline  200 mg Oral Daily    QUEtiapine  12.5 mg Oral BID    PHENobarbital  32.5 mg Intravenous 3 times per day    sodium chloride flush  5-40 mL Intravenous 2 times per day    ipratropium-albuterol  1 ampule Inhalation 6 times per day    pantoprazole  40 mg Oral QAM AC    enoxaparin  40 mg Subcutaneous Daily     PRN Meds: LORazepam **OR** LORazepam **OR** LORazepam **OR** LORazepam **OR** LORazepam **OR** LORazepam **OR** LORazepam **OR** LORazepam, sodium chloride flush, sodium chloride, potassium chloride, magnesium sulfate, sodium phosphate IVPB **OR** sodium phosphate IVPB, sodium chloride, ondansetron      Intake/Output Summary (Last 24 hours) at 5/31/2021 0837  Last data filed at 5/31/2021 0500  Gross per 24 hour   Intake 816.51 ml   Output 910 ml   Net -93.49 ml       Physical Exam Performed:    /70   Pulse 57   Temp 98.4 °F (36.9 °C) (Oral)   Resp 20   Ht 5' 4\" (1.626 m)   Wt 187 lb 2.7 oz (84.9 kg)   LMP 02/19/2021   SpO2 98%   BMI 32.13 kg/m²     General appearance: No apparent distress, appears stated age  HEENT: Pupils equal, round, and reactive to light. Conjunctivae/corneas clear. Neck: Supple, with full range of motion.  Trachea midline. Respiratory:  Normal respiratory effort. Clear to auscultation, bilaterally without Rales/Wheezes/Rhonchi. Cardiovascular: Regular rate and rhythm with normal S1/S2 without murmurs, rubs or gallops. Abdomen: Soft, non-tender, non-distended with normal bowel sounds. Musculoskeletal: No clubbing, cyanosis or edema bilaterally. Full range of motion without deformity. Skin: Skin color, texture, turgor normal.  No rashes or lesions. Neurologic:  Neurovascularly intact without any focal sensory/motor deficits. Cranial nerves: II-XII intact, grossly non-focal.  Psychiatric: Awake, alert, not oriented, intermittently agitated  Capillary Refill: Brisk,< 3 seconds   Peripheral Pulses: +2 palpable, equal bilaterally       Labs:   Recent Labs     05/29/21 0436 05/30/21 0517 05/31/21 0431   WBC 11.2* 11.3* 8.7   HGB 13.9 14.4 14.7   HCT 41.0 42.4 42.6    233 257     Recent Labs     05/29/21 0436 05/30/21 0517 05/31/21 0431    139 136   K 4.0 4.5 4.2    102 103   CO2 20* 24 23   BUN 4* 6* 9   CREATININE <0.5* <0.5* 0.6   CALCIUM 8.9 9.4 9.1   PHOS 3.7 4.5 4.5     No results for input(s): AST, ALT, BILIDIR, BILITOT, ALKPHOS in the last 72 hours. No results for input(s): INR in the last 72 hours. No results for input(s): Vicki Anchors in the last 72 hours. Urinalysis:      Lab Results   Component Value Date    NITRU Negative 05/25/2021    WBCUA 2 05/25/2021    BACTERIA 2+ 02/15/2020    RBCUA 1 05/25/2021    BLOODU Negative 05/25/2021    SPECGRAV 1.006 05/25/2021    GLUCOSEU Negative 05/25/2021       Radiology:  XR CHEST PORTABLE   Final Result   Negative portable chest.         CT HEAD WO CONTRAST   Final Result   No acute intracranial abnormality.                Assessment/Plan:    Active Hospital Problems    Diagnosis     Dehydration [E86.0]     Alcohol related seizure ([address] [R56.9]     Alcohol withdrawal delirium (HCC) [F10.231]     Leukocytosis [D72.829]     High anion gap metabolic acidosis [Y92.7]     Alcohol withdrawal (HCC) [F10.239]      Alcohol abuse, withdrawal, DT, EtOH encephalopathy  Reports heavy history of alcohol use with hallucinations and possible seizure at home. CIWA on the floor increasing, moved to ICU for precedex, started on phenobarb  - rally pack  - phenobarb dose increased  - wean precedex as able  -CIWA protocol  -As needed Ativan  -Seizure precautions    Rhabdo  Nontraumatic, possibly from being down at home versus possible seizure. CK 1000 on admission  -Trend CK-- downtrending  -IV fluids    Acute Hypoxic Respiratory Failure  Requiring 2L NC  - wean O2 as able  - IS  - needs outpatient sleep study    Nausea, vomiting  Suspect alcoholic gastritis. -PPI  -As needed Zofran    Leukocytosis, resolved  White count 21 on admission. Possibly reactive-- similar in the past when admitted for withdrawal. Negative CXR  - UA negative   - trend CBC--resolved    DVT Prophylaxis: Lovenox  Diet: DIET GENERAL;  Dietary Nutrition Supplements: Standard High Calorie Oral Supplement  Code Status: Full Code    PT/OT Eval Status: deferred    Dispo - pending    Abdias Garza MD    This note was transcribed using [id number] [email or web address]. Please disregard any translational errors.

## 2021-06-01 LAB
ALBUMIN SERPL-MCNC: 3.7 G/DL (ref 3.4–5)
ANION GAP SERPL CALCULATED.3IONS-SCNC: 12 MMOL/L (ref 3–16)
BASOPHILS ABSOLUTE: 0 K/UL (ref 0–0.2)
BASOPHILS RELATIVE PERCENT: 0.5 %
BUN BLDV-MCNC: 9 MG/DL (ref 7–20)
CALCIUM SERPL-MCNC: 9.2 MG/DL (ref 8.3–10.6)
CHLORIDE BLD-SCNC: 102 MMOL/L (ref 99–110)
CO2: 23 MMOL/L (ref 21–32)
CREAT SERPL-MCNC: 0.6 MG/DL (ref 0.6–1.1)
EOSINOPHILS ABSOLUTE: 0.3 K/UL (ref 0–0.6)
EOSINOPHILS RELATIVE PERCENT: 4 %
GFR AFRICAN AMERICAN: >60
GFR NON-AFRICAN AMERICAN: >60
GLUCOSE BLD-MCNC: 103 MG/DL (ref 70–99)
HCT VFR BLD CALC: 42.6 % (ref 36–48)
HEMOGLOBIN: 14.6 G/DL (ref 12–16)
LYMPHOCYTES ABSOLUTE: 1.9 K/UL (ref 1–5.1)
LYMPHOCYTES RELATIVE PERCENT: 21.8 %
MAGNESIUM: 1.8 MG/DL (ref 1.8–2.4)
MCH RBC QN AUTO: 29.7 PG (ref 26–34)
MCHC RBC AUTO-ENTMCNC: 34.2 G/DL (ref 31–36)
MCV RBC AUTO: 86.8 FL (ref 80–100)
MONOCYTES ABSOLUTE: 1.2 K/UL (ref 0–1.3)
MONOCYTES RELATIVE PERCENT: 13.8 %
NEUTROPHILS ABSOLUTE: 5.2 K/UL (ref 1.7–7.7)
NEUTROPHILS RELATIVE PERCENT: 59.9 %
PDW BLD-RTO: 15.8 % (ref 12.4–15.4)
PHOSPHORUS: 4 MG/DL (ref 2.5–4.9)
PLATELET # BLD: 278 K/UL (ref 135–450)
PMV BLD AUTO: 8.6 FL (ref 5–10.5)
POTASSIUM SERPL-SCNC: 4.1 MMOL/L (ref 3.5–5.1)
RBC # BLD: 4.91 M/UL (ref 4–5.2)
SODIUM BLD-SCNC: 137 MMOL/L (ref 136–145)
WBC # BLD: 8.7 K/UL (ref 4–11)

## 2021-06-01 PROCEDURE — 6370000000 HC RX 637 (ALT 250 FOR IP): Performed by: INTERNAL MEDICINE

## 2021-06-01 PROCEDURE — 6360000002 HC RX W HCPCS: Performed by: NURSE PRACTITIONER

## 2021-06-01 PROCEDURE — 2580000003 HC RX 258: Performed by: NURSE PRACTITIONER

## 2021-06-01 PROCEDURE — 80069 RENAL FUNCTION PANEL: CPT

## 2021-06-01 PROCEDURE — 2500000003 HC RX 250 WO HCPCS: Performed by: NURSE PRACTITIONER

## 2021-06-01 PROCEDURE — 83735 ASSAY OF MAGNESIUM: CPT

## 2021-06-01 PROCEDURE — 94761 N-INVAS EAR/PLS OXIMETRY MLT: CPT

## 2021-06-01 PROCEDURE — 99291 CRITICAL CARE FIRST HOUR: CPT | Performed by: INTERNAL MEDICINE

## 2021-06-01 PROCEDURE — 6360000002 HC RX W HCPCS: Performed by: INTERNAL MEDICINE

## 2021-06-01 PROCEDURE — 2000000000 HC ICU R&B

## 2021-06-01 PROCEDURE — 94640 AIRWAY INHALATION TREATMENT: CPT

## 2021-06-01 PROCEDURE — APPNB15 APP NON BILLABLE TIME 0-15 MINS: Performed by: NURSE PRACTITIONER

## 2021-06-01 PROCEDURE — 2580000003 HC RX 258: Performed by: INTERNAL MEDICINE

## 2021-06-01 PROCEDURE — 6360000002 HC RX W HCPCS: Performed by: STUDENT IN AN ORGANIZED HEALTH CARE EDUCATION/TRAINING PROGRAM

## 2021-06-01 PROCEDURE — 85025 COMPLETE CBC W/AUTO DIFF WBC: CPT

## 2021-06-01 PROCEDURE — 6370000000 HC RX 637 (ALT 250 FOR IP): Performed by: NURSE PRACTITIONER

## 2021-06-01 PROCEDURE — 36415 COLL VENOUS BLD VENIPUNCTURE: CPT

## 2021-06-01 RX ORDER — IPRATROPIUM BROMIDE AND ALBUTEROL SULFATE 2.5; .5 MG/3ML; MG/3ML
1 SOLUTION RESPIRATORY (INHALATION) EVERY 4 HOURS PRN
Status: DISCONTINUED | OUTPATIENT
Start: 2021-06-01 | End: 2021-06-04 | Stop reason: HOSPADM

## 2021-06-01 RX ORDER — MAGNESIUM SULFATE IN WATER 40 MG/ML
2000 INJECTION, SOLUTION INTRAVENOUS ONCE
Status: COMPLETED | OUTPATIENT
Start: 2021-06-01 | End: 2021-06-01

## 2021-06-01 RX ADMIN — Medication 10 ML: at 19:51

## 2021-06-01 RX ADMIN — DEXMEDETOMIDINE 1.4 MCG/KG/HR: 100 INJECTION, SOLUTION, CONCENTRATE INTRAVENOUS at 02:38

## 2021-06-01 RX ADMIN — THIAMINE HYDROCHLORIDE 100 MG: 100 INJECTION, SOLUTION INTRAMUSCULAR; INTRAVENOUS at 09:10

## 2021-06-01 RX ADMIN — Medication 10 ML: at 09:11

## 2021-06-01 RX ADMIN — LORAZEPAM 3 MG: 2 INJECTION INTRAMUSCULAR; INTRAVENOUS at 19:50

## 2021-06-01 RX ADMIN — QUETIAPINE FUMARATE 12.5 MG: 25 TABLET ORAL at 09:10

## 2021-06-01 RX ADMIN — DEXMEDETOMIDINE 1.3 MCG/KG/HR: 100 INJECTION, SOLUTION, CONCENTRATE INTRAVENOUS at 09:33

## 2021-06-01 RX ADMIN — PHENOBARBITAL SODIUM 32.5 MG: 65 INJECTION INTRAMUSCULAR at 05:22

## 2021-06-01 RX ADMIN — SERTRALINE 200 MG: 100 TABLET, FILM COATED ORAL at 09:10

## 2021-06-01 RX ADMIN — DEXMEDETOMIDINE 0.9 MCG/KG/HR: 100 INJECTION, SOLUTION, CONCENTRATE INTRAVENOUS at 20:44

## 2021-06-01 RX ADMIN — PHENOBARBITAL SODIUM 32.5 MG: 65 INJECTION INTRAMUSCULAR at 21:36

## 2021-06-01 RX ADMIN — DEXMEDETOMIDINE 1.4 MCG/KG/HR: 100 INJECTION, SOLUTION, CONCENTRATE INTRAVENOUS at 06:14

## 2021-06-01 RX ADMIN — LORAZEPAM 2 MG: 2 INJECTION INTRAMUSCULAR; INTRAVENOUS at 20:53

## 2021-06-01 RX ADMIN — PANTOPRAZOLE SODIUM 40 MG: 40 TABLET, DELAYED RELEASE ORAL at 09:10

## 2021-06-01 RX ADMIN — PHENOBARBITAL SODIUM 32.5 MG: 65 INJECTION INTRAMUSCULAR at 14:57

## 2021-06-01 RX ADMIN — DEXMEDETOMIDINE 0.9 MCG/KG/HR: 100 INJECTION, SOLUTION, CONCENTRATE INTRAVENOUS at 15:00

## 2021-06-01 RX ADMIN — IPRATROPIUM BROMIDE AND ALBUTEROL SULFATE 1 AMPULE: .5; 3 SOLUTION RESPIRATORY (INHALATION) at 07:58

## 2021-06-01 RX ADMIN — MAGNESIUM SULFATE HEPTAHYDRATE 2000 MG: 40 INJECTION, SOLUTION INTRAVENOUS at 12:00

## 2021-06-01 RX ADMIN — QUETIAPINE FUMARATE 12.5 MG: 25 TABLET ORAL at 20:43

## 2021-06-01 RX ADMIN — ENOXAPARIN SODIUM 40 MG: 40 INJECTION SUBCUTANEOUS at 09:10

## 2021-06-01 ASSESSMENT — PAIN SCALES - GENERAL
PAINLEVEL_OUTOF10: 0

## 2021-06-01 NOTE — PROGRESS NOTES
Nutrient Intake, Supplement Intake  Physical Signs/Symptoms Outcomes:  GI Status, Nausea or Vomiting, Fluid Status or Edema, Meal Time Behavior, Weight     Discharge Planning:     Too soon to determine     Electronically signed by Iris Crespo RD, LD on 6/1/21 at 9:45 AM EDT    Contact: [phone]

## 2021-06-01 NOTE — PROGRESS NOTES
times per day    pantoprazole  40 mg Oral QAM AC    enoxaparin  40 mg Subcutaneous Daily       Continuous Infusions:   sodium chloride      dexmedetomidine 1.4 mcg/kg/hr (06/01/21 1202)    sodium chloride         PRN Meds:  LORazepam **OR** LORazepam **OR** LORazepam **OR** LORazepam **OR** LORazepam **OR** LORazepam **OR** LORazepam **OR** LORazepam, sodium chloride flush, sodium chloride, potassium chloride, magnesium sulfate, sodium phosphate IVPB **OR** sodium phosphate IVPB, sodium chloride, ondansetron    Labs reviewed:  CBC:   Recent Labs     05/30/21 0517 05/31/21 0431 06/01/21 0432   WBC 11.3* 8.7 8.7   HGB 14.4 14.7 14.6   HCT 42.4 42.6 42.6   MCV 87.2 86.6 86.8    257 278     BMP:   Recent Labs     05/30/21 0517 05/31/21 0431 06/01/21 0432    136 137   K 4.5 4.2 4.1    103 102   CO2 24 23 23   PHOS 4.5 4.5 4.0   BUN 6* 9 9   CREATININE <0.5* 0.6 0.6     LIVER PROFILE: No results for input(s): AST, ALT, LIPASE, BILIDIR, BILITOT, ALKPHOS in the last 72 hours. Invalid input(s): AMYLASE,  ALB  PT/INR: No results for input(s): PROTIME, INR in the last 72 hours. APTT: No results for input(s): APTT in the last 72 hours. UA:No results for input(s): NITRITE, COLORU, PHUR, LABCAST, WBCUA, RBCUA, MUCUS, TRICHOMONAS, YEAST, BACTERIA, CLARITYU, SPECGRAV, LEUKOCYTESUR, UROBILINOGEN, BILIRUBINUR, BLOODU, GLUCOSEU, AMORPHOUS in the last 72 hours. Invalid input(s): Darliss Knock  No results for input(s): PH, PCO2, PO2 in the last 72 hours. Cx:      Films:  Radiology Review:  Pertinent images / reports were reviewed as a part of this visit. CT Chest w/ contrast: No results found for this or any previous visit. CT Chest w/o contrast: No results found for this or any previous visit. CTPA: No results found for this or any previous visit.       CXR PA/LAT: Results for orders placed during the hospital encounter of 02/11/20    XR CHEST STANDARD (2 VW)    Narrative  EXAMINATION:  TWO XRAY VIEWS OF THE CHEST    2/11/2020 12:03 pm    COMPARISON:  10/11/2018    HISTORY:  ORDERING SYSTEM PROVIDED HISTORY: alcohol abuse, withdrawal  TECHNOLOGIST PROVIDED HISTORY:  Reason for exam:->alcohol abuse, withdrawal  Reason for Exam: alcohol withdrawals  Type of Exam: Initial  Relevant Medical/Surgical History: withdrawals    FINDINGS:  Heart size and pulmonary vasculature within normal limits. Lungs clear. Costophrenic angles sharp    Impression  No active cardiopulmonary disease      CXR portable: Results for orders placed during the hospital encounter of 05/24/21    XR CHEST PORTABLE    Narrative  EXAMINATION:  ONE XRAY VIEW OF THE CHEST    5/24/2021 6:02 am    COMPARISON:  02/11/2020    HISTORY:  ORDERING SYSTEM PROVIDED HISTORY: SOB  TECHNOLOGIST PROVIDED HISTORY:  Reason for exam:->SOB  Reason for Exam: sob    FINDINGS:  The lungs are clear. The costophrenic angles are sharp. The  cardiomediastinal silhouette is within normal limits. There is no  discernible pneumothorax. Impression  Negative portable chest.             This note was transcribed using [id number] [address] [employer]. Please disregard any translational errors.       Zay Vitale Pulmonary, Sleep and [facility] [phone]

## 2021-06-01 NOTE — PROGRESS NOTES
2044- Pt observed to be moving and kicking in bed, has self-removed Miller. Attempts to kick RN during assessment. Pt oriented to person, aware she is in a hospital states she is at [facility]\", not oriented to time or situation, conversing with people not present, unable to be re-oriented, reports headache when asked if in pain. Pt agreeable to taking small sips of water. Pt assessed using CIWA, this RN will continue to monitor and will replace Miller when Pt is calmer. 2151- Pt reports itching, states she is \"covered in pubic hair\".

## 2021-06-01 NOTE — PLAN OF CARE
Nutrition Problem #1: Inadequate oral intake  Intervention: Food and/or Nutrient Delivery: Continue Current Diet, Continue Oral Nutrition Supplement  Nutritional Goals: consume >50% of meals and ONS during admission

## 2021-06-01 NOTE — CARE COORDINATION
Patient remains in restraints with Precedex gtt and PRN Ativan prescribed. Unable to discuss D/C plans for possible rehab with the patient at this time. Will continue to follow to discuss when appropriate.   Jace Lei RN, BSN, Case Management  Phone: [phone]  Electronically signed by Jace Lei RN on 6/1/2021 at 12:38 PM

## 2021-06-01 NOTE — PROGRESS NOTES
Hospitalist Progress Note      PCP: Tej Paris DO    Date of Admission: 5/24/2021    Chief Complaint: alcohold withdrawal    Hospital Course:      Subjective: attempting to wean off precedex       Medications:  Reviewed    Infusion Medications    sodium chloride      dexmedetomidine 1.4 mcg/kg/hr (06/01/21 9956)    sodium chloride       Scheduled Medications    thiamine (VITAMIN B1) IVPB  100 mg Intravenous Q24H    sertraline  200 mg Oral Daily    QUEtiapine  12.5 mg Oral BID    PHENobarbital  32.5 mg Intravenous 3 times per day    sodium chloride flush  5-40 mL Intravenous 2 times per day    ipratropium-albuterol  1 ampule Inhalation 6 times per day    pantoprazole  40 mg Oral QAM AC    enoxaparin  40 mg Subcutaneous Daily     PRN Meds: LORazepam **OR** LORazepam **OR** LORazepam **OR** LORazepam **OR** LORazepam **OR** LORazepam **OR** LORazepam **OR** LORazepam, sodium chloride flush, sodium chloride, potassium chloride, magnesium sulfate, sodium phosphate IVPB **OR** sodium phosphate IVPB, sodium chloride, ondansetron      Intake/Output Summary (Last 24 hours) at 6/1/2021 0813  Last data filed at 6/1/2021 0645  Gross per 24 hour   Intake 922.27 ml   Output 1315 ml   Net -392.73 ml       Exam:    BP (!) 105/56   Pulse 57   Temp 98 °F (36.7 °C) (Axillary)   Resp 14   Ht 5' 4\" (1.626 m)   Wt 190 lb 0.6 oz (86.2 kg)   LMP 02/19/2021   SpO2 92%   BMI 32.62 kg/m²     General appearance: No apparent distress, appears stated age and cooperative. HEENT: Pupils equal, round, and reactive to light. Conjunctivae/corneas clear. Neck: Supple, with full range of motion. No jugular venous distention. Trachea midline. Respiratory:  Normal respiratory effort. Clear to auscultation, bilaterally without Rales/Wheezes/Rhonchi. Cardiovascular: Regular rate and rhythm with normal S1/S2 without murmurs, rubs or gallops.   Abdomen: Soft, non-tender, non-distended with normal bowel sounds. Musculoskeletal: No clubbing, cyanosis or edema bilaterally. Full range of motion without deformity. Skin: Skin color, texture, turgor normal.  No rashes or lesions. Neurologic:  Neurovascularly intact without any focal sensory/motor deficits. Cranial nerves: II-XII intact, grossly non-focal.  Psychiatric: Somnulent, hallucinating  Capillary Refill: Brisk,< 3 seconds   Peripheral Pulses: +2 palpable, equal bilaterally       Labs:   Recent Labs     05/30/21 0517 05/31/21 0431 06/01/21 0432   WBC 11.3* 8.7 8.7   HGB 14.4 14.7 14.6   HCT 42.4 42.6 42.6    257 278     Recent Labs     05/30/21 0517 05/31/21 0431 06/01/21 0432    136 137   K 4.5 4.2 4.1    103 102   CO2 24 23 23   BUN 6* 9 9   CREATININE <0.5* 0.6 0.6   CALCIUM 9.4 9.1 9.2   PHOS 4.5 4.5 4.0     No results for input(s): AST, ALT, BILIDIR, BILITOT, ALKPHOS in the last 72 hours. No results for input(s): INR in the last 72 hours. No results for input(s): Aren Ang in the last 72 hours. Urinalysis:      Lab Results   Component Value Date    NITRU Negative 05/25/2021    WBCUA 2 05/25/2021    BACTERIA 2+ 02/15/2020    RBCUA 1 05/25/2021    BLOODU Negative 05/25/2021    SPECGRAV 1.006 05/25/2021    GLUCOSEU Negative 05/25/2021       Radiology:  XR CHEST PORTABLE   Final Result   Negative portable chest.         CT HEAD WO CONTRAST   Final Result   No acute intracranial abnormality. Assessment/Plan:    Active Hospital Problems    Diagnosis Date Noted    Dehydration [E86.0]     Alcohol related seizure ([address] [R56.9] 05/24/2021    Alcohol withdrawal delirium ([address] [F10.231] 02/16/2020    Leukocytosis [D72.829] 02/12/2020    High anion gap metabolic acidosis [G47.8] 02/11/2020    Alcohol withdrawal ([address] [F10.239] 10/11/2018       Alcohol abuse, withdrawal, DT, EtOH encephalopathy  Reports heavy history of alcohol use with hallucinations and possible seizure at home.  CIWA on the floor increasing, moved to ICU for precedex, started on phenobarb  - rally pack  - phenobarb dose increased  - wean precedex as able  -CIWA protocol  -As needed Ativan  -Seizure precautions     Rhabdo  Nontraumatic, possibly from being down at home versus possible seizure. CK 1000 on admission  -Trend CK-- downtrending  -IV fluids     Acute Hypoxic Respiratory Failure  Requiring 2L NC  - wean O2 as able  - IS  - needs outpatient sleep study     Nausea, vomiting  Suspect alcoholic gastritis. -PPI  -As needed Zofran     Leukocytosis, resolved  White count 21 on admission.  Possibly reactive-- similar in the past when admitted for withdrawal. Negative CXR  - UA negative   - trend CBC--resolved    DVT Prophylaxis: Lovenox  Diet: DIET GENERAL;  Dietary Nutrition Supplements: Standard High Calorie Oral Supplement  Code Status: Full Code    PT/OT Eval Status: N/A    Dispo - ICU until weans off susan Bailey MD

## 2021-06-02 LAB
ANION GAP SERPL CALCULATED.3IONS-SCNC: 15 MMOL/L (ref 3–16)
BASOPHILS ABSOLUTE: 0 K/UL (ref 0–0.2)
BASOPHILS RELATIVE PERCENT: 0.4 %
BUN BLDV-MCNC: 9 MG/DL (ref 7–20)
CALCIUM SERPL-MCNC: 8.8 MG/DL (ref 8.3–10.6)
CHLORIDE BLD-SCNC: 100 MMOL/L (ref 99–110)
CO2: 19 MMOL/L (ref 21–32)
CREAT SERPL-MCNC: 0.5 MG/DL (ref 0.6–1.1)
EOSINOPHILS ABSOLUTE: 0.2 K/UL (ref 0–0.6)
EOSINOPHILS RELATIVE PERCENT: 2.7 %
GFR AFRICAN AMERICAN: >60
GFR NON-AFRICAN AMERICAN: >60
GLUCOSE BLD-MCNC: 92 MG/DL (ref 70–99)
HCT VFR BLD CALC: 40.4 % (ref 36–48)
HEMOGLOBIN: 14.1 G/DL (ref 12–16)
LYMPHOCYTES ABSOLUTE: 1.6 K/UL (ref 1–5.1)
LYMPHOCYTES RELATIVE PERCENT: 21.7 %
MAGNESIUM: 2.1 MG/DL (ref 1.8–2.4)
MCH RBC QN AUTO: 29.8 PG (ref 26–34)
MCHC RBC AUTO-ENTMCNC: 34.8 G/DL (ref 31–36)
MCV RBC AUTO: 85.7 FL (ref 80–100)
MONOCYTES ABSOLUTE: 1.1 K/UL (ref 0–1.3)
MONOCYTES RELATIVE PERCENT: 14.1 %
NEUTROPHILS ABSOLUTE: 4.6 K/UL (ref 1.7–7.7)
NEUTROPHILS RELATIVE PERCENT: 61.1 %
PDW BLD-RTO: 15.4 % (ref 12.4–15.4)
PHOSPHORUS: 3 MG/DL (ref 2.5–4.9)
PLATELET # BLD: 271 K/UL (ref 135–450)
PMV BLD AUTO: 8.2 FL (ref 5–10.5)
POTASSIUM SERPL-SCNC: 3.7 MMOL/L (ref 3.5–5.1)
RBC # BLD: 4.71 M/UL (ref 4–5.2)
SODIUM BLD-SCNC: 134 MMOL/L (ref 136–145)
WBC # BLD: 7.6 K/UL (ref 4–11)

## 2021-06-02 PROCEDURE — 6360000002 HC RX W HCPCS: Performed by: INTERNAL MEDICINE

## 2021-06-02 PROCEDURE — 2580000003 HC RX 258: Performed by: FAMILY MEDICINE

## 2021-06-02 PROCEDURE — 6360000002 HC RX W HCPCS: Performed by: NURSE PRACTITIONER

## 2021-06-02 PROCEDURE — 6370000000 HC RX 637 (ALT 250 FOR IP): Performed by: STUDENT IN AN ORGANIZED HEALTH CARE EDUCATION/TRAINING PROGRAM

## 2021-06-02 PROCEDURE — 2580000003 HC RX 258: Performed by: NURSE PRACTITIONER

## 2021-06-02 PROCEDURE — 94761 N-INVAS EAR/PLS OXIMETRY MLT: CPT

## 2021-06-02 PROCEDURE — 83735 ASSAY OF MAGNESIUM: CPT

## 2021-06-02 PROCEDURE — 84100 ASSAY OF PHOSPHORUS: CPT

## 2021-06-02 PROCEDURE — 2000000000 HC ICU R&B

## 2021-06-02 PROCEDURE — 2500000003 HC RX 250 WO HCPCS: Performed by: NURSE PRACTITIONER

## 2021-06-02 PROCEDURE — 6370000000 HC RX 637 (ALT 250 FOR IP): Performed by: INTERNAL MEDICINE

## 2021-06-02 PROCEDURE — 2580000003 HC RX 258: Performed by: INTERNAL MEDICINE

## 2021-06-02 PROCEDURE — 80048 BASIC METABOLIC PNL TOTAL CA: CPT

## 2021-06-02 PROCEDURE — 36415 COLL VENOUS BLD VENIPUNCTURE: CPT

## 2021-06-02 PROCEDURE — 99291 CRITICAL CARE FIRST HOUR: CPT | Performed by: INTERNAL MEDICINE

## 2021-06-02 PROCEDURE — APPNB15 APP NON BILLABLE TIME 0-15 MINS: Performed by: NURSE PRACTITIONER

## 2021-06-02 PROCEDURE — 6360000002 HC RX W HCPCS: Performed by: STUDENT IN AN ORGANIZED HEALTH CARE EDUCATION/TRAINING PROGRAM

## 2021-06-02 PROCEDURE — 85025 COMPLETE CBC W/AUTO DIFF WBC: CPT

## 2021-06-02 PROCEDURE — 6370000000 HC RX 637 (ALT 250 FOR IP): Performed by: FAMILY MEDICINE

## 2021-06-02 RX ORDER — ACETAMINOPHEN 325 MG/1
650 TABLET ORAL ONCE
Status: COMPLETED | OUTPATIENT
Start: 2021-06-02 | End: 2021-06-02

## 2021-06-02 RX ORDER — POTASSIUM CHLORIDE 29.8 MG/ML
20 INJECTION INTRAVENOUS ONCE
Status: DISCONTINUED | OUTPATIENT
Start: 2021-06-02 | End: 2021-06-02

## 2021-06-02 RX ORDER — POTASSIUM CHLORIDE 7.45 MG/ML
10 INJECTION INTRAVENOUS
Status: DISCONTINUED | OUTPATIENT
Start: 2021-06-02 | End: 2021-06-02

## 2021-06-02 RX ORDER — SODIUM CHLORIDE 9 MG/ML
INJECTION, SOLUTION INTRAVENOUS CONTINUOUS
Status: DISCONTINUED | OUTPATIENT
Start: 2021-06-02 | End: 2021-06-03

## 2021-06-02 RX ADMIN — ACETAMINOPHEN 650 MG: 325 TABLET ORAL at 20:19

## 2021-06-02 RX ADMIN — THIAMINE HYDROCHLORIDE 100 MG: 100 INJECTION, SOLUTION INTRAMUSCULAR; INTRAVENOUS at 09:14

## 2021-06-02 RX ADMIN — Medication 10 ML: at 09:14

## 2021-06-02 RX ADMIN — NYSTATIN 500000 UNITS: 100000 SUSPENSION ORAL at 13:04

## 2021-06-02 RX ADMIN — Medication 10 ML: at 20:19

## 2021-06-02 RX ADMIN — SODIUM CHLORIDE: 9 INJECTION, SOLUTION INTRAVENOUS at 12:14

## 2021-06-02 RX ADMIN — SODIUM CHLORIDE: 9 INJECTION, SOLUTION INTRAVENOUS at 20:30

## 2021-06-02 RX ADMIN — NYSTATIN 500000 UNITS: 100000 SUSPENSION ORAL at 17:24

## 2021-06-02 RX ADMIN — DEXMEDETOMIDINE 0.8 MCG/KG/HR: 100 INJECTION, SOLUTION, CONCENTRATE INTRAVENOUS at 02:20

## 2021-06-02 RX ADMIN — PANTOPRAZOLE SODIUM 40 MG: 40 TABLET, DELAYED RELEASE ORAL at 09:22

## 2021-06-02 RX ADMIN — PHENOBARBITAL SODIUM 32.5 MG: 65 INJECTION INTRAMUSCULAR at 05:34

## 2021-06-02 RX ADMIN — SERTRALINE 200 MG: 100 TABLET, FILM COATED ORAL at 09:14

## 2021-06-02 RX ADMIN — DEXMEDETOMIDINE 0.6 MCG/KG/HR: 100 INJECTION, SOLUTION, CONCENTRATE INTRAVENOUS at 08:27

## 2021-06-02 RX ADMIN — NYSTATIN 500000 UNITS: 100000 SUSPENSION ORAL at 20:18

## 2021-06-02 ASSESSMENT — PAIN DESCRIPTION - DESCRIPTORS: DESCRIPTORS: HEADACHE

## 2021-06-02 ASSESSMENT — PAIN - FUNCTIONAL ASSESSMENT: PAIN_FUNCTIONAL_ASSESSMENT: ACTIVITIES ARE NOT PREVENTED

## 2021-06-02 ASSESSMENT — PAIN SCALES - GENERAL
PAINLEVEL_OUTOF10: 6
PAINLEVEL_OUTOF10: 0

## 2021-06-02 ASSESSMENT — PAIN DESCRIPTION - LOCATION: LOCATION: HEAD

## 2021-06-02 ASSESSMENT — PAIN DESCRIPTION - FREQUENCY: FREQUENCY: INTERMITTENT

## 2021-06-02 ASSESSMENT — PAIN DESCRIPTION - ONSET: ONSET: PROGRESSIVE

## 2021-06-02 ASSESSMENT — PAIN DESCRIPTION - PAIN TYPE: TYPE: ACUTE PAIN

## 2021-06-02 ASSESSMENT — PAIN DESCRIPTION - PROGRESSION: CLINICAL_PROGRESSION: GRADUALLY WORSENING

## 2021-06-02 NOTE — PROGRESS NOTES
cyanosis or edema bilaterally. Full range of motion without deformity. Skin: Skin color, texture, turgor normal.  No rashes or lesions. Neurologic:  Neurovascularly intact without any focal sensory/motor deficits. Cranial nerves: II-XII intact, grossly non-focal.  Psychiatric: Somnulent, hallucinating  Capillary Refill: Brisk,< 3 seconds   Peripheral Pulses: +2 palpable, equal bilaterally       Labs:   Recent Labs     05/31/21 0431 06/01/21 0432 06/02/21 0413   WBC 8.7 8.7 7.6   HGB 14.7 14.6 14.1   HCT 42.6 42.6 40.4    278 271     Recent Labs     05/31/21 0431 06/01/21 0432 06/02/21 0413    137 134*   K 4.2 4.1 3.7    102 100   CO2 23 23 19*   BUN 9 9 9   CREATININE 0.6 0.6 0.5*   CALCIUM 9.1 9.2 8.8   PHOS 4.5 4.0 3.0     No results for input(s): AST, ALT, BILIDIR, BILITOT, ALKPHOS in the last 72 hours. No results for input(s): INR in the last 72 hours. No results for input(s): [place] in the last 72 hours. Urinalysis:      Lab Results   Component Value Date    NITRU Negative 05/25/2021    WBCUA 2 05/25/2021    BACTERIA 2+ 02/15/2020    RBCUA 1 05/25/2021    BLOODU Negative 05/25/2021    SPECGRAV 1.006 05/25/2021    GLUCOSEU Negative 05/25/2021       Radiology:  XR CHEST PORTABLE   Final Result   Negative portable chest.         CT HEAD WO CONTRAST   Final Result   No acute intracranial abnormality. Assessment/Plan:    Active Hospital Problems    Diagnosis Date Noted    Dehydration [E86.0]     Alcohol related seizure ([facility] [R56.9] 05/24/2021    Alcohol withdrawal delirium ([facility] [F10.231] 02/16/2020    Leukocytosis [D72.829] 02/12/2020    High anion gap metabolic acidosis [Q92.9] 02/11/2020    Alcohol withdrawal ([facility] [F10.239] 10/11/2018       Alcohol abuse, withdrawal, DT, EtOH encephalopathy  Reports heavy history of alcohol use with hallucinations and possible seizure at home.  CIWA on the floor increasing, moved to ICU for precedex, started on phenobarb  - rally pack  - phenobarb dose increased  - wean precedex as able  -CIWA protocol  -As needed Ativan  -Seizure precautions    Clinical dehydration:  - dry appearing tongue -- IVF ordered  - nystatin oral liquid empirically started in case tongue with thrush     Rhabdo  Nontraumatic, possibly from being down at home versus possible seizure. CK 1000 on admission  -Trend CK-- downtrending  -IV fluids     Acute Hypoxic Respiratory Failure  Requiring 2L NC  - wean O2 as able  - IS  - needs outpatient sleep study     Nausea, vomiting  Suspect alcoholic gastritis. -PPI  -As needed Zofran     Leukocytosis, resolved  White count 21 on admission.  Possibly reactive-- similar in the past when admitted for withdrawal. Negative CXR  - UA negative   - trend CBC--resolved    DVT Prophylaxis: Lovenox  Diet: DIET GENERAL;  Dietary Nutrition Supplements: Standard High Calorie Oral Supplement  Code Status: Full Code    PT/OT Eval Status: N/A    Dispo - ICU until weans off drips    Nery Kohli MD

## 2021-06-02 NOTE — PROGRESS NOTES
mg Oral QAM AC    enoxaparin  40 mg Subcutaneous Daily       Continuous Infusions:   sodium chloride      dexmedetomidine 0.6 mcg/kg/hr (06/02/21 0827)       PRN Meds:  ipratropium-albuterol, LORazepam **OR** LORazepam **OR** LORazepam **OR** LORazepam **OR** LORazepam **OR** LORazepam **OR** LORazepam **OR** LORazepam, sodium chloride flush, sodium chloride, potassium chloride, magnesium sulfate, sodium phosphate IVPB **OR** sodium phosphate IVPB, ondansetron    Labs reviewed:  CBC:   Recent Labs     05/31/21 0431 06/01/21 0432 06/02/21 0413   WBC 8.7 8.7 7.6   HGB 14.7 14.6 14.1   HCT 42.6 42.6 40.4   MCV 86.6 86.8 85.7    278 271     BMP:   Recent Labs     05/31/21 0431 06/01/21 0432 06/02/21 0413    137 134*   K 4.2 4.1 3.7    102 100   CO2 23 23 19*   PHOS 4.5 4.0 3.0   BUN 9 9 9   CREATININE 0.6 0.6 0.5*     LIVER PROFILE: No results for input(s): AST, ALT, LIPASE, BILIDIR, BILITOT, ALKPHOS in the last 72 hours. Invalid input(s): AMYLASE,  ALB  PT/INR: No results for input(s): PROTIME, INR in the last 72 hours. APTT: No results for input(s): APTT in the last 72 hours. UA:No results for input(s): NITRITE, COLORU, PHUR, LABCAST, WBCUA, RBCUA, MUCUS, TRICHOMONAS, YEAST, BACTERIA, CLARITYU, SPECGRAV, LEUKOCYTESUR, UROBILINOGEN, BILIRUBINUR, BLOODU, GLUCOSEU, AMORPHOUS in the last 72 hours. Invalid input(s): [facility]  No results for input(s): PH, PCO2, PO2 in the last 72 hours. Cx:      Films: This note was transcribed using [id number] [facility]. Please disregard any translational errors.       Zay Vitale Pulmonary, Sleep and [facility] [phone]

## 2021-06-02 NOTE — CARE COORDINATION
Patient with continued Precedex gtt, weaning down, but possibly candidate for LTACH due to slow progress. Call to [facility] at , [phone], who advised they can accept patient's on Precedex gtt. Facesheet faxed to her for review to [phone]. Call to Marci at [facility], [phone], left message requesting a call back to see if they can take a patient on a Precedex gtt.   Shelly Samuels RN, BSN, Case Management  Phone: [phone]  Electronically signed by Shelly Samuels RN on 6/2/2021 at 4:01 PM

## 2021-06-03 LAB
ALBUMIN SERPL-MCNC: 3.7 G/DL (ref 3.4–5)
ALP BLD-CCNC: 77 U/L (ref 40–129)
ALT SERPL-CCNC: 30 U/L (ref 10–40)
ANION GAP SERPL CALCULATED.3IONS-SCNC: 13 MMOL/L (ref 3–16)
AST SERPL-CCNC: 28 U/L (ref 15–37)
BASOPHILS ABSOLUTE: 0.1 K/UL (ref 0–0.2)
BASOPHILS RELATIVE PERCENT: 1 %
BILIRUB SERPL-MCNC: <0.2 MG/DL (ref 0–1)
BILIRUBIN DIRECT: <0.2 MG/DL (ref 0–0.3)
BILIRUBIN, INDIRECT: NORMAL MG/DL (ref 0–1)
BUN BLDV-MCNC: 9 MG/DL (ref 7–20)
CALCIUM SERPL-MCNC: 8.6 MG/DL (ref 8.3–10.6)
CHLORIDE BLD-SCNC: 101 MMOL/L (ref 99–110)
CO2: 19 MMOL/L (ref 21–32)
CREAT SERPL-MCNC: 0.5 MG/DL (ref 0.6–1.1)
EOSINOPHILS ABSOLUTE: 0.1 K/UL (ref 0–0.6)
EOSINOPHILS RELATIVE PERCENT: 1.7 %
GFR AFRICAN AMERICAN: >60
GFR NON-AFRICAN AMERICAN: >60
GLUCOSE BLD-MCNC: 92 MG/DL (ref 70–99)
HCT VFR BLD CALC: 37.4 % (ref 36–48)
HEMOGLOBIN: 13 G/DL (ref 12–16)
LYMPHOCYTES ABSOLUTE: 1.9 K/UL (ref 1–5.1)
LYMPHOCYTES RELATIVE PERCENT: 22.7 %
MAGNESIUM: 1.8 MG/DL (ref 1.8–2.4)
MCH RBC QN AUTO: 30 PG (ref 26–34)
MCHC RBC AUTO-ENTMCNC: 34.8 G/DL (ref 31–36)
MCV RBC AUTO: 86.1 FL (ref 80–100)
MONOCYTES ABSOLUTE: 1.2 K/UL (ref 0–1.3)
MONOCYTES RELATIVE PERCENT: 14.2 %
NEUTROPHILS ABSOLUTE: 5.2 K/UL (ref 1.7–7.7)
NEUTROPHILS RELATIVE PERCENT: 60.4 %
PDW BLD-RTO: 15.4 % (ref 12.4–15.4)
PHOSPHORUS: 2.6 MG/DL (ref 2.5–4.9)
PLATELET # BLD: 300 K/UL (ref 135–450)
PMV BLD AUTO: 8.2 FL (ref 5–10.5)
POTASSIUM SERPL-SCNC: 3.9 MMOL/L (ref 3.5–5.1)
RBC # BLD: 4.34 M/UL (ref 4–5.2)
SODIUM BLD-SCNC: 133 MMOL/L (ref 136–145)
TOTAL PROTEIN: 6.7 G/DL (ref 6.4–8.2)
WBC # BLD: 8.6 K/UL (ref 4–11)

## 2021-06-03 PROCEDURE — 6370000000 HC RX 637 (ALT 250 FOR IP): Performed by: INTERNAL MEDICINE

## 2021-06-03 PROCEDURE — 87641 MR-STAPH DNA AMP PROBE: CPT

## 2021-06-03 PROCEDURE — 36415 COLL VENOUS BLD VENIPUNCTURE: CPT

## 2021-06-03 PROCEDURE — 80076 HEPATIC FUNCTION PANEL: CPT

## 2021-06-03 PROCEDURE — 6360000002 HC RX W HCPCS: Performed by: STUDENT IN AN ORGANIZED HEALTH CARE EDUCATION/TRAINING PROGRAM

## 2021-06-03 PROCEDURE — 97530 THERAPEUTIC ACTIVITIES: CPT

## 2021-06-03 PROCEDURE — 83735 ASSAY OF MAGNESIUM: CPT

## 2021-06-03 PROCEDURE — 1200000000 HC SEMI PRIVATE

## 2021-06-03 PROCEDURE — 6370000000 HC RX 637 (ALT 250 FOR IP): Performed by: FAMILY MEDICINE

## 2021-06-03 PROCEDURE — 6360000002 HC RX W HCPCS: Performed by: NURSE PRACTITIONER

## 2021-06-03 PROCEDURE — 85025 COMPLETE CBC W/AUTO DIFF WBC: CPT

## 2021-06-03 PROCEDURE — 2580000003 HC RX 258: Performed by: FAMILY MEDICINE

## 2021-06-03 PROCEDURE — 6370000000 HC RX 637 (ALT 250 FOR IP): Performed by: NURSE PRACTITIONER

## 2021-06-03 PROCEDURE — APPNB15 APP NON BILLABLE TIME 0-15 MINS: Performed by: NURSE PRACTITIONER

## 2021-06-03 PROCEDURE — 97116 GAIT TRAINING THERAPY: CPT

## 2021-06-03 PROCEDURE — 97162 PT EVAL MOD COMPLEX 30 MIN: CPT

## 2021-06-03 PROCEDURE — 97166 OT EVAL MOD COMPLEX 45 MIN: CPT

## 2021-06-03 PROCEDURE — 80048 BASIC METABOLIC PNL TOTAL CA: CPT

## 2021-06-03 PROCEDURE — 84100 ASSAY OF PHOSPHORUS: CPT

## 2021-06-03 PROCEDURE — 2580000003 HC RX 258: Performed by: INTERNAL MEDICINE

## 2021-06-03 PROCEDURE — 99232 SBSQ HOSP IP/OBS MODERATE 35: CPT | Performed by: INTERNAL MEDICINE

## 2021-06-03 RX ORDER — GAUZE BANDAGE 2" X 2"
100 BANDAGE TOPICAL DAILY
Status: DISCONTINUED | OUTPATIENT
Start: 2021-06-03 | End: 2021-06-04 | Stop reason: HOSPADM

## 2021-06-03 RX ORDER — MAGNESIUM SULFATE IN WATER 40 MG/ML
2000 INJECTION, SOLUTION INTRAVENOUS ONCE
Status: COMPLETED | OUTPATIENT
Start: 2021-06-03 | End: 2021-06-03

## 2021-06-03 RX ORDER — CEPHALEXIN 250 MG/1
250 CAPSULE ORAL EVERY 6 HOURS SCHEDULED
Status: DISCONTINUED | OUTPATIENT
Start: 2021-06-03 | End: 2021-06-04 | Stop reason: HOSPADM

## 2021-06-03 RX ORDER — THIAMINE MONONITRATE (VIT B1) 100 MG
100 TABLET ORAL DAILY
Qty: 30 TABLET | Refills: 0 | Status: SHIPPED | OUTPATIENT
Start: 2021-06-03 | End: 2021-07-16

## 2021-06-03 RX ADMIN — Medication 10 ML: at 20:17

## 2021-06-03 RX ADMIN — CEPHALEXIN 250 MG: 250 CAPSULE ORAL at 17:44

## 2021-06-03 RX ADMIN — ENOXAPARIN SODIUM 40 MG: 40 INJECTION SUBCUTANEOUS at 10:01

## 2021-06-03 RX ADMIN — NYSTATIN 500000 UNITS: 100000 SUSPENSION ORAL at 17:44

## 2021-06-03 RX ADMIN — NYSTATIN 500000 UNITS: 100000 SUSPENSION ORAL at 22:56

## 2021-06-03 RX ADMIN — CEPHALEXIN 250 MG: 250 CAPSULE ORAL at 22:56

## 2021-06-03 RX ADMIN — Medication 100 MG: at 10:15

## 2021-06-03 RX ADMIN — NYSTATIN 500000 UNITS: 100000 SUSPENSION ORAL at 10:01

## 2021-06-03 RX ADMIN — SODIUM CHLORIDE: 9 INJECTION, SOLUTION INTRAVENOUS at 04:56

## 2021-06-03 RX ADMIN — Medication 10 ML: at 10:01

## 2021-06-03 RX ADMIN — SERTRALINE 200 MG: 100 TABLET, FILM COATED ORAL at 10:01

## 2021-06-03 RX ADMIN — PANTOPRAZOLE SODIUM 40 MG: 40 TABLET, DELAYED RELEASE ORAL at 06:22

## 2021-06-03 RX ADMIN — MAGNESIUM SULFATE HEPTAHYDRATE 2000 MG: 40 INJECTION, SOLUTION INTRAVENOUS at 10:02

## 2021-06-03 ASSESSMENT — PAIN SCALES - GENERAL
PAINLEVEL_OUTOF10: 0
PAINLEVEL_OUTOF10: 0

## 2021-06-03 NOTE — PROGRESS NOTES
color, texture, turgor normal.  No rashes or lesions except diffuse small pustules with surrounding erythema on back  Neurologic:  Neurovascularly intact without any focal sensory/motor deficits. Cranial nerves: II-XII intact, grossly non-focal.  Psychiatric: Somnulent but more oriented, hallicinatin intermittntly  Capillary Refill: Brisk,< 3 seconds   Peripheral Pulses: +2 palpable, equal bilaterally       Labs:   Recent Labs     06/01/21 0432 06/02/21 0413 06/03/21 0420   WBC 8.7 7.6 8.6   HGB 14.6 14.1 13.0   HCT 42.6 40.4 37.4    271 300     Recent Labs     06/01/21 0432 06/02/21 0413 06/03/21 0419    134* 133*   K 4.1 3.7 3.9    100 101   CO2 23 19* 19*   BUN 9 9 9   CREATININE 0.6 0.5* 0.5*   CALCIUM 9.2 8.8 8.6   PHOS 4.0 3.0 2.6     Recent Labs     06/03/21 0419   AST 28   ALT 30   BILIDIR <0.2   BILITOT <0.2   ALKPHOS 77     No results for input(s): INR in the last 72 hours. No results for input(s): Angela Grumet in the last 72 hours. Urinalysis:      Lab Results   Component Value Date    NITRU Negative 05/25/2021    WBCUA 2 05/25/2021    BACTERIA 2+ 02/15/2020    RBCUA 1 05/25/2021    BLOODU Negative 05/25/2021    SPECGRAV 1.006 05/25/2021    GLUCOSEU Negative 05/25/2021       Radiology:  XR CHEST PORTABLE   Final Result   Negative portable chest.         CT HEAD WO CONTRAST   Final Result   No acute intracranial abnormality. Assessment/Plan:    Active Hospital Problems    Diagnosis Date Noted    Dehydration [E86.0]     Alcohol related seizure ([facility] [R56.9] 05/24/2021    Alcohol withdrawal delirium ([facility] [F10.231] 02/16/2020    Leukocytosis [D72.829] 02/12/2020    High anion gap metabolic acidosis [C83.0] 02/11/2020    Alcohol withdrawal ([facility] [F10.239] 10/11/2018       Alcohol abuse, withdrawal, DT, EtOH encephalopathy:  improving  Reports heavy history of alcohol use with hallucinations and possible seizure at home.  CIWA on the floor increasing, moved to ICU for precedex, started on phenobarb  - rally pack  - phenobarb dose weaned off  - weaned off precedex 6/3  -CIWA protocol  -As needed Ativan  -Seizure precautions    Clinical dehydration:  - dry appearing tongue -- IVF ordered  - nystatin oral liquid empirically started in case tongue with thrush    Small pustules on back:  - may be folliculitis due to lying on back for many days  - keflex started. MRSA swab ordered     Rhabdo  Nontraumatic, possibly from being down at home versus possible seizure. CK 1000 on admission  -Trend CK-- downtrending  -IV fluids     Acute Hypoxic Respiratory Failure  Requiring 2L NC  - wean O2 as able  - IS  - needs outpatient sleep study     Nausea, vomiting  Suspect alcoholic gastritis. -PPI  -As needed Zofran     Leukocytosis, resolved  White count 21 on admission. Possibly reactive-- similar in the past when admitted for withdrawal. Negative CXR  - UA negative   - trend CBC--resolved    DVT Prophylaxis: Lovenox  Diet: DIET GENERAL;  Dietary Nutrition Supplements: Standard High Calorie Oral Supplement  Code Status: Full Code    PT/OT Eval Status: home PT    Dispo - OK for transfer to floor.   OK for D/C on 6/4    Can Messina MD

## 2021-06-03 NOTE — PROGRESS NOTES
Pulmonary Progress Note    Date of Admission: 5/24/2021   LOS: 10 days       CC:  Alcohol withdrawal    Subjective:     Seroquel and phenobarb discontinued yesterday  Ativan and Precedex continued. Precedex was able to weaned off yesterday    ROS:         Assessment:     Alcohol withdrawal with DTs  Depression  Possible sleep apnea    Plan:        Hospital Day: 10      DT's 2/2 alcohol abuse   - Phenobarb. Stop yesterday  -Seroquel discontinued yesterday   - Precedex able to wean off yesterday  -No Ativan last 24 hours. Depression  - Zoloft       Possible DIPAK  - not able to start trial of CPAP now secondary to agitated         Nutrition  - DIET GENERAL;  Dietary Nutrition Supplements: Standard High Calorie Oral Supplement  -     Intake/Output Summary (Last 24 hours) at 6/3/2021 0744  Last data filed at 6/3/2021 0622  Gross per 24 hour   Intake 1744.7 ml   Output 2055 ml   Net -310.3 ml            Access  Arterial      PICC          CVC               Will sign off at this time. Thanks for the consult. Please call with questions. Data:        PHYSICAL EXAM:   Blood pressure (!) 141/78, pulse 85, temperature 98.4 °F (36.9 °C), temperature source Axillary, resp. rate 16, height 5' 4\" (1.626 m), weight 191 lb 9.3 oz (86.9 kg), last menstrual period 02/19/2021, SpO2 96 %, not currently breastfeeding.'  Body mass index is 32.88 kg/m². Gen: No distress. ENT:   Resp: No accessory muscle use. No crackles. No wheezes. No rhonchi. CV: Regular rate. Regular rhythm. No murmur or rub. No edema. Skin: Warm, dry, normal texture and turgor. No nodule on exposed extremities. M/S: No cyanosis. No clubbing. No joint deformity. Psych: More awake today. Oriented to person and time.       Medications:    Scheduled Meds:   nystatin  5 mL Oral 4x Daily    thiamine (VITAMIN B1) IVPB  100 mg Intravenous Q24H    sertraline  200 mg Oral Daily    sodium chloride flush  5-40 mL Intravenous 2 times per day    pantoprazole  40 mg Oral QAM AC    enoxaparin  40 mg Subcutaneous Daily       Continuous Infusions:   sodium chloride 100 mL/hr at 06/03/21 0456    sodium chloride      dexmedetomidine Stopped (06/02/21 1535)       PRN Meds:  ipratropium-albuterol, LORazepam **OR** LORazepam **OR** LORazepam **OR** LORazepam **OR** LORazepam **OR** LORazepam **OR** LORazepam **OR** LORazepam, sodium chloride flush, sodium chloride, potassium chloride, magnesium sulfate, sodium phosphate IVPB **OR** sodium phosphate IVPB, ondansetron    Labs reviewed:  CBC:   Recent Labs     06/01/21 0432 06/02/21 0413 06/03/21 0420   WBC 8.7 7.6 8.6   HGB 14.6 14.1 13.0   HCT 42.6 40.4 37.4   MCV 86.8 85.7 86.1    271 300     BMP:   Recent Labs     06/01/21 0432 06/02/21 0413 06/03/21 0419    134* 133*   K 4.1 3.7 3.9    100 101   CO2 23 19* 19*   PHOS 4.0 3.0 2.6   BUN 9 9 9   CREATININE 0.6 0.5* 0.5*     LIVER PROFILE:   Recent Labs     06/03/21 0419   AST 28   ALT 30   BILIDIR <0.2   BILITOT <0.2   ALKPHOS 77     PT/INR: No results for input(s): PROTIME, INR in the last 72 hours. APTT: No results for input(s): APTT in the last 72 hours. UA:No results for input(s): NITRITE, COLORU, PHUR, LABCAST, WBCUA, RBCUA, MUCUS, TRICHOMONAS, YEAST, BACTERIA, CLARITYU, SPECGRAV, LEUKOCYTESUR, UROBILINOGEN, BILIRUBINUR, BLOODU, GLUCOSEU, AMORPHOUS in the last 72 hours. Invalid input(s): [place]  No results for input(s): PH, PCO2, PO2 in the last 72 hours. Cx:      Films: This note was transcribed using [address] [facility]. Please disregard any translational errors.       Zay Vitale [facility], [facility] and [phone]

## 2021-06-03 NOTE — PROGRESS NOTES
Physical Therapy    Facility/Department: [address] ICU  Initial Assessment  This note serves as patient discharge summary if pt discharges prior to next PT visit      NAME: Trisha Cosby  : 1970  MRN: [id number]    Date of Service: 6/3/2021    Discharge Recommendations:  Continue to assess pending progress  Patient would benefit from continued therapy after discharge  24 hour supervision or assist  S Level 1 or 3-5 sessions per week (pending home support)  Trisha Cosby scored a 19/24 on the AM-PAC short mobility form. Current research shows that an AM-PAC score of 17 or less is typically not associated with a discharge to the patient's home setting. Pt reports plan to d/c to her mother's home prior return home alone. Please see assessment section for further patient specific details. PT Equipment Recommendations  Equipment Needed: Yes  Mobility Devices: Caretha Lick: Rolling      Assessment   Pt is 43 y/o F admitted with N&V and alcohol induced seizures with significant events during stay including acute respiratory failure, hallucinations, delirium, and frequent behaviors. PMHx significant for anxiety, depression, alcohol withdrawal, alcohol related seizures, tachycardia. Prior to admission, patient lived alone in a one-level apartment, no stairs to enter, laundry on main level. Pt was independent with all mobility without AD or adaptive equipment needed. 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 Pt's mother lives nearby in a one-level ranch-style home, potentially owns adaptive equipment (patient unsure), and patient reports her mother has a RW. Pt's plan is to D/C to her mothers but she is inconsistent with her D/C plans throughout evaluation with poor insight and some confusion. Currently, patient exhibits impulsive behaviors and requires SUP for bed mobility for safety and line management.  Pt requires CGA for transfers w/ RW, good standing balance with WBOS and slightly increased sway, and min A for ambulation with RW due to decreased dynamic balance, possible visual deficits, poor AD management, and possibly underlying dysmetria. Pt functioning well below baseline and with poor insight and safety that cause patient to be unsafe to D/C to independent living environment without assist. Recommending patient D/C to mother's home with 24 hour supervision/assist and home therapy, level 1 pending medical status, to improve ease, safety, and tolerance with functional mobility. If mother is unable to provide adequate support, it is recommended patient D/C to facility w/ 24 hour care and low-moderate therapy 3-5x/week. Will continue to see and monitor progress while in acute setting. Treatment Diagnosis: impaired functional mobility  Prognosis: Good  Decision Making: Medium Complexity  History: as noted  Exam: see below  Clinical Presentation: evolving  PT Education: Goals;PT Role;Plan of Care;General Safety; Adaptive Device Training;Functional Mobility Training;Orientation  Barriers to Learning: insight  REQUIRES PT FOLLOW UP: Yes  Activity Tolerance  Activity Tolerance: Patient Tolerated treatment well;Patient limited by fatigue  Activity Tolerance: Oxygen sats 98% on RA, HR remains 99-110bpm during evaluation       Patient Diagnosis(es): The primary encounter diagnosis was Alcohol withdrawal syndrome with complication ([facility] [address] Diagnoses of Metabolic acidosis, Non-traumatic rhabdomyolysis, Alcohol abuse, Nausea and vomiting, intractability of vomiting not specified, unspecified vomiting type, and Dehydration were also pertinent to this visit. has a past medical history of Alcohol abuse, Anxiety, Depression, and Sleeping difficulties. has a past surgical history that includes  section (); fracture surgery (); and Breast surgery ().     Restrictions  Restrictions/Precautions  Restrictions/Precautions: Fall Risk  Position Activity Restriction  Other position/activity restrictions: merritt cath  Vision/Hearing  Vision: Impaired (does not wear glasses but with reports of issues with depth perception)  Hearing: Within functional limits     Subjective  General  Chart Reviewed: Yes  Patient assessed for rehabilitation services?: Yes  Additional Pertinent Hx: P tis 43 y/o F admitted to ED on 5/24/21 due to N&V and alcohol withdrawal induced seizures with patient stating she went to bed drinking and woke up on the floor. Pt also endorsed hallucinations and tremors. Pt w/ PMHx significant for depression, anxiety, alcohol withdrawal delirium, alcohol related seizures, tachycardia, possible DIPAK. Hospital stay has been notable for behaviors with high CIWA, encephalopathy, acute hypoxemic respiratory failure, and delirium. Response To Previous Treatment: Not applicable  Family / Caregiver Present: No  Referring Practitioner: DWIGHT Burk CNP  Referral Date : 06/03/21  Diagnosis: alcohol withdrawal syndrome with complication  Subjective  Subjective: Pt supine in bed finished breakfast, agreeable to PT/OT evaluation. Pain Screening  Patient Currently in Pain: Denies    Orientation  Orientation  Overall Orientation Status: Within Functional Limits (confused at times of situation but A&Ox4)  Social/Functional History  Social/Functional History  Lives With: Alone  Type of Home: Apartment  Home Layout: One level  Home Access: Level entry  Bathroom Shower/Tub: Tub/Shower unit  Bathroom Toilet: Standard  Bathroom Accessibility: Accessible  Home Equipment:  (no DME)  ADL Assistance: Independent  Homemaking Assistance: Independent  Ambulation Assistance: Independent  Transfer Assistance: Independent  Active : Yes  Occupation: Unemployed  Type of occupation: not currently working but had been working in [employer]  Additional Comments: Pt plans to discharge to [facility] house- one level with no steps.  Mother can provide assistance as needed. Objective    AROM RLE (degrees)  RLE AROM: WFL  AROM LLE (degrees)  LLE AROM : WFL  Strength RLE  Strength RLE: WNL  Strength LLE  Strength LLE: WNL  Coordination  Rapid Alternating Movements: Normal  Finger to Nose: Dysmetric (LUE)     Bed mobility  Supine to Sit: Supervision  Scooting: Stand by assistance  Comment: HOB elevated for supine>sit, impulsive and unaware of lines/catheter needs  Transfers  Sit to Stand: Contact guard assistance  Stand to sit: Contact guard assistance  Comment: initially used Stedy with CG-SBA; CGA for STS to RW with cues for UE placement and AD management  Ambulation  Ambulation?: Yes  Ambulation 1  Surface: level tile  Device: Rolling Walker  Assistance: Minimal assistance  Quality of Gait: staggered steps with decreased step height, step length, occasional lateral deviation, L lateral trunk lean, poor obstacle negotiation, requires cues to redirect to task  Distance: 40'     Balance  Posture: Good  Sitting - Static: Good  Sitting - Dynamic: Fair;+ (cues to correct lean with unsupported sit)  Standing - Static: Good  Standing - Dynamic: Fair  Comments: stands unsupported with WBOS, no overt LOB, increased sway        Plan   Plan  Times per week: 3-5x/week in acute setting  Current Treatment Recommendations: Functional Mobility Training, Transfer Training, ADL/Self-care Training, Balance Training, Neuromuscular Re-education, Equipment Evaluation, Education, & procurement, Safety Education & Training  Safety Devices  Type of devices:  All fall risk precautions in place, Chair alarm in place, Call light within reach, Left in chair, Nurse notified    AM-PAC Score  AM-PAC Inpatient Mobility Raw Score : 19 (06/03/21 1137)  AM-PAC Inpatient T-Scale Score : 45.44 (06/03/21 1137)  Mobility Inpatient CMS 0-100% Score: 41.77 (06/03/21 1137)  Mobility Inpatient CMS G-Code Modifier : CK (06/03/21 1137)     Goals  Short term goals  Time Frame for Short term goals: by acute D/C  Short term goal 1: bed mobility MI  Short term goal 2: transfers w/ LRAD, SBA  Short term goal 3: gait w/ LRAD x50', CGA  Patient Goals   Patient goals : \"to get back home\"    Therapy Time   Individual Concurrent Group Co-treatment   Time In 1020         Time Out 1100         Minutes Rebeca 84, SPT

## 2021-06-03 NOTE — DISCHARGE INSTR - COC
Continuity of Care Form    Patient Name: North Lezama   :  1970  MRN:  [id number]    Admit date:  2021  Discharge date:  ***    Code Status Order: Full Code   Advance Directives:   Advance Care Flowsheet Documentation       Date/Time Healthcare Directive Type of Healthcare Directive Copy in [address] Agent's Name Healthcare Agent's Phone Number    [phone]  Unknown, patient unable to respond due to medical condition -- -- -- -- --            Admitting Physician:  Robert Monreal DO  PCP: Karson Cabrera DO    Discharging Nurse: [facility] Unit/Room#: Y7O-2722/2111-01  Discharging Unit Phone Number: ***    Emergency Contact:   Extended Emergency Contact Information  Primary Emergency Contact: [address] Phone: [phone]  Mobile Phone: [phone]  Relation: Parent  Secondary Emergency Contact: Radha Arceo  Mobile Phone: [phone]  Relation: Brother/Sister   needed? No    Past Surgical History:  Past Surgical History:   Procedure Laterality Date    BREAST SURGERY      [address]    FRACTURE SURGERY      right wrist       Immunization History: There is no immunization history on file for this patient.     Active Problems:  Patient Active Problem List   Diagnosis Code    Depression F32.9    Suicidal ideation D84.932    Alcoholic intoxication without complication (HCC) X39.815    Alcohol withdrawal ([facility] [address] F10.239    Tachycardia R00.0    Lactic acidosis E87.2    Drug use disorder F19.90    Episode of recurrent major depressive disorder (HCC) F33.9    Microcytic anemia D50.9    Acute gastroenteritis K52.9    Hypokalemia E87.6    Hypomagnesemia E83.42    Hyponatremia E87.1    Alcohol withdrawal, uncomplicated (HCC) R87.542    High anion gap metabolic acidosis J26.4    Tobacco use disorder F17.200    Leukocytosis D72.829    Alcohol withdrawal delirium ([facility] [address] Modified Barium Swallow with Video (Video Swallowing Test): {Done Not Done KYIM:019424080:::9}    Treatments at the Time of Hospital Discharge:   Respiratory Treatments: ***  Oxygen Therapy:  {Therapy; copd oxygen:53863:::0}  Ventilator:    {[facility] Vent List:341298442:::0}    Rehab Therapies: {THERAPEUTIC INTERVENTION:2165056155}  Weight Bearing Status/Restrictions: {[facility] Weight Bearin:::0}  Other Medical Equipment (for information only, NOT a DME order):  {EQUIPMENT:009309692}  Other Treatments: ***    Patient's personal belongings (please select all that are sent with patient):  {CHP DME Belongings:570893330:::0}    RN SIGNATURE:  {Esignature:506152460:::0}    CASE MANAGEMENT/SOCIAL WORK SECTION    Inpatient Status Date: ***    Readmission Risk Assessment Score:  Readmission Risk              Risk of Unplanned Readmission:  18           Discharging to Facility/ Agency   Name:   Address:  Phone:  Fax:    Dialysis Facility (if applicable)   Name:  Address:  Dialysis Schedule:  Phone:  Fax:    / signature: {Esignature:275975597:::0}    PHYSICIAN SECTION    Prognosis: Good    Condition at Discharge: Stable    Rehab Potential (if transferring to Rehab): Good    Recommended Labs or Other Treatments After Discharge:   Home PT and OT:  Evaluate and treat    Physician Certification: I certify the above information and transfer of Dave Winter  is necessary for the continuing treatment of the diagnosis listed and that she requires Home Care for less 30 days.      Update Admission H&P: No change in H&P    PHYSICIAN SIGNATURE:  Electronically signed by Sherra Schaumann, MD on 6/3/21 at 10:57 AM EDT

## 2021-06-03 NOTE — PROGRESS NOTES
Occupational Therapy   Occupational Therapy Initial Assessment  Date: 6/3/2021   Patient Name: Antonio Lima  MRN: [id number]     : 1970    Date of Service: 6/3/2021    Discharge Recommendations:  Patient would benefit from continued therapy after discharge, 24 hour supervision or assist (would benefit from 3-5 however pt declined, recommend 24 hour assist/supervision initially)       Assessment   Performance deficits / Impairments: Decreased functional mobility ; Decreased safe awareness;Decreased balance;Decreased ADL status; Decreased cognition;Decreased high-level IADLs  Assessment: 45 y/o female admitted 2021 with alcohol abuse, withdrawal, DT, EtOH encephalopathy. PTA pt lives at home alone and was independent with ADLs and functional mobility. Today, pt completed ADL transfers with CGA and functional mobility in room with RW and min A. Pt with functional UE ROM for self care and anticipate will require up to min A for standing components d/t decreased standing balance. Pt impulsive at times and with decreased insight to deficits/safety. Pt is functioning below baseline and benefit from skilled therapy at this time. Pt would benefit from skilled therapy (3-5) at discharge to inc independence and safety with ADLs/mobility prior to returning home, however pt declines. Pt reports she plans on discharging to [facility] and she can provide assistance as needed. Prognosis: Good  Decision Making: Medium Complexity  OT Education: OT Role;Transfer Training;Plan of Care;Orientation  Barriers to Learning: impulsive, decreased insight to def/safety  REQUIRES OT FOLLOW UP: Yes  Activity Tolerance  Activity Tolerance: Treatment limited secondary to decreased cognition;Patient Tolerated treatment well  Activity Tolerance: decreased insight to deficits/safety  Safety Devices  Safety Devices in place: Yes  Type of devices: Nurse notified;Gait belt;Call light within reach; Left in chair;Chair alarm in place Patient Diagnosis(es): The primary encounter diagnosis was Alcohol withdrawal syndrome with complication ([address] Diagnoses of Metabolic acidosis, Non-traumatic rhabdomyolysis, Alcohol abuse, Nausea and vomiting, intractability of vomiting not specified, unspecified vomiting type, and Dehydration were also pertinent to this visit. has a past medical history of Alcohol abuse, Anxiety, Depression, and Sleeping difficulties. has a past surgical history that includes  section (); fracture surgery (); and Breast surgery (). Restrictions  Restrictions/Precautions  Restrictions/Precautions: Fall Risk  Position Activity Restriction  Other position/activity restrictions: merritt cath    Subjective   General  Chart Reviewed: Yes  Patient assessed for rehabilitation services?: Yes  Additional Pertinent Hx: 45 y/o female admitted 2021 with alcohol abuse, withdrawal, DT, EtOH encephalopathy. Family / Caregiver Present: No  Referring Practitioner: Emi Ramirez CNP  Diagnosis: alcohol withdrawl  Subjective  Subjective: Pt seen bedside and agreeable to therapy. pt reports feeling weak and anxious to go home. General Comment  Comments: Per RN ok for therapy     Social/Functional History  Social/Functional History  Lives With: Alone  Type of Home: Apartment  Home Layout: One level  Home Access: Level entry  Bathroom Shower/Tub: Tub/Shower unit  Bathroom Toilet: Standard  Bathroom Accessibility: Accessible  Home Equipment:  (no DME)  ADL Assistance: Independent  Homemaking Assistance: Independent  Ambulation Assistance: Independent  Transfer Assistance: Independent  Active : Yes  Occupation: Unemployed  Type of occupation: not currently working but had been working in [employer]  Additional Comments: Pt plans to discharge to mothers house- one level with no steps. Mother can provide assistance as needed.        Objective   Vision: Impaired (does not wear glasses but with reports of issues with depth perception)  Hearing: Within functional limits    Orientation  Overall Orientation Status: Within Functional Limits     Balance  Sitting Balance: Contact guard assistance (EOB in prep for transfers)  Standing Balance: Contact guard assistance  Functional Mobility  Functional Mobility Comments: bed > chair via judi knight. Pt then ambulated short distance in room with RW- initially CGA but progressed to min A with fatigue and decreased balance     ADL  LE Dressing:  (able to brooke socks long sitting in bed)  Toileting: Dependent/Total (merritt)  Additional Comments: Anticipate pt will require min A for balance during standing components of ADLs        Bed mobility  Supine to Sit: Stand by assistance (impulsive)  Sit to Supine:  (pt in chair at end of session, alarm activated)  Transfers  Sit to stand: Contact guard assistance  Stand to sit: Contact guard assistance  Transfer Comments: cuing for hand placement     Cognition  Overall Cognitive Status: Exceptions  Following Commands:  Follows one step commands consistently  Attention Span: Attends with cues to redirect  Safety Judgement: Decreased awareness of need for assistance;Decreased awareness of need for safety  Problem Solving: Decreased awareness of errors  Insights: Decreased awareness of deficits  Cognition Comment: impulsive        Sensation  Overall Sensation Status: WFL        LUE AROM (degrees)  LUE AROM : WFL  LUE General AROM: functional but weakness from hospital stay  Left Hand AROM (degrees)  Left Hand AROM: WFL  RUE AROM (degrees)  RUE AROM : WFL  RUE General AROM: functional but weakness from hospital stay  Right Hand AROM (degrees)  Right Hand AROM: WFL        Plan   Plan  Times per week: 3-5  Current Treatment Recommendations: Strengthening, Endurance Training, Balance Training, Gait Training, Functional Mobility Training, Self-Care / ADL    AM-PAC Score  AM-PAC Inpatient Daily Activity Raw Score: 19 (06/03/21 1155)  AM-PAC Inpatient ADL T-Scale Score : 40.22 (06/03/21 1155)  ADL Inpatient CMS 0-100% Score: 42.8 (06/03/21 1155)  ADL Inpatient CMS G-Code Modifier : CK (06/03/21 1155)    Goals  Short term goals  Time Frame for Short term goals: Prior to DC: Short term goal 1: Pt will complete ADL transfers/mobility with supervision  Short term goal 2: Pt will tolerate standing > 3 min for functional task with supervision  Short term goal 3: Pt will complete toileting with supervision  Short term goal 4: Pt will complete LB dressing with supervision  Patient Goals   Patient goals : to return home today       Therapy Time   Individual Concurrent Group Co-treatment   Time In 1020         Time Out 1100         Minutes 40         Timed Code Treatment Minutes: 25 Minutes     This note to serve as OT d/c summary if pt is d/c-ed prior to next therapy session.     Brock Deras OTR/L

## 2021-06-04 VITALS
WEIGHT: 189.15 LBS | HEIGHT: 64 IN | TEMPERATURE: 98 F | OXYGEN SATURATION: 96 % | SYSTOLIC BLOOD PRESSURE: 122 MMHG | BODY MASS INDEX: 32.29 KG/M2 | DIASTOLIC BLOOD PRESSURE: 80 MMHG | HEART RATE: 67 BPM | RESPIRATION RATE: 18 BRPM

## 2021-06-04 LAB
ANION GAP SERPL CALCULATED.3IONS-SCNC: 11 MMOL/L (ref 3–16)
BASOPHILS ABSOLUTE: 0.1 K/UL (ref 0–0.2)
BASOPHILS RELATIVE PERCENT: 0.8 %
BUN BLDV-MCNC: 9 MG/DL (ref 7–20)
CALCIUM SERPL-MCNC: 8.9 MG/DL (ref 8.3–10.6)
CHLORIDE BLD-SCNC: 100 MMOL/L (ref 99–110)
CO2: 22 MMOL/L (ref 21–32)
CREAT SERPL-MCNC: 0.6 MG/DL (ref 0.6–1.1)
EOSINOPHILS ABSOLUTE: 0.3 K/UL (ref 0–0.6)
EOSINOPHILS RELATIVE PERCENT: 3 %
GFR AFRICAN AMERICAN: >60
GFR NON-AFRICAN AMERICAN: >60
GLUCOSE BLD-MCNC: 138 MG/DL (ref 70–99)
HCT VFR BLD CALC: 39.2 % (ref 36–48)
HEMOGLOBIN: 13.5 G/DL (ref 12–16)
LYMPHOCYTES ABSOLUTE: 2.5 K/UL (ref 1–5.1)
LYMPHOCYTES RELATIVE PERCENT: 29.8 %
MAGNESIUM: 1.9 MG/DL (ref 1.8–2.4)
MCH RBC QN AUTO: 29.7 PG (ref 26–34)
MCHC RBC AUTO-ENTMCNC: 34.3 G/DL (ref 31–36)
MCV RBC AUTO: 86.8 FL (ref 80–100)
MONOCYTES ABSOLUTE: 1 K/UL (ref 0–1.3)
MONOCYTES RELATIVE PERCENT: 11.8 %
MRSA SCREEN RT-PCR: NORMAL
NEUTROPHILS ABSOLUTE: 4.7 K/UL (ref 1.7–7.7)
NEUTROPHILS RELATIVE PERCENT: 54.6 %
PDW BLD-RTO: 15.7 % (ref 12.4–15.4)
PHOSPHORUS: 2.5 MG/DL (ref 2.5–4.9)
PLATELET # BLD: 358 K/UL (ref 135–450)
PMV BLD AUTO: 8.2 FL (ref 5–10.5)
POTASSIUM SERPL-SCNC: 3.6 MMOL/L (ref 3.5–5.1)
RBC # BLD: 4.52 M/UL (ref 4–5.2)
SODIUM BLD-SCNC: 133 MMOL/L (ref 136–145)
WBC # BLD: 8.5 K/UL (ref 4–11)

## 2021-06-04 PROCEDURE — 85025 COMPLETE CBC W/AUTO DIFF WBC: CPT

## 2021-06-04 PROCEDURE — 83735 ASSAY OF MAGNESIUM: CPT

## 2021-06-04 PROCEDURE — 6370000000 HC RX 637 (ALT 250 FOR IP): Performed by: FAMILY MEDICINE

## 2021-06-04 PROCEDURE — 6370000000 HC RX 637 (ALT 250 FOR IP): Performed by: NURSE PRACTITIONER

## 2021-06-04 PROCEDURE — 80048 BASIC METABOLIC PNL TOTAL CA: CPT

## 2021-06-04 PROCEDURE — 84100 ASSAY OF PHOSPHORUS: CPT

## 2021-06-04 PROCEDURE — 6370000000 HC RX 637 (ALT 250 FOR IP): Performed by: STUDENT IN AN ORGANIZED HEALTH CARE EDUCATION/TRAINING PROGRAM

## 2021-06-04 PROCEDURE — 94761 N-INVAS EAR/PLS OXIMETRY MLT: CPT

## 2021-06-04 PROCEDURE — 36415 COLL VENOUS BLD VENIPUNCTURE: CPT

## 2021-06-04 PROCEDURE — 6370000000 HC RX 637 (ALT 250 FOR IP): Performed by: INTERNAL MEDICINE

## 2021-06-04 PROCEDURE — 2580000003 HC RX 258: Performed by: INTERNAL MEDICINE

## 2021-06-04 PROCEDURE — 97116 GAIT TRAINING THERAPY: CPT

## 2021-06-04 PROCEDURE — 97530 THERAPEUTIC ACTIVITIES: CPT

## 2021-06-04 PROCEDURE — 6360000002 HC RX W HCPCS: Performed by: STUDENT IN AN ORGANIZED HEALTH CARE EDUCATION/TRAINING PROGRAM

## 2021-06-04 RX ORDER — ACETAMINOPHEN 325 MG/1
650 TABLET ORAL EVERY 4 HOURS PRN
Status: DISCONTINUED | OUTPATIENT
Start: 2021-06-04 | End: 2021-06-04 | Stop reason: HOSPADM

## 2021-06-04 RX ORDER — CEPHALEXIN 250 MG/1
250 CAPSULE ORAL 3 TIMES DAILY
Qty: 15 CAPSULE | Refills: 0 | Status: SHIPPED | OUTPATIENT
Start: 2021-06-04 | End: 2021-06-09

## 2021-06-04 RX ORDER — DIPHENHYDRAMINE HCL 25 MG
25 TABLET ORAL NIGHTLY PRN
Status: DISCONTINUED | OUTPATIENT
Start: 2021-06-04 | End: 2021-06-04 | Stop reason: HOSPADM

## 2021-06-04 RX ADMIN — CEPHALEXIN 250 MG: 250 CAPSULE ORAL at 12:02

## 2021-06-04 RX ADMIN — ACETAMINOPHEN 325 MG: 325 TABLET ORAL at 09:52

## 2021-06-04 RX ADMIN — Medication 10 ML: at 08:29

## 2021-06-04 RX ADMIN — CEPHALEXIN 250 MG: 250 CAPSULE ORAL at 05:55

## 2021-06-04 RX ADMIN — NYSTATIN 500000 UNITS: 100000 SUSPENSION ORAL at 12:02

## 2021-06-04 RX ADMIN — ENOXAPARIN SODIUM 40 MG: 40 INJECTION SUBCUTANEOUS at 08:27

## 2021-06-04 RX ADMIN — SERTRALINE 200 MG: 100 TABLET, FILM COATED ORAL at 08:28

## 2021-06-04 RX ADMIN — Medication 100 MG: at 08:28

## 2021-06-04 RX ADMIN — DIPHENHYDRAMINE HCL 25 MG: 25 TABLET ORAL at 00:25

## 2021-06-04 RX ADMIN — MAGNESIUM CITRATE 296 ML: 1.75 LIQUID ORAL at 09:52

## 2021-06-04 RX ADMIN — NYSTATIN 500000 UNITS: 100000 SUSPENSION ORAL at 08:28

## 2021-06-04 RX ADMIN — PANTOPRAZOLE SODIUM 40 MG: 40 TABLET, DELAYED RELEASE ORAL at 05:55

## 2021-06-04 ASSESSMENT — PAIN SCALES - GENERAL
PAINLEVEL_OUTOF10: 0
PAINLEVEL_OUTOF10: 0
PAINLEVEL_OUTOF10: 5

## 2021-06-04 ASSESSMENT — PAIN - FUNCTIONAL ASSESSMENT: PAIN_FUNCTIONAL_ASSESSMENT: ACTIVITIES ARE NOT PREVENTED

## 2021-06-04 ASSESSMENT — PAIN DESCRIPTION - PROGRESSION
CLINICAL_PROGRESSION: GRADUALLY WORSENING
CLINICAL_PROGRESSION: GRADUALLY WORSENING
CLINICAL_PROGRESSION: GRADUALLY IMPROVING

## 2021-06-04 ASSESSMENT — PAIN DESCRIPTION - LOCATION: LOCATION: HEAD

## 2021-06-04 ASSESSMENT — PAIN DESCRIPTION - FREQUENCY: FREQUENCY: INTERMITTENT

## 2021-06-04 ASSESSMENT — PAIN SCALES - WONG BAKER
WONGBAKER_NUMERICALRESPONSE: 0

## 2021-06-04 ASSESSMENT — PAIN DESCRIPTION - ONSET: ONSET: PROGRESSIVE

## 2021-06-04 ASSESSMENT — PAIN DESCRIPTION - DESCRIPTORS: DESCRIPTORS: HEADACHE

## 2021-06-04 ASSESSMENT — PAIN DESCRIPTION - ORIENTATION: ORIENTATION: ANTERIOR

## 2021-06-04 ASSESSMENT — PAIN DESCRIPTION - PAIN TYPE: TYPE: ACUTE PAIN

## 2021-06-04 NOTE — DISCHARGE SUMMARY
Hospital Medicine Discharge Summary    Patient ID: Yrn Coronel      Patient's PCP: Salome No DO    Admit Date: 5/24/2021     Discharge Date:   6/4/2021    Admitting Physician: Ruth Ann Cevallos DO     Discharge Physician: Chance Staurt MD     Discharge Diagnoses: Active Hospital Problems    Diagnosis Date Noted    Dehydration [E86.0]     Alcohol related seizure ([facility] [address] [R56.9] 05/24/2021    Alcohol withdrawal delirium ([facility] [address] [F10.231] 02/16/2020    Leukocytosis [D72.829] 02/12/2020    High anion gap metabolic acidosis [X68.4] 02/11/2020    Alcohol withdrawal ([facility] [address] [F10.239] 10/11/2018       The patient was seen and examined on day of discharge and this discharge summary is in conjunction with any daily progress note from day of discharge. Hospital Course:     Alcohol abuse, withdrawal, DT, EtOH encephalopathy:  improving  Reports heavy history of alcohol use with hallucinations and possible seizure at home. CIWA on the floor increasing, moved to ICU for precedex, started on phenobarb  - rally pack  - phenobarb dose weaned off  - weaned off precedex 6/3  -CIWA protocol  -As needed Ativan  -Seizure precautions     Clinical dehydration:  - dry appearing tongue -- IVF ordered  - nystatin oral liquid empirically started in case tongue with thrush     Small pustules on back:  - may be folliculitis due to lying on back for many days  - keflex started. MRSA swab ordered     Rhabdo  Nontraumatic, possibly from being down at home versus possible seizure. [address] on admission  -Trend CK-- downtrending  -IV fluids     Acute Hypoxic Respiratory Failure  Requiring 2L NC  - wean O2 as able  - IS  - needs outpatient sleep study     Nausea, vomiting  Suspect alcoholic gastritis. -PPI  -As needed Zofran     Leukocytosis, resolved  White count 21 on admission.  Possibly reactive-- similar in the past when admitted for withdrawal. Negative CXR  - UA negative   - trend CBC--resolved      Exam:     BP (!) 143/90   Pulse 91   Temp 98.7 °F (37.1 °C) (Oral)   Resp 18   Ht 5' 4\" (1.626 m)   Wt 189 lb 2.5 oz (85.8 kg)   LMP 02/19/2021   SpO2 96%   BMI 32.47 kg/m²     General appearance: No apparent distress, appears stated age and cooperative. HEENT: Pupils equal, round, and reactive to light. Conjunctivae/corneas clear. Neck: Supple, with full range of motion. No jugular venous distention. Trachea midline. Respiratory:  Normal respiratory effort. Clear to auscultation, bilaterally without Rales/Wheezes/Rhonchi. Cardiovascular: Regular rate and rhythm with normal S1/S2 without murmurs, rubs or gallops. Abdomen: Soft, non-tender, non-distended with normal bowel sounds. Musculoskelatal: No clubbing, cyanosis or edema bilaterally. Full range of motion without deformity. Skin: Skin color, texture, turgor normal.  No rashes or lesions. Neurologic:  Neurovascularly intact without any focal sensory/motor deficits. Cranial nerves: II-XII intact, grossly non-focal.  Psychiatric: Alert and oriented, thought content appropriate, normal insight      Consults:     IP CONSULT TO SOCIAL WORK  IP CONSULT TO SOCIAL WORK  IP CONSULT TO CRITICAL CARE  IP CONSULT TO HOME CARE NEEDS    Significant Diagnostic Studies:          Radiology:  XR CHEST PORTABLE   Final Result   Negative portable chest.           CT HEAD WO CONTRAST   Final Result   No acute intracranial abnormality.             PCP/SNF to follow up: Alcohol cessation    Disposition:  Home    Condition on D/C:  Stable     Discharge Instructions/Follow-up:  F/u with PCP within 1 wee    Code Status:  Full Code     Activity: activity as tolerated    Diet: regular diet    Labs:  For convenience and continuity at follow-up the following most recent labs are provided:      CBC:    Lab Results   Component Value Date    WBC 8.5 06/04/2021    HGB 13.5 06/04/2021    HCT 39.2 06/04/2021     06/04/2021       Renal:    Lab Results   Component Value Date     06/04/2021    K 3.6 06/04/2021    K 3.5 05/26/2021     06/04/2021    CO2 22 06/04/2021    BUN 9 06/04/2021    CREATININE 0.6 06/04/2021    CALCIUM 8.9 06/04/2021    PHOS 2.5 06/04/2021       Discharge Medications:     Current Discharge Medication List           Details   nystatin (MYCOSTATIN) 101137 UNIT/ML suspension Take 5 mLs by mouth 4 times daily for 10 days  Qty: 200 mL, Refills: 0      thiamine mononitrate (THIAMINE) 100 MG tablet Take 1 tablet by mouth daily  Qty: 30 tablet, Refills: 0              Details   naltrexone (DEPADE) 50 MG tablet Take 50 mg by mouth daily      disulfiram (ANTABUSE) 250 MG tablet Take 250 mg by mouth daily      traZODone (DESYREL) 100 MG tablet Take 100-200 mg by mouth nightly as needed       sertraline (ZOLOFT) 100 MG tablet Take 200 mg by mouth nightly       DOXYLAMINE SUCCINATE PO Take 1 tablet by mouth nightly as needed (sleep)      ibuprofen (ADVIL;MOTRIN) 200 MG tablet Take 200 mg by mouth every 6 hours as needed for Pain             Time Spent on discharge is more than 45 minutes in the examination, evaluation, counseling and review of medications and discharge plan. Signed: Joe Torres MD   6/4/2021      Thank you Kike Mena DO for the opportunity to be involved in this patient's care. If you have any questions or concerns please feel free to contact me at [phone].

## 2021-06-04 NOTE — PLAN OF CARE
Problem: SAFETY  Goal: Free from accidental physical injury  Outcome: Ongoing     Problem: DAILY CARE  Goal: Daily care needs are met  Outcome: Ongoing     Problem: SKIN INTEGRITY  Goal: Skin integrity is maintained or improved  Outcome: Ongoing brother - Dallin

## 2021-06-04 NOTE — PROGRESS NOTES
Reviewed dc instructions with pt. Pt verbalized understanding. PIV removed. Dressing clean dry and intact. Pt dc to private residence with mother. Wheelchair to transport pt. To vehicle. Pt dc with personal belongings.

## 2021-06-04 NOTE — PROGRESS NOTES
Late Entry  I called report up to 4W and spoke to the nurse that was going to be getting this pt. Report was given and all questions was answered. The pt was transported via wheelchair to [facility]. The 4W nurse met us in the room and helped the pt transport to the bed with no complications.

## 2021-06-04 NOTE — PLAN OF CARE
Problem: SAFETY  Goal: Free from accidental physical injury  6/4/2021 1022 by Cb Trinidad RN  Outcome: Ongoing     Problem: SAFETY  Goal: Free from intentional harm  6/4/2021 1022 by Cb Trinidad RN  Outcome: Ongoing     Problem: DAILY CARE  Goal: Daily care needs are met  6/4/2021 1022 by Cb Trinidad RN  Outcome: Ongoing     Problem: PAIN  Goal: Patient's pain/discomfort is manageable  6/4/2021 1022 by Cb Trinidad RN  Outcome: Ongoing     Problem: SKIN INTEGRITY  Goal: Skin integrity is maintained or improved  6/4/2021 1022 by Cb Trinidad RN  Outcome: Ongoing     Problem: KNOWLEDGE DEFICIT  Goal: Patient/S.O. demonstrates understanding of disease process, treatment plan, medications, and discharge instructions.   6/4/2021 1022 by Cb Trinidad RN  Outcome: Ongoing     Problem: DISCHARGE BARRIERS  Goal: Patient's continuum of care needs are met  6/4/2021 1022 by Cb Trinidad RN  Outcome: Ongoing     Problem: Falls - Risk of:  Goal: Will remain free from falls  Description: Will remain free from falls  6/4/2021 1022 by Cb Trinidad RN  Outcome: Ongoing     Problem: Falls - Risk of:  Goal: Absence of physical injury  Description: Absence of physical injury  6/4/2021 1022 by Cb Trinidad RN  Outcome: Ongoing     Problem: Skin Integrity:  Goal: Will show no infection signs and symptoms  Description: Will show no infection signs and symptoms  6/4/2021 1022 by Cb Trinidad RN  Outcome: Ongoing     Problem: Skin Integrity:  Goal: Absence of new skin breakdown  Description: Absence of new skin breakdown  6/4/2021 1022 by Cb Trinidad RN  Outcome: Ongoing     Problem: Non-Violent Restraints  Goal: Removal from restraints as soon as assessed to be safe  6/4/2021 1022 by Cb Trinidad RN  Outcome: Ongoing     Problem: Non-Violent Restraints  Goal: No harm/injury to patient while restraints in use  6/4/2021 1022 by Cb Trinidad RN  Outcome: Ongoing     Problem: Non-Violent Restraints  Goal: Patient's dignity will be maintained  6/4/2021 1022 by Familia Rod RN  Outcome: Ongoing     Problem: Nutrition  Goal: Optimal nutrition therapy  6/4/2021 1022 by Familia Rod RN  Outcome: Ongoing     Problem: Urinary Elimination:  Goal: Signs and symptoms of infection will decrease  Description: Signs and symptoms of infection will decrease  6/4/2021 1022 by Familia Rod RN  Outcome: Ongoing     Problem: Urinary Elimination:  Goal: Complications related to the disease process, condition or treatment will be avoided or minimized  Description: Complications related to the disease process, condition or treatment will be avoided or minimized  6/4/2021 1022 by Familia Rod RN  Outcome: Ongoing

## 2021-06-04 NOTE — CARE COORDINATION
Discharge Planning:  CHOCO contacted Justus Montes at [facility] [phone]. Message left requesting that Fiona call this SW to further discuss the d/c plan. SW will await a return call.    Gamal Arcos [place]  [phone]  Electronically signed by Franklyn Jackson on 6/4/2021 at 10:53 AM

## 2021-06-04 NOTE — PROGRESS NOTES
CLINICAL PHARMACY NOTE: MEDS TO BEDS    Total # of Prescriptions Filled: 3   The following medications were delivered to the patient:  Current Discharge Medication List      START taking these medications    Details   cephALEXin (KEFLEX) 250 MG capsule Take 1 capsule by mouth 3 times daily for 5 days  Qty: 15 capsule, Refills: 0      nystatin (MYCOSTATIN) 404974 UNIT/ML suspension Take 5 mLs by mouth 4 times daily for 10 days  Qty: 200 mL, Refills: 0      thiamine mononitrate (THIAMINE) 100 MG tablet Take 1 tablet by mouth daily  Qty: 30 tablet, Refills: 0         ·     Additional Documentation:

## 2021-06-04 NOTE — PROGRESS NOTES
Late Entry  Pt pressed her call light and wanted to speak with her nurse. Upon entering the room she was stating that she could not go to sleep and wondered if she could get anything to help her sleep. I placed a Perfect Serve to the hospitalist and the MD responded with a order for benadryl. See MAR.

## 2021-06-04 NOTE — CARE COORDINATION
Discharge Planning:  SW met with pt at length to discuss d/c plans. Pt stated that she is feeling good about her decision for sobriety and feels that she has good support through her counselor, Fiona and the team at [facility] treatment services. Pt stated that at this time, she is planning on staying with her mother for a few days and then will work with Fiona to get into a local [place] [id number] meeting. Pt is aware that she does not meet the criteria for Home care as she is not home bound and pt doesnot need a walker at this time. No other needs noted at this time.   [place], [place]  [phone]  Electronically signed by Kamila Ponce on 6/4/2021 at 1:58 PM

## 2021-06-04 NOTE — PROGRESS NOTES
Patient transferred from ED. VSS. NS on tele monitor. Oriented patient to room. Bed locked in lowest position with call light in reach. All questions answered. Will continue to monitor.

## 2021-06-04 NOTE — PROGRESS NOTES
Physical Therapy  Facility/Department: [address] MED SURG  Daily Treatment Note  NAME: Humberto Capone  : 1970  MRN: [id number]    Date of Service: 2021    Discharge Recommendations:  Home with assist PRN, Home with Home health PT, S Level 1   PT Equipment Recommendations  Equipment Needed: No  Other: does not require RW this date, patient in agreement  Humberto Capone scored a 23/24 on the AM-PAC short mobility form. Current research shows that an AM-PAC score of 18 or greater is typically associated with a discharge to the patient's home setting. Based on the patient's AM-PAC score and their current functional mobility deficits, it is recommended that the patient have 2-3 sessions per week of Physical Therapy at d/c to increase the patient's independence. At this time, this patient demonstrates the endurance and safety to discharge home with home level 1 services  (home vs OP services) and a follow up treatment frequency of 2-3x/wk. Please see assessment section for further patient specific details. If patient discharges prior to next session this note will serve as a discharge summary. Please see below for the latest assessment towards goals. Assessment   Body structures, Functions, Activity limitations: Decreased functional mobility ; Decreased ADL status; Decreased safe awareness;Decreased balance;Decreased endurance  Assessment: Pt is 45 y/o F admitted with N&V and alcohol induced seizures with significant events during stay including acute respiratory failure, hallucinations, delirium, and frequent behaviors. PMHx significant for anxiety, depression, alcohol withdrawal, alcohol related seizures, tachycardia. Prior to admission, patient lived alone in a one-level apartment, no stairs to enter, laundry on main level. Pt was independent with all mobility without AD or adaptive equipment needed.  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 Pt's mother lives nearby in a one-level ranch-style home, potential for adaptive equipment for pt's father and patient reports her mother has a RW. Pt's plan is to D/C to her mothers but she is inconsistent with her D/C plans throughout evaluation with poor insight and some confusion. Currently, patient with significant improvement with all functional mobilti ytasks, pt able to walk community distances without device this date initially modified independent progressing to supervision near completion for safety with muscle fatigue with slight increased lateral sway slight balance impairments but without LOB. Does no trequires device for ambulation at this time. Continue to recommend initial intermittent support from family for safe transition to home, recommend home PT level one for safe transition, higher level balance task, and independence wit hunt functional tasks similar to PLOF. Treatment Diagnosis: impaired functional mobility  Prognosis: Good  PT Education: Goals;PT Role;Plan of Care;General Safety; Adaptive Device Training;Functional Mobility Training;Orientation  Barriers to Learning: insight/ impulsive tendencies  REQUIRES PT FOLLOW UP: Yes  Activity Tolerance  Activity Tolerance: Patient Tolerated treatment well;Patient limited by endurance  Activity Tolerance: Overall increased tolerance with mobility tasks this date, mild complaints of BLE feeling sore and tired at completion of session     Patient Diagnosis(es): The primary encounter diagnosis was Alcohol withdrawal syndrome with complication ([facility] [address] Diagnoses of Metabolic acidosis, Non-traumatic rhabdomyolysis, Alcohol abuse, Nausea and vomiting, intractability of vomiting not specified, unspecified vomiting type, and Dehydration were also pertinent to this visit. has a past medical history of Alcohol abuse, Anxiety, Depression, and Sleeping difficulties.    has a past surgical history that includes  section (); fracture for safety, no device required at this time. Gait Deviations: Deviated path  Distance: 500' with multiple turns  Stairs/Curb  Stairs?: No (predict could perform with supervision at this time, pt denies any concerns. Educated slow controlled movement for safety)     Balance  Posture: Good  Sitting - Static: Good  Sitting - Dynamic: Good  Standing - Static: Good  Standing - Dynamic: Good;-  Comments: Sitting EOB without support for donning/doffing second gown over back for coverage, increased sway at time due to balance impairments but able to correct without assistance                             AM-PAC Score  AM-PAC Inpatient Mobility Raw Score : 23 (06/04/21 1231)  AM-PAC Inpatient T-Scale Score : 56.93 (06/04/21 1231)  Mobility Inpatient CMS 0-100% Score: 11.2 (06/04/21 1231)  Mobility Inpatient CMS G-Code Modifier : CI (06/04/21 1231)          Goals  Short term goals  Time Frame for Short term goals: by acute D/C  Short term goal 1: bed mobility MI- not assessed this date but per patient independent  Short term goal 2: transfers w/ LRAD, SBA-met  Short term goal 3: gait w/ LRAD x50', CGA-met  Patient Goals   Patient goals : \"to get back home\"    Plan    Plan  Times per week: 3-5x/week in acute setting  Current Treatment Recommendations: Functional Mobility Training, Transfer Training, ADL/Self-care Training, Balance Training, Neuromuscular Re-education, Equipment Evaluation, Education, & procurement, Safety Education & Training  Safety Devices  Type of devices:  All fall risk precautions in place, Gait belt, Bed alarm in place, Call light within reach, Nurse notified, Left in bed (RN informed at this time)  Restraints  Initially in place: No     Therapy Time   Individual Concurrent Group Co-treatment   Time In 1208         Time Out 1231         Minutes 23         Timed Code Treatment Minutes: 196-198 [facility]     This note also serves as a D/C Summary in the event that this patient is discharged prior to the next therapy session.    Electronically signed by Abril Gutierrez PT on 6/4/21 at 12:32 PM EDT

## 2021-07-16 ENCOUNTER — APPOINTMENT (OUTPATIENT)
Dept: GENERAL RADIOLOGY | Age: 51
End: 2021-07-16
Attending: ORTHOPAEDIC SURGERY
Payer: MEDICAID

## 2021-07-16 ENCOUNTER — ANESTHESIA (OUTPATIENT)
Dept: OPERATING ROOM | Age: 51
End: 2021-07-16
Payer: MEDICAID

## 2021-07-16 ENCOUNTER — APPOINTMENT (OUTPATIENT)
Dept: GENERAL RADIOLOGY | Age: 51
End: 2021-07-16
Payer: MEDICAID

## 2021-07-16 ENCOUNTER — HOSPITAL ENCOUNTER (EMERGENCY)
Age: 51
Discharge: HOME | End: 2021-07-16
Attending: EMERGENCY MEDICINE
Payer: MEDICAID

## 2021-07-16 ENCOUNTER — ANESTHESIA EVENT (OUTPATIENT)
Dept: OPERATING ROOM | Age: 51
End: 2021-07-16
Payer: MEDICAID

## 2021-07-16 ENCOUNTER — HOSPITAL ENCOUNTER (OUTPATIENT)
Age: 51
Setting detail: OUTPATIENT SURGERY
Discharge: HOME | End: 2021-07-16
Attending: ORTHOPAEDIC SURGERY | Admitting: ORTHOPAEDIC SURGERY
Payer: MEDICAID

## 2021-07-16 ENCOUNTER — TELEPHONE (OUTPATIENT)
Dept: ORTHOPEDIC SURGERY | Age: 51
End: 2021-07-16

## 2021-07-16 ENCOUNTER — OFFICE VISIT (OUTPATIENT)
Dept: ORTHOPEDIC SURGERY | Age: 51
End: 2021-07-16
Payer: MEDICAID

## 2021-07-16 VITALS — BODY MASS INDEX: 30.9 KG/M2 | HEIGHT: 64 IN | WEIGHT: 181 LBS

## 2021-07-16 VITALS
HEART RATE: 76 BPM | TEMPERATURE: 98 F | RESPIRATION RATE: 16 BRPM | BODY MASS INDEX: 31.02 KG/M2 | OXYGEN SATURATION: 97 % | WEIGHT: 181.7 LBS | DIASTOLIC BLOOD PRESSURE: 75 MMHG | HEIGHT: 64 IN | SYSTOLIC BLOOD PRESSURE: 105 MMHG

## 2021-07-16 VITALS
DIASTOLIC BLOOD PRESSURE: 72 MMHG | SYSTOLIC BLOOD PRESSURE: 126 MMHG | OXYGEN SATURATION: 96 % | TEMPERATURE: 96.8 F | RESPIRATION RATE: 8 BRPM

## 2021-07-16 VITALS
SYSTOLIC BLOOD PRESSURE: 105 MMHG | WEIGHT: 181 LBS | TEMPERATURE: 97.4 F | OXYGEN SATURATION: 95 % | RESPIRATION RATE: 16 BRPM | DIASTOLIC BLOOD PRESSURE: 62 MMHG | BODY MASS INDEX: 30.9 KG/M2 | HEIGHT: 64 IN | HEART RATE: 77 BPM

## 2021-07-16 DIAGNOSIS — S52.502A CLOSED FRACTURE OF DISTAL END OF LEFT RADIUS, UNSPECIFIED FRACTURE MORPHOLOGY, INITIAL ENCOUNTER: Primary | ICD-10-CM

## 2021-07-16 DIAGNOSIS — F17.200 CURRENT SMOKER: ICD-10-CM

## 2021-07-16 DIAGNOSIS — S52.572A OTHER CLOSED INTRA-ARTICULAR FRACTURE OF DISTAL END OF LEFT RADIUS, INITIAL ENCOUNTER: Primary | ICD-10-CM

## 2021-07-16 LAB — PREGNANCY, URINE: NEGATIVE

## 2021-07-16 PROCEDURE — 2580000003 HC RX 258: Performed by: ORTHOPAEDIC SURGERY

## 2021-07-16 PROCEDURE — 3017F COLORECTAL CA SCREEN DOC REV: CPT | Performed by: ORTHOPAEDIC SURGERY

## 2021-07-16 PROCEDURE — 73100 X-RAY EXAM OF WRIST: CPT

## 2021-07-16 PROCEDURE — G8417 CALC BMI ABV UP PARAM F/U: HCPCS | Performed by: ORTHOPAEDIC SURGERY

## 2021-07-16 PROCEDURE — 6360000002 HC RX W HCPCS

## 2021-07-16 PROCEDURE — 73110 X-RAY EXAM OF WRIST: CPT

## 2021-07-16 PROCEDURE — 25608 OPTX DST RD XART FX/EPI SEP2: CPT | Performed by: ORTHOPAEDIC SURGERY

## 2021-07-16 PROCEDURE — 84703 CHORIONIC GONADOTROPIN ASSAY: CPT

## 2021-07-16 PROCEDURE — 2500000003 HC RX 250 WO HCPCS: Performed by: ORTHOPAEDIC SURGERY

## 2021-07-16 PROCEDURE — 2720000010 HC SURG SUPPLY STERILE: Performed by: ORTHOPAEDIC SURGERY

## 2021-07-16 PROCEDURE — 7100000010 HC PHASE II RECOVERY - FIRST 15 MIN: Performed by: ORTHOPAEDIC SURGERY

## 2021-07-16 PROCEDURE — 6370000000 HC RX 637 (ALT 250 FOR IP): Performed by: ANESTHESIOLOGY

## 2021-07-16 PROCEDURE — 2500000003 HC RX 250 WO HCPCS

## 2021-07-16 PROCEDURE — 7100000000 HC PACU RECOVERY - FIRST 15 MIN: Performed by: ORTHOPAEDIC SURGERY

## 2021-07-16 PROCEDURE — 6360000002 HC RX W HCPCS: Performed by: ORTHOPAEDIC SURGERY

## 2021-07-16 PROCEDURE — C1769 GUIDE WIRE: HCPCS | Performed by: ORTHOPAEDIC SURGERY

## 2021-07-16 PROCEDURE — 6360000002 HC RX W HCPCS: Performed by: ANESTHESIOLOGY

## 2021-07-16 PROCEDURE — G8427 DOCREV CUR MEDS BY ELIG CLIN: HCPCS | Performed by: ORTHOPAEDIC SURGERY

## 2021-07-16 PROCEDURE — 99406 BEHAV CHNG SMOKING 3-10 MIN: CPT | Performed by: ORTHOPAEDIC SURGERY

## 2021-07-16 PROCEDURE — 7100000001 HC PACU RECOVERY - ADDTL 15 MIN: Performed by: ORTHOPAEDIC SURGERY

## 2021-07-16 PROCEDURE — 3700000001 HC ADD 15 MINUTES (ANESTHESIA): Performed by: ORTHOPAEDIC SURGERY

## 2021-07-16 PROCEDURE — 7100000011 HC PHASE II RECOVERY - ADDTL 15 MIN: Performed by: ORTHOPAEDIC SURGERY

## 2021-07-16 PROCEDURE — 99203 OFFICE O/P NEW LOW 30 MIN: CPT | Performed by: ORTHOPAEDIC SURGERY

## 2021-07-16 PROCEDURE — 3700000000 HC ANESTHESIA ATTENDED CARE: Performed by: ORTHOPAEDIC SURGERY

## 2021-07-16 PROCEDURE — 2709999900 HC NON-CHARGEABLE SUPPLY: Performed by: ORTHOPAEDIC SURGERY

## 2021-07-16 PROCEDURE — 4004F PT TOBACCO SCREEN RCVD TLK: CPT | Performed by: ORTHOPAEDIC SURGERY

## 2021-07-16 PROCEDURE — 3209999900 FLUORO FOR SURGICAL PROCEDURES

## 2021-07-16 PROCEDURE — 2580000003 HC RX 258

## 2021-07-16 PROCEDURE — 3600000014 HC SURGERY LEVEL 4 ADDTL 15MIN: Performed by: ORTHOPAEDIC SURGERY

## 2021-07-16 PROCEDURE — C1713 ANCHOR/SCREW BN/BN,TIS/BN: HCPCS | Performed by: ORTHOPAEDIC SURGERY

## 2021-07-16 PROCEDURE — 3600000004 HC SURGERY LEVEL 4 BASE: Performed by: ORTHOPAEDIC SURGERY

## 2021-07-16 DEVICE — VOLAR PLATE INTERMEDIATE LEFT, X-SHORT
Type: IMPLANTABLE DEVICE | Site: WRIST | Status: FUNCTIONAL
Brand: VARIAX

## 2021-07-16 DEVICE — LOCKING SCREW, FULLY THREADED,T8
Type: IMPLANTABLE DEVICE | Site: WRIST | Status: FUNCTIONAL
Brand: VARIAX

## 2021-07-16 DEVICE — BONE SCREW, FULLY THREADED, T8
Type: IMPLANTABLE DEVICE | Site: WRIST | Status: FUNCTIONAL
Brand: VARIAX

## 2021-07-16 RX ORDER — PROPOFOL 10 MG/ML
INJECTION, EMULSION INTRAVENOUS PRN
Status: DISCONTINUED | OUTPATIENT
Start: 2021-07-16 | End: 2021-07-16 | Stop reason: SDUPTHER

## 2021-07-16 RX ORDER — CEFAZOLIN SODIUM 2 G/50ML
2000 SOLUTION INTRAVENOUS ONCE
Status: COMPLETED | OUTPATIENT
Start: 2021-07-16 | End: 2021-07-16

## 2021-07-16 RX ORDER — LIDOCAINE HYDROCHLORIDE 20 MG/ML
INJECTION, SOLUTION EPIDURAL; INFILTRATION; INTRACAUDAL; PERINEURAL PRN
Status: DISCONTINUED | OUTPATIENT
Start: 2021-07-16 | End: 2021-07-16 | Stop reason: SDUPTHER

## 2021-07-16 RX ORDER — BUPIVACAINE HYDROCHLORIDE 5 MG/ML
INJECTION, SOLUTION EPIDURAL; INTRACAUDAL
Status: COMPLETED | OUTPATIENT
Start: 2021-07-16 | End: 2021-07-16

## 2021-07-16 RX ORDER — ONDANSETRON 2 MG/ML
INJECTION INTRAMUSCULAR; INTRAVENOUS PRN
Status: DISCONTINUED | OUTPATIENT
Start: 2021-07-16 | End: 2021-07-16 | Stop reason: SDUPTHER

## 2021-07-16 RX ORDER — MEPERIDINE HYDROCHLORIDE 25 MG/ML
12.5 INJECTION INTRAMUSCULAR; INTRAVENOUS; SUBCUTANEOUS EVERY 5 MIN PRN
Status: DISCONTINUED | OUTPATIENT
Start: 2021-07-16 | End: 2021-07-16 | Stop reason: HOSPADM

## 2021-07-16 RX ORDER — ONDANSETRON 2 MG/ML
4 INJECTION INTRAMUSCULAR; INTRAVENOUS
Status: COMPLETED | OUTPATIENT
Start: 2021-07-16 | End: 2021-07-16

## 2021-07-16 RX ORDER — LABETALOL HYDROCHLORIDE 5 MG/ML
5 INJECTION, SOLUTION INTRAVENOUS EVERY 10 MIN PRN
Status: DISCONTINUED | OUTPATIENT
Start: 2021-07-16 | End: 2021-07-16 | Stop reason: HOSPADM

## 2021-07-16 RX ORDER — HYDRALAZINE HYDROCHLORIDE 20 MG/ML
5 INJECTION INTRAMUSCULAR; INTRAVENOUS
Status: DISCONTINUED | OUTPATIENT
Start: 2021-07-16 | End: 2021-07-16 | Stop reason: HOSPADM

## 2021-07-16 RX ORDER — MORPHINE SULFATE 2 MG/ML
2 INJECTION, SOLUTION INTRAMUSCULAR; INTRAVENOUS EVERY 5 MIN PRN
Status: DISCONTINUED | OUTPATIENT
Start: 2021-07-16 | End: 2021-07-16 | Stop reason: HOSPADM

## 2021-07-16 RX ORDER — OXYCODONE HYDROCHLORIDE AND ACETAMINOPHEN 5; 325 MG/1; MG/1
1 TABLET ORAL PRN
Status: COMPLETED | OUTPATIENT
Start: 2021-07-16 | End: 2021-07-16

## 2021-07-16 RX ORDER — SODIUM CHLORIDE 9 MG/ML
INJECTION, SOLUTION INTRAVENOUS CONTINUOUS PRN
Status: DISCONTINUED | OUTPATIENT
Start: 2021-07-16 | End: 2021-07-16 | Stop reason: SDUPTHER

## 2021-07-16 RX ORDER — MORPHINE SULFATE 2 MG/ML
1 INJECTION, SOLUTION INTRAMUSCULAR; INTRAVENOUS EVERY 5 MIN PRN
Status: DISCONTINUED | OUTPATIENT
Start: 2021-07-16 | End: 2021-07-16 | Stop reason: HOSPADM

## 2021-07-16 RX ORDER — CEPHALEXIN 500 MG/1
500 CAPSULE ORAL 4 TIMES DAILY
Qty: 20 CAPSULE | Refills: 0 | Status: SHIPPED | OUTPATIENT
Start: 2021-07-16 | End: 2021-07-21

## 2021-07-16 RX ORDER — KETAMINE HCL IN NACL, ISO-OSM 100MG/10ML
SYRINGE (ML) INJECTION PRN
Status: DISCONTINUED | OUTPATIENT
Start: 2021-07-16 | End: 2021-07-16 | Stop reason: SDUPTHER

## 2021-07-16 RX ORDER — ONDANSETRON 2 MG/ML
INJECTION INTRAMUSCULAR; INTRAVENOUS PRN
Status: DISCONTINUED | OUTPATIENT
Start: 2021-07-16 | End: 2021-07-16

## 2021-07-16 RX ORDER — MIDAZOLAM HYDROCHLORIDE 1 MG/ML
INJECTION INTRAMUSCULAR; INTRAVENOUS PRN
Status: DISCONTINUED | OUTPATIENT
Start: 2021-07-16 | End: 2021-07-16 | Stop reason: SDUPTHER

## 2021-07-16 RX ORDER — OXYCODONE HYDROCHLORIDE AND ACETAMINOPHEN 5; 325 MG/1; MG/1
2 TABLET ORAL PRN
Status: COMPLETED | OUTPATIENT
Start: 2021-07-16 | End: 2021-07-16

## 2021-07-16 RX ORDER — FENTANYL CITRATE 50 UG/ML
INJECTION, SOLUTION INTRAMUSCULAR; INTRAVENOUS PRN
Status: DISCONTINUED | OUTPATIENT
Start: 2021-07-16 | End: 2021-07-16 | Stop reason: SDUPTHER

## 2021-07-16 RX ORDER — DEXAMETHASONE SODIUM PHOSPHATE 4 MG/ML
INJECTION, SOLUTION INTRA-ARTICULAR; INTRALESIONAL; INTRAMUSCULAR; INTRAVENOUS; SOFT TISSUE PRN
Status: DISCONTINUED | OUTPATIENT
Start: 2021-07-16 | End: 2021-07-16 | Stop reason: SDUPTHER

## 2021-07-16 RX ORDER — HYDROCODONE BITARTRATE AND ACETAMINOPHEN 5; 325 MG/1; MG/1
1 TABLET ORAL EVERY 6 HOURS PRN
Qty: 20 TABLET | Refills: 0 | Status: SHIPPED | OUTPATIENT
Start: 2021-07-16 | End: 2021-07-21

## 2021-07-16 RX ORDER — SUCCINYLCHOLINE/SOD CL,ISO/PF 200MG/10ML
SYRINGE (ML) INTRAVENOUS PRN
Status: DISCONTINUED | OUTPATIENT
Start: 2021-07-16 | End: 2021-07-16 | Stop reason: SDUPTHER

## 2021-07-16 RX ADMIN — HYDROMORPHONE HYDROCHLORIDE 0.5 MG: 1 INJECTION, SOLUTION INTRAMUSCULAR; INTRAVENOUS; SUBCUTANEOUS at 15:18

## 2021-07-16 RX ADMIN — FENTANYL CITRATE 25 MCG: 50 INJECTION INTRAMUSCULAR; INTRAVENOUS at 14:48

## 2021-07-16 RX ADMIN — LIDOCAINE HYDROCHLORIDE 100 MG: 20 INJECTION, SOLUTION EPIDURAL; INFILTRATION; INTRACAUDAL; PERINEURAL at 13:55

## 2021-07-16 RX ADMIN — FENTANYL CITRATE 50 MCG: 50 INJECTION INTRAMUSCULAR; INTRAVENOUS at 14:43

## 2021-07-16 RX ADMIN — OXYCODONE HYDROCHLORIDE AND ACETAMINOPHEN 2 TABLET: 5; 325 TABLET ORAL at 16:37

## 2021-07-16 RX ADMIN — HYDROMORPHONE HYDROCHLORIDE 0.5 MG: 1 INJECTION, SOLUTION INTRAMUSCULAR; INTRAVENOUS; SUBCUTANEOUS at 15:27

## 2021-07-16 RX ADMIN — HYDROMORPHONE HYDROCHLORIDE 0.25 MG: 1 INJECTION, SOLUTION INTRAMUSCULAR; INTRAVENOUS; SUBCUTANEOUS at 15:07

## 2021-07-16 RX ADMIN — PROPOFOL 200 MG: 10 INJECTION, EMULSION INTRAVENOUS at 13:55

## 2021-07-16 RX ADMIN — ONDANSETRON 4 MG: 2 INJECTION INTRAMUSCULAR; INTRAVENOUS at 15:09

## 2021-07-16 RX ADMIN — HYDROMORPHONE HYDROCHLORIDE 0.5 MG: 1 INJECTION, SOLUTION INTRAMUSCULAR; INTRAVENOUS; SUBCUTANEOUS at 11:40

## 2021-07-16 RX ADMIN — SODIUM CHLORIDE: 9 INJECTION, SOLUTION INTRAVENOUS at 13:52

## 2021-07-16 RX ADMIN — MIDAZOLAM 2 MG: 1 INJECTION INTRAMUSCULAR; INTRAVENOUS at 13:52

## 2021-07-16 RX ADMIN — Medication 120 MG: at 13:55

## 2021-07-16 RX ADMIN — HYDROMORPHONE HYDROCHLORIDE 0.25 MG: 1 INJECTION, SOLUTION INTRAMUSCULAR; INTRAVENOUS; SUBCUTANEOUS at 14:59

## 2021-07-16 RX ADMIN — Medication 10 MG: at 13:55

## 2021-07-16 RX ADMIN — Medication 10 MG: at 14:02

## 2021-07-16 RX ADMIN — HYDROMORPHONE HYDROCHLORIDE 0.5 MG: 1 INJECTION, SOLUTION INTRAMUSCULAR; INTRAVENOUS; SUBCUTANEOUS at 15:39

## 2021-07-16 RX ADMIN — ONDANSETRON 4 MG: 2 INJECTION INTRAMUSCULAR; INTRAVENOUS at 14:12

## 2021-07-16 RX ADMIN — DEXAMETHASONE SODIUM PHOSPHATE 10 MG: 4 INJECTION, SOLUTION INTRAMUSCULAR; INTRAVENOUS at 13:58

## 2021-07-16 RX ADMIN — Medication 10 MG: at 14:21

## 2021-07-16 RX ADMIN — FENTANYL CITRATE 100 MCG: 50 INJECTION INTRAMUSCULAR; INTRAVENOUS at 13:55

## 2021-07-16 RX ADMIN — CEFAZOLIN SODIUM 2000 MG: 2 SOLUTION INTRAVENOUS at 13:58

## 2021-07-16 RX ADMIN — FENTANYL CITRATE 25 MCG: 50 INJECTION INTRAMUSCULAR; INTRAVENOUS at 14:31

## 2021-07-16 ASSESSMENT — PAIN DESCRIPTION - PAIN TYPE
TYPE: SURGICAL PAIN
TYPE: ACUTE PAIN
TYPE: SURGICAL PAIN
TYPE: SURGICAL PAIN
TYPE: ACUTE PAIN
TYPE: SURGICAL PAIN

## 2021-07-16 ASSESSMENT — PAIN DESCRIPTION - PROGRESSION
CLINICAL_PROGRESSION: GRADUALLY IMPROVING
CLINICAL_PROGRESSION: GRADUALLY WORSENING
CLINICAL_PROGRESSION: NOT CHANGED
CLINICAL_PROGRESSION: NOT CHANGED

## 2021-07-16 ASSESSMENT — PULMONARY FUNCTION TESTS
PIF_VALUE: 16
PIF_VALUE: 3
PIF_VALUE: 17
PIF_VALUE: 17
PIF_VALUE: 2
PIF_VALUE: 3
PIF_VALUE: 16
PIF_VALUE: 0
PIF_VALUE: 16
PIF_VALUE: 4
PIF_VALUE: 2
PIF_VALUE: 21
PIF_VALUE: 3
PIF_VALUE: 4
PIF_VALUE: 16
PIF_VALUE: 0
PIF_VALUE: 3
PIF_VALUE: 16
PIF_VALUE: 17
PIF_VALUE: 4
PIF_VALUE: 16
PIF_VALUE: 4
PIF_VALUE: 17
PIF_VALUE: 3
PIF_VALUE: 2
PIF_VALUE: 1
PIF_VALUE: 4
PIF_VALUE: 17
PIF_VALUE: 17
PIF_VALUE: 16
PIF_VALUE: 2
PIF_VALUE: 4
PIF_VALUE: 12
PIF_VALUE: 1
PIF_VALUE: 16
PIF_VALUE: 4
PIF_VALUE: 2
PIF_VALUE: 16
PIF_VALUE: 17
PIF_VALUE: 17
PIF_VALUE: 16
PIF_VALUE: 17
PIF_VALUE: 3
PIF_VALUE: 5
PIF_VALUE: 3
PIF_VALUE: 17
PIF_VALUE: 0
PIF_VALUE: 3
PIF_VALUE: 3
PIF_VALUE: 16
PIF_VALUE: 1
PIF_VALUE: 4
PIF_VALUE: 17
PIF_VALUE: 3

## 2021-07-16 ASSESSMENT — PAIN DESCRIPTION - ONSET
ONSET: ON-GOING

## 2021-07-16 ASSESSMENT — PAIN DESCRIPTION - ORIENTATION
ORIENTATION: LEFT

## 2021-07-16 ASSESSMENT — PAIN DESCRIPTION - DESCRIPTORS
DESCRIPTORS: ACHING
DESCRIPTORS: ACHING
DESCRIPTORS: DISCOMFORT
DESCRIPTORS: BURNING;ACHING
DESCRIPTORS: DISCOMFORT
DESCRIPTORS: CONSTANT
DESCRIPTORS: ACHING
DESCRIPTORS: DISCOMFORT
DESCRIPTORS: ACHING
DESCRIPTORS: CONSTANT
DESCRIPTORS: CONSTANT

## 2021-07-16 ASSESSMENT — PAIN SCALES - GENERAL
PAINLEVEL_OUTOF10: 7
PAINLEVEL_OUTOF10: 6
PAINLEVEL_OUTOF10: 7
PAINLEVEL_OUTOF10: 8
PAINLEVEL_OUTOF10: 6
PAINLEVEL_OUTOF10: 7
PAINLEVEL_OUTOF10: 7
PAINLEVEL_OUTOF10: 5
PAINLEVEL_OUTOF10: 7
PAINLEVEL_OUTOF10: 5
PAINLEVEL_OUTOF10: 6

## 2021-07-16 ASSESSMENT — PAIN DESCRIPTION - LOCATION
LOCATION: WRIST
LOCATION: ARM;WRIST
LOCATION: WRIST

## 2021-07-16 ASSESSMENT — PAIN - FUNCTIONAL ASSESSMENT
PAIN_FUNCTIONAL_ASSESSMENT: PREVENTS OR INTERFERES SOME ACTIVE ACTIVITIES AND ADLS
PAIN_FUNCTIONAL_ASSESSMENT: ACTIVITIES ARE NOT PREVENTED
PAIN_FUNCTIONAL_ASSESSMENT: 0-10
PAIN_FUNCTIONAL_ASSESSMENT: 0-10
PAIN_FUNCTIONAL_ASSESSMENT: ACTIVITIES ARE NOT PREVENTED
PAIN_FUNCTIONAL_ASSESSMENT: 0-10
PAIN_FUNCTIONAL_ASSESSMENT: PREVENTS OR INTERFERES SOME ACTIVE ACTIVITIES AND ADLS
PAIN_FUNCTIONAL_ASSESSMENT: ACTIVITIES ARE NOT PREVENTED
PAIN_FUNCTIONAL_ASSESSMENT: 0-10
PAIN_FUNCTIONAL_ASSESSMENT: PREVENTS OR INTERFERES SOME ACTIVE ACTIVITIES AND ADLS

## 2021-07-16 ASSESSMENT — LIFESTYLE VARIABLES: SMOKING_STATUS: 1

## 2021-07-16 ASSESSMENT — PAIN DESCRIPTION - FREQUENCY
FREQUENCY: CONTINUOUS

## 2021-07-16 ASSESSMENT — ENCOUNTER SYMPTOMS: SHORTNESS OF BREATH: 0

## 2021-07-16 NOTE — PROGRESS NOTES
From PACU. Alert and oriented. C/o 5/10 surgical left wrist pain. Denies nausea. Dressing is clean dry intact. Fingers are warm, move well, esther well.

## 2021-07-16 NOTE — BRIEF OP NOTE
Brief Postoperative Note      Patient: Jackeline Schneider  YOB: 1970  MRN: [id number]    Date of Procedure: 7/16/2021    Pre-Op Diagnosis: Left distal radius fracture    Post-Op Diagnosis: Same       Procedure(s):  OPEN REDUCTION INTERNAL FIXATION LEFT DISTAL RADIUS    Surgeon(s):  Edi Leonardo MD    Assistant:  Surgical Assistant: Dana Turpin    Anesthesia: General    Estimated Blood Loss (mL): Minimal    Complications: None    Specimens:   * No specimens in log *    Implants:  Implant Name Type Inv. Item Serial No.  Lot No. LRB No. Used Action   SCREW BONE L14MM DIA2.7MM WR TI ALLOY LCK FULL THRD T8 DRV  SCREW BONE L14MM DIA2.7MM WR TI ALLOY LCK FULL THRD T8 DRV  [employer] [facility]  Left 1 Implanted   SCREW BONE L18MM DIA2.7MM WR TI ALLOY LCK FULL THRD T8 DRV  SCREW BONE L18MM DIA2.7MM WR TI ALLOY LCK FULL THRD T8 DRV  [employer] [facility]  Left 1 Implanted   SCREW BNE L20MM DIA2. 7MM JADON FULL THRD T8 DRV [employer]  SCREW BNE L20MM DIA2. 7MM JADON FULL THRD T8 DRV [employer]IA[employer]  [employer] [facility]  Left 4 Implanted   SCREW BONE AD PED L22MM DIA2.7MM STD FRANNIE TI NONCANNULATED  SCREW BONE AD PED L22MM DIA2.7MM STD FRANNIE TI NONCANNULATED  [employer] [facility]  Left 2 Implanted   SCREW BONE L14MM DIA2.7MM WR TI ALLOY NONLOCKING FULL THRD  SCREW BONE L14MM DIA2.7MM WR TI ALLOY NONLOCKING FULL THRD  [employer] [facility]  Left 1 Implanted   PLATE BNE [id number] 10 H XSH L DST RAD VOLAR ASHLEY TI  PLATE BNE [id number] 10 H XSH L DST RAD VOLAR ASHLEY TI  [employer] [facility]  Left 1 Implanted         Drains:   NG/OG/NJ/NE Tube Orogastric 18 fr Center mouth (Active)       Findings: Same    Electronically signed by Edi Leonardo MD on 7/16/2021 at 3:47 PM

## 2021-07-16 NOTE — ED PROVIDER NOTES
eMERGENCY dEPARTMENT eNCOUnter      Pt Name: Maile Naidu  MRN: [id number]  [place] 1970  Date of evaluation: 2021  Provider: Salud Garcia MD     [address]       Chief Complaint   Patient presents with    Wrist Injury     fell on left wrist last night. C/o pain and swelling left wrist          HISTORY OF PRESENT ILLNESS   (Location/Symptom, Timing/Onset,Context/Setting, Quality, Duration, Modifying Factors, Severity) Note limiting factors. HPI    Maile Naidu is a 44 y.o. female who presents to the emergency department with left wrist injury after a fall yesterday. Patient woke up this morning with more pain. Patient states any movement of the left hand increases pain at the wrist.  There is moderate swelling noted. Patient has history of alcohol abuse. There was no other injury. Patient presents with pain at the left wrist.  Patient is right-hand dominant. Nursing Notes were reviewed. REVIEW OFSYSTEMS    (2+ for level 4; 10+ for level 5)   Review of Systems    General: No fevers, chills or night sweats, No weight loss    Head:  No Sore throat,  No Ear Pain    Chest:  Nontender. No Cough, No SOB,  Chest Pain    GI: No abdominal pain or vomiting    : No dysuria or hematuria    Musculoskeletal: No unrelenting pain or night pain    Neurologic: No bowel or bladder incontinence, No saddle anesthesia, No leg weakness    All other systems reviewed and are negative.         PAST MEDICAL HISTORY     Past Medical History:   Diagnosis Date    Alcohol abuse     Anxiety     Depression     Sleeping difficulties        SURGICAL HISTORY       Past Surgical History:   Procedure Laterality Date    BREAST SURGERY      sailine breast      SECTION  1988    FRACTURE SURGERY  2006    right wrist       CURRENT MEDICATIONS       Previous Medications    DISULFIRAM (ANTABUSE) 250 MG TABLET    Take 250 mg by mouth daily    IBUPROFEN (ADVIL;MOTRIN) 200 MG TABLET    Take 200 mg by mouth Partner Violence:     Fear of Current or Ex-Partner:     Emotionally Abused:     Physically Abused:     Sexually Abused:        SCREENINGS    Nolan Coma Scale  Eye Opening: Spontaneous  Best Verbal Response: Oriented  Best Motor Response: Obeys commands  [place] Coma Scale Score: 15      PHYSICAL EXAM    (up to 7 for level 4, 8 or more for level 5)     ED Triage Vitals [07/16/21 0511]   BP Temp Temp Source Pulse Resp SpO2 Height Weight   105/75 98 °F (36.7 °C) Oral 76 16 97 % 5' 4\" (1.626 m) 181 lb 11.2 oz (82.4 kg)       Physical Exam    General: Alert and awake ×3. Nontoxic appearance. Well-developed well-nourished 57-year-old in moderate pain and distress. HEENT: Normocephalic atraumatic. Neck is supple. Airway intact. No adenopathy  Cardiac: Regular rate and rhythm with no murmurs rubs or gallops  Pulmonary: Lungs are clear in all lung fields. No wheezing. No Rales. Abdomen: Soft and nontender. Negative hepatosplenomegaly. Bowel sounds are active  Extremities: Moving all extremities. No calf tenderness. Peripheral pulses all intact. Moderate distal radius and wrist swelling and tenderness. Neurovascular exam is fine. Good pedal pulses. Cap refills less than 2 seconds. Skin: No skin lesions. No rashes  Neurologic: Cranial nerves II through XII was grossly intact. Nonfocal neurological exam  Psychiatric: Patient is pleasant. Mood is appropriate. DIAGNOSTIC RESULTS     EKG (Per Emergency Physician):       RADIOLOGY (Per Emergency Physician): Interpretation per the Radiologist below, if available at the time of this note:  XR WRIST LEFT (MIN 3 VIEWS)    Result Date: 7/16/2021  EXAMINATION: 3 XRAY VIEWS OF THE LEFT WRIST 7/16/2021 5:19 am COMPARISON: None.  HISTORY: ORDERING SYSTEM PROVIDED HISTORY: FELL ON WRIST TECHNOLOGIST PROVIDED HISTORY: Reason for exam:->FELL ON WRIST Reason for Exam: [address] INJURY, WRIST PAIN Acuity: Acute Type of Exam: Initial Mechanism of Injury: [address] INJURY FINDINGS: There is an impacted intra-articular fracture of the distal radius. There is subtle apex anterior angulation. There is 2 mm of dorsal displacement of the distal fracture fragment. .  There is surrounding soft tissue swelling Small bony density also marginates the ulnar styloid. This could represent sequelae from recent fracture or represent sequelae from remote avulsion fracture. Intra-articular fracture distal radius Small bony fragment marginates the ulna of unclear etiology       ED BEDSIDE ULTRASOUND:   Performed by ED Physician - none    LABS:  Labs Reviewed - No data to display     All other labs were within normal range or not returned as of this dictation. Procedures      EMERGENCY DEPARTMENT COURSE and DIFFERENTIAL DIAGNOSIS/MDM:   Vitals:    Vitals:    07/16/21 0511   BP: 105/75   Pulse: 76   Resp: 16   Temp: 98 °F (36.7 °C)   TempSrc: Oral   SpO2: 97%   Weight: 181 lb 11.2 oz (82.4 kg)   Height: 5' 4\" (1.626 m)       Medications - No data to display    MDM. Patient presents with a fall with outstretched hand and left wrist pain. Exam consistent with moderate swelling and tenderness at the distal radius. X-ray confirms a intra-articular distal radius fracture. Patient placed in a OCL splint and a sling. Ice and ibuprofen. Follow-up with orthopedic. REVAL:         CRITICAL CARE TIME   Total CriticalCare time was 0 minutes, excluding separately reportable procedures. There was a high probability of clinically significant/life threatening deterioration in the patient's condition which required my urgent intervention. CONSULTS:  None    PROCEDURES:  Unless otherwise noted below, none     [unfilled]    FINAL IMPRESSION      1.  Closed fracture of distal end of left radius, unspecified fracture morphology, initial encounter          DISPOSITION/PLAN   DISPOSITION Decision To Discharge 07/16/2021 06:19:56 AM      PATIENT REFERRED TO:  Petra Denise MD  [address] [facility] [address] [facility] [address]  [phone]    Schedule an appointment as soon as possible for a visit in 3 days        DISCHARGE MEDICATIONS:  New Prescriptions    No medications on file          (Please note:  Portions of this note were completed with a voice recognition program.Efforts were made to edit the dictations but occasionally words and phrases are mis-transcribed.)  Form v2016. J.5-cn    Adeline CUNHA MD (electronically signed)  Emergency Medicine Provider        Judith Burkett MD  07/16/21 5781

## 2021-07-16 NOTE — ED NOTES
OCL splint short arm placed on left wrist. Sling placed on patient. Dr. Gentry Aragon checked splint. He told patient to go to [facility] 9 AM to see Dr. Bonita Frank. Gave patient discharged instructions she states, understanding. Patient discharged to home.      Germain Tobar RN  07/16/21 2646

## 2021-07-16 NOTE — PROGRESS NOTES
Vss. Dressing remains clean dry intact. Fingers are warm, move well, esther well. C/o 7/10 surgical pain. Analgesic given. Tolerated sitting up and po fluids and crackers well. Mother at bedside. Verbalized understanding of discharge instructions.

## 2021-07-16 NOTE — TELEPHONE ENCOUNTER
General Question     Subject: Lisa Luong is calling from [facility] to get the CPT CODES for surgery.      Facility Request: [facility] Number: Lisa Ag [phone]

## 2021-07-16 NOTE — ANESTHESIA PRE PROCEDURE
[facility] Department of Anesthesiology  Pre-Anesthesia Evaluation/Consultation       Name:  Gabriela Rich  : 1970  Age:  44 y.o. MRN:  [id number]  Date: 2021           Surgeon: Surgeon(s):  Irene Turpin MD    Procedure: Procedure(s):  OPEN REDUCTION INTERNAL FIXATION LEFT DISTAL RADIUS     No Known Allergies  Patient Active Problem List   Diagnosis    Depression    Suicidal ideation    [address]coholic intoxication without complication ([address]    Alcohol withdrawal ([address]    Tachycardia    Lactic acidosis    Drug use disorder    Episode of recurrent major depressive disorder ([address]    Microcytic anemia    Acute gastroenteritis    Hypokalemia    Hypomagnesemia    Hyponatremia    Alcohol withdrawal, uncomplicated (HCC)    High anion gap metabolic acidosis    Tobacco use disorder    Leukocytosis    Alcohol withdrawal delirium ([address]    Alcohol related seizure ([address]     Past Medical History:   Diagnosis Date    Alcohol abuse     Anxiety     Depression     Sleeping difficulties      Past Surgical History:   Procedure Laterality Date    BREAST SURGERY      sailine breast      SECTION  1988    FRACTURE SURGERY  2006    right wrist     Social History     Tobacco Use    Smoking status: Current Every Day Smoker     Packs/day: 1.00     Years: 8.00     Pack years: 8.00     Types: Cigarettes    Smokeless tobacco: Never Used   Vaping Use    Vaping Use: Unknown   Substance Use Topics    Alcohol use: Yes     Comment: binge drinker, states she threw 3 bottles of vodka away    Drug use: No     Medications  No current facility-administered medications on file prior to encounter. Current Outpatient Medications on File Prior to Encounter   Medication Sig Dispense Refill    HYDROcodone-acetaminophen (NORCO) 5-325 MG per tablet Take 1 tablet by mouth every 6 hours as needed for Pain for up to 5 days.  20 tablet 0    cephALEXin (KEFLEX) 500 MG capsule Take 1 capsule by mouth 4 times daily for 5 days 20 capsule 0    disulfiram (ANTABUSE) 250 MG tablet Take 250 mg by mouth daily      traZODone (DESYREL) 100 MG tablet Take 100-200 mg by mouth nightly as needed       ibuprofen (ADVIL;MOTRIN) 200 MG tablet Take 200 mg by mouth every 6 hours as needed for Pain      sertraline (ZOLOFT) 100 MG tablet Take 200 mg by mouth nightly        No current facility-administered medications for this encounter. Vital Signs (Current)   Vitals:    21 1104   Weight: 181 lb (82.1 kg)   Height: 5' 4\" (1.626 m)                                            Vital Signs Statistics (for past 48 hrs)     Temp  Av °F (36.7 °C)  Min: 98 °F (36.7 °C)   Min taken time: 21  Max: 98 °F (36.7 °C)   Max taken time: 21  Pulse  Av  Min: 68   Min taken time: 21  Max: 68   Max taken time: 21  Resp  Av  Min: 12   Min taken time: 21  Max: 12   Max taken time: 21  BP  Min: 105/75   Min taken time: 21  Max: 105/75   Max taken time: 21  SpO2  Av %  Min: 97 %   Min taken time: 21  Max: 97 %   Max taken time: 21  BP Readings from Last 3 Encounters:   21 105/75   21 122/80   21 126/71       BMI  Body mass index is 31.07 kg/m². Estimated body mass index is 31.07 kg/m² as calculated from the following:    Height as of this encounter: 5' 4\" (1.626 m). Weight as of this encounter: 181 lb (82.1 kg).     CBC   Lab Results   Component Value Date    WBC 8.5 2021    RBC 4.52 2021    HGB 13.5 2021    HCT 39.2 2021    MCV 86.8 2021    RDW 15.7 2021     2021     CMP    Lab Results   Component Value Date     2021    K 3.6 2021    K 3.5 2021     2021    CO2 22 2021    BUN 9 2021    CREATININE 0.6 2021    GFRAA >60 2021    AGRATIO 1.4 2020 LABGLOM >60 06/04/2021    GLUCOSE 138 06/04/2021    PROT 6.7 06/03/2021    CALCIUM 8.9 06/04/2021    BILITOT <0.2 06/03/2021    ALKPHOS 77 06/03/2021    AST 28 06/03/2021    ALT 30 06/03/2021     BMP    Lab Results   Component Value Date     06/04/2021    K 3.6 06/04/2021    K 3.5 05/26/2021     06/04/2021    CO2 22 06/04/2021    BUN 9 06/04/2021    CREATININE 0.6 06/04/2021    CALCIUM 8.9 06/04/2021    GFRAA >60 06/04/2021    LABGLOM >60 06/04/2021    GLUCOSE 138 06/04/2021     POCGlucose  No results for input(s): GLUCOSE in the last 72 hours. Coags  No results found for: PROTIME, INR, APTT  HCG (If Applicable)   Lab Results   Component Value Date    PREGTESTUR Negative 10/11/2018      ABGs No results found for: PHART, PO2ART, ATG7YQC, XGF8OJD, BEART, S7KJEEMX   Type & Screen (If Applicable)  No results found for: LABABO, LABRH                         BMI: Wt Readings from Last 3 Encounters:       NPO Status:                          Anesthesia Evaluation  Patient summary reviewed  Airway: Mallampati: II  TM distance: >3 FB   Neck ROM: full  Mouth opening: > = 3 FB Dental: normal exam         Pulmonary:   (+) current smoker    (-) COPD, asthma and shortness of breath          Patient smoked on day of surgery. Cardiovascular:        (-) hypertension, valvular problems/murmurs, past MI, CAD, CABG/stent, dysrhythmias,  angina and no hyperlipidemia                Neuro/Psych:   (+) psychiatric history:depression/anxiety    (-) seizures, TIA and CVA           GI/Hepatic/Renal:        (-) GERD, PUD, liver disease and no renal disease       Endo/Other:        (-) diabetes mellitus               Abdominal:             Vascular: negative vascular ROS. Other Findings:           Anesthesia Plan      general     ASA 3     (I discussed with the patient the risks and benefits of PIV, general anesthesia, IV Narcotics, PACU.   All questions were answered the patient agrees with the plan.)  Induction: intravenous. MIPS: Postoperative opioids intended and Prophylactic antiemetics administered. Anesthetic plan and risks discussed with patient. Plan discussed with CRNA. This pre-anesthesia assessment may be used as a history and physical.    DOS STAFF ADDENDUM:    Pt seen and examined, chart reviewed (including anesthesia, drug and allergy history). No interval changes to history and physical examination. Anesthetic plan, risks, benefits, alternatives, and personnel involved discussed with patient. Patient verbalized an understanding and agrees to proceed.       Genevieve Senior MD  July 16, 2021  11:06 AM

## 2021-07-16 NOTE — ANESTHESIA POSTPROCEDURE EVALUATION
[facility] Department of Anesthesiology  Post-Anesthesia Note       Name:  Philly Wen                                  Age:  44 y.o. MRN:  [id number]     Last Vitals & Oxygen Saturation: /70   Pulse 85   Temp 97.4 °F (36.3 °C) (Temporal)   Resp 16   Ht 5' 4\" (1.626 m)   Wt 181 lb (82.1 kg)   LMP 07/01/2021   SpO2 94%   BMI 31.07 kg/m²   Patient Vitals for the past 4 hrs:   BP Temp Temp src Pulse Resp SpO2   07/16/21 1610 110/70 97.4 °F (36.3 °C) Temporal 85 16 94 %   07/16/21 1545 121/77   90 19    07/16/21 1530 115/79   86 20    07/16/21 1515 112/75   90 17    07/16/21 1507  98.5 °F (36.9 °C) Temporal   96 %   07/16/21 1500 122/84   106 20    07/16/21 1456  98.6 °F (37 °C) Temporal          Level of consciousness:  Awake, alert    Respiratory: Respirations easy, no distress. Stable. Cardiovascular: Hemodynamically stable. Hydration: Adequate. PONV: Adequately managed. Post-op pain: Adequately controlled. Post-op assessment: Tolerated anesthetic well without complication. Complications:  None.     Suman Limon MD  July 16, 2021   4:31 PM

## 2021-07-16 NOTE — H&P
Preoperative H&P Update    The patient's History and Physical in the medical record from 2021 was reviewed by me today. Past Medical History:   Diagnosis Date    Alcohol abuse     Anxiety     Depression     Sleeping difficulties      Past Surgical History:   Procedure Laterality Date    BREAST SURGERY      sailine breast      SECTION      FRACTURE SURGERY  2006    right wrist     No current facility-administered medications on file prior to encounter. Current Outpatient Medications on File Prior to Encounter   Medication Sig Dispense Refill    HYDROcodone-acetaminophen (NORCO) 5-325 MG per tablet Take 1 tablet by mouth every 6 hours as needed for Pain for up to 5 days. 20 tablet 0    sertraline (ZOLOFT) 100 MG tablet Take 200 mg by mouth nightly       cephALEXin (KEFLEX) 500 MG capsule Take 1 capsule by mouth 4 times daily for 5 days 20 capsule 0    disulfiram (ANTABUSE) 250 MG tablet Take 250 mg by mouth daily      traZODone (DESYREL) 100 MG tablet Take 100-200 mg by mouth nightly as needed       ibuprofen (ADVIL;MOTRIN) 200 MG tablet Take 200 mg by mouth every 6 hours as needed for Pain         No Known Allergies   I reviewed the HPI, medications, allergies, reason for surgery, diagnosis and treatment plan and there has been no change. The patient was evaluated by me today. Physical exam findings for this update include:    Vitals:    21 1116   BP: 131/72   Pulse: 75   Resp: 17   Temp: 97.2 °F (36.2 °C)   SpO2: 97%     Airway is intact  Chest: chest clear, no wheezing, rales, normal symmetric air entry, no tachypnea, retractions or cyanosis  Heart: regular rate and rhythm ; heart sounds normal  Findings on exam of the body region where surgery is to be performed include:  Left distal radius fracture.     Electronically signed by Nico Garcia MD on 2021 at 12:23 PM

## 2021-07-16 NOTE — PROGRESS NOTES
Pt alert and oriented x4. C/o surgical pain 6/10, medicated per order.  VSS, wob wnl sat 97% on on 2L nc

## 2021-07-16 NOTE — ED NOTES
C/o swelling and pain on left wrist. She states, she fell on wrist last night. Patient smells of alcohol. She has hx alcohol abuse. Asked patient if she drinks. She states every now and then.  Mother at bedside      [facility] GarciaH[facility]  07/16/21 3567

## 2021-07-16 NOTE — PROGRESS NOTES
CHIEF COMPLAINT: Left wrist pain/ moderately displaced distal radius fracture. DATE OF INJURY: 7/15/2021    HISTORY:  Ms. Tenzin Bhat is a 44 y.o.  female right handed who presents today for evaluation of a left wrist injury. The patient reports that this injury occurred when she fell. She was first seen and evaluated in [facility], when she was x-rayed and splinted, and asked to f/u with Orthopedics. The patient denies any other injuries. Rates pain a 8/10 VAS moderate, sharp, constant and show no change. Movement makes the pain worse, the splint and resting makes the pain better. Alleviating factors rest. No numbness or tingling sensation.       Past Medical History:   Diagnosis Date    Alcohol abuse     Anxiety     Depression     Sleeping difficulties        Past Surgical History:   Procedure Laterality Date    BREAST SURGERY      sailine breast      [address] FRACTURE SURGERY  2006    right wrist       Social History     Socioeconomic History    Marital status: Single     Spouse name: Not on file    Number of children: Not on file    Years of education: Not on file    Highest education level: Not on file   Occupational History    Not on file   Tobacco Use    Smoking status: Current Every Day Smoker     Packs/day: 1.00     Years: 8.00     Pack years: 8.00     Types: Cigarettes    Smokeless tobacco: Never Used   Vaping Use    Vaping Use: Unknown   Substance and Sexual Activity    Alcohol use: Yes     Comment: binge drinker, states she threw 3 bottles of vodka away    Drug use: No    Sexual activity: Not Currently   Other Topics Concern    Not on file   Social History Narrative    Not on file     Social Determinants of Health     Financial Resource Strain:     Difficulty of Paying Living Expenses:    Food Insecurity:     Worried About Running Out of Food in the Last Year:     Junie of Food in the Last Year:    Transportation Needs:     Lack of Transportation (Medical):  Lack of Transportation (Non-Medical):    Physical Activity:     Days of Exercise per Week:     Minutes of Exercise per Session:    Stress:     Feeling of Stress :    Social Connections:     Frequency of Communication with Friends and Family:     Frequency of Social Gatherings with Friends and Family:     Attends Mandaen Services:     Active Member of Clubs or Organizations:     Attends Club or Organization Meetings:     Marital Status:    Intimate Partner Violence:     Fear of Current or Ex-Partner:     Emotionally Abused:     Physically Abused:     Sexually Abused:        Family History   Problem Relation Age of Onset    No Known Problems Mother     No Known Problems Father     Other Brother         anxiety    Cancer Maternal Grandmother         lung cancer    Cancer Maternal Grandfather         lung       Current Outpatient Medications on File Prior to Visit   Medication Sig Dispense Refill    disulfiram (ANTABUSE) 250 MG tablet Take 250 mg by mouth daily      traZODone (DESYREL) 100 MG tablet Take 100-200 mg by mouth nightly as needed       ibuprofen (ADVIL;MOTRIN) 200 MG tablet Take 200 mg by mouth every 6 hours as needed for Pain      sertraline (ZOLOFT) 100 MG tablet Take 200 mg by mouth nightly        No current facility-administered medications on file prior to visit. Pertinent items are noted in HPI  Review of systems reviewed from Patient History Form dated on 7/16/2021 and available in the patient's chart under the Media tab. PHYSICAL EXAMINATION:  Ms. Ozzie Levy is a very pleasant 44 y.o.  female who presents today in no acute distress, awake, alert, and oriented. She is well dressed, nourished and  groomed. Patient with normal affect. Height is  5' 4\" (1.626 m), weight is 181 lb (82.1 kg), Body mass index is 31.07 kg/m². Resting respiratory rate is 16. On evaluation of her left upper extremity, there is minimal deformity.   There is moderate swelling and moderate ecchymosis. She is tender to palpation over the distal radius, and otherwise nontender over the remainder of the extremity. Range of motion is decreased secondary to pain over the left wrist, but no mechanical block. The skin overlying the left wrist is intact without evidence of lesion, laceration or abrasion. Distal pulses are 2+ and symmetric bilaterally. Sensation is grossly intact to light touch and symmetric bilaterally. IMAGING:  Xrays dated 7/16/2021, 3 views of left wrist were reviewed, and showed moderately displaced distal radius fracture. IMPRESSION:  Left moderately displaced distal radius fracture. PLAN:  I discussed with Fred Garcia the overall alignment of the fracture and treatment options including both surgical and non-surgical treatment, and that my recommendation is an open reduction and internal fixation given the amount of displacement and comminution of the fracture. I discussed the risks and benefits of surgery with the patient, including but not limited to infection, bleeding, pain, injury to nerves or blood vessels failure of the surgery and need for additional surgery. All the patient's questions were answered. We discussed an expected post-operative course. She  is understanding of this and wishes to proceed. As this patient has demonstrated risk factors for osteoporosis, such as age greater than [de-identified] years and evidence of a fracture, I have referred the patient back to the primary care physician for evaluation for osteoporosis, including consideration for DEXA scanning, if this is felt to be clinically indicated. The patient is advised to contact the primary care physician to follow-up for further evaluation. The patient smokes, and we discussed with the patient the risks of smoking on general health and also on bone and soft tissue healing (delay and non-union), and promised to cut down or stop smoking.      Smoking: Educated the patient regarding the hazards of smoking and that it harms their body in many ways. It increases the chance of developing heart disease, lung disease, cancer, and other health problems including poor bone and wound healing. The importance of smoking cessation for optimal bone and wound healing was stressed. This was communicated verbally, 5 Minutes.       Merlinda Goring, MD

## 2021-07-19 ENCOUNTER — TELEPHONE (OUTPATIENT)
Dept: ORTHOPEDIC SURGERY | Age: 51
End: 2021-07-19

## 2021-07-19 ENCOUNTER — TELEPHONE (OUTPATIENT)
Dept: FAMILY MEDICINE CLINIC | Age: 51
End: 2021-07-19

## 2021-07-19 NOTE — LETTER
[address] [facility]  [address] [facility],First [address] [address]  Phone: [phone]  Fax: [phone]    Papi Ford MD        July 19, 2021     Patient: Gibson Reynoso   YOB: 1970   Date of Visit: 7/19/2021       To Whom It May Concern: It is my medical opinion that Gibson Reynoso should remain out of work until 7-30-21. If you have any questions or concerns, please don't hesitate to call.     Sincerely,        Papi Ford MD

## 2021-07-19 NOTE — TELEPHONE ENCOUNTER
Spoke with Patient and informed her I will speak with shahzad or Dr Aron Velez tomorrow. She would like to know if she can return to work or if she should stay off work for two weeks until her next appointment.

## 2021-07-19 NOTE — TELEPHONE ENCOUNTER
Anjali [facility] Transitions Initial Follow Up Call    Outreach made within 2 business days of discharge: Yes    Patient: Allison Delgado Patient : 1970   MRN: [id number]  Reason for Admission: There are no discharge diagnoses documented for the most recent discharge. Discharge Date: 21       Spoke with: patient in contact with PCP and Ortho.   Patient has post-op scheduled     Discharge department/facility: [facility]    Scheduled appointment with PCP within 7-14 days    Follow Up  Future Appointments   Date Time Provider Giovanny Frias   2021  9:00 AM MD ITA Balderrama ORTHO GILSON Deras MA

## 2021-07-22 ENCOUNTER — HOSPITAL ENCOUNTER (INPATIENT)
Age: 51
LOS: 1 days | Discharge: LEFT BEFORE DISCHARGE | DRG: 770 | End: 2021-07-23
Attending: EMERGENCY MEDICINE | Admitting: INTERNAL MEDICINE
Payer: MEDICAID

## 2021-07-22 ENCOUNTER — APPOINTMENT (OUTPATIENT)
Dept: GENERAL RADIOLOGY | Age: 51
DRG: 770 | End: 2021-07-22
Payer: MEDICAID

## 2021-07-22 DIAGNOSIS — F10.939 ALCOHOL WITHDRAWAL SYNDROME WITH COMPLICATION (HCC): Primary | ICD-10-CM

## 2021-07-22 PROBLEM — F10.239 ALCOHOL DEPENDENCE WITH WITHDRAWAL (HCC): Status: ACTIVE | Noted: 2021-07-22

## 2021-07-22 LAB
A/G RATIO: 1.6 (ref 1.1–2.2)
ALBUMIN SERPL-MCNC: 4.2 G/DL (ref 3.4–5)
ALP BLD-CCNC: 69 U/L (ref 40–129)
ALT SERPL-CCNC: 27 U/L (ref 10–40)
AMPHETAMINE SCREEN, URINE: NORMAL
ANION GAP SERPL CALCULATED.3IONS-SCNC: 14 MMOL/L (ref 3–16)
AST SERPL-CCNC: 18 U/L (ref 15–37)
BACTERIA: ABNORMAL /HPF
BARBITURATE SCREEN URINE: NORMAL
BASOPHILS ABSOLUTE: 0.1 K/UL (ref 0–0.2)
BASOPHILS RELATIVE PERCENT: 0.7 %
BENZODIAZEPINE SCREEN, URINE: NORMAL
BILIRUB SERPL-MCNC: <0.2 MG/DL (ref 0–1)
BILIRUBIN URINE: NEGATIVE
BLOOD, URINE: NEGATIVE
BUN BLDV-MCNC: 9 MG/DL (ref 7–20)
CALCIUM SERPL-MCNC: 9 MG/DL (ref 8.3–10.6)
CANNABINOID SCREEN URINE: NORMAL
CHLORIDE BLD-SCNC: 107 MMOL/L (ref 99–110)
CLARITY: CLEAR
CO2: 21 MMOL/L (ref 21–32)
COCAINE METABOLITE SCREEN URINE: NORMAL
COLOR: YELLOW
CREAT SERPL-MCNC: 0.7 MG/DL (ref 0.6–1.1)
EKG ATRIAL RATE: 98 BPM
EKG DIAGNOSIS: NORMAL
EKG P AXIS: 37 DEGREES
EKG P-R INTERVAL: 118 MS
EKG Q-T INTERVAL: 338 MS
EKG QRS DURATION: 62 MS
EKG QTC CALCULATION (BAZETT): 431 MS
EKG R AXIS: 78 DEGREES
EKG T AXIS: 49 DEGREES
EKG VENTRICULAR RATE: 98 BPM
EOSINOPHILS ABSOLUTE: 0.1 K/UL (ref 0–0.6)
EOSINOPHILS RELATIVE PERCENT: 1 %
EPITHELIAL CELLS, UA: 8 /HPF (ref 0–5)
ETHANOL: NORMAL MG/DL (ref 0–0.08)
GFR AFRICAN AMERICAN: >60
GFR NON-AFRICAN AMERICAN: >60
GLOBULIN: 2.7 G/DL
GLUCOSE BLD-MCNC: 92 MG/DL (ref 70–99)
GLUCOSE URINE: NEGATIVE MG/DL
HCG QUALITATIVE: NEGATIVE
HCT VFR BLD CALC: 40.3 % (ref 36–48)
HEMOGLOBIN: 13.8 G/DL (ref 12–16)
HYALINE CASTS: 0 /LPF (ref 0–8)
KETONES, URINE: ABNORMAL MG/DL
LEUKOCYTE ESTERASE, URINE: NEGATIVE
LYMPHOCYTES ABSOLUTE: 3.2 K/UL (ref 1–5.1)
LYMPHOCYTES RELATIVE PERCENT: 24.5 %
Lab: NORMAL
MCH RBC QN AUTO: 30.3 PG (ref 26–34)
MCHC RBC AUTO-ENTMCNC: 34.2 G/DL (ref 31–36)
MCV RBC AUTO: 88.6 FL (ref 80–100)
METHADONE SCREEN, URINE: NORMAL
MICROSCOPIC EXAMINATION: YES
MONOCYTES ABSOLUTE: 0.6 K/UL (ref 0–1.3)
MONOCYTES RELATIVE PERCENT: 4.9 %
NEUTROPHILS ABSOLUTE: 8.9 K/UL (ref 1.7–7.7)
NEUTROPHILS RELATIVE PERCENT: 68.9 %
NITRITE, URINE: NEGATIVE
OPIATE SCREEN URINE: NORMAL
OXYCODONE URINE: NORMAL
PDW BLD-RTO: 15.2 % (ref 12.4–15.4)
PH UA: 7
PH UA: 7 (ref 5–8)
PHENCYCLIDINE SCREEN URINE: NORMAL
PLATELET # BLD: 327 K/UL (ref 135–450)
PMV BLD AUTO: 8.1 FL (ref 5–10.5)
POTASSIUM REFLEX MAGNESIUM: 4 MMOL/L (ref 3.5–5.1)
PROPOXYPHENE SCREEN: NORMAL
PROTEIN UA: ABNORMAL MG/DL
RBC # BLD: 4.55 M/UL (ref 4–5.2)
RBC UA: 1 /HPF (ref 0–4)
SODIUM BLD-SCNC: 142 MMOL/L (ref 136–145)
SPECIFIC GRAVITY UA: 1.02 (ref 1–1.03)
TOTAL PROTEIN: 6.9 G/DL (ref 6.4–8.2)
URINE REFLEX TO CULTURE: ABNORMAL
URINE TYPE: ABNORMAL
UROBILINOGEN, URINE: 0.2 E.U./DL
WBC # BLD: 12.9 K/UL (ref 4–11)
WBC UA: 2 /HPF (ref 0–5)

## 2021-07-22 PROCEDURE — 6360000002 HC RX W HCPCS: Performed by: INTERNAL MEDICINE

## 2021-07-22 PROCEDURE — 84703 CHORIONIC GONADOTROPIN ASSAY: CPT

## 2021-07-22 PROCEDURE — 96375 TX/PRO/DX INJ NEW DRUG ADDON: CPT

## 2021-07-22 PROCEDURE — 81001 URINALYSIS AUTO W/SCOPE: CPT

## 2021-07-22 PROCEDURE — 71046 X-RAY EXAM CHEST 2 VIEWS: CPT

## 2021-07-22 PROCEDURE — 1200000000 HC SEMI PRIVATE

## 2021-07-22 PROCEDURE — 85025 COMPLETE CBC W/AUTO DIFF WBC: CPT

## 2021-07-22 PROCEDURE — 80053 COMPREHEN METABOLIC PANEL: CPT

## 2021-07-22 PROCEDURE — 2500000003 HC RX 250 WO HCPCS: Performed by: EMERGENCY MEDICINE

## 2021-07-22 PROCEDURE — 96374 THER/PROPH/DIAG INJ IV PUSH: CPT

## 2021-07-22 PROCEDURE — 93010 ELECTROCARDIOGRAM REPORT: CPT | Performed by: INTERNAL MEDICINE

## 2021-07-22 PROCEDURE — 80307 DRUG TEST PRSMV CHEM ANLYZR: CPT

## 2021-07-22 PROCEDURE — 93005 ELECTROCARDIOGRAM TRACING: CPT | Performed by: EMERGENCY MEDICINE

## 2021-07-22 PROCEDURE — 6360000002 HC RX W HCPCS: Performed by: EMERGENCY MEDICINE

## 2021-07-22 PROCEDURE — 2580000003 HC RX 258: Performed by: INTERNAL MEDICINE

## 2021-07-22 PROCEDURE — 2580000003 HC RX 258: Performed by: EMERGENCY MEDICINE

## 2021-07-22 PROCEDURE — 99283 EMERGENCY DEPT VISIT LOW MDM: CPT

## 2021-07-22 PROCEDURE — 82077 ASSAY SPEC XCP UR&BREATH IA: CPT

## 2021-07-22 PROCEDURE — 6370000000 HC RX 637 (ALT 250 FOR IP): Performed by: INTERNAL MEDICINE

## 2021-07-22 RX ORDER — SERTRALINE HYDROCHLORIDE 100 MG/1
200 TABLET, FILM COATED ORAL NIGHTLY
Status: DISCONTINUED | OUTPATIENT
Start: 2021-07-22 | End: 2021-07-23 | Stop reason: HOSPADM

## 2021-07-22 RX ORDER — TRAZODONE HYDROCHLORIDE 100 MG/1
100 TABLET ORAL NIGHTLY PRN
Status: DISCONTINUED | OUTPATIENT
Start: 2021-07-22 | End: 2021-07-23 | Stop reason: HOSPADM

## 2021-07-22 RX ORDER — SODIUM CHLORIDE 0.9 % (FLUSH) 0.9 %
5-40 SYRINGE (ML) INJECTION EVERY 12 HOURS SCHEDULED
Status: DISCONTINUED | OUTPATIENT
Start: 2021-07-22 | End: 2021-07-22 | Stop reason: SDUPTHER

## 2021-07-22 RX ORDER — ONDANSETRON 2 MG/ML
4 INJECTION INTRAMUSCULAR; INTRAVENOUS ONCE
Status: COMPLETED | OUTPATIENT
Start: 2021-07-22 | End: 2021-07-22

## 2021-07-22 RX ORDER — GAUZE BANDAGE 2" X 2"
100 BANDAGE TOPICAL DAILY
Status: DISCONTINUED | OUTPATIENT
Start: 2021-07-22 | End: 2021-07-23 | Stop reason: HOSPADM

## 2021-07-22 RX ORDER — SODIUM CHLORIDE 9 MG/ML
25 INJECTION, SOLUTION INTRAVENOUS PRN
Status: DISCONTINUED | OUTPATIENT
Start: 2021-07-22 | End: 2021-07-23 | Stop reason: HOSPADM

## 2021-07-22 RX ORDER — SODIUM CHLORIDE 0.9 % (FLUSH) 0.9 %
5-40 SYRINGE (ML) INJECTION PRN
Status: DISCONTINUED | OUTPATIENT
Start: 2021-07-22 | End: 2021-07-23 | Stop reason: HOSPADM

## 2021-07-22 RX ORDER — ACETAMINOPHEN 650 MG/1
650 SUPPOSITORY RECTAL EVERY 6 HOURS PRN
Status: DISCONTINUED | OUTPATIENT
Start: 2021-07-22 | End: 2021-07-23 | Stop reason: HOSPADM

## 2021-07-22 RX ORDER — SODIUM CHLORIDE 9 MG/ML
25 INJECTION, SOLUTION INTRAVENOUS PRN
Status: DISCONTINUED | OUTPATIENT
Start: 2021-07-22 | End: 2021-07-22 | Stop reason: SDUPTHER

## 2021-07-22 RX ORDER — LORAZEPAM 2 MG/ML
4 INJECTION INTRAMUSCULAR
Status: DISCONTINUED | OUTPATIENT
Start: 2021-07-22 | End: 2021-07-23 | Stop reason: HOSPADM

## 2021-07-22 RX ORDER — LORAZEPAM 1 MG/1
4 TABLET ORAL
Status: DISCONTINUED | OUTPATIENT
Start: 2021-07-22 | End: 2021-07-23 | Stop reason: HOSPADM

## 2021-07-22 RX ORDER — MULTIVITAMIN WITH IRON
1 TABLET ORAL DAILY
Status: DISCONTINUED | OUTPATIENT
Start: 2021-07-22 | End: 2021-07-23 | Stop reason: HOSPADM

## 2021-07-22 RX ORDER — LORAZEPAM 2 MG/ML
2 INJECTION INTRAMUSCULAR ONCE
Status: DISCONTINUED | OUTPATIENT
Start: 2021-07-22 | End: 2021-07-22

## 2021-07-22 RX ORDER — LORAZEPAM 2 MG/ML
2 INJECTION INTRAMUSCULAR
Status: DISCONTINUED | OUTPATIENT
Start: 2021-07-22 | End: 2021-07-23 | Stop reason: HOSPADM

## 2021-07-22 RX ORDER — LORAZEPAM 2 MG/ML
3 INJECTION INTRAMUSCULAR
Status: DISCONTINUED | OUTPATIENT
Start: 2021-07-22 | End: 2021-07-23 | Stop reason: HOSPADM

## 2021-07-22 RX ORDER — LORAZEPAM 2 MG/ML
2 INJECTION INTRAMUSCULAR ONCE
Status: COMPLETED | OUTPATIENT
Start: 2021-07-22 | End: 2021-07-22

## 2021-07-22 RX ORDER — ONDANSETRON 2 MG/ML
4 INJECTION INTRAMUSCULAR; INTRAVENOUS EVERY 6 HOURS PRN
Status: DISCONTINUED | OUTPATIENT
Start: 2021-07-22 | End: 2021-07-23 | Stop reason: HOSPADM

## 2021-07-22 RX ORDER — ONDANSETRON 4 MG/1
4 TABLET, ORALLY DISINTEGRATING ORAL EVERY 8 HOURS PRN
Status: DISCONTINUED | OUTPATIENT
Start: 2021-07-22 | End: 2021-07-23 | Stop reason: HOSPADM

## 2021-07-22 RX ORDER — SODIUM CHLORIDE 0.9 % (FLUSH) 0.9 %
5-40 SYRINGE (ML) INJECTION EVERY 12 HOURS SCHEDULED
Status: DISCONTINUED | OUTPATIENT
Start: 2021-07-22 | End: 2021-07-23 | Stop reason: HOSPADM

## 2021-07-22 RX ORDER — LORAZEPAM 2 MG/ML
1 INJECTION INTRAMUSCULAR
Status: DISCONTINUED | OUTPATIENT
Start: 2021-07-22 | End: 2021-07-23 | Stop reason: HOSPADM

## 2021-07-22 RX ORDER — LORAZEPAM 1 MG/1
3 TABLET ORAL
Status: DISCONTINUED | OUTPATIENT
Start: 2021-07-22 | End: 2021-07-23 | Stop reason: HOSPADM

## 2021-07-22 RX ORDER — POLYETHYLENE GLYCOL 3350 17 G/17G
17 POWDER, FOR SOLUTION ORAL DAILY PRN
Status: DISCONTINUED | OUTPATIENT
Start: 2021-07-22 | End: 2021-07-23 | Stop reason: HOSPADM

## 2021-07-22 RX ORDER — ACETAMINOPHEN 325 MG/1
650 TABLET ORAL EVERY 6 HOURS PRN
Status: DISCONTINUED | OUTPATIENT
Start: 2021-07-22 | End: 2021-07-23 | Stop reason: HOSPADM

## 2021-07-22 RX ORDER — LORAZEPAM 1 MG/1
2 TABLET ORAL
Status: DISCONTINUED | OUTPATIENT
Start: 2021-07-22 | End: 2021-07-23 | Stop reason: HOSPADM

## 2021-07-22 RX ORDER — SODIUM CHLORIDE 0.9 % (FLUSH) 0.9 %
5-40 SYRINGE (ML) INJECTION PRN
Status: DISCONTINUED | OUTPATIENT
Start: 2021-07-22 | End: 2021-07-22 | Stop reason: SDUPTHER

## 2021-07-22 RX ORDER — LORAZEPAM 1 MG/1
1 TABLET ORAL
Status: DISCONTINUED | OUTPATIENT
Start: 2021-07-22 | End: 2021-07-23 | Stop reason: HOSPADM

## 2021-07-22 RX ORDER — CHLORDIAZEPOXIDE HYDROCHLORIDE 5 MG/1
10 CAPSULE, GELATIN COATED ORAL 3 TIMES DAILY
Status: DISCONTINUED | OUTPATIENT
Start: 2021-07-22 | End: 2021-07-23 | Stop reason: HOSPADM

## 2021-07-22 RX ADMIN — CHLORDIAZEPOXIDE HYDROCHLORIDE 10 MG: 5 CAPSULE ORAL at 22:10

## 2021-07-22 RX ADMIN — LORAZEPAM 2 MG: 2 INJECTION INTRAMUSCULAR; INTRAVENOUS at 12:56

## 2021-07-22 RX ADMIN — CHLORDIAZEPOXIDE HYDROCHLORIDE 10 MG: 5 CAPSULE ORAL at 17:44

## 2021-07-22 RX ADMIN — Medication 100 MG: at 17:44

## 2021-07-22 RX ADMIN — SODIUM CHLORIDE, PRESERVATIVE FREE 10 ML: 5 INJECTION INTRAVENOUS at 22:10

## 2021-07-22 RX ADMIN — SERTRALINE 200 MG: 100 TABLET, FILM COATED ORAL at 22:10

## 2021-07-22 RX ADMIN — LORAZEPAM 2 MG: 2 INJECTION INTRAMUSCULAR; INTRAVENOUS at 15:31

## 2021-07-22 RX ADMIN — ACETAMINOPHEN 650 MG: 325 TABLET ORAL at 17:51

## 2021-07-22 RX ADMIN — ONDANSETRON 4 MG: 2 INJECTION INTRAMUSCULAR; INTRAVENOUS at 14:54

## 2021-07-22 RX ADMIN — THERA TABS 1 TABLET: TAB at 17:44

## 2021-07-22 RX ADMIN — FOLIC ACID: 5 INJECTION, SOLUTION INTRAMUSCULAR; INTRAVENOUS; SUBCUTANEOUS at 13:00

## 2021-07-22 ASSESSMENT — PAIN DESCRIPTION - LOCATION: LOCATION: ARM

## 2021-07-22 ASSESSMENT — PAIN DESCRIPTION - PAIN TYPE
TYPE: ACUTE PAIN
TYPE: ACUTE PAIN

## 2021-07-22 ASSESSMENT — PAIN SCALES - GENERAL
PAINLEVEL_OUTOF10: 0
PAINLEVEL_OUTOF10: 7
PAINLEVEL_OUTOF10: 0
PAINLEVEL_OUTOF10: 0
PAINLEVEL_OUTOF10: 6

## 2021-07-22 NOTE — H&P
Hospital Medicine History & Physical      PCP: Alona Victoria DO    Date of Admission: 2021    Date of Service: Pt seen/examined on 2021    Chief Complaint:      Chief Complaint   Patient presents with    Withdrawal     pt states last drink was at 11pm she has been on a 48hr drinking binge , possible sz this am        History Of Present Illness:     64-year-old female with past medical history of alcohol abuse, anxiety, depression presented to the hospital concerns of withdrawal symptoms. Patient states that she was on a 48-hour binge drinking. She is very lasting with around 11 PM last night. Since then today she started noticing tremors and feeling anxious and hard palpitating. Due to this she decided to come to the hospital.  On arrival she was noted to be tachycardic. Lab work was fairly unremarkable. Patient was admitted to the hospital further work-up and management    Past Medical History:        Diagnosis Date    Alcohol abuse     Anxiety     Depression     Sleeping difficulties        Past Surgical History:        Procedure Laterality Date    BREAST SURGERY      sailine breast      SECTION  1988    FOREARM SURGERY Left 2021    OPEN REDUCTION INTERNAL FIXATION LEFT DISTAL RADIUS performed by Robert Nelson MD at [place] [address]      right wrist       Medications Prior to Admission:    Prior to Admission medications    Medication Sig Start Date End Date Taking? Authorizing Provider   traZODone (DESYREL) 100 MG tablet Take 100 mg by mouth nightly as needed for Sleep    Yes Historical Provider, MD   ibuprofen (ADVIL;MOTRIN) 200 MG tablet Take 200 mg by mouth every 6 hours as needed for Pain   Yes Historical Provider, MD   sertraline (ZOLOFT) 100 MG tablet Take 200 mg by mouth nightly    Yes Historical Provider, MD       Allergies:  Patient has no known allergies. Social History:       reports that she has been smoking cigarettes.  She has a 8.00 pack-year smoking history. She has never used smokeless tobacco. She reports current alcohol use. She reports that she does not use drugs. Family History:  Reviewed in detail and Positive as follows:        Problem Relation Age of Onset    No Known Problems Mother     No Known Problems Father     Other Brother         anxiety    Cancer Maternal Grandmother         lung cancer    Cancer Maternal Grandfather         lung       REVIEW OF SYSTEMS:   Positive review  noted in the HPI. All other systems reviewed and negative.     PHYSICAL EXAM:    /82   Pulse 93   Temp 97.7 °F (36.5 °C) (Oral)   Resp 18   Ht 5' 5\" (1.651 m)   LMP 07/01/2021   SpO2 94%   BMI 30.12 kg/m²   General Appearance: alert and oriented to person, place and time, appears anxious  Skin: warm and dry, no rash or erythema  Head: normocephalic and atraumatic  Eyes: pupils equal, round, and reactive to light, extraocular eye movements intact, conjunctivae normal  ENT: tympanic membrane, external ear and ear canal normal bilaterally, nose without deformity, nasal mucosa and turbinates normal without polyps  Neck: supple and non-tender without mass, no thyromegaly or thyroid nodules, no cervical lymphadenopathy  Pulmonary/Chest: clear to auscultation bilaterally- no wheezes, rales or rhonchi, normal air movement, no respiratory distress  Cardiovascular: Tachycardic regular rhythm, normal S1 and S2, no murmurs, rubs, clicks, or gallops, Peripheral pulses good, Cap refill <3 sec, no carotid bruits  Abdomen: soft, non-tender, non-distended, normal bowel sounds, no masses or organomegaly  Extremities: no cyanosis, clubbing or edema  Musculoskeletal: normal range of motion, no joint swelling, deformity or tenderness  Neurologic: Bilateral tremors noted in arms, no focal motor deficits noted cranial nerves II-XII intact      LABS:        CBC   Recent Labs     07/22/21  1228   WBC 12.9*   HGB 13.8   HCT 40.3         RENAL  Recent Labs     07/22/21  1228      K 4.0      CO2 21   BUN 9   CREATININE 0.7     LFT'S  Recent Labs     07/22/21  1228   AST 18   ALT 27   BILITOT <0.2   ALKPHOS 69     COAG  No results for input(s): INR in the last 72 hours. CARDIAC ENZYMES  No results for input(s): CKTOTAL, CKMB, CKMBINDEX, TROPONINI in the last 72 hours. U/A:    Lab Results   Component Value Date    COLORU YELLOW 07/22/2021    WBCUA 2 07/22/2021    RBCUA 1 07/22/2021    MUCUS 1+ 02/15/2020    BACTERIA 1+ 07/22/2021    CLARITYU Clear 07/22/2021    SPECGRAV 1.024 07/22/2021    LEUKOCYTESUR Negative 07/22/2021    BLOODU Negative 07/22/2021    GLUCOSEU Negative 07/22/2021       ABG  No results found for: GST7XOW, BEART, H4MWYOQJ, PHART, THGBART, QPA3DPL, PO2ART, ZPV8ONT    UA:  Recent Labs     07/22/21  1500   COLORU YELLOW   PHUR 7.0  7.0   WBCUA 2   RBCUA 1   BACTERIA 1+*   CLARITYU Clear   SPECGRAV 1.024   LEUKOCYTESUR Negative   UROBILINOGEN 0.2   BILIRUBINUR Negative   BLOODU Negative   GLUCOSEU Negative   KETUA TRACE*       Microbiology:  No results for input(s): LABGRAM, LABANAE, ORG, CXSURG in the last 72 hours. Nasal Culture: No results for input(s): ORG, MRSAPCR in the last 72 hours. Blood Culture: No results for input(s): BC, BLOODCULT2, ORG in the last 72 hours. Fungal Culture:   No results for input(s): FUNGSM in the last 72 hours. No results for input(s): FUNCXBLD in the last 72 hours. CSF Culture:  No results for input(s): COLORCSF, APPEARCSF, CFTUBE, CLOTCSF, WBCCSF, RBCCSF, NEUTCSF, NUMCELLSCSF, LYMPHSCSF, MONOCSF, GLUCCSF, VOLCSF in the last 72 hours. Respiratory Culture:  No results for input(s): Paola Autumn in the last 72 hours. AFB:No results for input(s): AFBSMEAR in the last 72 hours. Urine Culture  No results for input(s): LABURIN in the last 72 hours.     RADIOLOGY:    XR CHEST (2 VW)   Final Result   No active cardiopulmonary disease             Previous medical records personally reviewed and analyzed         PHYSICIAN CERTIFICATION    I certify that Jorge Alaniz is expected to be hospitalized for >2 midnights based on the following assessment and plan:    ASSESSMENT/PLAN:  Active Hospital Problems    Diagnosis Date Noted    [facility] [F10.239] 07/22/2021     Alcohol abuse with acute withdrawal   -Continue CIWA protocol  -Librium 10 mg 3 times daily scheduled  -Continue MVI/thiamine    Depression  -Continue Zoloft    DVT Prophylaxis: Lovenox  Diet: ADULT DIET; Regular  Code Status: Full Code      Dispo -pending clinical course       Whitney Mcardle, MD  The note was completed using EMR. Every effort was made to ensure accuracy; however, inadvertent computerized transcription errors may be present. Thank you Luzma Eden DO for the opportunity to be involved in this patient's care. If you have any questions or concerns please feel free to contact me at [phone].

## 2021-07-22 NOTE — ED NOTES
Fall risk screening completed. Fall risk bracelet applied to patient. Non-skid socks provided and placed on patient. The fall risk is indicated using  fall sign . Based on score, a bed alarm was indicated and applied. The call light is within the patient's reach, and instructions for use were provided. The bed is in the lowest position with wheels locked. The patient has been advised to notify staff, using the call light, if there is a need to get up or use restroom. The patient verbalized understanding of safety precautions and how to contact staff for assistance.       Anabel Santos RN  07/22/21 2807

## 2021-07-22 NOTE — PROGRESS NOTES
Medication Reconciliation    List of medications patient is currently taking is complete. Source of information: 1. Conversation with patient at bedside                                      2. EPIC records      Allergies  Patient has no known allergies. Notes regarding home medications:   1.  Patient takes no scheduled medication in am. Only scheduled medication is sertraline 200 mg nightly, which she took last pm.

## 2021-07-22 NOTE — ED NOTES
ED SBAR report provider to [facility]. Patient to be transported to Room 4269 via stretcher by ED tech. Patient transported with bedside cardiac monitor and with IV medications infusing. IV site clean, dry, and intact. MEWS score and pain assessed and documented. Updated patient on plan of care.      Daphney Lopez, [place]  07/22/21 3715

## 2021-07-22 NOTE — ED PROVIDER NOTES
Triage Chief Complaint:   Withdrawal (pt states last drink was at 11pm she has been on a 48hr drinking binge , possible sz this am )    [place]:  Hilda Ewing is a 44 y.o. female with past medical history of alcohol abuse and prior admission as recently as 2021 for alcohol withdrawal presents for evaluation of concern that she is withdrawing again. She states her last drink was 11 PM last night after a 48-hour drinking binge. She states she may have a seizure this morning. She arrives alert awake oriented but tachycardic and tremulous. No headache no chest pain no fever reported    ROS:  At least 12 systems reviewed and otherwise acutely negative except as in the [address]     Past Medical History:   Diagnosis Date    Alcohol abuse     Anxiety     Depression     Sleeping difficulties      Past Surgical History:   Procedure Laterality Date    BREAST SURGERY      sailine breast      SECTION  1988    FOREARM SURGERY Left 2021    OPEN REDUCTION INTERNAL FIXATION LEFT DISTAL RADIUS performed by Lawrence Mays MD at [address] [place] [address]      right wrist     Family History   Problem Relation Age of Onset    No Known Problems Mother     No Known Problems Father     Other Brother         anxiety    Cancer Maternal Grandmother         lung cancer    Cancer Maternal Grandfather         lung     Social History     Socioeconomic History    Marital status: Single     Spouse name: Not on file    Number of children: Not on file    Years of education: Not on file    Highest education level: Not on file   Occupational History    Not on file   Tobacco Use    Smoking status: Current Every Day Smoker     Packs/day: 1.00     Years: 8.00     Pack years: 8.00     Types: Cigarettes    Smokeless tobacco: Never Used   Vaping Use    Vaping Use: Unknown   Substance and Sexual Activity    Alcohol use: Yes     Comment: binge drinker, states she threw 3 bottles of vodka away    Drug use: No    Sexual activity: Not Currently   Other Topics Concern    Not on file   Social History Narrative    Not on file     Social Determinants of Health     Financial Resource Strain:     Difficulty of Paying Living Expenses:    Food Insecurity:     Worried About Running Out of Food in the Last Year:     [address] in the Last Year:    Transportation Needs:     Lack of Transportation (Medical):  Lack of Transportation (Non-Medical):    Physical Activity:     Days of Exercise per Week:     Minutes of Exercise per Session:    Stress:     Feeling of Stress :    Social Connections:     Frequency of Communication with Friends and Family:     Frequency of Social Gatherings with Friends and Family:     Attends [employer] Services:     Active Member of Clubs or Organizations:     Attends Club or Organization Meetings:     Marital Status:    Intimate Partner Violence:     Fear of Current or Ex-Partner:     Emotionally Abused:     Physically Abused:     Sexually Abused:      Current Facility-Administered Medications   Medication Dose Route Frequency Provider Last Rate Last Admin    sodium chloride 0.9 % 6,933 mL with folic acid 1 mg, adult multi-vitamin with vitamin k 10 mL, thiamine 100 mg   Intravenous Once Corlis Opitz, MD         Current Outpatient Medications   Medication Sig Dispense Refill    disulfiram (ANTABUSE) 250 MG tablet Take 250 mg by mouth daily      traZODone (DESYREL) 100 MG tablet Take 100-200 mg by mouth nightly as needed       ibuprofen (ADVIL;MOTRIN) 200 MG tablet Take 200 mg by mouth every 6 hours as needed for Pain      sertraline (ZOLOFT) 100 MG tablet Take 200 mg by mouth nightly        No Known Allergies    [unfilled]    Nursing Notes Reviewed    Physical Exam:  Vitals:    07/22/21 1229   BP: (!) 154/89   Pulse: 101   Resp: 14   Temp: 98.1 °F (36.7 °C)   SpO2: 100%       GENERAL APPEARANCE: Awake and alert. Cooperative. Tremulous  HEAD: Normocephalic. Atraumatic. EYES: EOM's grossly intact. Sclera anicteric. ENT: Mucous membranes are moist. Tolerates saliva. No trismus. NECK: Supple. No meningismus. Trachea midline. HEART: tachycardic. Radial pulses 2+. LUNGS: Respirations unlabored. CTAB  ABDOMEN: Soft. Non-tender. No guarding or rebound. EXTREMITIES: No acute deformities. SKIN: Warm and dry. NEUROLOGICAL: No gross facial drooping. Moves all 4 extremities spontaneously. PSYCHIATRIC: Anxious    I have reviewed and interpreted all of the currently available lab results from this visit (if applicable):  No results found for this visit on 07/22/21. Radiographs (if obtained):  [] The following radiograph was interpreted by myself in the absence of a radiologist:  [x] Radiologist's Report Reviewed:     XR CHEST (2 VW) (Final result)  Result time 07/22/21 12:44:46  Final result by Justa Lerner MD (07/22/21 12:44:46)                Impression:    No active cardiopulmonary disease             Narrative:    EXAMINATION:   TWO XRAY VIEWS OF THE CHEST     7/22/2021 12:38 pm     COMPARISON:   05/24/2021     HISTORY:   ORDERING SYSTEM PROVIDED HISTORY: ekg   TECHNOLOGIST PROVIDED HISTORY:   Reason for exam:->ekg   Reason for Exam: ekg   Acuity: Acute   Type of Exam: Initial     FINDINGS:   Heart size and pulmonary vasculature within normal limits.  Lungs clear. Costophrenic angles sharp                       EKG (if obtained): (All EKG's are interpreted by myself in the absence of a cardiologist)  Initial EKG on my interpretation shows normal sinus rhythm with rate of 90 bpm and no ST segment elevation    MDM:  Differential diagnosis: etoh withdrawal, electrolyte abnormality, tachycardia    Seizure precautions ordered. Banana bag ordered. CIWA score is 37 on arrival so Ativan given. Patient improved clinically. Etoh level is undetectable. CBC/CMP shows no emergent process. The patient is not pregnant. Will admit        Old records reviewed.  Labs and imaging reviewed and results discussed with patient. .        Patient was given scripts for the following medications. I counseled patient how to take these medications. New Prescriptions    No medications on file         CRITICAL CARE TIME   Total Critical Care time was 0 minutes, excluding separately reportable procedures. There was a high probability of clinically significant/life threatening deterioration in the patient's condition which required my urgent intervention.       Clinical Impression:  Alcohol withdrawal  (Please note that portions of this note may have been completed with a voice recognition program. Efforts were made to edit the dictations but occasionally words are mis-transcribed.)    MD Shana Razo MD  07/22/21 6523

## 2021-07-23 VITALS
SYSTOLIC BLOOD PRESSURE: 126 MMHG | TEMPERATURE: 97.9 F | HEIGHT: 65 IN | HEART RATE: 93 BPM | OXYGEN SATURATION: 96 % | RESPIRATION RATE: 18 BRPM | WEIGHT: 186.95 LBS | BODY MASS INDEX: 31.15 KG/M2 | DIASTOLIC BLOOD PRESSURE: 79 MMHG

## 2021-07-23 LAB
ANION GAP SERPL CALCULATED.3IONS-SCNC: 12 MMOL/L (ref 3–16)
BASOPHILS ABSOLUTE: 0.1 K/UL (ref 0–0.2)
BASOPHILS RELATIVE PERCENT: 0.7 %
BUN BLDV-MCNC: 12 MG/DL (ref 7–20)
CALCIUM SERPL-MCNC: 8.4 MG/DL (ref 8.3–10.6)
CHLORIDE BLD-SCNC: 106 MMOL/L (ref 99–110)
CO2: 16 MMOL/L (ref 21–32)
CREAT SERPL-MCNC: 0.6 MG/DL (ref 0.6–1.1)
EOSINOPHILS ABSOLUTE: 0.4 K/UL (ref 0–0.6)
EOSINOPHILS RELATIVE PERCENT: 2.9 %
GFR AFRICAN AMERICAN: >60
GFR NON-AFRICAN AMERICAN: >60
GLUCOSE BLD-MCNC: 99 MG/DL (ref 70–99)
HCT VFR BLD CALC: 42.5 % (ref 36–48)
HEMOGLOBIN: 14.6 G/DL (ref 12–16)
LYMPHOCYTES ABSOLUTE: 2.8 K/UL (ref 1–5.1)
LYMPHOCYTES RELATIVE PERCENT: 19 %
MCH RBC QN AUTO: 30.8 PG (ref 26–34)
MCHC RBC AUTO-ENTMCNC: 34.3 G/DL (ref 31–36)
MCV RBC AUTO: 89.8 FL (ref 80–100)
MONOCYTES ABSOLUTE: 0.9 K/UL (ref 0–1.3)
MONOCYTES RELATIVE PERCENT: 6.5 %
NEUTROPHILS ABSOLUTE: 10.3 K/UL (ref 1.7–7.7)
NEUTROPHILS RELATIVE PERCENT: 70.9 %
PDW BLD-RTO: 15.1 % (ref 12.4–15.4)
PLATELET # BLD: 269 K/UL (ref 135–450)
PMV BLD AUTO: 7.9 FL (ref 5–10.5)
POTASSIUM REFLEX MAGNESIUM: 4.7 MMOL/L (ref 3.5–5.1)
RBC # BLD: 4.73 M/UL (ref 4–5.2)
SODIUM BLD-SCNC: 134 MMOL/L (ref 136–145)
WBC # BLD: 14.6 K/UL (ref 4–11)

## 2021-07-23 PROCEDURE — 80048 BASIC METABOLIC PNL TOTAL CA: CPT

## 2021-07-23 PROCEDURE — 36415 COLL VENOUS BLD VENIPUNCTURE: CPT

## 2021-07-23 PROCEDURE — 6360000002 HC RX W HCPCS: Performed by: INTERNAL MEDICINE

## 2021-07-23 PROCEDURE — 85025 COMPLETE CBC W/AUTO DIFF WBC: CPT

## 2021-07-23 PROCEDURE — 6370000000 HC RX 637 (ALT 250 FOR IP): Performed by: INTERNAL MEDICINE

## 2021-07-23 RX ADMIN — CHLORDIAZEPOXIDE HYDROCHLORIDE 10 MG: 5 CAPSULE ORAL at 09:32

## 2021-07-23 RX ADMIN — THERA TABS 1 TABLET: TAB at 09:32

## 2021-07-23 RX ADMIN — Medication 100 MG: at 09:31

## 2021-07-23 RX ADMIN — LORAZEPAM 2 MG: 2 INJECTION INTRAMUSCULAR; INTRAVENOUS at 04:01

## 2021-07-23 NOTE — PLAN OF CARE
Problem: Falls - Risk of:  Goal: Will remain free from falls  Description: Will remain free from falls  Outcome: Ongoing  Goal: Absence of physical injury  Description: Absence of physical injury  Outcome: Ongoing     Problem: Pain:  Goal: Pain level will decrease  Description: Pain level will decrease  Outcome: Ongoing  Goal: Control of acute pain  Description: Control of acute pain  Outcome: Ongoing  Goal: Control of chronic pain  Description: Control of chronic pain  Outcome: Ongoing     Problem: SAFETY  Goal: Free from accidental physical injury  Outcome: Ongoing  Goal: Free from intentional harm  Outcome: Ongoing     Problem: DAILY CARE  Goal: Daily care needs are met  Outcome: Ongoing     Problem: SKIN INTEGRITY  Goal: Skin integrity is maintained or improved  Outcome: Ongoing     Problem: KNOWLEDGE DEFICIT  Goal: Patient/S.O. demonstrates understanding of disease process, treatment plan, medications, and discharge instructions.   Outcome: Ongoing     Problem: DISCHARGE BARRIERS  Goal: Patient's continuum of care needs are met  Outcome: Ongoing

## 2021-07-23 NOTE — PROGRESS NOTES
Before the nurse could come in for her morning meds, the patient walked out of her room and was observed trying to walk out the front door. Charge RN Clive Rolle walked the patient back to her room while assessing to see if she is alert and oriented. The patient was alert and oriented x4. This nurse walked in to assess patient and give morning meds. This nurse gave her morning medications and then this patient insisted on leaving AMA, this nurse ensured that she was still A&O x4, she was. So this nurse printed off AMA the [place] form and explained the risks of leaving AMA. This patient expressed how she wanted to check herself into rehab today, and so this nurse removed her IV and alerted the physician. Then this nurse had BEATRICE Bailey sign the [place] form as a secondary witness.   and escorted this patient to the front [facility]   AMA form was then placed in the patient's chart

## 2021-07-23 NOTE — PROGRESS NOTES
Went into room to for evening assessment. Pt currently in bed w/ eyes closed and snoring.     Electronically signed by Sushila Valderrama RN on 7/22/2021 at 8:07 PM

## 2021-07-25 ENCOUNTER — HOSPITAL ENCOUNTER (EMERGENCY)
Age: 51
Discharge: LEFT BEFORE DISCHARGE | End: 2021-07-25
Payer: MEDICAID

## 2021-07-25 VITALS
HEART RATE: 88 BPM | OXYGEN SATURATION: 93 % | DIASTOLIC BLOOD PRESSURE: 103 MMHG | RESPIRATION RATE: 14 BRPM | TEMPERATURE: 98.5 F | SYSTOLIC BLOOD PRESSURE: 148 MMHG

## 2021-07-25 DIAGNOSIS — Z53.21 ELOPED FROM EMERGENCY DEPARTMENT: Primary | ICD-10-CM

## 2021-07-25 DIAGNOSIS — F10.10 ALCOHOL ABUSE: ICD-10-CM

## 2021-07-25 LAB
A/G RATIO: 1.6 (ref 1.1–2.2)
ALBUMIN SERPL-MCNC: 4.4 G/DL (ref 3.4–5)
ALP BLD-CCNC: 74 U/L (ref 40–129)
ALT SERPL-CCNC: 20 U/L (ref 10–40)
ANION GAP SERPL CALCULATED.3IONS-SCNC: 16 MMOL/L (ref 3–16)
AST SERPL-CCNC: 18 U/L (ref 15–37)
BASOPHILS ABSOLUTE: 0.1 K/UL (ref 0–0.2)
BASOPHILS RELATIVE PERCENT: 1 %
BILIRUB SERPL-MCNC: <0.2 MG/DL (ref 0–1)
BUN BLDV-MCNC: 6 MG/DL (ref 7–20)
CALCIUM SERPL-MCNC: 8.5 MG/DL (ref 8.3–10.6)
CHLORIDE BLD-SCNC: 110 MMOL/L (ref 99–110)
CO2: 22 MMOL/L (ref 21–32)
CREAT SERPL-MCNC: 0.6 MG/DL (ref 0.6–1.1)
EOSINOPHILS ABSOLUTE: 0.1 K/UL (ref 0–0.6)
EOSINOPHILS RELATIVE PERCENT: 1.2 %
ETHANOL: 315 MG/DL (ref 0–0.08)
GFR AFRICAN AMERICAN: >60
GFR NON-AFRICAN AMERICAN: >60
GLOBULIN: 2.8 G/DL
GLUCOSE BLD-MCNC: 108 MG/DL (ref 70–99)
HCT VFR BLD CALC: 43 % (ref 36–48)
HEMOGLOBIN: 14.7 G/DL (ref 12–16)
LYMPHOCYTES ABSOLUTE: 5 K/UL (ref 1–5.1)
LYMPHOCYTES RELATIVE PERCENT: 45.5 %
MCH RBC QN AUTO: 30.4 PG (ref 26–34)
MCHC RBC AUTO-ENTMCNC: 34.1 G/DL (ref 31–36)
MCV RBC AUTO: 89.1 FL (ref 80–100)
MONOCYTES ABSOLUTE: 0.6 K/UL (ref 0–1.3)
MONOCYTES RELATIVE PERCENT: 5.6 %
NEUTROPHILS ABSOLUTE: 5.1 K/UL (ref 1.7–7.7)
NEUTROPHILS RELATIVE PERCENT: 46.7 %
PDW BLD-RTO: 15.6 % (ref 12.4–15.4)
PLATELET # BLD: 341 K/UL (ref 135–450)
PMV BLD AUTO: 7.5 FL (ref 5–10.5)
POTASSIUM SERPL-SCNC: 3.9 MMOL/L (ref 3.5–5.1)
RBC # BLD: 4.83 M/UL (ref 4–5.2)
SODIUM BLD-SCNC: 148 MMOL/L (ref 136–145)
TOTAL PROTEIN: 7.2 G/DL (ref 6.4–8.2)
WBC # BLD: 11 K/UL (ref 4–11)

## 2021-07-25 PROCEDURE — 80053 COMPREHEN METABOLIC PANEL: CPT

## 2021-07-25 PROCEDURE — 99283 EMERGENCY DEPT VISIT LOW MDM: CPT

## 2021-07-25 PROCEDURE — 85025 COMPLETE CBC W/AUTO DIFF WBC: CPT

## 2021-07-25 PROCEDURE — 82077 ASSAY SPEC XCP UR&BREATH IA: CPT

## 2021-07-25 PROCEDURE — 93005 ELECTROCARDIOGRAM TRACING: CPT | Performed by: EMERGENCY MEDICINE

## 2021-07-25 ASSESSMENT — ENCOUNTER SYMPTOMS
BACK PAIN: 0
ABDOMINAL PAIN: 0
VOMITING: 0
NAUSEA: 0
SHORTNESS OF BREATH: 0
ABDOMINAL DISTENTION: 0
DIARRHEA: 0
COLOR CHANGE: 0

## 2021-07-25 ASSESSMENT — PAIN SCALES - GENERAL: PAINLEVEL_OUTOF10: 6

## 2021-07-25 ASSESSMENT — PAIN DESCRIPTION - LOCATION: LOCATION: ARM;WRIST

## 2021-07-25 ASSESSMENT — PAIN DESCRIPTION - ORIENTATION: ORIENTATION: LEFT

## 2021-07-26 LAB
EKG ATRIAL RATE: 99 BPM
EKG DIAGNOSIS: NORMAL
EKG P AXIS: 25 DEGREES
EKG P-R INTERVAL: 124 MS
EKG Q-T INTERVAL: 338 MS
EKG QRS DURATION: 62 MS
EKG QTC CALCULATION (BAZETT): 433 MS
EKG R AXIS: 50 DEGREES
EKG T AXIS: 53 DEGREES
EKG VENTRICULAR RATE: 99 BPM

## 2021-07-26 PROCEDURE — 93010 ELECTROCARDIOGRAM REPORT: CPT | Performed by: INTERNAL MEDICINE

## 2021-07-26 NOTE — DISCHARGE SUMMARY
Hospitalist Discharge Summary    Patient ID:  Angy Toussaint  [id number]  44 y.o.  1970    Admit date: 7/22/2021    Discharge date: 7/23/2021    Disposition: home    Admission Diagnoses:   Patient Active Problem List   Diagnosis    Depression    Suicidal ideation    [facility]    Alcohol withdrawal ([facility]    Tachycardia    Lactic acidosis    Drug use disorder    Episode of recurrent major depressive disorder ([facility]    Microcytic anemia    Acute gastroenteritis    Hypokalemia    Hypomagnesemia    Hyponatremia    Alcohol withdrawal, uncomplicated (HCC)    High anion gap metabolic acidosis    Current smoker    Leukocytosis    Alcohol withdrawal delirium ([facility]    [facility]    Closed fracture of left distal radius    Alcohol dependence with withdrawal [facility])       Discharge Diagnoses: Active Problems:    Alcohol dependence with withdrawal ([facility]  Resolved Problems:    * No resolved hospital problems. *      Code Status:  Prior    Condition:  Stable    Discharge Diet: Diet:  No diet orders on file    PCP to do list:  F/u for improvement of symptoms    Hospital Course: 47-year-old female with past medical history of alcohol abuse, anxiety, depression presented to the hospital concerns of withdrawal symptoms. Patient states that she was on a 48-hour binge drinking. She is very lasting with around 11 PM last night. Since then today she started noticing tremors and feeling anxious and hard palpitating. Due to this she decided to come to the hospital.  On arrival she was noted to be tachycardic. Lab work was fairly unremarkable. Patient was admitted to the hospital and started on management for alcohol withdrawal with Librium 10 mg 3 times a day and CIWA protocol. She unfortunately left the hospital [address]     Discharge Medications:   Discharge Medication List as of 7/23/2021 10:06 AM Discharge Medication List as of 7/23/2021 10:06 AM        Discharge Medication List as of 7/23/2021 10:06 AM      CONTINUE these medications which have NOT CHANGED    Details   traZODone (DESYREL) 100 MG tablet Take 100 mg by mouth nightly as needed for Sleep Historical Med      ibuprofen (ADVIL;MOTRIN) 200 MG tablet Take 200 mg by mouth every 6 hours as needed for PainHistorical Med      sertraline (ZOLOFT) 100 MG tablet Take 200 mg by mouth nightly Historical Med           Discharge Medication List as of 7/23/2021 10:06 AM      STOP taking these medications       disulfiram (ANTABUSE) 250 MG tablet Comments:   Reason for Stopping:                   Procedures: none    Assessment on Discharge: Stable, improved     Discharge ROS:  A complete review of systems was asked and negative except for none    Discharge Exam:  /79   Pulse 93   Temp 97.9 °F (36.6 °C) (Oral)   Resp 18   Ht 5' 5\" (1.651 m)   Wt 186 lb 15.2 oz (84.8 kg)   LMP 07/01/2021   SpO2 96%   BMI 31.11 kg/m²     Gen: NAD  HEENT: NC/AT, moist mucous membranes, no oropharyngeal erythema or exudate  Neck: supple, trachea midline, no anterior cervical or SC LAD  Heart:  Normal s1/s2, RRR, no murmurs, gallops, or rubs. no leg edema  Lungs:  CTA bilaterally, no wheeze,no rales or rhonchi, no use of accessory muscles  Abd: bowel sounds present, soft, nontender, nondistended, no masses  Extrem:  No clubbing, cyanosis,  no edema  Skin: no lesion or masses  Psych:  A & O x3  Neuro: grossly intact, moves all four extremities    Pertinent Studies During Hospital Stay:  Radiology:  XR CHEST (2 VW)    Result Date: 7/22/2021  EXAMINATION: TWO XRAY VIEWS OF THE CHEST 7/22/2021 12:38 pm COMPARISON: 05/24/2021 HISTORY: ORDERING SYSTEM PROVIDED HISTORY: ekg TECHNOLOGIST PROVIDED HISTORY: Reason for exam:->ekg Reason for Exam: ekg Acuity: Acute Type of Exam: Initial FINDINGS: Heart size and pulmonary vasculature within normal limits. Lungs clear. Costophrenic angles sharp     No active cardiopulmonary disease     XR WRIST LEFT (2 VIEWS)    Result Date: 7/16/2021  Radiology exam is complete. No Radiologist dictation. Please follow up with ordering provider. XR WRIST LEFT (MIN 3 VIEWS)    Result Date: 7/16/2021  EXAMINATION: 3 XRAY VIEWS OF THE LEFT WRIST 7/16/2021 5:19 am COMPARISON: None. HISTORY: ORDERING SYSTEM PROVIDED HISTORY: FELL ON WRIST TECHNOLOGIST PROVIDED HISTORY: Reason for exam:->FELL ON WRIST Reason for Exam: [address] INJURY, WRIST PAIN Acuity: Acute Type of Exam: Initial Mechanism of Injury: [address] INJURY FINDINGS: There is an impacted intra-articular fracture of the distal radius. There is subtle apex anterior angulation. There is 2 mm of dorsal displacement of the distal fracture fragment. .  There is surrounding soft tissue swelling Small bony density also marginates the ulnar styloid. This could represent sequelae from recent fracture or represent sequelae from remote avulsion fracture. Intra-articular fracture distal radius Small bony fragment marginates the ulna of unclear etiology     FLUORO FOR SURGICAL PROCEDURES    Result Date: 7/16/2021  Radiology exam is complete. No Radiologist dictation. Please follow up with ordering provider.            Last Labs on Discharge:     Recent Results (from the past 24 hour(s))   EKG 12 Lead    Collection Time: 07/25/21  8:21 PM   Result Value Ref Range    Ventricular Rate 99 BPM    Atrial Rate 99 BPM    P-R Interval 124 ms    QRS Duration 62 ms    Q-T Interval 338 ms    QTc Calculation (Bazett) 433 ms    P Axis 25 degrees    R Axis 50 degrees    T Axis 53 degrees    Diagnosis       Normal sinus rhythmConfirmed by Lilly SABILLON MD ([id number]) on 7/26/2021 7:52:49 AM   CBC Auto Differential    Collection Time: 07/25/21  8:31 PM   Result Value Ref Range    WBC 11.0 4.0 - 11.0 K/uL    RBC 4.83 4.00 - 5.20 M/uL    Hemoglobin 14.7 12.0 - 16.0 g/dL    Hematocrit 43.0 36.0 - 48.0 %    MCV 89.1 80.0 - 100.0 fL    MCH 30.4 26.0 - 34.0 pg    MCHC 34.1 31.0 - 36.0 g/dL    RDW 15.6 (H) 12.4 - 15.4 %    Platelets 569 591 - 877 K/uL    MPV 7.5 5.0 - 10.5 fL    Neutrophils % 46.7 %    Lymphocytes % 45.5 %    Monocytes % 5.6 %    Eosinophils % 1.2 %    Basophils % 1.0 %    Neutrophils Absolute 5.1 1.7 - 7.7 K/uL    Lymphocytes Absolute 5.0 1.0 - 5.1 K/uL    Monocytes Absolute 0.6 0.0 - 1.3 K/uL    Eosinophils Absolute 0.1 0.0 - 0.6 K/uL    Basophils Absolute 0.1 0.0 - 0.2 K/uL   Comprehensive Metabolic Panel    Collection Time: 07/25/21  8:31 PM   Result Value Ref Range    Sodium 148 (H) 136 - 145 mmol/L    Potassium 3.9 3.5 - 5.1 mmol/L    Chloride 110 99 - 110 mmol/L    CO2 22 21 - 32 mmol/L    Anion Gap 16 3 - 16    Glucose 108 (H) 70 - 99 mg/dL    BUN 6 (L) 7 - 20 mg/dL    CREATININE 0.6 0.6 - 1.1 mg/dL    GFR Non-African American >60 >60    GFR African American >60 >60    Calcium 8.5 8.3 - 10.6 mg/dL    Total Protein 7.2 6.4 - 8.2 g/dL    Albumin 4.4 3.4 - 5.0 g/dL    Albumin/Globulin Ratio 1.6 1.1 - 2.2    Total Bilirubin <0.2 0.0 - 1.0 mg/dL    Alkaline Phosphatase 74 40 - 129 U/L    ALT 20 10 - 40 U/L    AST 18 15 - 37 U/L    Globulin 2.8 g/dL   Ethanol    Collection Time: 07/25/21  8:31 PM   Result Value Ref Range    Ethanol Lvl 315 mg/dL         Follow up: with Lilian Puga DO    Note that over 30 minutes was spent in preparing discharge papers, discussing discharge with patient, medication review, etc.    Thank you Lilian Puga DO for the opportunity to be involved in this patient's care. If you have any questions or concerns please feel free to contact me at [phone].     Electronically signed by Esequiel Daigle MD on 7/26/2021 at 12:17 PM

## 2021-07-26 NOTE — ED NOTES
D/C: Order noted for d/c. Pt confirmed d/c paperwork have correct name. Discharge and education instructions reviewed with patient. Teach-back successful. Pt verbalized understanding and signed d/c papers. Pt denied questions at this time. No acute distress noted. Patient instructed to follow-up as noted - return to emergency department if symptoms worsen. Patient verbalized understanding. Discharged per EDMD with discharge instructions. Pt discharged to private vehicle. Patient stable upon departure. Thanked patient for choosing [facility]) for care. Provider aware of patient pain at time of discharge.      Jcarlos Long RN  07/25/21 1417

## 2021-07-26 NOTE — ED NOTES
Patient becoming argumentative with [facility]. States she is going to leave. When told patients IV needs taken out, patients tremors were able to stop to take IV out of hand.       Naya Castanon RN  07/25/21 2126

## 2021-07-26 NOTE — ED PROVIDER NOTES
**ADVANCED PRACTICE PROVIDER, I HAVE EVALUATED THIS PATIENT**        [address]      Pt Name: [place] Delgado  IPC:[id number]  [place] 1970  Date of evaluation: 2021  Provider: DWIGHT Stone CNP      Chief Complaint:    Chief Complaint   Patient presents with    Alcohol Problem     pt states that she drank two bottles of gin yesterday. pt. states that she is not a daily drinker. pt. states \"I just dont want to have another seizure, thats why I am here. \" Last drink yesterday. Nursing Notes, Past Medical Hx, Past Surgical Hx, Social Hx, Allergies, and Family Hx were all reviewed and agreed with or any disagreements were addressed in the HPI.    HPI: (Location, Duration, Timing, Severity, Quality, Assoc Sx, Context, Modifying factors)    Chief Complaint of alcohol abuse    This is a  44 y.o. female with history of alcohol abuse who presents to the emergency department today \"because I do not want to go into alcohol withdrawal and have a seizure. \"  She advised she had a heavy day of drinking yesterday. She advised she was recently admitted but left AMA, and would like the same medication that they gave her then. She denies suicidal ideation. She denies headache, dizziness, numbness, or weakness. She denies chest pain and shortness of breath.     PastMedical/Surgical History:      Diagnosis Date    Alcohol abuse     Anxiety     Depression     Sleeping difficulties          Procedure Laterality Date    BREAST SURGERY      sailine breast      SECTION  1988    FOREARM SURGERY Left 2021    OPEN REDUCTION INTERNAL FIXATION LEFT DISTAL RADIUS performed by Robert Nelson MD at [place] [address]      right wrist       Medications:  Discharge Medication List as of 2021  9:31 PM      CONTINUE these medications which have NOT CHANGED    Details   traZODone (DESYREL) 100 MG tablet Take passive range of motion without pain and neck supple. No rigidity. Comments: Cast on left arm. Fingers are exposed and they are pink and well perfused. Skin:     General: Skin is warm and dry. Capillary Refill: Capillary refill takes less than 2 seconds. Findings: No rash. Neurological:      General: No focal deficit present. Mental Status: She is alert and oriented to person, place, and time. Cranial Nerves: Cranial nerves are intact. Sensory: Sensation is intact. Motor: Motor function is intact. Comments: Patient was moving her right arm around wildly screaming \"aren't you going to help me with this? \"  However, patient could stop shaking whenever she wanted, such as when she was getting her IV removed. Psychiatric:         Mood and Affect: Mood normal. Affect is angry. Behavior: Behavior is agitated and aggressive (verbally). Comments: Patient is angry that I am not giving her the medication that she was given while she was admitted. I tried to explain to patient that her alcohol level was >300, but she was so upset I was not going to give her medication that she ripped all of her leads and blood pressure cuff off and ambulated out of the room with normal gait. The nurse stopped her to take IV out, and she held her arm perfectly still while this happened.          MEDICAL DECISION MAKING    Vitals:    Vitals:    07/25/21 2016 07/25/21 2045 07/25/21 2047 07/25/21 2101   BP: 112/76 (!) 135/94 (!) 135/94 (!) 148/103   Pulse: 113 94 92 88   Resp: 18  16 14   Temp: 98.5 °F (36.9 °C)      TempSrc: Oral      SpO2: 96%   93%       LABS:  Labs Reviewed   CBC WITH AUTO DIFFERENTIAL - Abnormal; Notable for the following components:       Result Value    RDW 15.6 (*)     All other components within normal limits    Narrative:     Performed at:  [address] [address]   Phone [phone] COMPREHENSIVE METABOLIC PANEL - Abnormal; Notable for the following components:    Sodium 148 (*)     Glucose 108 (*)     BUN 6 (*)     All other components within normal limits    Narrative:     Performed at:  [facility]  [address] [facility] [address]   Phone [phone]   ETHANOL    Narrative:     Performed at:  [facility]  [address] [facility] [address]   Phone [phone] of labs reviewed and were negative at this time or not returned at the time of this note. RADIOLOGY:   Non-plain film images such as CT, Ultrasound and MRI are read by the radiologist. DWIGHT Holm CNP have directly visualized the radiologic plain film image(s) with the below findings:      Interpretation per the Radiologist below, if available at the time of this note:    No orders to display        XR CHEST (2 VW)    Result Date: 7/22/2021  EXAMINATION: TWO XRAY VIEWS OF THE CHEST 7/22/2021 12:38 pm COMPARISON: 05/24/2021 HISTORY: ORDERING SYSTEM PROVIDED HISTORY: ekg TECHNOLOGIST PROVIDED HISTORY: Reason for exam:->ekg Reason for Exam: ekg Acuity: Acute Type of Exam: Initial FINDINGS: Heart size and pulmonary vasculature within normal limits. Lungs clear. Costophrenic angles sharp     No active cardiopulmonary disease     XR WRIST LEFT (2 VIEWS)    Result Date: 7/16/2021  Radiology exam is complete. No Radiologist dictation. Please follow up with ordering provider. XR WRIST LEFT (MIN 3 VIEWS)    Result Date: 7/16/2021  EXAMINATION: 3 XRAY VIEWS OF THE LEFT WRIST 7/16/2021 5:19 am COMPARISON: None. HISTORY: ORDERING SYSTEM PROVIDED HISTORY: FELL ON WRIST TECHNOLOGIST PROVIDED HISTORY: Reason for exam:->FELL ON WRIST Reason for Exam: [address] INJURY, WRIST PAIN Acuity: Acute Type of Exam: Initial Mechanism of Injury: [address] INJURY FINDINGS: There is an impacted intra-articular fracture of the distal radius. There is subtle apex anterior angulation. There is 2 mm of dorsal displacement of the distal fracture fragment. .  There is surrounding soft tissue swelling Small bony density also marginates the ulnar styloid. This could represent sequelae from recent fracture or represent sequelae from remote avulsion fracture. Intra-articular fracture distal radius Small bony fragment marginates the ulna of unclear etiology     FLUORO FOR SURGICAL PROCEDURES    Result Date: 7/16/2021  Radiology exam is complete. No Radiologist dictation. Please follow up with ordering provider. MEDICAL DECISION MAKING / ED COURSE:      PROCEDURES:   Procedures    None    Patient was given:  Medications - No data to display    Patient presents after an alcohol marte. See HPI for full presentation. Physical exam as above. 78-year-old lying in bed in no acute distress. She is awake alert and oriented with no neurovascular deficits. Cast on left arm with no neurovascular deficits on that extremity. Vitals reassuring. Alcohol level 315. CBC and chemistry normal.  When I went to assess patient she was moving her right arm around wildly which she was not doing when I turn the corner to enter the room. Patient was very agitated from the very start. She was requesting the medication that she was just given while she was admitted before she left AMA. I advised patient that I cannot give her any sedatives because her alcohol level is already >300. She became very upset by this notion, and kept asking the same question over and over. I tried to explain to the patient that I would like to try to help her with her alcohol withdrawal, but I cannot give her sedative medications at this time. Patient became more more upset until she ripped the leads off her chest and blood pressure cuff off her arm and ambulated out of the emergency department yelling and cursing at me.   The nurse was able to remove the IV in her right arm without her moving her hand at all. I have a suspicion for drug-seeking behavior. Patient has normal gait, normal capacity, and is not clinically intoxicated. Patient was not discharged, but eloped herself from the emergency department. She advised she was going to sit in the lobby and called an Uber. CLINICAL IMPRESSION:  1. Eloped from emergency department    2.  Alcohol abuse        DISPOSITION Eloped - Left Before Treatment Complete 07/25/2021 09:38:17 PM      PATIENT REFERRED TO:  VELMA Ramírez [address] [address]  [phone]    Schedule an appointment as soon as possible for a visit       [facility]  [id number]  [phone]  Go to   As needed      DISCHARGE MEDICATIONS:  Discharge Medication List as of 7/25/2021  9:31 PM          DISCONTINUED MEDICATIONS:  Discharge Medication List as of 7/25/2021  9:31 PM                 (Please note the MDM and HPI sections of this note were completed with a voice recognition program.  Efforts were made to edit the dictations but occasionally words are mis-transcribed.)    Electronically signed, DWIGHT Diaz CNP,          DWIGHT Diaz CNP  07/25/21 8829

## 2021-07-27 ENCOUNTER — TELEPHONE (OUTPATIENT)
Dept: ORTHOPEDIC SURGERY | Age: 51
End: 2021-07-27

## 2021-07-27 NOTE — TELEPHONE ENCOUNTER
Patient mother called asking to move appointment due to patient being in detox program for alcoholism. Moved patients appointment to 8-6-21.

## 2021-07-27 NOTE — TELEPHONE ENCOUNTER
General Question     Subject: L WRIST  Patient and /or Facility Request: PATIENT'S MOTHER DARYL WOULD LIKE TO TALK TO  [address] HER POST OP ISSUES  Contact Number: 1 [phone]  2. [phone]

## 2021-08-03 ENCOUNTER — OFFICE VISIT (OUTPATIENT)
Dept: ORTHOPEDIC SURGERY | Age: 51
End: 2021-08-03
Payer: MEDICAID

## 2021-08-03 VITALS — WEIGHT: 186 LBS | HEIGHT: 65 IN | BODY MASS INDEX: 30.99 KG/M2

## 2021-08-03 DIAGNOSIS — S52.572A OTHER CLOSED INTRA-ARTICULAR FRACTURE OF DISTAL END OF LEFT RADIUS, INITIAL ENCOUNTER: Primary | ICD-10-CM

## 2021-08-03 DIAGNOSIS — F17.200 CURRENT SMOKER: ICD-10-CM

## 2021-08-03 PROCEDURE — 99024 POSTOP FOLLOW-UP VISIT: CPT | Performed by: NURSE PRACTITIONER

## 2021-08-03 PROCEDURE — 99406 BEHAV CHNG SMOKING 3-10 MIN: CPT | Performed by: NURSE PRACTITIONER

## 2021-08-03 PROCEDURE — L3908 WHO COCK-UP NONMOLDE PRE OTS: HCPCS | Performed by: NURSE PRACTITIONER

## 2021-08-03 NOTE — PROGRESS NOTES
DIAGNOSIS:  Left distal radius 2 parts intra articular comminuted fracture, status post ORIF. DATE OF SURGERY: 7/16/2021. HISTORY OF PRESENT ILLNESS:  Ms. Meghan Winters 44 y.o.  female right handed returns today  for 2 weeks postoperative visit. The patient denies any significant pain in the left wrist.  Rates pain a 3/10 VAS mild, aching, intermittent and are improving. Aggravating factors movement. Alleviating factors elevation and rest.  No numbness or tingling sensation. No fever or Chills. She  is in a splint. She smokes half a pack of cigarettes a day. She is currently not employed. She was just released from alcohol rehab. PHYSICAL EXAMINATION:  The incision is completely healed . No signs of any erythema or drainage. She  has no pain with the active or passive range of motion of the left wrist, but decrease ROM. She  has intact sensation, distally, and she  is neurovascularly intact. IMAGING:  Three views left wrist taken today in the office showed anatomic alignment of left distal radius, plate and screws in good position, no loosening. IMPRESSION:  2 weeks out from left distal radius ORIF and doing very well. PLAN:  I have told the patient to work on ROM, as well as strengthening exercises. A removable forearm brace applied. No heavy impact activities. The patient will come back for a follow up in 6 weeks. At that time, we will take 3 views of the left wrist. PT if needed then. The patient smokes, and we discussed with the patient the risks of smoking on general health and also on bone and soft tissue healing (delay and non-union), and promised to cut down or stop smoking. Smoking: Educated the patient regarding the hazards of smoking and that it harms their body in many ways. It increases the chance of developing heart disease, lung disease, cancer, and other health problems including poor bone and wound healing.     The importance of smoking cessation for optimal bone and wound healing was stressed. This was communicated verbally, 5 Minutes. As this patient has demonstrated risk factors for osteoporosis, such as age greater than [de-identified] years and evidence of a fracture, I have referred the patient back to the primary care physician for evaluation for osteoporosis, including consideration for DEXA scanning, if this is felt to be clinically indicated. The patient is advised to contact the primary care physician to follow-up for further evaluation. Procedures    Nicole Mccabe Titan Wrist Long Forearm Brace     Patient was prescribed a Nicole Mccabe Titan Wrist and Forearm Brace. The left wrist will require stabilization / immobilization from this semi-rigid / rigid orthosis to improve their function. The orthosis will assist in protecting the affected area, provide functional support and facilitate healing. The patient was educated and fit by a healthcare professional with expert knowledge and specialization in brace application while under the direct supervision of the treating physician. Verbal and written instructions for the use of and application of this item were provided. They were instructed to contact the office immediately should the brace result in increased pain, decreased sensation, increased swelling or worsening of the condition.          DWIGHT Knox - CNP

## 2021-10-02 ENCOUNTER — HOSPITAL ENCOUNTER (EMERGENCY)
Age: 51
Discharge: HOME | End: 2021-10-02
Attending: EMERGENCY MEDICINE
Payer: MEDICAID

## 2021-10-02 VITALS
DIASTOLIC BLOOD PRESSURE: 71 MMHG | BODY MASS INDEX: 33.02 KG/M2 | TEMPERATURE: 98.2 F | RESPIRATION RATE: 18 BRPM | HEART RATE: 65 BPM | WEIGHT: 193.4 LBS | HEIGHT: 64 IN | OXYGEN SATURATION: 97 % | SYSTOLIC BLOOD PRESSURE: 121 MMHG

## 2021-10-02 DIAGNOSIS — J06.9 UPPER RESPIRATORY TRACT INFECTION, UNSPECIFIED TYPE: Primary | ICD-10-CM

## 2021-10-02 LAB — SARS-COV-2: NOT DETECTED

## 2021-10-02 PROCEDURE — U0003 INFECTIOUS AGENT DETECTION BY NUCLEIC ACID (DNA OR RNA); SEVERE ACUTE RESPIRATORY SYNDROME CORONAVIRUS 2 (SARS-COV-2) (CORONAVIRUS DISEASE [COVID-19]), AMPLIFIED PROBE TECHNIQUE, MAKING USE OF HIGH THROUGHPUT TECHNOLOGIES AS DESCRIBED BY CMS-2020-01-R: HCPCS

## 2021-10-02 PROCEDURE — 99283 EMERGENCY DEPT VISIT LOW MDM: CPT

## 2021-10-02 PROCEDURE — U0005 INFEC AGEN DETEC AMPLI PROBE: HCPCS

## 2021-10-02 RX ORDER — ONDANSETRON 4 MG/1
4-8 TABLET, ORALLY DISINTEGRATING ORAL EVERY 12 HOURS PRN
Qty: 12 TABLET | Refills: 0 | Status: SHIPPED | OUTPATIENT
Start: 2021-10-02 | End: 2022-10-09

## 2021-10-02 RX ORDER — FLUOXETINE HYDROCHLORIDE 20 MG/1
CAPSULE ORAL
COMMUNITY
Start: 2021-09-08 | End: 2022-10-09

## 2021-10-02 RX ORDER — PSEUDOEPHEDRINE HCL 120 MG/1
120 TABLET, FILM COATED, EXTENDED RELEASE ORAL EVERY 12 HOURS
Qty: 20 TABLET | Refills: 0 | Status: SHIPPED | OUTPATIENT
Start: 2021-10-02 | End: 2021-10-12

## 2021-10-02 RX ORDER — GUAIFENESIN AND DEXTROMETHORPHAN HYDROBROMIDE 600; 30 MG/1; MG/1
1 TABLET, EXTENDED RELEASE ORAL 2 TIMES DAILY
Qty: 14 TABLET | Refills: 0 | Status: SHIPPED | OUTPATIENT
Start: 2021-10-02 | End: 2022-10-09

## 2021-10-02 NOTE — ED TRIAGE NOTES
Developed a cough during the night. Last week a coworker was positive for Covid. She did not want to go to work today with a cough. Was vaccinated for Covid in April. Corbinje new sinus congestion as well. States her cough has been productive of yellow sputum this morning.

## 2021-10-02 NOTE — ED PROVIDER NOTES
CHIEF COMPLAINT  Chief Complaint   Patient presents with    Cough     Developed a cough during the night. Last week a coworker was positive for Covid. She did not want to go to work today with a cough. Was vaccinated for Covid in April. Somje new sinus congestion as well. HISTORY OF PRESENT ILLNESS  Priscilla Andersen is a 46 y.o. female who presents to the ED complaining of a history of cough without sputum production, nasal congestion, rhinorrhea, fatigue, myalgias but no headaches. No sore throat. No ear pain. No rash. She reports nausea without vomiting. No diarrhea. No abdominal pain, chest pain, shortness of breath. Patient was vaccinated for COVID-19 in April    No other complaints, modifying factors or associated symptoms. Nursing notes reviewed.    Past Medical History:   Diagnosis Date    Alcohol abuse     Anxiety     Depression     Sleeping difficulties      Past Surgical History:   Procedure Laterality Date    BREAST SURGERY      sailine breast      SECTION  1988    FOREARM SURGERY Left 2021    OPEN REDUCTION INTERNAL FIXATION LEFT DISTAL RADIUS performed by Natalee Yusuf MD at [address] [address]      right wrist     Family History   Problem Relation Age of Onset    No Known Problems Mother     No Known Problems Father     Other Brother         anxiety    Cancer Maternal Grandmother         lung cancer    Cancer Maternal Grandfather         lung     Social History     Socioeconomic History    Marital status: Single     Spouse name: Not on file    Number of children: Not on file    Years of education: Not on file    Highest education level: Not on file   Occupational History    Not on file   Tobacco Use    Smoking status: Current Every Day Smoker     Packs/day: 1.00     Years: 8.00     Pack years: 8.00     Types: Cigarettes    Smokeless tobacco: Never Used   Vaping Use    Vaping Use: Unknown   Substance and Sexual Activity    Alcohol use: Yes     Comment: once a month currently    Drug use: No    Sexual activity: Not Currently   Other Topics Concern    Not on file   Social History Narrative    Not on file     Social Determinants of Health     Financial Resource Strain:     Difficulty of Paying Living Expenses:    Food Insecurity:     Worried About Running Out of Food in the Last Year:     [address] in the Last Year:    Transportation Needs:     Lack of Transportation (Medical):  Lack of Transportation (Non-Medical):    Physical Activity:     Days of Exercise per Week:     Minutes of Exercise per Session:    Stress:     Feeling of Stress :    Social Connections:     Frequency of Communication with Friends and Family:     Frequency of Social Gatherings with Friends and Family:     Attends Taoist Services:     Active Member of Clubs or Organizations:     Attends Club or Organization Meetings:     Marital Status:    Intimate Partner Violence:     Fear of Current or Ex-Partner:     Emotionally Abused:     Physically Abused:     Sexually Abused:      No current facility-administered medications for this encounter. Current Outpatient Medications   Medication Sig Dispense Refill    FLUoxetine (PROZAC) 20 MG capsule       Dextromethorphan-guaiFENesin (MUCINEX DM)  MG TB12 Take 1 tablet by mouth 2 times daily 14 tablet 0    ondansetron (ZOFRAN ODT) 4 MG disintegrating tablet Take 1-2 tablets by mouth every 12 hours as needed for Nausea May Sub regular tablet (non-ODT) if insurance does not cover ODT.  12 tablet 0    pseudoephedrine (SUDAFED 12 HOUR) 120 MG extended release tablet Take 1 tablet by mouth every 12 hours for 20 doses 20 tablet 0    traZODone (DESYREL) 100 MG tablet Take 100 mg by mouth nightly as needed for Sleep       ibuprofen (ADVIL;MOTRIN) 200 MG tablet Take 200 mg by mouth every 6 hours as needed for Pain       No Known Allergies    REVIEW OF SYSTEMS  Positives and pertinent negatives as per HPI. Six other systems were reviewed and are negative. Nursing notes pertaining to ROS were reviewed. PHYSICAL EXAM   /71   Pulse 65   Temp 98.2 °F (36.8 °C) (Oral)   Resp 18   Ht 5' 4\" (1.626 m)   Wt 193 lb 6.4 oz (87.7 kg)   LMP 06/02/2021 (Approximate)   SpO2 97%   BMI 33.20 kg/m²   GENERAL APPEARANCE: Awake and alert. Cooperative. No acute distress. No increased work of breathing or accessory muscle use. HEAD: Normocephalic. Atraumatic. EYES: PERRL. EOM's grossly intact. No scleral icterus, injection or exudate. ENT: Mucous membranes are moist.  TMs are clear bilaterally. Oral cavity is clear without tonsillar hypertrophy or exudate. No palatal petechiae. No frontal or maxillary sinus tenderness to palpation. Nasal MM are clear. NECK: Supple. Normal ROM. No cervical lymphadenopathy. CHEST:  Regular rate and rhythm, no murmurs, rubs or gallops. LUNGS: Breathing is unlabored. Speaking comfortably in full sentences. Clear through auscultation bilaterally  ABDOMEN: Nondistended, nontender. EXTREMITIES: MAEE. No acute deformities. SKIN: Warm and dry. No rash  NEUROLOGICAL: Alert and oriented. ED COURSE/MDM  Upper respiratory infection without hypoxia or increased work of breathing and no evidence of focal bacterial illness and an otherwise well-appearing patient. Mucinex DM, Zofran, Sudafed as needed. Patient will be Covid tested and was advised she could return to work after her Covid screen is negative. Return to the emergency room for worsening symptoms. Patient was given scripts for the following medications. I counseled patient how to take these medications.    New Prescriptions    DEXTROMETHORPHAN-GUAIFENESIN (MUCINEX DM)  MG TB12    Take 1 tablet by mouth 2 times daily    ONDANSETRON (ZOFRAN ODT) 4 MG DISINTEGRATING TABLET    Take 1-2 tablets by mouth every 12 hours as needed for Nausea May Sub regular tablet (non-ODT) if insurance does not cover ODT.    PSEUDOEPHEDRINE (SUDAFED 12 HOUR) 120 MG EXTENDED RELEASE TABLET    Take 1 tablet by mouth every 12 hours for 20 doses         CLINICAL IMPRESSION  1. Upper respiratory tract infection, unspecified type        Blood pressure 121/71, pulse 65, temperature 98.2 °F (36.8 °C), temperature source Oral, resp. rate 18, height 5' 4\" (1.626 m), weight 193 lb 6.4 oz (87.7 kg), last menstrual period 06/02/2021, SpO2 97 %, not currently breastfeeding.       Follow-up with:  Blaine Bartlett, [address] [place] [id number] [phone]    In 1 week  If symptoms worsen          Jordin Randolph MD  10/02/21 7674

## 2021-10-28 ENCOUNTER — TELEPHONE (OUTPATIENT)
Dept: FAMILY MEDICINE CLINIC | Age: 51
End: 2021-10-28

## 2021-10-28 RX ORDER — NITROFURANTOIN 25; 75 MG/1; MG/1
100 CAPSULE ORAL 2 TIMES DAILY
Qty: 14 CAPSULE | Refills: 0 | Status: SHIPPED | OUTPATIENT
Start: 2021-10-28 | End: 2021-11-04

## 2021-10-28 NOTE — TELEPHONE ENCOUNTER
Med sent in  If not better see dr Tabby Syed This Encounter   Medications    nitrofurantoin, macrocrystal-monohydrate, (MACROBID) 100 MG capsule     Sig: Take 1 capsule by mouth 2 times daily for 7 days     Dispense:  14 capsule     Refill:  0

## 2021-10-28 NOTE — TELEPHONE ENCOUNTER
Patient calling she feels she has a uti, she has urgency, burning, frequency, and pressure. She said she gets them all the time. She is at work and can't leave. She is asking if you can call something to [employer].       LOV 3/2/20    Please call, [phone]

## 2022-10-09 ENCOUNTER — APPOINTMENT (OUTPATIENT)
Dept: CT IMAGING | Age: 52
End: 2022-10-09
Payer: MEDICAID

## 2022-10-09 ENCOUNTER — APPOINTMENT (OUTPATIENT)
Dept: GENERAL RADIOLOGY | Age: 52
End: 2022-10-09
Payer: MEDICAID

## 2022-10-09 ENCOUNTER — HOSPITAL ENCOUNTER (INPATIENT)
Age: 52
LOS: 4 days | Discharge: HOME | End: 2022-10-14
Attending: EMERGENCY MEDICINE | Admitting: STUDENT IN AN ORGANIZED HEALTH CARE EDUCATION/TRAINING PROGRAM
Payer: MEDICAID

## 2022-10-09 DIAGNOSIS — F10.932 ALCOHOL WITHDRAWAL SYNDROME WITH PERCEPTUAL DISTURBANCE (HCC): Primary | ICD-10-CM

## 2022-10-09 LAB
A/G RATIO: 1.8 (ref 1.1–2.2)
ALBUMIN SERPL-MCNC: 4.4 G/DL (ref 3.4–5)
ALP BLD-CCNC: 95 U/L (ref 40–129)
ALT SERPL-CCNC: 163 U/L (ref 10–40)
ANION GAP SERPL CALCULATED.3IONS-SCNC: 19 MMOL/L (ref 3–16)
AST SERPL-CCNC: 180 U/L (ref 15–37)
BASOPHILS ABSOLUTE: 0.1 K/UL (ref 0–0.2)
BASOPHILS RELATIVE PERCENT: 0.2 %
BILIRUB SERPL-MCNC: 1.2 MG/DL (ref 0–1)
BILIRUBIN URINE: NEGATIVE
BLOOD, URINE: ABNORMAL
BUN BLDV-MCNC: 8 MG/DL (ref 7–20)
CALCIUM SERPL-MCNC: 8.1 MG/DL (ref 8.3–10.6)
CHLORIDE BLD-SCNC: 87 MMOL/L (ref 99–110)
CLARITY: CLEAR
CO2: 20 MMOL/L (ref 21–32)
COLOR: YELLOW
CREAT SERPL-MCNC: 0.7 MG/DL (ref 0.6–1.1)
EOSINOPHILS ABSOLUTE: 0 K/UL (ref 0–0.6)
EOSINOPHILS RELATIVE PERCENT: 0.1 %
EPITHELIAL CELLS, UA: NORMAL /HPF (ref 0–5)
ETHANOL: NORMAL MG/DL (ref 0–0.08)
GFR AFRICAN AMERICAN: >60
GFR NON-AFRICAN AMERICAN: >60
GLUCOSE BLD-MCNC: 111 MG/DL (ref 70–99)
GLUCOSE URINE: NEGATIVE MG/DL
HCT VFR BLD CALC: 39.3 % (ref 36–48)
HEMOGLOBIN: 13.8 G/DL (ref 12–16)
IMMATURE GRANULOCYTES #: 0.1 K/UL (ref 0–0.2)
IMMATURE GRANULOCYTES %: 0.3 %
INR BLD: 0.99 (ref 0.87–1.14)
KETONES, URINE: 15 MG/DL
LACTIC ACID, SEPSIS: 2.3 MMOL/L (ref 0.4–1.9)
LACTIC ACID: 3.3 MMOL/L (ref 0.4–2)
LEUKOCYTE ESTERASE, URINE: NEGATIVE
LIPASE: 17 U/L (ref 13–60)
LYMPHOCYTES ABSOLUTE: 2.6 K/UL (ref 1–5.1)
LYMPHOCYTES RELATIVE PERCENT: 11.7 %
MAGNESIUM: 0.9 MG/DL (ref 1.8–2.4)
MCH RBC QN AUTO: 29.6 PG (ref 26–34)
MCHC RBC AUTO-ENTMCNC: 35.1 G/DL (ref 32–36.4)
MCV RBC AUTO: 84.2 FL (ref 80–100)
MICROSCOPIC EXAMINATION: YES
MONOCYTES ABSOLUTE: 1.4 K/UL (ref 0–1.3)
MONOCYTES RELATIVE PERCENT: 6.3 %
NEUTROPHILS ABSOLUTE: 18.1 K/UL (ref 1.7–7.7)
NEUTROPHILS RELATIVE PERCENT: 81.4 %
NITRITE, URINE: NEGATIVE
PDW BLD-RTO: 12.6 % (ref 12.4–15.4)
PH UA: 7 (ref 5–8)
PLATELET # BLD: 333 K/UL (ref 135–450)
PMV BLD AUTO: 8.9 FL (ref 5–10.5)
POTASSIUM REFLEX MAGNESIUM: 3.3 MMOL/L (ref 3.5–5.1)
PROTEIN UA: NEGATIVE MG/DL
PROTHROMBIN TIME: 13 SEC (ref 11.7–14.5)
RBC # BLD: 4.67 M/UL (ref 4–5.2)
RBC UA: NORMAL /HPF (ref 0–4)
SARS-COV-2, NAAT: NOT DETECTED
SODIUM BLD-SCNC: 126 MMOL/L (ref 136–145)
SPECIFIC GRAVITY UA: 1.02 (ref 1–1.03)
TOTAL PROTEIN: 6.9 G/DL (ref 6.4–8.2)
URINE REFLEX TO CULTURE: ABNORMAL
URINE TYPE: ABNORMAL
UROBILINOGEN, URINE: 0.2 E.U./DL
WBC # BLD: 22.2 K/UL (ref 4–11)
WBC UA: NORMAL /HPF (ref 0–5)

## 2022-10-09 PROCEDURE — 82077 ASSAY SPEC XCP UR&BREATH IA: CPT

## 2022-10-09 PROCEDURE — 96375 TX/PRO/DX INJ NEW DRUG ADDON: CPT

## 2022-10-09 PROCEDURE — 6360000004 HC RX CONTRAST MEDICATION: Performed by: EMERGENCY MEDICINE

## 2022-10-09 PROCEDURE — 70450 CT HEAD/BRAIN W/O DYE: CPT

## 2022-10-09 PROCEDURE — 83605 ASSAY OF LACTIC ACID: CPT

## 2022-10-09 PROCEDURE — 71045 X-RAY EXAM CHEST 1 VIEW: CPT

## 2022-10-09 PROCEDURE — 36415 COLL VENOUS BLD VENIPUNCTURE: CPT

## 2022-10-09 PROCEDURE — 96374 THER/PROPH/DIAG INJ IV PUSH: CPT

## 2022-10-09 PROCEDURE — 87635 SARS-COV-2 COVID-19 AMP PRB: CPT

## 2022-10-09 PROCEDURE — 2580000003 HC RX 258: Performed by: EMERGENCY MEDICINE

## 2022-10-09 PROCEDURE — 83735 ASSAY OF MAGNESIUM: CPT

## 2022-10-09 PROCEDURE — 74177 CT ABD & PELVIS W/CONTRAST: CPT

## 2022-10-09 PROCEDURE — 85025 COMPLETE CBC W/AUTO DIFF WBC: CPT

## 2022-10-09 PROCEDURE — 85610 PROTHROMBIN TIME: CPT

## 2022-10-09 PROCEDURE — 96361 HYDRATE IV INFUSION ADD-ON: CPT

## 2022-10-09 PROCEDURE — 87040 BLOOD CULTURE FOR BACTERIA: CPT

## 2022-10-09 PROCEDURE — 80053 COMPREHEN METABOLIC PANEL: CPT

## 2022-10-09 PROCEDURE — 83690 ASSAY OF LIPASE: CPT

## 2022-10-09 PROCEDURE — 96365 THER/PROPH/DIAG IV INF INIT: CPT

## 2022-10-09 PROCEDURE — 96367 TX/PROPH/DG ADDL SEQ IV INF: CPT

## 2022-10-09 PROCEDURE — 81001 URINALYSIS AUTO W/SCOPE: CPT

## 2022-10-09 PROCEDURE — 6360000002 HC RX W HCPCS: Performed by: EMERGENCY MEDICINE

## 2022-10-09 PROCEDURE — 99285 EMERGENCY DEPT VISIT HI MDM: CPT

## 2022-10-09 RX ORDER — SODIUM CHLORIDE, SODIUM LACTATE, POTASSIUM CHLORIDE, AND CALCIUM CHLORIDE .6; .31; .03; .02 G/100ML; G/100ML; G/100ML; G/100ML
30 INJECTION, SOLUTION INTRAVENOUS ONCE
Status: COMPLETED | OUTPATIENT
Start: 2022-10-09 | End: 2022-10-09

## 2022-10-09 RX ORDER — LORAZEPAM 2 MG/ML
0.5 INJECTION INTRAMUSCULAR ONCE
Status: COMPLETED | OUTPATIENT
Start: 2022-10-09 | End: 2022-10-09

## 2022-10-09 RX ORDER — SODIUM CHLORIDE 0.9 % (FLUSH) 0.9 %
5-40 SYRINGE (ML) INJECTION EVERY 12 HOURS SCHEDULED
Status: DISCONTINUED | OUTPATIENT
Start: 2022-10-09 | End: 2022-10-10 | Stop reason: SDUPTHER

## 2022-10-09 RX ORDER — SODIUM CHLORIDE 0.9 % (FLUSH) 0.9 %
5-40 SYRINGE (ML) INJECTION PRN
Status: DISCONTINUED | OUTPATIENT
Start: 2022-10-09 | End: 2022-10-10 | Stop reason: SDUPTHER

## 2022-10-09 RX ORDER — MAGNESIUM SULFATE 1 G/100ML
1000 INJECTION INTRAVENOUS ONCE
Status: COMPLETED | OUTPATIENT
Start: 2022-10-09 | End: 2022-10-09

## 2022-10-09 RX ORDER — ONDANSETRON 2 MG/ML
4 INJECTION INTRAMUSCULAR; INTRAVENOUS ONCE
Status: COMPLETED | OUTPATIENT
Start: 2022-10-09 | End: 2022-10-09

## 2022-10-09 RX ORDER — SODIUM CHLORIDE 9 MG/ML
INJECTION, SOLUTION INTRAVENOUS PRN
Status: DISCONTINUED | OUTPATIENT
Start: 2022-10-09 | End: 2022-10-10 | Stop reason: SDUPTHER

## 2022-10-09 RX ORDER — SODIUM CHLORIDE, SODIUM LACTATE, POTASSIUM CHLORIDE, AND CALCIUM CHLORIDE .6; .31; .03; .02 G/100ML; G/100ML; G/100ML; G/100ML
1000 INJECTION, SOLUTION INTRAVENOUS ONCE
Status: COMPLETED | OUTPATIENT
Start: 2022-10-09 | End: 2022-10-09

## 2022-10-09 RX ORDER — MAGNESIUM SULFATE IN WATER 40 MG/ML
2000 INJECTION, SOLUTION INTRAVENOUS ONCE
Status: COMPLETED | OUTPATIENT
Start: 2022-10-09 | End: 2022-10-09

## 2022-10-09 RX ORDER — POTASSIUM CHLORIDE 7.45 MG/ML
10 INJECTION INTRAVENOUS ONCE
Status: DISCONTINUED | OUTPATIENT
Start: 2022-10-09 | End: 2022-10-10

## 2022-10-09 RX ADMIN — MAGNESIUM SULFATE HEPTAHYDRATE 2000 MG: 40 INJECTION, SOLUTION INTRAVENOUS at 19:53

## 2022-10-09 RX ADMIN — MAGNESIUM SULFATE HEPTAHYDRATE 1000 MG: 1 INJECTION, SOLUTION INTRAVENOUS at 22:16

## 2022-10-09 RX ADMIN — SODIUM CHLORIDE, PRESERVATIVE FREE 10 ML: 5 INJECTION INTRAVENOUS at 20:51

## 2022-10-09 RX ADMIN — SODIUM CHLORIDE, POTASSIUM CHLORIDE, SODIUM LACTATE AND CALCIUM CHLORIDE 1000 ML: 600; 310; 30; 20 INJECTION, SOLUTION INTRAVENOUS at 18:46

## 2022-10-09 RX ADMIN — ONDANSETRON 4 MG: 2 INJECTION INTRAMUSCULAR; INTRAVENOUS at 18:46

## 2022-10-09 RX ADMIN — PIPERACILLIN SODIUM AND TAZOBACTAM SODIUM 3375 MG: 3; .375 INJECTION, POWDER, FOR SOLUTION INTRAVENOUS at 21:15

## 2022-10-09 RX ADMIN — ONDANSETRON 4 MG: 2 INJECTION INTRAMUSCULAR; INTRAVENOUS at 21:15

## 2022-10-09 RX ADMIN — SODIUM CHLORIDE, POTASSIUM CHLORIDE, SODIUM LACTATE AND CALCIUM CHLORIDE 2772 ML: 600; 310; 30; 20 INJECTION, SOLUTION INTRAVENOUS at 21:16

## 2022-10-09 RX ADMIN — LORAZEPAM 0.5 MG: 2 INJECTION, SOLUTION INTRAMUSCULAR; INTRAVENOUS at 21:30

## 2022-10-09 RX ADMIN — LORAZEPAM 0.5 MG: 2 INJECTION, SOLUTION INTRAMUSCULAR; INTRAVENOUS at 18:47

## 2022-10-09 RX ADMIN — IOPAMIDOL 100 ML: 755 INJECTION, SOLUTION INTRAVENOUS at 20:47

## 2022-10-09 ASSESSMENT — PAIN DESCRIPTION - LOCATION: LOCATION: ABDOMEN

## 2022-10-09 ASSESSMENT — PAIN - FUNCTIONAL ASSESSMENT: PAIN_FUNCTIONAL_ASSESSMENT: 0-10

## 2022-10-09 ASSESSMENT — PAIN DESCRIPTION - ORIENTATION: ORIENTATION: RIGHT;UPPER

## 2022-10-09 ASSESSMENT — PAIN SCALES - GENERAL: PAINLEVEL_OUTOF10: 4

## 2022-10-09 ASSESSMENT — PAIN DESCRIPTION - DESCRIPTORS: DESCRIPTORS: ACHING

## 2022-10-09 NOTE — ED TRIAGE NOTES
Pt. Arrived via [address] from [facility] where she went on Friday to drink, last alcohol intake 4am, started hallucinating when woke up at 0800, called  and they offered to call squad for patient, alert and oriented now, nauseated and vomiting on arrival.
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
## 2022-10-10 PROBLEM — F10.939 ALCOHOL WITHDRAWAL, WITH UNSPECIFIED COMPLICATION (HCC): Status: ACTIVE | Noted: 2022-10-10

## 2022-10-10 LAB
A/G RATIO: 2 (ref 1.1–2.2)
ALBUMIN SERPL-MCNC: 4.3 G/DL (ref 3.4–5)
ALP BLD-CCNC: 92 U/L (ref 40–129)
ALT SERPL-CCNC: 146 U/L (ref 10–40)
ANION GAP SERPL CALCULATED.3IONS-SCNC: 14 MMOL/L (ref 3–16)
AST SERPL-CCNC: 167 U/L (ref 15–37)
BASOPHILS ABSOLUTE: 0.1 K/UL (ref 0–0.2)
BASOPHILS ABSOLUTE: 0.1 K/UL (ref 0–0.2)
BASOPHILS RELATIVE PERCENT: 0.4 %
BASOPHILS RELATIVE PERCENT: 0.6 %
BILIRUB SERPL-MCNC: 1.1 MG/DL (ref 0–1)
BUN BLDV-MCNC: 4 MG/DL (ref 7–20)
CALCIUM SERPL-MCNC: 8.4 MG/DL (ref 8.3–10.6)
CHLORIDE BLD-SCNC: 96 MMOL/L (ref 99–110)
CO2: 25 MMOL/L (ref 21–32)
CREAT SERPL-MCNC: 0.6 MG/DL (ref 0.6–1.1)
EOSINOPHILS ABSOLUTE: 0 K/UL (ref 0–0.6)
EOSINOPHILS ABSOLUTE: 0 K/UL (ref 0–0.6)
EOSINOPHILS RELATIVE PERCENT: 0 %
EOSINOPHILS RELATIVE PERCENT: 0.1 %
GFR AFRICAN AMERICAN: >60
GFR NON-AFRICAN AMERICAN: >60
GLUCOSE BLD-MCNC: 114 MG/DL (ref 70–99)
HCT VFR BLD CALC: 39.5 % (ref 36–48)
HCT VFR BLD CALC: 39.6 % (ref 36–48)
HEMOGLOBIN: 13.6 G/DL (ref 12–16)
HEMOGLOBIN: 13.9 G/DL (ref 12–16)
LACTIC ACID, SEPSIS: 1.7 MMOL/L (ref 0.4–1.9)
LYMPHOCYTES ABSOLUTE: 2.6 K/UL (ref 1–5.1)
LYMPHOCYTES ABSOLUTE: 3.1 K/UL (ref 1–5.1)
LYMPHOCYTES RELATIVE PERCENT: 19.4 %
LYMPHOCYTES RELATIVE PERCENT: 23.2 %
MAGNESIUM: 2 MG/DL (ref 1.8–2.4)
MCH RBC QN AUTO: 29.9 PG (ref 26–34)
MCH RBC QN AUTO: 30.3 PG (ref 26–34)
MCHC RBC AUTO-ENTMCNC: 34.5 G/DL (ref 31–36)
MCHC RBC AUTO-ENTMCNC: 35.1 G/DL (ref 31–36)
MCV RBC AUTO: 86.4 FL (ref 80–100)
MCV RBC AUTO: 86.6 FL (ref 80–100)
MONOCYTES ABSOLUTE: 1.1 K/UL (ref 0–1.3)
MONOCYTES ABSOLUTE: 1.1 K/UL (ref 0–1.3)
MONOCYTES RELATIVE PERCENT: 7.9 %
MONOCYTES RELATIVE PERCENT: 7.9 %
NEUTROPHILS ABSOLUTE: 9.3 K/UL (ref 1.7–7.7)
NEUTROPHILS ABSOLUTE: 9.8 K/UL (ref 1.7–7.7)
NEUTROPHILS RELATIVE PERCENT: 68.4 %
NEUTROPHILS RELATIVE PERCENT: 72.1 %
PDW BLD-RTO: 12.8 % (ref 12.4–15.4)
PDW BLD-RTO: 13.1 % (ref 12.4–15.4)
PLATELET # BLD: 285 K/UL (ref 135–450)
PLATELET # BLD: 293 K/UL (ref 135–450)
PMV BLD AUTO: 7.3 FL (ref 5–10.5)
PMV BLD AUTO: 7.6 FL (ref 5–10.5)
POTASSIUM SERPL-SCNC: 3.6 MMOL/L (ref 3.5–5.1)
RBC # BLD: 4.57 M/UL (ref 4–5.2)
RBC # BLD: 4.58 M/UL (ref 4–5.2)
SODIUM BLD-SCNC: 131 MMOL/L (ref 136–145)
SODIUM BLD-SCNC: 135 MMOL/L (ref 136–145)
TOTAL PROTEIN: 6.4 G/DL (ref 6.4–8.2)
WBC # BLD: 13.6 K/UL (ref 4–11)
WBC # BLD: 13.6 K/UL (ref 4–11)

## 2022-10-10 PROCEDURE — 85025 COMPLETE CBC W/AUTO DIFF WBC: CPT

## 2022-10-10 PROCEDURE — 2500000003 HC RX 250 WO HCPCS: Performed by: STUDENT IN AN ORGANIZED HEALTH CARE EDUCATION/TRAINING PROGRAM

## 2022-10-10 PROCEDURE — 83605 ASSAY OF LACTIC ACID: CPT

## 2022-10-10 PROCEDURE — 99254 IP/OBS CNSLTJ NEW/EST MOD 60: CPT | Performed by: REGISTERED NURSE

## 2022-10-10 PROCEDURE — 6370000000 HC RX 637 (ALT 250 FOR IP): Performed by: REGISTERED NURSE

## 2022-10-10 PROCEDURE — 6370000000 HC RX 637 (ALT 250 FOR IP): Performed by: STUDENT IN AN ORGANIZED HEALTH CARE EDUCATION/TRAINING PROGRAM

## 2022-10-10 PROCEDURE — 36415 COLL VENOUS BLD VENIPUNCTURE: CPT

## 2022-10-10 PROCEDURE — 94760 N-INVAS EAR/PLS OXIMETRY 1: CPT

## 2022-10-10 PROCEDURE — 1200000000 HC SEMI PRIVATE

## 2022-10-10 PROCEDURE — 6370000000 HC RX 637 (ALT 250 FOR IP): Performed by: INTERNAL MEDICINE

## 2022-10-10 PROCEDURE — 83735 ASSAY OF MAGNESIUM: CPT

## 2022-10-10 PROCEDURE — 80053 COMPREHEN METABOLIC PANEL: CPT

## 2022-10-10 PROCEDURE — 6360000002 HC RX W HCPCS: Performed by: STUDENT IN AN ORGANIZED HEALTH CARE EDUCATION/TRAINING PROGRAM

## 2022-10-10 PROCEDURE — 84295 ASSAY OF SERUM SODIUM: CPT

## 2022-10-10 PROCEDURE — 2580000003 HC RX 258: Performed by: STUDENT IN AN ORGANIZED HEALTH CARE EDUCATION/TRAINING PROGRAM

## 2022-10-10 RX ORDER — CITALOPRAM 10 MG/1
10 TABLET ORAL DAILY
Status: DISCONTINUED | OUTPATIENT
Start: 2022-10-10 | End: 2022-10-14 | Stop reason: HOSPADM

## 2022-10-10 RX ORDER — LORAZEPAM 1 MG/1
4 TABLET ORAL
Status: DISCONTINUED | OUTPATIENT
Start: 2022-10-10 | End: 2022-10-13

## 2022-10-10 RX ORDER — LORAZEPAM 2 MG/ML
4 INJECTION INTRAMUSCULAR
Status: DISCONTINUED | OUTPATIENT
Start: 2022-10-10 | End: 2022-10-13

## 2022-10-10 RX ORDER — FOLIC ACID 1 MG/1
1 TABLET ORAL DAILY
Status: DISCONTINUED | OUTPATIENT
Start: 2022-10-10 | End: 2022-10-14 | Stop reason: HOSPADM

## 2022-10-10 RX ORDER — POLYETHYLENE GLYCOL 3350 17 G/17G
17 POWDER, FOR SOLUTION ORAL DAILY PRN
Status: DISCONTINUED | OUTPATIENT
Start: 2022-10-10 | End: 2022-10-14 | Stop reason: HOSPADM

## 2022-10-10 RX ORDER — NICOTINE 21 MG/24HR
1 PATCH, TRANSDERMAL 24 HOURS TRANSDERMAL DAILY
Status: DISCONTINUED | OUTPATIENT
Start: 2022-10-10 | End: 2022-10-14 | Stop reason: HOSPADM

## 2022-10-10 RX ORDER — ACETAMINOPHEN 325 MG/1
650 TABLET ORAL EVERY 6 HOURS PRN
Status: DISCONTINUED | OUTPATIENT
Start: 2022-10-10 | End: 2022-10-14 | Stop reason: HOSPADM

## 2022-10-10 RX ORDER — LORAZEPAM 2 MG/ML
2 INJECTION INTRAMUSCULAR
Status: DISCONTINUED | OUTPATIENT
Start: 2022-10-10 | End: 2022-10-13

## 2022-10-10 RX ORDER — LORAZEPAM 1 MG/1
2 TABLET ORAL
Status: DISCONTINUED | OUTPATIENT
Start: 2022-10-10 | End: 2022-10-13

## 2022-10-10 RX ORDER — SODIUM CHLORIDE 0.9 % (FLUSH) 0.9 %
5-40 SYRINGE (ML) INJECTION EVERY 12 HOURS SCHEDULED
Status: DISCONTINUED | OUTPATIENT
Start: 2022-10-10 | End: 2022-10-14 | Stop reason: HOSPADM

## 2022-10-10 RX ORDER — ACETAMINOPHEN 650 MG/1
650 SUPPOSITORY RECTAL EVERY 6 HOURS PRN
Status: DISCONTINUED | OUTPATIENT
Start: 2022-10-10 | End: 2022-10-14 | Stop reason: HOSPADM

## 2022-10-10 RX ORDER — TRAZODONE HYDROCHLORIDE 100 MG/1
100 TABLET ORAL NIGHTLY
Status: DISCONTINUED | OUTPATIENT
Start: 2022-10-10 | End: 2022-10-14 | Stop reason: HOSPADM

## 2022-10-10 RX ORDER — GAUZE BANDAGE 2" X 2"
100 BANDAGE TOPICAL DAILY
Status: DISCONTINUED | OUTPATIENT
Start: 2022-10-10 | End: 2022-10-14 | Stop reason: HOSPADM

## 2022-10-10 RX ORDER — LORAZEPAM 1 MG/1
3 TABLET ORAL
Status: DISCONTINUED | OUTPATIENT
Start: 2022-10-10 | End: 2022-10-13

## 2022-10-10 RX ORDER — MULTIVITAMIN WITH IRON
1 TABLET ORAL DAILY
Status: DISCONTINUED | OUTPATIENT
Start: 2022-10-10 | End: 2022-10-14 | Stop reason: HOSPADM

## 2022-10-10 RX ORDER — LORAZEPAM 2 MG/ML
1 INJECTION INTRAMUSCULAR
Status: DISCONTINUED | OUTPATIENT
Start: 2022-10-10 | End: 2022-10-13

## 2022-10-10 RX ORDER — LORAZEPAM 2 MG/ML
3 INJECTION INTRAMUSCULAR
Status: DISCONTINUED | OUTPATIENT
Start: 2022-10-10 | End: 2022-10-13

## 2022-10-10 RX ORDER — ONDANSETRON 2 MG/ML
4 INJECTION INTRAMUSCULAR; INTRAVENOUS EVERY 6 HOURS PRN
Status: DISCONTINUED | OUTPATIENT
Start: 2022-10-10 | End: 2022-10-14 | Stop reason: HOSPADM

## 2022-10-10 RX ORDER — LORAZEPAM 1 MG/1
1 TABLET ORAL
Status: DISCONTINUED | OUTPATIENT
Start: 2022-10-10 | End: 2022-10-13

## 2022-10-10 RX ORDER — SODIUM CHLORIDE 9 MG/ML
INJECTION, SOLUTION INTRAVENOUS PRN
Status: DISCONTINUED | OUTPATIENT
Start: 2022-10-10 | End: 2022-10-14 | Stop reason: HOSPADM

## 2022-10-10 RX ORDER — MIDAZOLAM HYDROCHLORIDE 1 MG/ML
2 INJECTION INTRAMUSCULAR; INTRAVENOUS
Status: DISCONTINUED | OUTPATIENT
Start: 2022-10-10 | End: 2022-10-14 | Stop reason: HOSPADM

## 2022-10-10 RX ORDER — CHLORDIAZEPOXIDE HYDROCHLORIDE 5 MG/1
10 CAPSULE, GELATIN COATED ORAL 3 TIMES DAILY
Status: DISCONTINUED | OUTPATIENT
Start: 2022-10-10 | End: 2022-10-11

## 2022-10-10 RX ORDER — ENOXAPARIN SODIUM 100 MG/ML
40 INJECTION SUBCUTANEOUS DAILY
Status: DISCONTINUED | OUTPATIENT
Start: 2022-10-10 | End: 2022-10-14 | Stop reason: HOSPADM

## 2022-10-10 RX ORDER — SODIUM CHLORIDE 0.9 % (FLUSH) 0.9 %
5-40 SYRINGE (ML) INJECTION PRN
Status: DISCONTINUED | OUTPATIENT
Start: 2022-10-10 | End: 2022-10-14 | Stop reason: HOSPADM

## 2022-10-10 RX ADMIN — THERA TABS 1 TABLET: TAB at 08:40

## 2022-10-10 RX ADMIN — ACETAMINOPHEN 650 MG: 325 TABLET ORAL at 12:09

## 2022-10-10 RX ADMIN — CHLORDIAZEPOXIDE HYDROCHLORIDE 10 MG: 5 CAPSULE ORAL at 08:39

## 2022-10-10 RX ADMIN — SODIUM CHLORIDE, PRESERVATIVE FREE 10 ML: 5 INJECTION INTRAVENOUS at 20:30

## 2022-10-10 RX ADMIN — ONDANSETRON 4 MG: 2 INJECTION INTRAMUSCULAR; INTRAVENOUS at 08:39

## 2022-10-10 RX ADMIN — LORAZEPAM 2 MG: 2 INJECTION INTRAMUSCULAR; INTRAVENOUS at 18:36

## 2022-10-10 RX ADMIN — LORAZEPAM 2 MG: 1 TABLET ORAL at 01:16

## 2022-10-10 RX ADMIN — CHLORDIAZEPOXIDE HYDROCHLORIDE 10 MG: 5 CAPSULE ORAL at 20:28

## 2022-10-10 RX ADMIN — ONDANSETRON 4 MG: 2 INJECTION INTRAMUSCULAR; INTRAVENOUS at 18:35

## 2022-10-10 RX ADMIN — CITALOPRAM HYDROBROMIDE 10 MG: 10 TABLET ORAL at 12:27

## 2022-10-10 RX ADMIN — LORAZEPAM 2 MG: 1 TABLET ORAL at 12:09

## 2022-10-10 RX ADMIN — CHLORDIAZEPOXIDE HYDROCHLORIDE 10 MG: 5 CAPSULE ORAL at 01:17

## 2022-10-10 RX ADMIN — LORAZEPAM 1 MG: 1 TABLET ORAL at 08:40

## 2022-10-10 RX ADMIN — ONDANSETRON 4 MG: 2 INJECTION INTRAMUSCULAR; INTRAVENOUS at 01:17

## 2022-10-10 RX ADMIN — FOLIC ACID: 5 INJECTION, SOLUTION INTRAMUSCULAR; INTRAVENOUS; SUBCUTANEOUS at 00:33

## 2022-10-10 RX ADMIN — ACETAMINOPHEN 650 MG: 325 TABLET ORAL at 18:35

## 2022-10-10 RX ADMIN — ENOXAPARIN SODIUM 40 MG: 100 INJECTION SUBCUTANEOUS at 08:39

## 2022-10-10 RX ADMIN — CHLORDIAZEPOXIDE HYDROCHLORIDE 10 MG: 5 CAPSULE ORAL at 14:17

## 2022-10-10 RX ADMIN — Medication 100 MG: at 08:39

## 2022-10-10 RX ADMIN — LORAZEPAM 2 MG: 1 TABLET ORAL at 05:42

## 2022-10-10 RX ADMIN — FOLIC ACID 1 MG: 1 TABLET ORAL at 08:39

## 2022-10-10 RX ADMIN — TRAZODONE HYDROCHLORIDE 100 MG: 100 TABLET ORAL at 20:28

## 2022-10-10 RX ADMIN — POTASSIUM BICARBONATE 20 MEQ: 782 TABLET, EFFERVESCENT ORAL at 01:17

## 2022-10-10 ASSESSMENT — PAIN SCALES - GENERAL
PAINLEVEL_OUTOF10: 5
PAINLEVEL_OUTOF10: 6
PAINLEVEL_OUTOF10: 6

## 2022-10-10 ASSESSMENT — PAIN DESCRIPTION - LOCATION
LOCATION: ABDOMEN
LOCATION: HEAD
LOCATION: HEAD;GENERALIZED

## 2022-10-10 ASSESSMENT — PAIN DESCRIPTION - DESCRIPTORS
DESCRIPTORS: ACHING
DESCRIPTORS: ACHING;DULL

## 2022-10-10 NOTE — PROGRESS NOTES
Medication Reconciliation    List of medications patient is currently taking is complete. Source of information: 1. Conversation with patient at bedside                                      2. EPIC records      Allergies  Patient has no known allergies. Notes regarding home medications:   1. Patient only taking trazodone 100 mg nightly on a regular basis.

## 2022-10-10 NOTE — PLAN OF CARE
Problem: Discharge Planning  Goal: Discharge to home or other facility with appropriate resources  10/10/2022 0202 by Pebbles Herrera RN  Outcome: Progressing  10/10/2022 0159 by Pebbles Herrera RN  Outcome: Progressing     Problem: Safety - Adult  Goal: Able to ambulate independently  Description: Able to ambulate independently  Outcome: Progressing

## 2022-10-10 NOTE — CONSULTS
PSYCHIATRY CONSULT, INITIAL EVALUATION    Referring Provider:  Caitlyn Sierra, *    CC/Reason for Consult: alcoholism, depression    Psychiatric Dx:    MDD, recurrent, moderate     2. Alcohol abuse disorder    3. ANALI    Assessment   This a 45 y.o with past psychiatric hx anxiety, depression, Alcohol abuse disorder who brought to ED by EMS with alcohol withdrawal symptoms. She was recently released from Alcohol rehab residential program at [facility] in [facility] for 6 months and discharged on Sept 17, 2022. After she was released she started living with her mom which has been stressful and  relapsed on alcohol use. She went hotel few days ago to have some time for herself but started having alcohol withdrawal symptoms; and anxiety. Ethanol level was undetected up on admission. During my interview, she denies suicidal or homicidal ideation. She denies hallucinations. RECOMMENDATIONS:     Psychiatric  -  Monitor for alcohol withdrawal. Use CIWA protocol if needed. -  start antidepressant medication. Celexa 10 mg/daily  - Electrolytes replacement per attending.    - She needles outpatient psychiatry and counseling services for long term alcohol relapse prevention services. Currently she has [facility] PCP so we can connect with our [place] [id number] providers on the [place] side. Resources discussed with patient. - patient is interested  in inpatient alcohol rehab or IOP as a bridge until she establishes with psychiatry and psychology services   - order TSH, vitamin D and B12 and Folate   - Appreciate SW in helping for placement. - Updated plan of care nurse caring for this patient. - psych follow along. Dispo: D/c to inpatient alcohol rehab center or IOP services when medically stable. She does NOT need inpatient  psych admission. Safety: RF: chronic alcohol abuse, depression, anxiety, divorce, poor social support, socio-economic hardship, previous psych admission .  However, the patient has good insight about her mental health, wants to get better with psychotropic and psychotherapy services interventions. She is low risk for self harm at the time my interview. Pink slip: N/A  ___________________________________________________________________________    HPI:   context: This a 45 y.o with past psychiatric hx anxiety, depression, Alcohol abuse disorder who brought to ED by EMS with alcohol withdrawal symptoms. She was recently released from Alcohol rehab residential program at [facility] in [facility] for 6 months and discharged on Sept 17, 2022. After she was released she started living with her mom which has been stressful and  relapsed on alcohol use. She went hotel few days ago to have some time for herself but started having alcohol withdrawal symptoms; and anxiety. She reports she has been drinking or more than years. She lives with her mom which is stressful. She relapsed on alcohol last Thursday. In the last few days she drank about \" four giants bottles of whiskey. \"  She states her mom is controlling. She states her mom is Rastafarian and but patient is not. She could not get a job. 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 She feels \" I lost my identity. \"  She has no communications with kids. associated symptoms: anxiety, depression, feeling guilty  modifying factors: living conditions, employment, poor social support, medication non-adherence. Timing: acute on chronic   duration: 3-4 days   severity: \moderate     Collateral information: N/A    ROS: Negative for HA, blurry vision, CP, SOB, dizziness, Syncope, tremors, abd pain, or abnormal movements. Past Psychiatric History:        Hosp: she admitted once 2019 for suicide ideation. Diagnoses: MDD, anxiety, KATHY. Med trials:  Wellbutrin, Prozac, Buspar, Naltrexone, Disulfiram, antabuse, Zoloft, Lexaparo        Outpt: counseling      Suicide Attempts: cutting wrist 2019    Substance Use History:     Nicotine:  0.5 pack a day Alcohol: the amount she drinks \" varies. \" She used to drink 5-6 beers daily but this week she drinks whiskey. Illicits: denies     Past Medical History:   Past Medical History:   Diagnosis Date    Alcohol abuse     Ankle fracture     bilateral    Anxiety     Depression     Sleeping difficulties      Past Surgical History:   Procedure Laterality Date    BREAST SURGERY      sailine breast      SECTION  1988    FOREARM SURGERY Left 2021    OPEN REDUCTION INTERNAL FIXATION LEFT DISTAL RADIUS performed by uJlio Farooq MD at [address]      right wrist       Social/Developmental History:     Hx:Relationship/ children/housing/ occupation/ Legal/ abuse  - she lives with her mom. She is  in . 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 She has 4 children. Her children in [place]. Access to firearms: No     Family History:   Family History   Problem Relation Age of Onset    No Known Problems Mother     No Known Problems Father     Other Brother         anxiety    Cancer Maternal Grandmother         lung cancer    Cancer Maternal Grandfather         lung       Psychiatric: siblings drinks but significant. Dad- alcohol     History of completed suicide: denies     Allergies:  No Known Allergies    Home Medications:   No current facility-administered medications on file prior to encounter.      Current Outpatient Medications on File Prior to Encounter   Medication Sig Dispense Refill    traZODone (DESYREL) 100 MG tablet Take 100 mg by mouth nightly as needed for Sleep          Medications:  Scheduled Meds:   sodium chloride flush  5-40 mL IntraVENous 2 times per day    thiamine  100 mg Oral Daily    enoxaparin  40 mg SubCUTAneous Daily    multivitamin  1 tablet Oral Daily    folic acid  1 mg Oral Daily    nicotine  1 patch TransDERmal Daily    chlordiazePOXIDE  10 mg Oral TID     PRN Meds:.sodium chloride flush, sodium chloride, polyethylene glycol, acetaminophen **OR** acetaminophen, ondansetron, LORazepam **OR** LORazepam **OR** LORazepam **OR** LORazepam **OR** LORazepam **OR** LORazepam **OR** LORazepam **OR** LORazepam, midazolam    OBJECTIVE:  .  Vitals:    10/10/22 0645 10/10/22 0811 10/10/22 0815 10/10/22 0827   BP:   (!) 142/88 (!) 142/88   Pulse:   (!) 105 (!) 105   Resp:   16    Temp:   98.1 °F (36.7 °C)    TempSrc:   Oral    SpO2:  93% 93%    Weight: 200 lb 12.8 oz (91.1 kg)      Height:           MSE:   Appearance    alert, cooperative  Motor: Normal strength and tone, No abnormal movements, tics or mannerisms. Speech    spontaneous, normal rate, normal volume, and well articulated  Language    0 - no aphasia, normal  Mood/Affect    Anxious  Guilty  Depressed  Irritability / depressed affect  Thought Process    linear, goal directed, and coherent  Thought Content    hopelessness , no suicidal ideation  Associations    logical connections  Attention/Concentration    intact  Orientation    oriented to person, place, time, and general circumstances  Memory    recent and remote memory intact  Fund of Knowledge    intact  Insight/Judgement    Good / Intact    Labs:   No results found for: TSH, [facility]  Lab Results   Component Value Date    YUTVVCUE14 482 10/13/2018     Lab Results   Component Value Date    FOLATE 14.59 10/13/2018       Last Drug screen: None available    Imaging: CT head 10/9/22  Impression   No acute intracranial findings. EKG:     Vitals:  HR 98 PVC 0  10/10/2022 01:22:42 Saved strip re?labeled to Sinus Rhythm NEW ADMIT SG GA 0.14 QRS 0.08 QT 0.32    Sandrine Henriquez, OTILIA, PMHNP-BC, CNP  [facility]  Thank you for this consult, please call the psychiatry consult line for further questions.
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
## 2022-10-10 NOTE — ED NOTES
Pt has been able to urinate after CT, bladder scan shows 14 cc or urine     Terrie Bowden, RN  10/09/22 8617

## 2022-10-10 NOTE — PROGRESS NOTES
Hospitalist Progress Note      PCP: Alban Thompson DO    Date of Admission: 10/9/2022    Chief Complaint:   Chief Complaint   Patient presents with    Alcohol Intoxication     Pt. Arrived via [address] from hotel where she went on Friday to drink, last alcohol intake 4am, started hallucinating when woke up at 0800, called  and they offered to call squad for patient, alert and oriented now, nauseated and vomiting on arrival         Hospital Course: 49-year-old female with past medical history significant for Anabella comes to the emergency department with alcohol withdrawal symptoms including tremors hallucination after drinking binge. 10/10  Patient reports persistent tremors today    Subjective: as above      Medications:  Reviewed    Infusion Medications    sodium chloride       Scheduled Medications    sodium chloride flush  5-40 mL IntraVENous 2 times per day    thiamine  100 mg Oral Daily    enoxaparin  40 mg SubCUTAneous Daily    multivitamin  1 tablet Oral Daily    folic acid  1 mg Oral Daily    nicotine  1 patch TransDERmal Daily    chlordiazePOXIDE  10 mg Oral TID    citalopram  10 mg Oral Daily    traZODone  100 mg Oral Nightly     PRN Meds: sodium chloride flush, sodium chloride, polyethylene glycol, acetaminophen **OR** acetaminophen, ondansetron, LORazepam **OR** LORazepam **OR** LORazepam **OR** LORazepam **OR** LORazepam **OR** LORazepam **OR** LORazepam **OR** LORazepam, midazolam    No intake or output data in the 24 hours ending 10/10/22 1403    Physical Exam Performed:    BP (!) 141/89   Pulse 98   Temp 98.5 °F (36.9 °C) (Oral)   Resp 18   Ht 5' 4\" (1.626 m)   Wt 200 lb 12.8 oz (91.1 kg)   SpO2 96%   BMI 34.47 kg/m²     General appearance: No apparent distress, appears stated age and cooperative. HEENT: Pupils equal, round, and reactive to light. Conjunctivae/corneas clear. Neck: Supple, with full range of motion. No jugular venous distention.  Trachea midline. Respiratory:  Normal respiratory effort. Clear to auscultation, bilaterally without Rales/Wheezes/Rhonchi. Cardiovascular: Regular rate and rhythm with normal S1/S2 without murmurs, rubs or gallops. Abdomen: Soft, non-tender, non-distended with normal bowel sounds. Musculoskeletal: No clubbing, cyanosis or edema bilaterally. Full range of motion without deformity. Skin: Skin color, texture, turgor normal.  No rashes or lesions. Neurologic:  Neurovascularly intact without any focal sensory/motor deficits. Cranial nerves: II-XII intact, grossly non-focal.  Resting tremors upper extremity  Psychiatric: Alert and oriented, thought content appropriate, normal insight  Capillary Refill: Brisk, 3 seconds, normal   Peripheral Pulses: +2 palpable, equal bilaterally       Labs:   Recent Labs     10/09/22  1840 10/10/22  0021 10/10/22  0524   WBC 22.2* 13.6* 13.6*   HGB 13.8 13.6 13.9   HCT 39.3 39.5 39.6    285 293     Recent Labs     10/09/22  1840 10/10/22  0021 10/10/22  0524   * 131* 135*   K 3.3*  --  3.6   CL 87*  --  96*   CO2 20*  --  25   BUN 8  --  4*   CREATININE 0.7  --  0.6   CALCIUM 8.1*  --  8.4     Recent Labs     10/09/22  1840 10/10/22  0524   * 167*   * 146*   BILITOT 1.2* 1.1*   ALKPHOS 95 92     Recent Labs     10/09/22  1840   INR 0.99     No results for input(s): Alayna Monique in the last 72 hours. Urinalysis:      Lab Results   Component Value Date/Time    NITRU Negative 10/09/2022 08:15 PM    WBCUA 0-2 10/09/2022 08:15 PM    BACTERIA 1+ 07/22/2021 03:00 PM    RBCUA 0-2 10/09/2022 08:15 PM    BLOODU TRACE-LYSED 10/09/2022 08:15 PM    SPECGRAV 1.020 10/09/2022 08:15 PM    GLUCOSEU Negative 10/09/2022 08:15 PM       Radiology:  CT ABDOMEN PELVIS W IV CONTRAST Additional Contrast? None   Final Result   1. Prominent distention of the bladder with an estimated volume of 1.1 L   which raises the question of bladder outlet obstruction.    2. No acute findings elsewhere in the abdomen or pelvis. 3. Hepatomegaly and hepatic steatosis. 4. Right renal cysts measuring up to 2.5 cm. CT HEAD WO CONTRAST   Final Result   No acute intracranial findings. XR CHEST PORTABLE   Final Result   No acute abnormality                 Assessment/Plan:    Active Hospital Problems    Diagnosis     Alcohol withdrawal, with unspecified complication ([facility] [G59.282]      Priority: Medium    Alcohol dependence with withdrawal ([facility] [F10.239]     Leukocytosis [D72.829]     Lactic acidosis [E87.20]      Problem list    Alcohol intoxication with alcohol withdrawal signs and symptoms  Alcohol use disorder  Leukocytosis-reactive    Plan    Continue Librium  Continue lorazepam per CIWA protocol      DVT Prophylaxis: +  Diet: ADULT DIET; Regular  Code Status: Full Code  PT/OT Eval Status: -    Dispo - ?       Fabrizio Prabhakar MD

## 2022-10-10 NOTE — ED NOTES
[address] arrives to transport patient to [facility]. Report called to RN. Pt states no hallucinations at this time and no pain.      Apolonia Molina RN  10/09/22 9184

## 2022-10-10 NOTE — PROGRESS NOTES
Pt admitted to floor alert and oriented. Pt is ambulatory. Lizbeth Ravi oriented to room and call light. All needs attended. VS taken. Fall precaution implemented. Will assess and monitor.

## 2022-10-11 LAB
A/G RATIO: 1.9 (ref 1.1–2.2)
ALBUMIN SERPL-MCNC: 3.9 G/DL (ref 3.4–5)
ALP BLD-CCNC: 81 U/L (ref 40–129)
ALT SERPL-CCNC: 138 U/L (ref 10–40)
ANION GAP SERPL CALCULATED.3IONS-SCNC: 12 MMOL/L (ref 3–16)
AST SERPL-CCNC: 126 U/L (ref 15–37)
BASOPHILS ABSOLUTE: 0 K/UL (ref 0–0.2)
BASOPHILS RELATIVE PERCENT: 0.4 %
BILIRUB SERPL-MCNC: 0.6 MG/DL (ref 0–1)
BUN BLDV-MCNC: 3 MG/DL (ref 7–20)
CALCIUM SERPL-MCNC: 8.7 MG/DL (ref 8.3–10.6)
CHLORIDE BLD-SCNC: 97 MMOL/L (ref 99–110)
CO2: 26 MMOL/L (ref 21–32)
CREAT SERPL-MCNC: 0.6 MG/DL (ref 0.6–1.1)
EOSINOPHILS ABSOLUTE: 0.1 K/UL (ref 0–0.6)
EOSINOPHILS RELATIVE PERCENT: 1.5 %
FOLATE: 18.23 NG/ML (ref 4.78–24.2)
GFR AFRICAN AMERICAN: >60
GFR NON-AFRICAN AMERICAN: >60
GLUCOSE BLD-MCNC: 97 MG/DL (ref 70–99)
HCT VFR BLD CALC: 38.6 % (ref 36–48)
HEMOGLOBIN: 13.3 G/DL (ref 12–16)
LYMPHOCYTES ABSOLUTE: 2.2 K/UL (ref 1–5.1)
LYMPHOCYTES RELATIVE PERCENT: 29.7 %
MCH RBC QN AUTO: 30.2 PG (ref 26–34)
MCHC RBC AUTO-ENTMCNC: 34.4 G/DL (ref 31–36)
MCV RBC AUTO: 87.9 FL (ref 80–100)
MONOCYTES ABSOLUTE: 0.4 K/UL (ref 0–1.3)
MONOCYTES RELATIVE PERCENT: 5.8 %
NEUTROPHILS ABSOLUTE: 4.5 K/UL (ref 1.7–7.7)
NEUTROPHILS RELATIVE PERCENT: 62.6 %
PDW BLD-RTO: 13.1 % (ref 12.4–15.4)
PLATELET # BLD: 232 K/UL (ref 135–450)
PMV BLD AUTO: 7.6 FL (ref 5–10.5)
POTASSIUM SERPL-SCNC: 3.5 MMOL/L (ref 3.5–5.1)
RBC # BLD: 4.39 M/UL (ref 4–5.2)
SODIUM BLD-SCNC: 135 MMOL/L (ref 136–145)
TOTAL PROTEIN: 6 G/DL (ref 6.4–8.2)
TSH REFLEX: 2.65 UIU/ML (ref 0.27–4.2)
VITAMIN B-12: 864 PG/ML (ref 211–911)
VITAMIN D 25-HYDROXY: 57.4 NG/ML
WBC # BLD: 7.3 K/UL (ref 4–11)

## 2022-10-11 PROCEDURE — 82306 VITAMIN D 25 HYDROXY: CPT

## 2022-10-11 PROCEDURE — 6360000002 HC RX W HCPCS: Performed by: STUDENT IN AN ORGANIZED HEALTH CARE EDUCATION/TRAINING PROGRAM

## 2022-10-11 PROCEDURE — 36415 COLL VENOUS BLD VENIPUNCTURE: CPT

## 2022-10-11 PROCEDURE — 6370000000 HC RX 637 (ALT 250 FOR IP): Performed by: REGISTERED NURSE

## 2022-10-11 PROCEDURE — 2580000003 HC RX 258: Performed by: STUDENT IN AN ORGANIZED HEALTH CARE EDUCATION/TRAINING PROGRAM

## 2022-10-11 PROCEDURE — 6370000000 HC RX 637 (ALT 250 FOR IP): Performed by: STUDENT IN AN ORGANIZED HEALTH CARE EDUCATION/TRAINING PROGRAM

## 2022-10-11 PROCEDURE — 82746 ASSAY OF FOLIC ACID SERUM: CPT

## 2022-10-11 PROCEDURE — 94760 N-INVAS EAR/PLS OXIMETRY 1: CPT

## 2022-10-11 PROCEDURE — 1200000000 HC SEMI PRIVATE

## 2022-10-11 PROCEDURE — 85025 COMPLETE CBC W/AUTO DIFF WBC: CPT

## 2022-10-11 PROCEDURE — 84443 ASSAY THYROID STIM HORMONE: CPT

## 2022-10-11 PROCEDURE — 80053 COMPREHEN METABOLIC PANEL: CPT

## 2022-10-11 PROCEDURE — 82607 VITAMIN B-12: CPT

## 2022-10-11 PROCEDURE — 6370000000 HC RX 637 (ALT 250 FOR IP): Performed by: INTERNAL MEDICINE

## 2022-10-11 PROCEDURE — 6360000002 HC RX W HCPCS: Performed by: INTERNAL MEDICINE

## 2022-10-11 RX ORDER — CHLORDIAZEPOXIDE HYDROCHLORIDE 25 MG/1
25 CAPSULE, GELATIN COATED ORAL 3 TIMES DAILY
Status: DISCONTINUED | OUTPATIENT
Start: 2022-10-11 | End: 2022-10-14 | Stop reason: HOSPADM

## 2022-10-11 RX ORDER — LORAZEPAM 2 MG/ML
1 INJECTION INTRAMUSCULAR ONCE
Status: COMPLETED | OUTPATIENT
Start: 2022-10-11 | End: 2022-10-11

## 2022-10-11 RX ORDER — LOPERAMIDE HYDROCHLORIDE 2 MG/1
2 CAPSULE ORAL 4 TIMES DAILY PRN
Status: DISCONTINUED | OUTPATIENT
Start: 2022-10-11 | End: 2022-10-14 | Stop reason: HOSPADM

## 2022-10-11 RX ADMIN — LORAZEPAM 3 MG: 1 TABLET ORAL at 20:58

## 2022-10-11 RX ADMIN — ACETAMINOPHEN 650 MG: 325 TABLET ORAL at 20:58

## 2022-10-11 RX ADMIN — CITALOPRAM HYDROBROMIDE 10 MG: 10 TABLET ORAL at 08:54

## 2022-10-11 RX ADMIN — TRAZODONE HYDROCHLORIDE 100 MG: 100 TABLET ORAL at 22:38

## 2022-10-11 RX ADMIN — ONDANSETRON 4 MG: 2 INJECTION INTRAMUSCULAR; INTRAVENOUS at 00:45

## 2022-10-11 RX ADMIN — ONDANSETRON 4 MG: 2 INJECTION INTRAMUSCULAR; INTRAVENOUS at 13:12

## 2022-10-11 RX ADMIN — ENOXAPARIN SODIUM 40 MG: 100 INJECTION SUBCUTANEOUS at 08:54

## 2022-10-11 RX ADMIN — CHLORDIAZEPOXIDE HYDROCHLORIDE 25 MG: 25 CAPSULE ORAL at 20:57

## 2022-10-11 RX ADMIN — LORAZEPAM 1 MG: 2 INJECTION INTRAMUSCULAR; INTRAVENOUS at 09:38

## 2022-10-11 RX ADMIN — LORAZEPAM 1 MG: 2 INJECTION INTRAMUSCULAR; INTRAVENOUS at 15:23

## 2022-10-11 RX ADMIN — THERA TABS 1 TABLET: TAB at 08:54

## 2022-10-11 RX ADMIN — ONDANSETRON 4 MG: 2 INJECTION INTRAMUSCULAR; INTRAVENOUS at 20:59

## 2022-10-11 RX ADMIN — LORAZEPAM 2 MG: 2 INJECTION INTRAMUSCULAR; INTRAVENOUS at 00:45

## 2022-10-11 RX ADMIN — SODIUM CHLORIDE, PRESERVATIVE FREE 10 ML: 5 INJECTION INTRAVENOUS at 23:46

## 2022-10-11 RX ADMIN — CHLORDIAZEPOXIDE HYDROCHLORIDE 10 MG: 5 CAPSULE ORAL at 08:54

## 2022-10-11 RX ADMIN — FOLIC ACID 1 MG: 1 TABLET ORAL at 08:54

## 2022-10-11 RX ADMIN — SODIUM CHLORIDE, PRESERVATIVE FREE 10 ML: 5 INJECTION INTRAVENOUS at 09:06

## 2022-10-11 RX ADMIN — CHLORDIAZEPOXIDE HYDROCHLORIDE 25 MG: 25 CAPSULE ORAL at 13:10

## 2022-10-11 RX ADMIN — LOPERAMIDE HYDROCHLORIDE 2 MG: 2 CAPSULE ORAL at 13:43

## 2022-10-11 RX ADMIN — Medication 100 MG: at 08:54

## 2022-10-11 RX ADMIN — ACETAMINOPHEN 650 MG: 325 TABLET ORAL at 09:01

## 2022-10-11 ASSESSMENT — PAIN DESCRIPTION - DESCRIPTORS: DESCRIPTORS: ACHING

## 2022-10-11 ASSESSMENT — PAIN DESCRIPTION - LOCATION: LOCATION: HEAD;GENERALIZED

## 2022-10-11 ASSESSMENT — PAIN SCALES - GENERAL
PAINLEVEL_OUTOF10: 0
PAINLEVEL_OUTOF10: 5

## 2022-10-11 NOTE — PROGRESS NOTES
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 Writer then asked what could I get for her and asked what symptoms was she having. Patient states I need ativan, zofran, and something to snack on. Writer completed CIWA assessment, patient scored 15. Medication (ativan and zofran)administered per order, snacks were given to patient and fresh pitcher of water per patient request. Patient then asked asked about her librium order and asked how often medication is prescribed writer stated 3x daily 9am, 2pm, 9pm, patient states that the medication was given to close and not within 24 hrs. Writer asked if there was anything else patient needs, patient states \"the supervisor needs to come in here, I need to speak to someone about everything. \" Felix RN notified.

## 2022-10-11 NOTE — PROGRESS NOTES
Ativan given per CIWA score, immediately asking when she will get ativan again, RN explained the CIWA scale/protocol, pt then requested MD, RN asking regarding what so I could notify MD, pt wouldn't tell RN, perfect serve sent to MD to notify of requesting to speak to him

## 2022-10-11 NOTE — PLAN OF CARE
Problem: Discharge Planning  Goal: Discharge to home or other facility with appropriate resources  Outcome: Progressing     Problem: Safety - Adult  Goal: Able to ambulate independently  Description: Able to ambulate independently  Outcome: Progressing     Problem: Pain  Goal: Verbalizes/displays adequate comfort level or baseline comfort level  Outcome: Progressing

## 2022-10-11 NOTE — PROGRESS NOTES
Hospitalist Progress Note      PCP: Netta Delgado DO    Date of Admission: 10/9/2022    Chief Complaint:   Chief Complaint   Patient presents with    Alcohol Intoxication     Pt. Arrived via [address] from hotel where she went on Friday to drink, last alcohol intake 4am, started hallucinating when woke up at 0800, called  and they offered to call squad for patient, alert and oriented now, nauseated and vomiting on arrival         Hospital Course: 78-year-old female with past medical history significant for Anabella comes to the emergency department with alcohol withdrawal symptoms including tremors hallucination after drinking binge. 10/10  Patient reports persistent tremors today. 10/11  Patient reports significant signs and symptoms of withdrawal, tremors, diaphoresis. Subjective: as above      Medications:  Reviewed    Infusion Medications    sodium chloride       Scheduled Medications    chlordiazePOXIDE  25 mg Oral TID    sodium chloride flush  5-40 mL IntraVENous 2 times per day    thiamine  100 mg Oral Daily    enoxaparin  40 mg SubCUTAneous Daily    multivitamin  1 tablet Oral Daily    folic acid  1 mg Oral Daily    nicotine  1 patch TransDERmal Daily    citalopram  10 mg Oral Daily    traZODone  100 mg Oral Nightly     PRN Meds: loperamide, sodium chloride flush, sodium chloride, polyethylene glycol, acetaminophen **OR** acetaminophen, ondansetron, LORazepam **OR** LORazepam **OR** LORazepam **OR** LORazepam **OR** LORazepam **OR** LORazepam **OR** LORazepam **OR** LORazepam, midazolam    No intake or output data in the 24 hours ending 10/11/22 1454    Physical Exam Performed:    /81   Pulse 96   Temp 97.7 °F (36.5 °C) (Oral)   Resp 16   Ht 5' 4\" (1.626 m)   Wt 198 lb 6.6 oz (90 kg)   SpO2 93%   BMI 34.06 kg/m²     General appearance: No apparent distress, appears stated age and cooperative. HEENT: Pupils equal, round, and reactive to light.  Conjunctivae/corneas clear.  Neck: Supple, with full range of motion. No jugular venous distention. Trachea midline. Respiratory:  Normal respiratory effort. Clear to auscultation, bilaterally without Rales/Wheezes/Rhonchi. Cardiovascular: Regular rate and rhythm with normal S1/S2 without murmurs, rubs or gallops. Abdomen: Soft, non-tender, non-distended with normal bowel sounds. Musculoskeletal: No clubbing, cyanosis or edema bilaterally. Full range of motion without deformity. Skin: Skin color, texture, turgor normal.  No rashes or lesions. Neurologic:  Neurovascularly intact without any focal sensory/motor deficits. Cranial nerves: II-XII intact, grossly non-focal.  Resting tremors upper extremity  Psychiatric: Alert and oriented, thought content appropriate, normal insight  Capillary Refill: Brisk, 3 seconds, normal   Peripheral Pulses: +2 palpable, equal bilaterally       Labs:   Recent Labs     10/10/22  0021 10/10/22  0524 10/11/22  0540   WBC 13.6* 13.6* 7.3   HGB 13.6 13.9 13.3   HCT 39.5 39.6 38.6    293 232       Recent Labs     10/09/22  1840 10/10/22  0021 10/10/22  0524 10/11/22  0540   * 131* 135* 135*   K 3.3*  --  3.6 3.5   CL 87*  --  96* 97*   CO2 20*  --  25 26   BUN 8  --  4* 3*   CREATININE 0.7  --  0.6 0.6   CALCIUM 8.1*  --  8.4 8.7       Recent Labs     10/09/22  1840 10/10/22  0524 10/11/22  0540   * 167* 126*   * 146* 138*   BILITOT 1.2* 1.1* 0.6   ALKPHOS 95 92 81       Recent Labs     10/09/22  1840   INR 0.99       No results for input(s): Rosario Noy in the last 72 hours.     Urinalysis:      Lab Results   Component Value Date/Time    NITRU Negative 10/09/2022 08:15 PM    WBCUA 0-2 10/09/2022 08:15 PM    BACTERIA 1+ 07/22/2021 03:00 PM    RBCUA 0-2 10/09/2022 08:15 PM    BLOODU TRACE-LYSED 10/09/2022 08:15 PM    SPECGRAV 1.020 10/09/2022 08:15 PM    GLUCOSEU Negative 10/09/2022 08:15 PM       Radiology:  CT ABDOMEN PELVIS W IV CONTRAST Additional Contrast? None Final Result   1. Prominent distention of the bladder with an estimated volume of 1.1 L   which raises the question of bladder outlet obstruction. 2. No acute findings elsewhere in the abdomen or pelvis. 3. Hepatomegaly and hepatic steatosis. 4. Right renal cysts measuring up to 2.5 cm. CT HEAD WO CONTRAST   Final Result   No acute intracranial findings. XR CHEST PORTABLE   Final Result   No acute abnormality                 Assessment/Plan:    Active Hospital Problems    Diagnosis     Alcohol withdrawal, with unspecified complication ([facility] [M91.391]      Priority: Medium    Alcohol dependence with withdrawal ([facility] [F10.239]     Leukocytosis [D72.829]     Lactic acidosis [E87.20]      Problem list    Alcohol intoxication with alcohol withdrawal signs and symptoms  Alcohol use disorder  Leukocytosis-reactive, resolved  Acute liver injury related to alcohol abuse  Hyponatremia due to alcohol use, resolved     Plan    Increase Librium  Continue lorazepam per [facility] protocol  LFTs trending down continue to monitor  Evaluated by psychiatry, no need for inpatient psych placement      DVT Prophylaxis: +  Diet: ADULT DIET;  Regular  Code Status: Full Code  PT/OT Eval Status: -    Dispo - home when alcohol withdrawals signs/ symptoms are improved      Kilo Wayne MD

## 2022-10-12 LAB
A/G RATIO: 1.8 (ref 1.1–2.2)
ALBUMIN SERPL-MCNC: 4 G/DL (ref 3.4–5)
ALP BLD-CCNC: 74 U/L (ref 40–129)
ALT SERPL-CCNC: 112 U/L (ref 10–40)
ANION GAP SERPL CALCULATED.3IONS-SCNC: 15 MMOL/L (ref 3–16)
AST SERPL-CCNC: 78 U/L (ref 15–37)
BASOPHILS ABSOLUTE: 0.1 K/UL (ref 0–0.2)
BASOPHILS RELATIVE PERCENT: 1 %
BILIRUB SERPL-MCNC: 0.4 MG/DL (ref 0–1)
BUN BLDV-MCNC: 4 MG/DL (ref 7–20)
CALCIUM SERPL-MCNC: 8.7 MG/DL (ref 8.3–10.6)
CHLORIDE BLD-SCNC: 99 MMOL/L (ref 99–110)
CO2: 23 MMOL/L (ref 21–32)
CREAT SERPL-MCNC: 0.6 MG/DL (ref 0.6–1.1)
EOSINOPHILS ABSOLUTE: 0.2 K/UL (ref 0–0.6)
EOSINOPHILS RELATIVE PERCENT: 3.2 %
GFR AFRICAN AMERICAN: >60
GFR NON-AFRICAN AMERICAN: >60
GLUCOSE BLD-MCNC: 102 MG/DL (ref 70–99)
HCT VFR BLD CALC: 38.1 % (ref 36–48)
HEMOGLOBIN: 13.2 G/DL (ref 12–16)
LYMPHOCYTES ABSOLUTE: 3 K/UL (ref 1–5.1)
LYMPHOCYTES RELATIVE PERCENT: 39 %
MCH RBC QN AUTO: 30.6 PG (ref 26–34)
MCHC RBC AUTO-ENTMCNC: 34.6 G/DL (ref 31–36)
MCV RBC AUTO: 88.3 FL (ref 80–100)
MONOCYTES ABSOLUTE: 0.4 K/UL (ref 0–1.3)
MONOCYTES RELATIVE PERCENT: 5.7 %
NEUTROPHILS ABSOLUTE: 3.9 K/UL (ref 1.7–7.7)
NEUTROPHILS RELATIVE PERCENT: 51.1 %
PDW BLD-RTO: 12.9 % (ref 12.4–15.4)
PLATELET # BLD: 213 K/UL (ref 135–450)
PMV BLD AUTO: 7.4 FL (ref 5–10.5)
POTASSIUM SERPL-SCNC: 3.6 MMOL/L (ref 3.5–5.1)
RBC # BLD: 4.32 M/UL (ref 4–5.2)
SODIUM BLD-SCNC: 137 MMOL/L (ref 136–145)
TOTAL PROTEIN: 6.2 G/DL (ref 6.4–8.2)
WBC # BLD: 7.6 K/UL (ref 4–11)

## 2022-10-12 PROCEDURE — 2580000003 HC RX 258: Performed by: STUDENT IN AN ORGANIZED HEALTH CARE EDUCATION/TRAINING PROGRAM

## 2022-10-12 PROCEDURE — 6370000000 HC RX 637 (ALT 250 FOR IP): Performed by: REGISTERED NURSE

## 2022-10-12 PROCEDURE — 6360000002 HC RX W HCPCS: Performed by: STUDENT IN AN ORGANIZED HEALTH CARE EDUCATION/TRAINING PROGRAM

## 2022-10-12 PROCEDURE — 6370000000 HC RX 637 (ALT 250 FOR IP): Performed by: STUDENT IN AN ORGANIZED HEALTH CARE EDUCATION/TRAINING PROGRAM

## 2022-10-12 PROCEDURE — 2580000003 HC RX 258: Performed by: NURSE PRACTITIONER

## 2022-10-12 PROCEDURE — 36415 COLL VENOUS BLD VENIPUNCTURE: CPT

## 2022-10-12 PROCEDURE — 94760 N-INVAS EAR/PLS OXIMETRY 1: CPT

## 2022-10-12 PROCEDURE — 85025 COMPLETE CBC W/AUTO DIFF WBC: CPT

## 2022-10-12 PROCEDURE — 1200000000 HC SEMI PRIVATE

## 2022-10-12 PROCEDURE — 6370000000 HC RX 637 (ALT 250 FOR IP): Performed by: INTERNAL MEDICINE

## 2022-10-12 PROCEDURE — 80053 COMPREHEN METABOLIC PANEL: CPT

## 2022-10-12 RX ORDER — SODIUM CHLORIDE, SODIUM LACTATE, POTASSIUM CHLORIDE, CALCIUM CHLORIDE 600; 310; 30; 20 MG/100ML; MG/100ML; MG/100ML; MG/100ML
INJECTION, SOLUTION INTRAVENOUS ONCE
Status: COMPLETED | OUTPATIENT
Start: 2022-10-12 | End: 2022-10-12

## 2022-10-12 RX ADMIN — LORAZEPAM 2 MG: 1 TABLET ORAL at 09:23

## 2022-10-12 RX ADMIN — ACETAMINOPHEN 650 MG: 325 TABLET ORAL at 09:14

## 2022-10-12 RX ADMIN — TRAZODONE HYDROCHLORIDE 100 MG: 100 TABLET ORAL at 20:57

## 2022-10-12 RX ADMIN — LORAZEPAM 3 MG: 1 TABLET ORAL at 05:11

## 2022-10-12 RX ADMIN — CHLORDIAZEPOXIDE HYDROCHLORIDE 25 MG: 25 CAPSULE ORAL at 14:04

## 2022-10-12 RX ADMIN — ENOXAPARIN SODIUM 40 MG: 100 INJECTION SUBCUTANEOUS at 09:13

## 2022-10-12 RX ADMIN — Medication 100 MG: at 09:14

## 2022-10-12 RX ADMIN — ONDANSETRON 4 MG: 2 INJECTION INTRAMUSCULAR; INTRAVENOUS at 18:35

## 2022-10-12 RX ADMIN — SODIUM CHLORIDE, PRESERVATIVE FREE 10 ML: 5 INJECTION INTRAVENOUS at 21:37

## 2022-10-12 RX ADMIN — CHLORDIAZEPOXIDE HYDROCHLORIDE 25 MG: 25 CAPSULE ORAL at 09:13

## 2022-10-12 RX ADMIN — FOLIC ACID 1 MG: 1 TABLET ORAL at 09:13

## 2022-10-12 RX ADMIN — LORAZEPAM 1 MG: 1 TABLET ORAL at 16:34

## 2022-10-12 RX ADMIN — SODIUM CHLORIDE, POTASSIUM CHLORIDE, SODIUM LACTATE AND CALCIUM CHLORIDE: 600; 310; 30; 20 INJECTION, SOLUTION INTRAVENOUS at 21:37

## 2022-10-12 RX ADMIN — ACETAMINOPHEN 650 MG: 325 TABLET ORAL at 21:13

## 2022-10-12 RX ADMIN — THERA TABS 1 TABLET: TAB at 09:14

## 2022-10-12 RX ADMIN — CHLORDIAZEPOXIDE HYDROCHLORIDE 25 MG: 25 CAPSULE ORAL at 20:57

## 2022-10-12 RX ADMIN — ONDANSETRON 4 MG: 2 INJECTION INTRAMUSCULAR; INTRAVENOUS at 05:13

## 2022-10-12 RX ADMIN — LORAZEPAM 1 MG: 1 TABLET ORAL at 21:13

## 2022-10-12 RX ADMIN — LORAZEPAM 1 MG: 1 TABLET ORAL at 12:17

## 2022-10-12 RX ADMIN — CITALOPRAM HYDROBROMIDE 10 MG: 10 TABLET ORAL at 09:14

## 2022-10-12 RX ADMIN — SODIUM CHLORIDE, PRESERVATIVE FREE 10 ML: 5 INJECTION INTRAVENOUS at 09:23

## 2022-10-12 RX ADMIN — ONDANSETRON 4 MG: 2 INJECTION INTRAMUSCULAR; INTRAVENOUS at 12:09

## 2022-10-12 ASSESSMENT — PAIN DESCRIPTION - DESCRIPTORS: DESCRIPTORS: ACHING;DISCOMFORT

## 2022-10-12 ASSESSMENT — PAIN DESCRIPTION - ORIENTATION: ORIENTATION: RIGHT;LEFT;MID

## 2022-10-12 ASSESSMENT — PAIN SCALES - GENERAL
PAINLEVEL_OUTOF10: 0
PAINLEVEL_OUTOF10: 6
PAINLEVEL_OUTOF10: 3

## 2022-10-12 ASSESSMENT — PAIN DESCRIPTION - LOCATION: LOCATION: HEAD;ANKLE

## 2022-10-12 NOTE — PROGRESS NOTES
Hospital Medicine Progress Note      Admit Date: 10/9/2022       CC: F/U for    Alcohol Intoxication       Pt. Arrived via [address] from hotel where she went on Friday to drink, last alcohol intake 4am, started hallucinating when woke up at 0800, called  and they offered to call squad for patient, alert and oriented now, nauseated and vomiting on arrival          HPI: 49-year-old female with past medical history significant for Anabella comes to the emergency department with alcohol withdrawal symptoms including tremors hallucination after drinking binge. 10/10  Patient reports persistent tremors today. 10/11  Patient reports significant signs and symptoms of withdrawal, tremors, diaphoresis. Interval History/Subjective: patient very tearful on my exam. Still having shakes, sweats and nausea. She states she was in-pt rehab x8 months and then out for 6 months sober before she started binging again. She states she starts to binge every so often where she just drinks for days in a row and then goes into withdrawal.  She states she usually binges on 4 bottles of whiskey but is not a regular drinker otherwise. She would like outpatient resources from alcohol rehab and psych counseling. She and I had a lengthy conversation about her binging and willingness to go to counseling on discharge. She has a letter she wrote to her mother and we discussed journaling ideas and goal-setting. She denies si/hi. Asked social work to provide her with resources on discharge. Monitor another day with her sx. On 25mg tid librium. Still requiring ativan per ciwa score. Review of Systems:       The patient denied headaches, visual changes, LOC, SOB, CP, ABD pain, N/V/D, skin changes, new or worsening weakness or neuromuscular deficits. Comprehensive ROS negative except as mentioned above.     Past Medical History:        Diagnosis Date    Alcohol abuse     Ankle fracture 2022    bilateral    Anxiety Depression     Sleeping difficulties        Past Surgical History:        Procedure Laterality Date    BREAST SURGERY      sailine breast      SECTION  1988    FOREARM SURGERY Left 2021    OPEN REDUCTION INTERNAL FIXATION LEFT DISTAL RADIUS performed by Krysta Guerrero MD at [address]      right wrist       Allergies:  Patient has no known allergies. Past medical and surgical history reviewed. Any changes have been noted. PHYSICAL EXAM:  BP (!) 144/96   Pulse (!) 109   Temp 97.8 °F (36.6 °C) (Oral)   Resp 18   Ht 5' 4\" (1.626 m)   Wt 198 lb 3.1 oz (89.9 kg)   SpO2 94%   BMI 34.02 kg/m²       Intake/Output Summary (Last 24 hours) at 10/12/2022 0954  Last data filed at 10/12/2022 0439  Gross per 24 hour   Intake 480 ml   Output --   Net 480 ml        General appearance:   No apparent distress, appears stated age. Cooperative. tearful on exam. pleasant  HEENT:  Normocephalic, atraumatic. PERRLA. EOMi. Conjunctivae/corneas clear, no icterus, non-injected. Neck: Supple, with full range of motion. No jugular venous distention. Trachea midline. Respiratory:  Normal respiratory effort. Clear to auscultation, bilaterally without Rales/Wheezes/Rhonchi. Cardiovascular:  Regular rate and rhythm without murmurs, rubs or gallops. Abdomen: Soft, non-tender, non-distended, without rebound or guarding. Normal bowel sounds. Musculoskeletal:  No clubbing, cyanosis or edema bilaterally. Full range of motion without deformity. Skin: Skin color, texture, turgor normal.  No rashes or lesions. Neurologic:  Neurovascularly intact without any focal sensory/motor deficits. Cranial nerves: II-XII intact, grossly intact. No facial asymmetry, tongue midline.    Psychiatric:  Alert and oriented, thought content appropriate  Capillary Refill: Brisk,< 3 seconds   Peripheral Pulses: +2 palpable, equal bilaterally       LABS:    Lab Results   Component Value Date    WBC 7.6 10/12/2022    HGB 13.2 10/12/2022    HCT 38.1 10/12/2022    MCV 88.3 10/12/2022     10/12/2022    LYMPHOPCT 39.0 10/12/2022    RBC 4.32 10/12/2022    MCH 30.6 10/12/2022    MCHC 34.6 10/12/2022    RDW 12.9 10/12/2022       Lab Results   Component Value Date    CREATININE 0.6 10/12/2022    BUN 4 (L) 10/12/2022     10/12/2022    K 3.6 10/12/2022    CL 99 10/12/2022    CO2 23 10/12/2022       Lab Results   Component Value Date/Time    MG 2.00 10/10/2022 05:24 AM       Lab Results   Component Value Date     (H) 10/12/2022    AST 78 (H) 10/12/2022    ALKPHOS 74 10/12/2022    BILITOT 0.4 10/12/2022        No flowsheet data found. No results found for: LABA1C    Imaging:  CT ABDOMEN PELVIS W IV CONTRAST Additional Contrast? None   Final Result   1. Prominent distention of the bladder with an estimated volume of 1.1 L   which raises the question of bladder outlet obstruction. 2. No acute findings elsewhere in the abdomen or pelvis. 3. Hepatomegaly and hepatic steatosis. 4. Right renal cysts measuring up to 2.5 cm. CT HEAD WO CONTRAST   Final Result   No acute intracranial findings. XR CHEST PORTABLE   Final Result   No acute abnormality             Scheduled and prn Medications:    Scheduled Meds:   chlordiazePOXIDE  25 mg Oral TID    sodium chloride flush  5-40 mL IntraVENous 2 times per day    thiamine  100 mg Oral Daily    enoxaparin  40 mg SubCUTAneous Daily    multivitamin  1 tablet Oral Daily    folic acid  1 mg Oral Daily    nicotine  1 patch TransDERmal Daily    citalopram  10 mg Oral Daily    traZODone  100 mg Oral Nightly     Continuous Infusions:   sodium chloride       PRN Meds:. loperamide, sodium chloride flush, sodium chloride, polyethylene glycol, acetaminophen **OR** acetaminophen, ondansetron, LORazepam **OR** LORazepam **OR** LORazepam **OR** LORazepam **OR** LORazepam **OR** LORazepam **OR** LORazepam **OR** LORazepam, midazolam    Assessment & Plan:         Alcohol withdrawal, with unspecified complication ([facility] [K07.245]         Priority: Medium    Alcohol dependence with withdrawal ([facility] [F10.239]      Leukocytosis [D72.829]      Lactic acidosis [E87.20]        Problem list     Alcohol intoxication with alcohol withdrawal signs and symptoms  Alcohol use disorder  Leukocytosis-reactive, resolved  Acute liver injury related to alcohol abuse  Hyponatremia due to alcohol use, resolved      Plan     Increase Librium  Continue lorazepam per [facility] protocol  LFTs trending down continue to monitor  Evaluated by psychiatry, no need for inpatient psych placement  - social work with resources for outpatient counseling        Continue current regimen/therapies. Monitor. Adjust medical regimen as appropriate. Body mass index is 34.02 kg/m². The patient and / or the family were informed of the results of any tests, a time was given to answer questions, a plan was proposed and they agreed with plan. DVT ppx: lovenox      Diet: ADULT DIET;  Regular    Consults:  IP CONSULT TO SOCIAL WORK  IP CONSULT TO PSYCHIATRY    DISPO/placement plan: pending     Code Status: Full Code      DWIGHT Lugo CNP  10/12/22

## 2022-10-12 NOTE — CARE COORDINATION
DISCHARGE PLAN: Pt plans to return home upon d/c and stated that she will pay for an Elnita Persons to return home. Counseling resources provided. Pt is not interested in ETOH treatment resources. ____________________________________  INITIAL ASSESSMENT  Met w/pt to address barriers to dc. HOME: Pt reported that she resides in a ranch home with her mother. There are 0 HOLLY. The home is owned by pts mother. Disease Specific: Alcohol withdrawal     COVID Vaccination: Yes    DME/O2: None, no DME needs noted    ACTIVE SERVICES: Pt reported that she was discharged from in alcohol rehab on 9/17/2022 and chose to stay at a hotel for a few days where she began to binge drink. Pt stated that she is not interested in going back to inpt treatment. Pt stated that she would be interested in getting information out counseling resources. SW provided pt with counseling resources. TRANSPORTATION: Pt is an active  and stated that she will call for an Tom at d/c. PHARMACY: Denies difficulty obtaining/taking meds  Pt uses [facility] on [address]     PCP: Nargis Santana DO     DEMOGRAPHICS: Verified address/phone number as correct    INSURANCE:  [address] [facility]: [address]    HD/PD: No      THERAPY RECS Not ordered      Discharge planning team will remain available for needs. Please consult for any specifics not addressed in this note.     ROSEMARIE Quiñones  [phone]  Electronically signed by Edward Albarran on 10/12/2022 at 1:42 PM

## 2022-10-13 LAB
A/G RATIO: 1.1 (ref 1.1–2.2)
ALBUMIN SERPL-MCNC: 3.6 G/DL (ref 3.4–5)
ALP BLD-CCNC: 78 U/L (ref 40–129)
ALT SERPL-CCNC: 102 U/L (ref 10–40)
ANION GAP SERPL CALCULATED.3IONS-SCNC: 14 MMOL/L (ref 3–16)
AST SERPL-CCNC: 58 U/L (ref 15–37)
BACTERIA: ABNORMAL /HPF
BASOPHILS ABSOLUTE: 0.1 K/UL (ref 0–0.2)
BASOPHILS RELATIVE PERCENT: 0.7 %
BILIRUB SERPL-MCNC: 0.4 MG/DL (ref 0–1)
BILIRUBIN URINE: NEGATIVE
BLOOD, URINE: ABNORMAL
BUN BLDV-MCNC: 4 MG/DL (ref 7–20)
CALCIUM SERPL-MCNC: 8.7 MG/DL (ref 8.3–10.6)
CHLORIDE BLD-SCNC: 101 MMOL/L (ref 99–110)
CLARITY: CLEAR
CO2: 22 MMOL/L (ref 21–32)
COLOR: YELLOW
CREAT SERPL-MCNC: 0.6 MG/DL (ref 0.6–1.1)
EOSINOPHILS ABSOLUTE: 0.4 K/UL (ref 0–0.6)
EOSINOPHILS RELATIVE PERCENT: 3.6 %
EPITHELIAL CELLS, UA: 2 /HPF (ref 0–5)
GFR AFRICAN AMERICAN: >60
GFR NON-AFRICAN AMERICAN: >60
GLUCOSE BLD-MCNC: 103 MG/DL (ref 70–99)
GLUCOSE URINE: NEGATIVE MG/DL
HCT VFR BLD CALC: 40.7 % (ref 36–48)
HEMOGLOBIN: 13.9 G/DL (ref 12–16)
HYALINE CASTS: 0 /LPF (ref 0–8)
KETONES, URINE: NEGATIVE MG/DL
LEUKOCYTE ESTERASE, URINE: ABNORMAL
LYMPHOCYTES ABSOLUTE: 3.3 K/UL (ref 1–5.1)
LYMPHOCYTES RELATIVE PERCENT: 30.3 %
MCH RBC QN AUTO: 30.3 PG (ref 26–34)
MCHC RBC AUTO-ENTMCNC: 34.1 G/DL (ref 31–36)
MCV RBC AUTO: 88.6 FL (ref 80–100)
MICROSCOPIC EXAMINATION: YES
MONOCYTES ABSOLUTE: 0.5 K/UL (ref 0–1.3)
MONOCYTES RELATIVE PERCENT: 4.5 %
NEUTROPHILS ABSOLUTE: 6.6 K/UL (ref 1.7–7.7)
NEUTROPHILS RELATIVE PERCENT: 60.9 %
NITRITE, URINE: NEGATIVE
PDW BLD-RTO: 12.7 % (ref 12.4–15.4)
PH UA: 7 (ref 5–8)
PLATELET # BLD: 224 K/UL (ref 135–450)
PMV BLD AUTO: 7.4 FL (ref 5–10.5)
POTASSIUM SERPL-SCNC: 3.5 MMOL/L (ref 3.5–5.1)
PROTEIN UA: NEGATIVE MG/DL
RBC # BLD: 4.59 M/UL (ref 4–5.2)
RBC UA: 2 /HPF (ref 0–4)
SODIUM BLD-SCNC: 137 MMOL/L (ref 136–145)
SPECIFIC GRAVITY UA: 1.02 (ref 1–1.03)
TOTAL PROTEIN: 6.8 G/DL (ref 6.4–8.2)
URINE REFLEX TO CULTURE: YES
URINE TYPE: ABNORMAL
UROBILINOGEN, URINE: 0.2 E.U./DL
WBC # BLD: 10.9 K/UL (ref 4–11)
WBC UA: 67 /HPF (ref 0–5)

## 2022-10-13 PROCEDURE — 6360000002 HC RX W HCPCS: Performed by: STUDENT IN AN ORGANIZED HEALTH CARE EDUCATION/TRAINING PROGRAM

## 2022-10-13 PROCEDURE — 80053 COMPREHEN METABOLIC PANEL: CPT

## 2022-10-13 PROCEDURE — 6370000000 HC RX 637 (ALT 250 FOR IP): Performed by: STUDENT IN AN ORGANIZED HEALTH CARE EDUCATION/TRAINING PROGRAM

## 2022-10-13 PROCEDURE — 87186 SC STD MICRODIL/AGAR DIL: CPT

## 2022-10-13 PROCEDURE — 6370000000 HC RX 637 (ALT 250 FOR IP): Performed by: NURSE PRACTITIONER

## 2022-10-13 PROCEDURE — 87077 CULTURE AEROBIC IDENTIFY: CPT

## 2022-10-13 PROCEDURE — 6370000000 HC RX 637 (ALT 250 FOR IP): Performed by: INTERNAL MEDICINE

## 2022-10-13 PROCEDURE — 1200000000 HC SEMI PRIVATE

## 2022-10-13 PROCEDURE — 6370000000 HC RX 637 (ALT 250 FOR IP): Performed by: REGISTERED NURSE

## 2022-10-13 PROCEDURE — 36415 COLL VENOUS BLD VENIPUNCTURE: CPT

## 2022-10-13 PROCEDURE — 94760 N-INVAS EAR/PLS OXIMETRY 1: CPT

## 2022-10-13 PROCEDURE — 87086 URINE CULTURE/COLONY COUNT: CPT

## 2022-10-13 PROCEDURE — 85025 COMPLETE CBC W/AUTO DIFF WBC: CPT

## 2022-10-13 PROCEDURE — 81001 URINALYSIS AUTO W/SCOPE: CPT

## 2022-10-13 RX ORDER — LORAZEPAM 0.5 MG/1
0.5 TABLET ORAL EVERY 4 HOURS PRN
Status: DISCONTINUED | OUTPATIENT
Start: 2022-10-13 | End: 2022-10-14 | Stop reason: HOSPADM

## 2022-10-13 RX ORDER — PHENAZOPYRIDINE HYDROCHLORIDE 200 MG/1
200 TABLET, FILM COATED ORAL
Status: DISCONTINUED | OUTPATIENT
Start: 2022-10-13 | End: 2022-10-14 | Stop reason: HOSPADM

## 2022-10-13 RX ORDER — ONDANSETRON 4 MG/1
4 TABLET, FILM COATED ORAL EVERY 8 HOURS PRN
Status: DISCONTINUED | OUTPATIENT
Start: 2022-10-13 | End: 2022-10-14 | Stop reason: HOSPADM

## 2022-10-13 RX ADMIN — PHENAZOPYRIDINE HYDROCHLORIDE 200 MG: 200 TABLET ORAL at 17:44

## 2022-10-13 RX ADMIN — LORAZEPAM 1 MG: 1 TABLET ORAL at 08:59

## 2022-10-13 RX ADMIN — LOPERAMIDE HYDROCHLORIDE 2 MG: 2 CAPSULE ORAL at 13:08

## 2022-10-13 RX ADMIN — PHENAZOPYRIDINE HYDROCHLORIDE 200 MG: 200 TABLET ORAL at 13:07

## 2022-10-13 RX ADMIN — LORAZEPAM 1 MG: 1 TABLET ORAL at 21:36

## 2022-10-13 RX ADMIN — ENOXAPARIN SODIUM 40 MG: 100 INJECTION SUBCUTANEOUS at 08:59

## 2022-10-13 RX ADMIN — LORAZEPAM 1 MG: 1 TABLET ORAL at 17:44

## 2022-10-13 RX ADMIN — CHLORDIAZEPOXIDE HYDROCHLORIDE 25 MG: 25 CAPSULE ORAL at 08:59

## 2022-10-13 RX ADMIN — LORAZEPAM 3 MG: 1 TABLET ORAL at 13:08

## 2022-10-13 RX ADMIN — ACETAMINOPHEN 650 MG: 325 TABLET ORAL at 10:01

## 2022-10-13 RX ADMIN — THERA TABS 1 TABLET: TAB at 08:59

## 2022-10-13 RX ADMIN — Medication 100 MG: at 08:59

## 2022-10-13 RX ADMIN — TRAZODONE HYDROCHLORIDE 100 MG: 100 TABLET ORAL at 21:36

## 2022-10-13 RX ADMIN — FOLIC ACID 1 MG: 1 TABLET ORAL at 08:59

## 2022-10-13 RX ADMIN — LOPERAMIDE HYDROCHLORIDE 2 MG: 2 CAPSULE ORAL at 08:59

## 2022-10-13 RX ADMIN — LORAZEPAM 2 MG: 1 TABLET ORAL at 10:01

## 2022-10-13 RX ADMIN — LORAZEPAM 2 MG: 1 TABLET ORAL at 14:45

## 2022-10-13 RX ADMIN — ONDANSETRON HYDROCHLORIDE 4 MG: 4 TABLET, FILM COATED ORAL at 09:56

## 2022-10-13 RX ADMIN — CHLORDIAZEPOXIDE HYDROCHLORIDE 25 MG: 25 CAPSULE ORAL at 21:36

## 2022-10-13 RX ADMIN — CHLORDIAZEPOXIDE HYDROCHLORIDE 25 MG: 25 CAPSULE ORAL at 13:08

## 2022-10-13 RX ADMIN — CITALOPRAM HYDROBROMIDE 10 MG: 10 TABLET ORAL at 09:00

## 2022-10-13 ASSESSMENT — PAIN SCALES - GENERAL
PAINLEVEL_OUTOF10: 3
PAINLEVEL_OUTOF10: 6
PAINLEVEL_OUTOF10: 6

## 2022-10-13 ASSESSMENT — PAIN - FUNCTIONAL ASSESSMENT: PAIN_FUNCTIONAL_ASSESSMENT: PREVENTS OR INTERFERES SOME ACTIVE ACTIVITIES AND ADLS

## 2022-10-13 ASSESSMENT — PAIN DESCRIPTION - LOCATION
LOCATION: ABDOMEN
LOCATION: HEAD

## 2022-10-13 ASSESSMENT — PAIN DESCRIPTION - DESCRIPTORS
DESCRIPTORS: ACHING
DESCRIPTORS: DISCOMFORT;CRUSHING

## 2022-10-13 ASSESSMENT — PAIN DESCRIPTION - ORIENTATION
ORIENTATION: MID
ORIENTATION: MID

## 2022-10-13 NOTE — FLOWSHEET NOTE
Pt stated she went to the bathroom bringing her IV pole with her and it pulled on her PIV in her LAC. IV site was leaking and bleeding. IV removed with no complications.

## 2022-10-13 NOTE — PLAN OF CARE
Problem: Discharge Planning  Goal: Discharge to home or other facility with appropriate resources  10/13/2022 1338 by Anuj Ledbetter RN  Outcome: Progressing  10/13/2022 0010 by Kinga David RN  Outcome: Progressing  Flowsheets (Taken 10/12/2022 2100)  Discharge to home or other facility with appropriate resources: Identify barriers to discharge with patient and caregiver     Problem: Safety - Adult  Goal: Able to ambulate independently  Description: Able to ambulate independently  10/13/2022 1338 by Anuj Ledbetter RN  Outcome: Progressing  10/13/2022 0010 by Kinga David RN  Outcome: Progressing     Problem: Pain  Goal: Verbalizes/displays adequate comfort level or baseline comfort level  10/13/2022 1338 by Anuj Ledbetter RN  Outcome: Progressing  10/13/2022 0010 by Kinga David RN  Outcome: Progressing     Problem: Cardiovascular - Adult  Goal: Maintains optimal cardiac output and hemodynamic stability  Outcome: Progressing

## 2022-10-13 NOTE — PLAN OF CARE
Problem: Discharge Planning  Goal: Discharge to home or other facility with appropriate resources  Outcome: Progressing  Continuing to work with patient and health care team on discharge plan. Discharge instructions and medication management will be reviewed prior to discharge. Problem: Safety - Adult  Goal: Able to ambulate independently  Description: Able to ambulate independently  Outcome: Progressing   Pt free from falls this shift. Fall precautions in place at all times. Call light always within reach. Pt able and agreeable to contact for safety appropriately. Problem: Pain  Goal: Verbalizes/displays adequate comfort level or baseline comfort level  Outcome: Progressing   Pt able to express presence/absence of pain and rate pain appropriately using numerical scale. Pain/discomfort being managed with PRN analgesics per MD orders (see MAR). Pain assessed every shift and after interventions.

## 2022-10-13 NOTE — PROGRESS NOTES
[facility] Medicine Progress Note      Admit Date: 10/9/2022       CC: F/U for alcohol intoxication from binging/withdrawal    HPI: 25-year-old female with past medical history significant for Anabella comes to the emergency department with alcohol withdrawal symptoms including tremors hallucination after drinking binge. 10/10  Patient reports persistent tremors today. 10/11  Patient reports significant signs and symptoms of withdrawal, tremors, diaphoresis. 10/12: patient very tearful on my exam. Still having shakes, sweats and nausea. She states she was in-pt rehab x8 months and then out for 6 months sober before she started binging again. She states she starts to binge every so often where she just drinks for days in a row and then goes into withdrawal.  She states she usually binges on 4 bottles of whiskey but is not a regular drinker otherwise. She would like outpatient resources from alcohol rehab and psych counseling. She and I had a lengthy conversation about her binging and willingness to go to counseling on discharge. She has a letter she wrote to her mother and we discussed journaling ideas and goal-setting. She denies si/hi. Asked social work to provide her with resources on discharge. Monitor another day with her sx. On 25mg tid librium. Still requiring ativan per ciwa score. Interval History/Subjective: shaking today. Feels agitated. C/o burning and urinary frequency today. Will get clean catch. She states she used to get UTIs frequently when she was  and at one point was on prophylactic macrobid. She is requesting us to help her set up an appt with psych for her on d/c. Discussed with Joy Baum NP psych. As long as she is seen by one of the PCPs at their clinics, there should be no problem, and she sees Dr. Ashly Garcia so she should be fine to set that up.     Review of Systems:       The patient denied headaches, visual changes, LOC, SOB, CP, ABD pain, N/V/D, skin changes, new or worsening weakness or neuromuscular deficits. Comprehensive ROS negative except as mentioned above. Past Medical History:        Diagnosis Date    Alcohol abuse     Ankle fracture     bilateral    Anxiety     Depression     Sleeping difficulties        Past Surgical History:        Procedure Laterality Date    BREAST SURGERY      sailine breast      SECTION  1988    FOREARM SURGERY Left 2021    OPEN REDUCTION INTERNAL FIXATION LEFT DISTAL RADIUS performed by Javon Tyson MD at [address]      right wrist       Allergies:  Patient has no known allergies. Past medical and surgical history reviewed. Any changes have been noted. PHYSICAL EXAM:  /68   Pulse 81   Temp 98 °F (36.7 °C) (Oral)   Resp 16   Ht 5' 4\" (1.626 m)   Wt 197 lb 15.6 oz (89.8 kg)   SpO2 95%   BMI 33.98 kg/m²       Intake/Output Summary (Last 24 hours) at 10/13/2022 1252  Last data filed at 10/13/2022 3138  Gross per 24 hour   Intake 240 ml   Output --   Net 240 ml        General appearance:   No apparent distress, appears stated age. Cooperative. HEENT:  Normocephalic, atraumatic. PERRLA. EOMi. Conjunctivae/corneas clear, no icterus, non-injected. Neck: Supple, with full range of motion. No jugular venous distention. Trachea midline. Respiratory:  Normal respiratory effort. Clear to auscultation, bilaterally without Rales/Wheezes/Rhonchi. Cardiovascular:  Regular rate and rhythm without murmurs, rubs or gallops. Abdomen: Soft, non-tender, non-distended, without rebound or guarding. Normal bowel sounds. Musculoskeletal:  No clubbing, cyanosis or edema bilaterally. Full range of motion without deformity. Skin: Skin color, texture, turgor normal.  No rashes or lesions. Neurologic:  Neurovascularly intact without any focal sensory/motor deficits. Cranial nerves: II-XII intact, grossly intact. No facial asymmetry, tongue midline.    Psychiatric:  Alert and oriented, thought content appropriate  Capillary Refill: Brisk,< 3 seconds   Peripheral Pulses: +2 palpable, equal bilaterally       LABS:    Lab Results   Component Value Date    WBC 10.9 10/13/2022    HGB 13.9 10/13/2022    HCT 40.7 10/13/2022    MCV 88.6 10/13/2022     10/13/2022    LYMPHOPCT 30.3 10/13/2022    RBC 4.59 10/13/2022    MCH 30.3 10/13/2022    MCHC 34.1 10/13/2022    RDW 12.7 10/13/2022       Lab Results   Component Value Date    CREATININE 0.6 10/13/2022    BUN 4 (L) 10/13/2022     10/13/2022    K 3.5 10/13/2022     10/13/2022    CO2 22 10/13/2022       Lab Results   Component Value Date/Time    MG 2.00 10/10/2022 05:24 AM       Lab Results   Component Value Date     (H) 10/13/2022    AST 58 (H) 10/13/2022    ALKPHOS 78 10/13/2022    BILITOT 0.4 10/13/2022        No flowsheet data found. No results found for: LABA1C    Imaging:  CT ABDOMEN PELVIS W IV CONTRAST Additional Contrast? None   Final Result   1. Prominent distention of the bladder with an estimated volume of 1.1 L   which raises the question of bladder outlet obstruction. 2. No acute findings elsewhere in the abdomen or pelvis. 3. Hepatomegaly and hepatic steatosis. 4. Right renal cysts measuring up to 2.5 cm. CT HEAD WO CONTRAST   Final Result   No acute intracranial findings.          XR CHEST PORTABLE   Final Result   No acute abnormality             Scheduled and prn Medications:    Scheduled Meds:   phenazopyridine  200 mg Oral TID WC    chlordiazePOXIDE  25 mg Oral TID    sodium chloride flush  5-40 mL IntraVENous 2 times per day    thiamine  100 mg Oral Daily    enoxaparin  40 mg SubCUTAneous Daily    multivitamin  1 tablet Oral Daily    folic acid  1 mg Oral Daily    nicotine  1 patch TransDERmal Daily    citalopram  10 mg Oral Daily    traZODone  100 mg Oral Nightly     Continuous Infusions:   sodium chloride       PRN Meds:.ondansetron, loperamide, sodium chloride flush, sodium chloride,

## 2022-10-14 VITALS
OXYGEN SATURATION: 92 % | BODY MASS INDEX: 33.84 KG/M2 | HEART RATE: 89 BPM | DIASTOLIC BLOOD PRESSURE: 94 MMHG | WEIGHT: 198.19 LBS | RESPIRATION RATE: 16 BRPM | HEIGHT: 64 IN | TEMPERATURE: 98.6 F | SYSTOLIC BLOOD PRESSURE: 149 MMHG

## 2022-10-14 LAB
A/G RATIO: 1.6 (ref 1.1–2.2)
ALBUMIN SERPL-MCNC: 3.6 G/DL (ref 3.4–5)
ALP BLD-CCNC: 70 U/L (ref 40–129)
ALT SERPL-CCNC: 80 U/L (ref 10–40)
ANION GAP SERPL CALCULATED.3IONS-SCNC: 12 MMOL/L (ref 3–16)
AST SERPL-CCNC: 42 U/L (ref 15–37)
BASOPHILS ABSOLUTE: 0.1 K/UL (ref 0–0.2)
BASOPHILS RELATIVE PERCENT: 0.4 %
BILIRUB SERPL-MCNC: <0.2 MG/DL (ref 0–1)
BLOOD CULTURE, ROUTINE: NORMAL
BUN BLDV-MCNC: 7 MG/DL (ref 7–20)
CALCIUM SERPL-MCNC: 8.5 MG/DL (ref 8.3–10.6)
CHLORIDE BLD-SCNC: 102 MMOL/L (ref 99–110)
CO2: 24 MMOL/L (ref 21–32)
CREAT SERPL-MCNC: 0.7 MG/DL (ref 0.6–1.1)
EOSINOPHILS ABSOLUTE: 0.3 K/UL (ref 0–0.6)
EOSINOPHILS RELATIVE PERCENT: 2.1 %
GFR AFRICAN AMERICAN: >60
GFR NON-AFRICAN AMERICAN: >60
GLUCOSE BLD-MCNC: 94 MG/DL (ref 70–99)
HCT VFR BLD CALC: 37.5 % (ref 36–48)
HEMOGLOBIN: 12.3 G/DL (ref 12–16)
LYMPHOCYTES ABSOLUTE: 2.5 K/UL (ref 1–5.1)
LYMPHOCYTES RELATIVE PERCENT: 17.4 %
MCH RBC QN AUTO: 29.5 PG (ref 26–34)
MCHC RBC AUTO-ENTMCNC: 32.8 G/DL (ref 31–36)
MCV RBC AUTO: 90 FL (ref 80–100)
MONOCYTES ABSOLUTE: 0.7 K/UL (ref 0–1.3)
MONOCYTES RELATIVE PERCENT: 4.6 %
NEUTROPHILS ABSOLUTE: 10.7 K/UL (ref 1.7–7.7)
NEUTROPHILS RELATIVE PERCENT: 75.5 %
PDW BLD-RTO: 13.1 % (ref 12.4–15.4)
PLATELET # BLD: 210 K/UL (ref 135–450)
PMV BLD AUTO: 7.9 FL (ref 5–10.5)
POTASSIUM SERPL-SCNC: 3.6 MMOL/L (ref 3.5–5.1)
RBC # BLD: 4.17 M/UL (ref 4–5.2)
SODIUM BLD-SCNC: 138 MMOL/L (ref 136–145)
TOTAL PROTEIN: 5.8 G/DL (ref 6.4–8.2)
WBC # BLD: 14.2 K/UL (ref 4–11)

## 2022-10-14 PROCEDURE — 6370000000 HC RX 637 (ALT 250 FOR IP): Performed by: INTERNAL MEDICINE

## 2022-10-14 PROCEDURE — 85025 COMPLETE CBC W/AUTO DIFF WBC: CPT

## 2022-10-14 PROCEDURE — 80053 COMPREHEN METABOLIC PANEL: CPT

## 2022-10-14 PROCEDURE — 6370000000 HC RX 637 (ALT 250 FOR IP): Performed by: STUDENT IN AN ORGANIZED HEALTH CARE EDUCATION/TRAINING PROGRAM

## 2022-10-14 PROCEDURE — 6370000000 HC RX 637 (ALT 250 FOR IP): Performed by: NURSE PRACTITIONER

## 2022-10-14 PROCEDURE — 6360000002 HC RX W HCPCS: Performed by: STUDENT IN AN ORGANIZED HEALTH CARE EDUCATION/TRAINING PROGRAM

## 2022-10-14 PROCEDURE — 36415 COLL VENOUS BLD VENIPUNCTURE: CPT

## 2022-10-14 PROCEDURE — 6370000000 HC RX 637 (ALT 250 FOR IP): Performed by: REGISTERED NURSE

## 2022-10-14 PROCEDURE — 94760 N-INVAS EAR/PLS OXIMETRY 1: CPT

## 2022-10-14 RX ORDER — CEFUROXIME AXETIL 500 MG/1
500 TABLET ORAL EVERY 12 HOURS SCHEDULED
Qty: 14 TABLET | Refills: 0 | Status: SHIPPED | OUTPATIENT
Start: 2022-10-14 | End: 2022-10-21

## 2022-10-14 RX ORDER — MULTIVITAMIN WITH IRON
1 TABLET ORAL DAILY
Qty: 30 TABLET | Refills: 0 | Status: SHIPPED | OUTPATIENT
Start: 2022-10-15

## 2022-10-14 RX ORDER — CEFUROXIME AXETIL 250 MG/1
500 TABLET ORAL EVERY 12 HOURS SCHEDULED
Status: DISCONTINUED | OUTPATIENT
Start: 2022-10-14 | End: 2022-10-14 | Stop reason: HOSPADM

## 2022-10-14 RX ORDER — NICOTINE 21 MG/24HR
1 PATCH, TRANSDERMAL 24 HOURS TRANSDERMAL DAILY
Qty: 30 PATCH | Refills: 3 | Status: SHIPPED | OUTPATIENT
Start: 2022-10-15

## 2022-10-14 RX ORDER — FOLIC ACID 1 MG/1
1 TABLET ORAL DAILY
Qty: 30 TABLET | Refills: 3 | Status: SHIPPED | OUTPATIENT
Start: 2022-10-15

## 2022-10-14 RX ORDER — PHENAZOPYRIDINE HYDROCHLORIDE 200 MG/1
200 TABLET, FILM COATED ORAL
Qty: 9 TABLET | Refills: 0 | Status: SHIPPED | OUTPATIENT
Start: 2022-10-14 | End: 2022-10-17

## 2022-10-14 RX ORDER — GREEN TEA/HOODIA GORDONII 315-12.5MG
1 CAPSULE ORAL DAILY
Qty: 30 TABLET | Refills: 0 | Status: SHIPPED | OUTPATIENT
Start: 2022-10-14 | End: 2022-11-13

## 2022-10-14 RX ORDER — CITALOPRAM 10 MG/1
10 TABLET ORAL DAILY
Qty: 30 TABLET | Refills: 3 | Status: SHIPPED | OUTPATIENT
Start: 2022-10-15

## 2022-10-14 RX ORDER — THIAMINE MONONITRATE (VIT B1) 100 MG
100 TABLET ORAL DAILY
Qty: 30 TABLET | Refills: 0 | Status: SHIPPED | OUTPATIENT
Start: 2022-10-15

## 2022-10-14 RX ADMIN — PHENAZOPYRIDINE HYDROCHLORIDE 200 MG: 200 TABLET ORAL at 08:40

## 2022-10-14 RX ADMIN — LOPERAMIDE HYDROCHLORIDE 2 MG: 2 CAPSULE ORAL at 08:40

## 2022-10-14 RX ADMIN — THERA TABS 1 TABLET: TAB at 08:40

## 2022-10-14 RX ADMIN — ENOXAPARIN SODIUM 40 MG: 100 INJECTION SUBCUTANEOUS at 08:41

## 2022-10-14 RX ADMIN — LORAZEPAM 0.5 MG: 0.5 TABLET ORAL at 08:41

## 2022-10-14 RX ADMIN — PHENAZOPYRIDINE HYDROCHLORIDE 200 MG: 200 TABLET ORAL at 12:13

## 2022-10-14 RX ADMIN — CITALOPRAM HYDROBROMIDE 10 MG: 10 TABLET ORAL at 08:41

## 2022-10-14 RX ADMIN — CEFUROXIME AXETIL 500 MG: 250 TABLET ORAL at 12:13

## 2022-10-14 RX ADMIN — Medication 100 MG: at 08:40

## 2022-10-14 RX ADMIN — CHLORDIAZEPOXIDE HYDROCHLORIDE 25 MG: 25 CAPSULE ORAL at 08:41

## 2022-10-14 RX ADMIN — FOLIC ACID 1 MG: 1 TABLET ORAL at 08:40

## 2022-10-14 NOTE — DISCHARGE INSTRUCTIONS
Learning About Alcohol Withdrawal  What is alcohol withdrawal?     If you drink alcohol regularly (more than a few drinks on most days) and then suddenly stop or cut down, you may go through some physical and emotional problems while the alcohol clears out of your system. This is called withdrawal. Clearing the alcohol from your body is called detoxification, or detox. What are the symptoms? Symptoms of alcohol withdrawal may start as soon as 4 to 12 hours after you stop drinking. Or they may not start until several days after the last drink. Mild symptoms include:  Nausea. Sweating. Shakiness. Diarrhea. Intense worry. Disturbed sleep. Headache. More severe symptoms include:  Vomiting or belly pain. Being confused, upset, and irritable. Changed sensations. You might feel things on your body that aren't really there. Or you may see or hear things that aren't there. Trembling. Being short of breath or having pain in your chest.  Having seizures. Symptoms may peak within a few days. Mild symptoms can last for a few weeks. If your symptoms are severe, you'll need to see a doctor. What is the treatment for alcohol withdrawal?  Most people may be able to cut down or stop drinking with only mild withdrawal. They can stay safe by simply resting, drinking lots of fluids, and eating healthy foods. But people who drink large amounts of alcohol or are at risk for severe withdrawal symptoms should not try to detox at home unless they work closely with a doctor to manage it. A person can die of severe alcohol withdrawal.  Before you stop drinking, talk to your doctor about how you plan to stop. Be completely honest about how much you've been drinking. Your doctor will figure out if you need to detox in a medical center. You may get medicine to treat the symptoms whether you are at home or in a medical center. Medicine that treats seizures can also help.  Your doctor will explain what types of medicine might help you. You may start with a high dose and then take smaller amounts over several days. There's also medicine that can help you avoid alcohol while you recover. How can you manage your withdrawal and recovery? Here are a few tips that can help you to not start drinking again. Make sure there's no alcohol in the house. This includes drinks as well as liquid medicines, rubbing alcohol, and certain flavorings like vanilla extract. Try not to hang out with people you used to drink with. Don't go it alone. Spend time with people who support the changes you are making in your life. This includes asking for advice and help from people who have stopped drinking. You might also try mutual support groups such as Alcoholics Anonymous. Drink lots of fluids. Eat snacks such as fruit, cheese and crackers, and pretzels. High-carbohydrate foods may help reduce the craving for alcohol. What happens after withdrawal?  It can be hard to stop drinking. But after you clear the alcohol from your system, you can start the next, healthier part of your life. After detox, you will focus on staying alcohol-free. You can learn skills that you can use to stay abstinent (or sober) as you recover. Finding new ways to deal with life's challenges, without drinking, takes time and effort. Recovery is a long-term process. It's not something you can achieve in a few weeks. Most people get some type of therapy, such as group counseling. You also may need medicine to help you stay sober. Treatment doesn't focus on alcohol use alone. It may address other parts of your life, like your relationships, work, medical problems, and home life. Treatment, support, patience, and commitment will help you make the changes you need to live a fong life without alcohol. You may find, over time, that the process gets easier, life becomes more joyous, and your connections to others becomes more rewarding. Where can you find help?   Behavioral Health Treatment Services . This service from the [facility] Substance Abuse and [employer]  can help you find local alcohol treatment services. Search online at [place]. [facility].gov or call 5-682-149-CFEB ([phone]), or TDD 1-845.511.3815. Where can you learn more? Go to https://[employer].[facility]. org and sign in to your [employer] account. Enter A867 in the [place] box to learn more about \"Learning About Alcohol Withdrawal.\"     If you do not have an account, please click on the \"Sign Up Now\" link. Current as of: November 8, 2021               Content Version: 13.4  © 2006-2022 Healthwise, Incorporated. Care instructions adapted under license by [facility] ([facility]). If you have questions about a medical condition or this instruction, always ask your healthcare professional. [address] any warranty or liability for your use of this information.

## 2022-10-14 NOTE — CARE COORDINATION
DISCHARGE SUMMARY     DATE OF DISCHARGE: 10/14/2022    DISCHARGE DESTINATION: Home with mother. TRANSPORTATION: Pts mother will transport pt home today. COMMENTS: SW spoke with pt who stated that she is planning on getting set up with the psychologist in the same office as Dr. Nguyễn Brink. Yovani Chew stated that she also has the list of resources for counseling that this SW provided her with. Pt stated that her mother will be transporting her home and she plans to continue to live with her mother for the time being. No other needs noted at this time.    ROSEMARIE Yost LSITA  [phone]  Electronically signed by Kathleen Payan on 10/14/2022 at 11:14 AM

## 2022-10-14 NOTE — PROGRESS NOTES
Pt educated on discharge instructions and follow-up appointments. Pt states no further questions at this time. Pt given appropriate resources to follow outpatient with psych. Pt ambulatory to the [facility], transported home by mother.     Electronically signed by Ingrid Cavazos RN on 10/14/22 at 1:02 PM EDT

## 2022-10-14 NOTE — PROGRESS NOTES
Patient called to speak to this charge nurse twice this shift in regards to her CIWA scoring. She stated her assigned nurse scored her at a 7 but she feels she is higher now, requiring Ativan and that no one is assessing her every hour. Spoke and discussed situation with assigned RN, Cristal Ledesma whom then discussed the case with the NP, Suzanna Barcenas. This RN and assigned RN updated patient on new orders.

## 2022-10-14 NOTE — DISCHARGE SUMMARY
Hospital Medicine Discharge Summary    Patient ID: Cain Daigle      Patient's PCP: Magalie Melgoza DO    Admit Date: 10/9/2022     Discharge Date:   10/14/22    Admitting Physician: Anum Antoine DO     Discharge Physician: DWIGHT Ley - CNP       Discharge Diagnoses: Active Hospital Problems    Diagnosis Date Noted    Alcohol withdrawal, with unspecified complication ([facility] [address] [J40.494] 10/10/2022     Priority: Medium    [facility] [F10.239] 07/22/2021    Leukocytosis [D72.829] 02/12/2020    Lactic acidosis [E87.20] 10/11/2018       The patient was seen and examined on day of discharge and this discharge summary is in conjunction with any daily progress note from day of discharge. Disposition:  [x] Home  [] Home with home health [] Rehab [] Psych [] SNF  [] LTAC  [] Long term nursing home or group home [] Transfer to ICU  [] Transfer to PCU [] Other:    Hospital Course: 26-year-old female with past medical history significant for alcoholism comes to the emergency department with alcohol withdrawal symptoms including tremors hallucination after drinking binge. She states she was in-pt rehab x8 months and then out for 6 months sober before she started binging again. She states she starts to binge every so often where she just drinks for days in a row and then goes into withdrawal.  She states she usually binges on 4 bottles of whiskey but is not a regular drinker otherwise. She would like outpatient resources from alcohol rehab and psych counseling. Treated for withdrawal with CIWA and librium here. Symptoms resolved. During her stay, developed UTI sx and was started on antibx for bacteruria. Lengthy discussions on importance of following up with Psych on discharge. Social work with resources given for ETOH rehab/cessation.      .              Alcohol withdrawal, with unspecified complication ([facility] [U37.502]         Priority: Medium    Alcohol dependence with withdrawal (HCC) [F10.239]      Leukocytosis [D72.829]      Lactic acidosis [E87.20]        Problem list     Alcohol intoxication with alcohol withdrawal signs and symptoms  Alcohol use disorder  Leukocytosis-reactive, resolved  Acute liver injury related to alcohol abuse  Hyponatremia due to alcohol use, resolved      Plan  Increase Librium  Continue lorazepam per [facility] protocol  LFTs trending down continue to monitor  Evaluated by psychiatry, no need for inpatient psych placement  - social work with resources for outpatient counseling      Dysuria and urgency  - clean catch u/a  - start antibx  - pyridium    Exam:     BP (!) 149/94   Pulse 89   Temp 98.6 °F (37 °C) (Oral)   Resp 16   Ht 5' 4\" (1.626 m)   Wt 198 lb 3.1 oz (89.9 kg)   SpO2 92%   BMI 34.02 kg/m²   General appearance: No apparent distress, appears stated age and cooperative. HEENT: Pupils equal, round, and reactive to light. Conjunctivae/corneas clear. Neck: Supple, with full range of motion. No jugular venous distention. Trachea midline. Respiratory:  Normal respiratory effort. Clear to auscultation, bilaterally without Rales/Wheezes/Rhonchi. Cardiovascular: Regular rate and rhythm with normal S1/S2 without murmurs, rubs or gallops. Abdomen: Soft, non-tender, non-distended with normal bowel sounds. Musculoskelatal: No clubbing, cyanosis or edema bilaterally. Full range of motion without deformity. Skin: Skin color, texture, turgor normal.  No rashes or lesions. Neurologic:  Neurovascularly intact without any focal sensory/motor deficits. Cranial nerves: II-XII intact, grossly non-focal.  Psychiatric: Alert and oriented, thought content appropriate, normal insight      Consults:     IP CONSULT TO SOCIAL WORK  IP CONSULT TO PSYCHIATRY  IP CONSULT TO SOCIAL WORK    Diagnostic tests: Imaging:  CT ABDOMEN PELVIS W IV CONTRAST Additional Contrast? None   Final Result   1.  Prominent distention of the bladder with an estimated volume of 1.1 L   which raises the question of bladder outlet obstruction. 2. No acute findings elsewhere in the abdomen or pelvis. 3. Hepatomegaly and hepatic steatosis. 4. Right renal cysts measuring up to 2.5 cm. CT HEAD WO CONTRAST   Final Result   No acute intracranial findings. XR CHEST PORTABLE   Final Result   No acute abnormality               Labs: For convenience and continuity at follow-up the following most recent labs are provided:      CBC:    Lab Results   Component Value Date/Time    WBC 14.2 10/14/2022 05:56 AM    HGB 12.3 10/14/2022 05:56 AM    HCT 37.5 10/14/2022 05:56 AM     10/14/2022 05:56 AM       Renal:    Lab Results   Component Value Date/Time     10/14/2022 05:56 AM    K 3.6 10/14/2022 05:56 AM    K 3.3 10/09/2022 06:40 PM     10/14/2022 05:56 AM    CO2 24 10/14/2022 05:56 AM    BUN 7 10/14/2022 05:56 AM    CREATININE 0.7 10/14/2022 05:56 AM    CALCIUM 8.5 10/14/2022 05:56 AM    PHOS 2.5 06/04/2021 05:48 AM           Discharge Instructions/Follow-up:  f/u psych as indicated.  Branden Gregory NP to see patient outpatient   Cont home meds  Rx for celexa given  Resources for alcohol rehab/cessation given  Ceftin on d/c for uti + pyridium PRN    PCP/SNF to follow up: pcp 1 wk    D/C condition: stable    Code status: full        Discharge Medications:     Current Discharge Medication List             Details   citalopram (CELEXA) 10 MG tablet Take 1 tablet by mouth daily  Qty: 30 tablet, Refills: 3      cefUROXime (CEFTIN) 500 MG tablet Take 1 tablet by mouth every 12 hours for 7 days  Qty: 14 tablet, Refills: 0      Probiotic Acidophilus (FLORANEX) TABS Take 1 tablet by mouth daily  Qty: 30 tablet, Refills: 0      folic acid (FOLVITE) 1 MG tablet Take 1 tablet by mouth daily  Qty: 30 tablet, Refills: 3      phenazopyridine (PYRIDIUM) 200 MG tablet Take 1 tablet by mouth 3 times daily (with meals) for 3 days  Qty: 9 tablet, Refills: 0      nicotine (NICODERM CQ) 21 MG/24HR Place 1 patch onto the skin daily  Qty: 30 patch, Refills: 3      Multiple Vitamin (MULTIVITAMIN) TABS tablet Take 1 tablet by mouth daily  Qty: 30 tablet, Refills: 0      thiamine mononitrate (THIAMINE) 100 MG tablet Take 1 tablet by mouth daily  Qty: 30 tablet, Refills: 0                Details   traZODone (DESYREL) 100 MG tablet Take 100 mg by mouth nightly as needed for Sleep              Time Spent on discharge is more than 30 minutes in the examination, evaluation, counseling and review of medications and discharge plan. Signed:    DWIGHT Blankenship - CNP   10/14/2022      Thank you Nargis Santana DO for the opportunity to be involved in this patient's care. If you have any questions or concerns please feel free to contact me at [phone].

## 2022-10-15 LAB
CULTURE, BLOOD 2: ABNORMAL
ORGANISM: ABNORMAL
ORGANISM: ABNORMAL
URINE CULTURE, ROUTINE: ABNORMAL

## 2022-10-17 ENCOUNTER — TELEPHONE (OUTPATIENT)
Dept: INTERNAL MEDICINE CLINIC | Age: 52
End: 2022-10-17

## 2022-10-17 NOTE — TELEPHONE ENCOUNTER
She was released from the hospital Friday and they told her to get in to see you ASAP , but the earliest I have is in feb and I made her a appointment . Wondering if we can get her in earlier.      Please call her back [phone]

## 2022-10-19 ENCOUNTER — HOSPITAL ENCOUNTER (OUTPATIENT)
Dept: WOMENS IMAGING | Age: 52
Discharge: HOME | End: 2022-10-19
Payer: MEDICAID

## 2022-10-19 DIAGNOSIS — Z12.31 VISIT FOR SCREENING MAMMOGRAM: ICD-10-CM

## 2022-10-19 PROCEDURE — 77067 SCR MAMMO BI INCL CAD: CPT

## 2022-10-21 ENCOUNTER — TELEPHONE (OUTPATIENT)
Dept: FAMILY MEDICINE CLINIC | Age: 52
End: 2022-10-21

## 2022-10-21 NOTE — TELEPHONE ENCOUNTER
Regarding: Referral  ----- Message from Jaya La sent at 10/20/2022 10:22 AM EDT -----       ----- Message from Joel Verde \"Hamida\" to Teresita Donaldson DO sent at 10/20/2022  9:53 AM -----   Hello,   I have a scheduled appointment to see Paul Concepcion on December 9, 2022 @10am.  I was told by their office to obtain a referral from Dr. Alona Treadwell for this. Could he please send a referral for me? My appointment is located at [address] [place] [address] in [place]. If you need more information, please call me at [phone].   Thank you so much,  Su Ruth  1970

## 2022-11-15 ENCOUNTER — HOSPITAL ENCOUNTER (EMERGENCY)
Age: 52
Discharge: HOME | End: 2022-11-15
Attending: EMERGENCY MEDICINE
Payer: MEDICAID

## 2022-11-15 ENCOUNTER — HOSPITAL ENCOUNTER (INPATIENT)
Age: 52
LOS: 1 days | Discharge: HOME | End: 2022-11-16
Attending: EMERGENCY MEDICINE | Admitting: INTERNAL MEDICINE
Payer: MEDICAID

## 2022-11-15 VITALS
BODY MASS INDEX: 34.44 KG/M2 | HEART RATE: 105 BPM | RESPIRATION RATE: 21 BRPM | OXYGEN SATURATION: 91 % | SYSTOLIC BLOOD PRESSURE: 105 MMHG | TEMPERATURE: 98.6 F | HEIGHT: 64 IN | WEIGHT: 201.72 LBS | DIASTOLIC BLOOD PRESSURE: 63 MMHG

## 2022-11-15 DIAGNOSIS — R00.0 SINUS TACHYCARDIA: ICD-10-CM

## 2022-11-15 DIAGNOSIS — F10.20 CHRONIC ALCOHOLISM (HCC): Primary | ICD-10-CM

## 2022-11-15 DIAGNOSIS — E86.0 DEHYDRATION: ICD-10-CM

## 2022-11-15 DIAGNOSIS — F10.932 ALCOHOL WITHDRAWAL SYNDROME WITH PERCEPTUAL DISTURBANCE (HCC): ICD-10-CM

## 2022-11-15 DIAGNOSIS — F10.10 ALCOHOL ABUSE: Primary | ICD-10-CM

## 2022-11-15 LAB
A/G RATIO: 1.8 (ref 1.1–2.2)
A/G RATIO: 1.9 (ref 1.1–2.2)
ALBUMIN SERPL-MCNC: 4.2 G/DL (ref 3.4–5)
ALBUMIN SERPL-MCNC: 4.3 G/DL (ref 3.4–5)
ALP BLD-CCNC: 66 U/L (ref 40–129)
ALP BLD-CCNC: 71 U/L (ref 40–129)
ALT SERPL-CCNC: 43 U/L (ref 10–40)
ALT SERPL-CCNC: 48 U/L (ref 10–40)
AMPHETAMINE SCREEN, URINE: NORMAL
ANION GAP SERPL CALCULATED.3IONS-SCNC: 14 MMOL/L (ref 3–16)
ANION GAP SERPL CALCULATED.3IONS-SCNC: 16 MMOL/L (ref 3–16)
AST SERPL-CCNC: 44 U/L (ref 15–37)
AST SERPL-CCNC: 50 U/L (ref 15–37)
BACTERIA: ABNORMAL /HPF
BARBITURATE SCREEN URINE: NORMAL
BASOPHILS ABSOLUTE: 0 K/UL (ref 0–0.2)
BASOPHILS ABSOLUTE: 0.1 K/UL (ref 0–0.2)
BASOPHILS RELATIVE PERCENT: 0.3 %
BASOPHILS RELATIVE PERCENT: 0.4 %
BENZODIAZEPINE SCREEN, URINE: NORMAL
BILIRUB SERPL-MCNC: <0.2 MG/DL (ref 0–1)
BILIRUB SERPL-MCNC: <0.2 MG/DL (ref 0–1)
BILIRUBIN URINE: NEGATIVE
BLOOD, URINE: ABNORMAL
BUN BLDV-MCNC: 10 MG/DL (ref 7–20)
BUN BLDV-MCNC: 7 MG/DL (ref 7–20)
CALCIUM SERPL-MCNC: 8 MG/DL (ref 8.3–10.6)
CALCIUM SERPL-MCNC: 8.2 MG/DL (ref 8.3–10.6)
CANNABINOID SCREEN URINE: NORMAL
CHLORIDE BLD-SCNC: 102 MMOL/L (ref 99–110)
CHLORIDE BLD-SCNC: 103 MMOL/L (ref 99–110)
CLARITY: ABNORMAL
CO2: 21 MMOL/L (ref 21–32)
CO2: 21 MMOL/L (ref 21–32)
COCAINE METABOLITE SCREEN URINE: NORMAL
COLOR: YELLOW
CREAT SERPL-MCNC: 0.8 MG/DL (ref 0.6–1.1)
CREAT SERPL-MCNC: 0.8 MG/DL (ref 0.6–1.1)
EOSINOPHILS ABSOLUTE: 0 K/UL (ref 0–0.6)
EOSINOPHILS ABSOLUTE: 0.1 K/UL (ref 0–0.6)
EOSINOPHILS RELATIVE PERCENT: 0.3 %
EOSINOPHILS RELATIVE PERCENT: 0.4 %
EPITHELIAL CELLS, UA: 7 /HPF (ref 0–5)
ETHANOL: 99 MG/DL (ref 0–0.08)
ETHANOL: NORMAL MG/DL (ref 0–0.08)
FENTANYL SCREEN, URINE: NORMAL
GFR SERPL CREATININE-BSD FRML MDRD: >60 ML/MIN/{1.73_M2}
GFR SERPL CREATININE-BSD FRML MDRD: >60 ML/MIN/{1.73_M2}
GLUCOSE BLD-MCNC: 114 MG/DL (ref 70–99)
GLUCOSE BLD-MCNC: 91 MG/DL (ref 70–99)
GLUCOSE URINE: NEGATIVE MG/DL
HCT VFR BLD CALC: 38.5 % (ref 36–48)
HCT VFR BLD CALC: 39.3 % (ref 36–48)
HEMOGLOBIN: 12.9 G/DL (ref 12–16)
HEMOGLOBIN: 13.2 G/DL (ref 12–16)
HYALINE CASTS: 0 /LPF (ref 0–8)
IMMATURE GRANULOCYTES #: 0 K/UL (ref 0–0.2)
IMMATURE GRANULOCYTES %: 0.3 %
KETONES, URINE: NEGATIVE MG/DL
LACTIC ACID: 2.6 MMOL/L (ref 0.4–2)
LACTIC ACID: 3 MMOL/L (ref 0.4–2)
LEUKOCYTE ESTERASE, URINE: NEGATIVE
LIPASE: 22 U/L (ref 13–60)
LYMPHOCYTES ABSOLUTE: 0.8 K/UL (ref 1–5.1)
LYMPHOCYTES ABSOLUTE: 1.2 K/UL (ref 1–5.1)
LYMPHOCYTES RELATIVE PERCENT: 12.1 %
LYMPHOCYTES RELATIVE PERCENT: 6.2 %
Lab: NORMAL
MCH RBC QN AUTO: 29.6 PG (ref 26–34)
MCH RBC QN AUTO: 29.9 PG (ref 26–34)
MCHC RBC AUTO-ENTMCNC: 33.3 G/DL (ref 31–36)
MCHC RBC AUTO-ENTMCNC: 33.6 G/DL (ref 32–36.4)
MCV RBC AUTO: 88.1 FL (ref 80–100)
MCV RBC AUTO: 89.6 FL (ref 80–100)
METHADONE SCREEN, URINE: NORMAL
MICROSCOPIC EXAMINATION: YES
MONOCYTES ABSOLUTE: 1 K/UL (ref 0–1.3)
MONOCYTES ABSOLUTE: 1 K/UL (ref 0–1.3)
MONOCYTES RELATIVE PERCENT: 8.2 %
MONOCYTES RELATIVE PERCENT: 9.9 %
NEUTROPHILS ABSOLUTE: 10.7 K/UL (ref 1.7–7.7)
NEUTROPHILS ABSOLUTE: 7.6 K/UL (ref 1.7–7.7)
NEUTROPHILS RELATIVE PERCENT: 77.4 %
NEUTROPHILS RELATIVE PERCENT: 84.5 %
NITRITE, URINE: NEGATIVE
OPIATE SCREEN URINE: NORMAL
OXYCODONE URINE: NORMAL
PDW BLD-RTO: 13.6 % (ref 12.4–15.4)
PDW BLD-RTO: 14.1 % (ref 12.4–15.4)
PH UA: 5.5
PH UA: 5.5 (ref 5–8)
PHENCYCLIDINE SCREEN URINE: NORMAL
PLATELET # BLD: 249 K/UL (ref 135–450)
PLATELET # BLD: 258 K/UL (ref 135–450)
PMV BLD AUTO: 7 FL (ref 5–10.5)
PMV BLD AUTO: 8.6 FL (ref 5–10.5)
POTASSIUM REFLEX MAGNESIUM: 4 MMOL/L (ref 3.5–5.1)
POTASSIUM REFLEX MAGNESIUM: 4.3 MMOL/L (ref 3.5–5.1)
PROTEIN UA: ABNORMAL MG/DL
RBC # BLD: 4.3 M/UL (ref 4–5.2)
RBC # BLD: 4.46 M/UL (ref 4–5.2)
RBC UA: 4 /HPF (ref 0–4)
SODIUM BLD-SCNC: 138 MMOL/L (ref 136–145)
SODIUM BLD-SCNC: 139 MMOL/L (ref 136–145)
SPECIFIC GRAVITY UA: 1.02 (ref 1–1.03)
TOTAL PROTEIN: 6.6 G/DL (ref 6.4–8.2)
TOTAL PROTEIN: 6.6 G/DL (ref 6.4–8.2)
URINE REFLEX TO CULTURE: ABNORMAL
URINE TYPE: ABNORMAL
UROBILINOGEN, URINE: 0.2 E.U./DL
WBC # BLD: 12.6 K/UL (ref 4–11)
WBC # BLD: 9.8 K/UL (ref 4–11)
WBC UA: 2 /HPF (ref 0–5)

## 2022-11-15 PROCEDURE — 83690 ASSAY OF LIPASE: CPT

## 2022-11-15 PROCEDURE — 1200000000 HC SEMI PRIVATE

## 2022-11-15 PROCEDURE — 6360000002 HC RX W HCPCS: Performed by: EMERGENCY MEDICINE

## 2022-11-15 PROCEDURE — 36415 COLL VENOUS BLD VENIPUNCTURE: CPT

## 2022-11-15 PROCEDURE — 96365 THER/PROPH/DIAG IV INF INIT: CPT

## 2022-11-15 PROCEDURE — 85025 COMPLETE CBC W/AUTO DIFF WBC: CPT

## 2022-11-15 PROCEDURE — 96361 HYDRATE IV INFUSION ADD-ON: CPT

## 2022-11-15 PROCEDURE — 99284 EMERGENCY DEPT VISIT MOD MDM: CPT

## 2022-11-15 PROCEDURE — 2580000003 HC RX 258: Performed by: EMERGENCY MEDICINE

## 2022-11-15 PROCEDURE — 83605 ASSAY OF LACTIC ACID: CPT

## 2022-11-15 PROCEDURE — 80307 DRUG TEST PRSMV CHEM ANLYZR: CPT

## 2022-11-15 PROCEDURE — 96374 THER/PROPH/DIAG INJ IV PUSH: CPT

## 2022-11-15 PROCEDURE — 80053 COMPREHEN METABOLIC PANEL: CPT

## 2022-11-15 PROCEDURE — 99285 EMERGENCY DEPT VISIT HI MDM: CPT

## 2022-11-15 PROCEDURE — 6370000000 HC RX 637 (ALT 250 FOR IP): Performed by: EMERGENCY MEDICINE

## 2022-11-15 PROCEDURE — 82077 ASSAY SPEC XCP UR&BREATH IA: CPT

## 2022-11-15 PROCEDURE — 6370000000 HC RX 637 (ALT 250 FOR IP): Performed by: INTERNAL MEDICINE

## 2022-11-15 PROCEDURE — 81001 URINALYSIS AUTO W/SCOPE: CPT

## 2022-11-15 PROCEDURE — HZ2ZZZZ DETOXIFICATION SERVICES FOR SUBSTANCE ABUSE TREATMENT: ICD-10-PCS | Performed by: INTERNAL MEDICINE

## 2022-11-15 PROCEDURE — 2580000003 HC RX 258: Performed by: INTERNAL MEDICINE

## 2022-11-15 RX ORDER — LORAZEPAM 2 MG/ML
1 INJECTION INTRAMUSCULAR ONCE
Status: COMPLETED | OUTPATIENT
Start: 2022-11-15 | End: 2022-11-15

## 2022-11-15 RX ORDER — LORAZEPAM 2 MG/ML
3 INJECTION INTRAMUSCULAR
Status: DISCONTINUED | OUTPATIENT
Start: 2022-11-15 | End: 2022-11-16 | Stop reason: HOSPADM

## 2022-11-15 RX ORDER — GAUZE BANDAGE 2" X 2"
100 BANDAGE TOPICAL DAILY
Status: DISCONTINUED | OUTPATIENT
Start: 2022-11-15 | End: 2022-11-15 | Stop reason: SDUPTHER

## 2022-11-15 RX ORDER — LORAZEPAM 1 MG/1
1 TABLET ORAL
Status: DISCONTINUED | OUTPATIENT
Start: 2022-11-15 | End: 2022-11-16 | Stop reason: HOSPADM

## 2022-11-15 RX ORDER — LORAZEPAM 1 MG/1
1 TABLET ORAL EVERY 8 HOURS PRN
Status: DISCONTINUED | OUTPATIENT
Start: 2022-11-15 | End: 2022-11-15 | Stop reason: ALTCHOICE

## 2022-11-15 RX ORDER — LORAZEPAM 2 MG/ML
2 INJECTION INTRAMUSCULAR
Status: DISCONTINUED | OUTPATIENT
Start: 2022-11-15 | End: 2022-11-16 | Stop reason: HOSPADM

## 2022-11-15 RX ORDER — CITALOPRAM 10 MG/1
10 TABLET ORAL DAILY
Status: DISCONTINUED | OUTPATIENT
Start: 2022-11-15 | End: 2022-11-16 | Stop reason: HOSPADM

## 2022-11-15 RX ORDER — NICOTINE 21 MG/24HR
1 PATCH, TRANSDERMAL 24 HOURS TRANSDERMAL EVERY 24 HOURS
Status: DISCONTINUED | OUTPATIENT
Start: 2022-11-15 | End: 2022-11-16 | Stop reason: HOSPADM

## 2022-11-15 RX ORDER — SODIUM CHLORIDE 0.9 % (FLUSH) 0.9 %
5-40 SYRINGE (ML) INJECTION EVERY 12 HOURS SCHEDULED
Status: DISCONTINUED | OUTPATIENT
Start: 2022-11-15 | End: 2022-11-16 | Stop reason: HOSPADM

## 2022-11-15 RX ORDER — ONDANSETRON 4 MG/1
4 TABLET, ORALLY DISINTEGRATING ORAL EVERY 8 HOURS PRN
Status: DISCONTINUED | OUTPATIENT
Start: 2022-11-15 | End: 2022-11-16 | Stop reason: HOSPADM

## 2022-11-15 RX ORDER — BUSPIRONE HYDROCHLORIDE 5 MG/1
7.5 TABLET ORAL 2 TIMES DAILY
Status: DISCONTINUED | OUTPATIENT
Start: 2022-11-15 | End: 2022-11-16 | Stop reason: HOSPADM

## 2022-11-15 RX ORDER — LORAZEPAM 2 MG/ML
1 INJECTION INTRAMUSCULAR
Status: DISCONTINUED | OUTPATIENT
Start: 2022-11-15 | End: 2022-11-16 | Stop reason: HOSPADM

## 2022-11-15 RX ORDER — POLYETHYLENE GLYCOL 3350 17 G/17G
17 POWDER, FOR SOLUTION ORAL DAILY PRN
Status: DISCONTINUED | OUTPATIENT
Start: 2022-11-15 | End: 2022-11-16 | Stop reason: HOSPADM

## 2022-11-15 RX ORDER — ACETAMINOPHEN 650 MG/1
650 SUPPOSITORY RECTAL EVERY 6 HOURS PRN
Status: DISCONTINUED | OUTPATIENT
Start: 2022-11-15 | End: 2022-11-16 | Stop reason: HOSPADM

## 2022-11-15 RX ORDER — PROMETHAZINE HYDROCHLORIDE 25 MG/1
25 TABLET ORAL 4 TIMES DAILY PRN
Qty: 12 TABLET | Refills: 0 | Status: SHIPPED | OUTPATIENT
Start: 2022-11-15 | End: 2022-11-18

## 2022-11-15 RX ORDER — 0.9 % SODIUM CHLORIDE 0.9 %
1000 INTRAVENOUS SOLUTION INTRAVENOUS ONCE
Status: COMPLETED | OUTPATIENT
Start: 2022-11-15 | End: 2022-11-15

## 2022-11-15 RX ORDER — LORAZEPAM 2 MG/ML
4 INJECTION INTRAMUSCULAR
Status: DISCONTINUED | OUTPATIENT
Start: 2022-11-15 | End: 2022-11-16 | Stop reason: HOSPADM

## 2022-11-15 RX ORDER — BUSPIRONE HYDROCHLORIDE 15 MG/1
7.5 TABLET ORAL 2 TIMES DAILY
COMMUNITY
Start: 2022-08-01

## 2022-11-15 RX ORDER — ENOXAPARIN SODIUM 100 MG/ML
40 INJECTION SUBCUTANEOUS DAILY
Status: DISCONTINUED | OUTPATIENT
Start: 2022-11-16 | End: 2022-11-16 | Stop reason: HOSPADM

## 2022-11-15 RX ORDER — LORAZEPAM 1 MG/1
2 TABLET ORAL
Status: DISCONTINUED | OUTPATIENT
Start: 2022-11-15 | End: 2022-11-16 | Stop reason: HOSPADM

## 2022-11-15 RX ORDER — ONDANSETRON 2 MG/ML
4 INJECTION INTRAMUSCULAR; INTRAVENOUS EVERY 6 HOURS PRN
Status: DISCONTINUED | OUTPATIENT
Start: 2022-11-15 | End: 2022-11-16 | Stop reason: HOSPADM

## 2022-11-15 RX ORDER — SODIUM CHLORIDE 9 MG/ML
INJECTION, SOLUTION INTRAVENOUS PRN
Status: DISCONTINUED | OUTPATIENT
Start: 2022-11-15 | End: 2022-11-16 | Stop reason: HOSPADM

## 2022-11-15 RX ORDER — ACETAMINOPHEN 325 MG/1
650 TABLET ORAL EVERY 6 HOURS PRN
Status: DISCONTINUED | OUTPATIENT
Start: 2022-11-15 | End: 2022-11-16 | Stop reason: HOSPADM

## 2022-11-15 RX ORDER — LORAZEPAM 1 MG/1
3 TABLET ORAL
Status: DISCONTINUED | OUTPATIENT
Start: 2022-11-15 | End: 2022-11-16 | Stop reason: HOSPADM

## 2022-11-15 RX ORDER — LORAZEPAM 1 MG/1
1 TABLET ORAL EVERY 8 HOURS PRN
Qty: 9 TABLET | Refills: 0 | Status: SHIPPED | OUTPATIENT
Start: 2022-11-15 | End: 2022-11-18

## 2022-11-15 RX ORDER — TRAZODONE HYDROCHLORIDE 100 MG/1
100 TABLET ORAL NIGHTLY PRN
Status: DISCONTINUED | OUTPATIENT
Start: 2022-11-15 | End: 2022-11-16 | Stop reason: HOSPADM

## 2022-11-15 RX ORDER — LORAZEPAM 1 MG/1
1 TABLET ORAL ONCE
Status: COMPLETED | OUTPATIENT
Start: 2022-11-15 | End: 2022-11-15

## 2022-11-15 RX ORDER — SODIUM CHLORIDE 0.9 % (FLUSH) 0.9 %
5-40 SYRINGE (ML) INJECTION PRN
Status: DISCONTINUED | OUTPATIENT
Start: 2022-11-15 | End: 2022-11-16 | Stop reason: HOSPADM

## 2022-11-15 RX ORDER — LORAZEPAM 1 MG/1
4 TABLET ORAL
Status: DISCONTINUED | OUTPATIENT
Start: 2022-11-15 | End: 2022-11-16 | Stop reason: HOSPADM

## 2022-11-15 RX ORDER — GAUZE BANDAGE 2" X 2"
100 BANDAGE TOPICAL DAILY
Status: DISCONTINUED | OUTPATIENT
Start: 2022-11-15 | End: 2022-11-16 | Stop reason: HOSPADM

## 2022-11-15 RX ADMIN — Medication 100 MG: at 11:51

## 2022-11-15 RX ADMIN — LORAZEPAM 1 MG: 1 TABLET ORAL at 03:42

## 2022-11-15 RX ADMIN — CITALOPRAM HYDROBROMIDE 10 MG: 10 TABLET ORAL at 11:51

## 2022-11-15 RX ADMIN — LORAZEPAM 1 MG: 1 TABLET ORAL at 01:17

## 2022-11-15 RX ADMIN — LORAZEPAM 1 MG: 2 INJECTION INTRAMUSCULAR; INTRAVENOUS at 07:38

## 2022-11-15 RX ADMIN — LORAZEPAM 1 MG: 1 TABLET ORAL at 18:53

## 2022-11-15 RX ADMIN — LORAZEPAM 3 MG: 1 TABLET ORAL at 17:34

## 2022-11-15 RX ADMIN — LORAZEPAM 1 MG: 1 TABLET ORAL at 11:51

## 2022-11-15 RX ADMIN — SODIUM CHLORIDE, PRESERVATIVE FREE 10 ML: 5 INJECTION INTRAVENOUS at 21:00

## 2022-11-15 RX ADMIN — PROMETHAZINE HYDROCHLORIDE 25 MG: 25 INJECTION INTRAMUSCULAR; INTRAVENOUS at 02:02

## 2022-11-15 RX ADMIN — LORAZEPAM 1 MG: 1 TABLET ORAL at 16:15

## 2022-11-15 RX ADMIN — SODIUM CHLORIDE 1000 ML: 9 INJECTION, SOLUTION INTRAVENOUS at 07:38

## 2022-11-15 RX ADMIN — SODIUM CHLORIDE 1000 ML: 9 INJECTION, SOLUTION INTRAVENOUS at 01:41

## 2022-11-15 RX ADMIN — BUSPIRONE HYDROCHLORIDE 7.5 MG: 5 TABLET ORAL at 11:51

## 2022-11-15 RX ADMIN — ACETAMINOPHEN 650 MG: 325 TABLET ORAL at 17:30

## 2022-11-15 RX ADMIN — SODIUM CHLORIDE 1000 ML: 9 INJECTION, SOLUTION INTRAVENOUS at 02:47

## 2022-11-15 ASSESSMENT — LIFESTYLE VARIABLES
HOW MANY STANDARD DRINKS CONTAINING ALCOHOL DO YOU HAVE ON A TYPICAL DAY: 10 OR MORE
HOW OFTEN DO YOU HAVE A DRINK CONTAINING ALCOHOL: 4 OR MORE TIMES A WEEK

## 2022-11-15 ASSESSMENT — ENCOUNTER SYMPTOMS
VOMITING: 1
WHEEZING: 0
SHORTNESS OF BREATH: 0
ABDOMINAL PAIN: 1
RHINORRHEA: 0
NAUSEA: 1
COUGH: 0
BACK PAIN: 0
SORE THROAT: 0
DIARRHEA: 0
EYE DISCHARGE: 0
EYE PAIN: 0

## 2022-11-15 ASSESSMENT — PAIN SCALES - GENERAL
PAINLEVEL_OUTOF10: 3
PAINLEVEL_OUTOF10: 0
PAINLEVEL_OUTOF10: 0

## 2022-11-15 ASSESSMENT — PAIN DESCRIPTION - DESCRIPTORS: DESCRIPTORS: ACHING

## 2022-11-15 ASSESSMENT — PAIN DESCRIPTION - ORIENTATION: ORIENTATION: MID

## 2022-11-15 ASSESSMENT — PAIN DESCRIPTION - LOCATION: LOCATION: HEAD

## 2022-11-15 NOTE — PROGRESS NOTES
Patient has scored on CIWA x3 since admission. See flow sheets for documentation and MAR for administrations. Last CIWA score was 19 - 3mg of PO ativan given. Patient now resting quietly, RR>12.      Electronically signed by Torres Tolentino RN on 11/15/2022 at 6:12 PM

## 2022-11-15 NOTE — DISCHARGE INSTRUCTIONS
You have been administered medications in the emergency department that may cause drowsiness. Do not be driving, operating heavy machinery, swimming, caring for small children, or engaging in dangerous activities of any type until these medications have worn off. This may be as long as 6-8 hours. You have been prescribed medications that may cause drowsiness. Do not be driving, operating heavy machinery, swimming, caring for small children, or engaging in dangerous activities of any type until these medications have worn off. This may be as long as 6-8 hours. You may take Tylenol (acetaminophen) and/or Motrin (ibuprofen) with the medications that are prescribed for you, if you are permitted to take these medications. Please follow package directions for the appropriate dosing and frequency.

## 2022-11-15 NOTE — H&P
Hospital Medicine History & Physical      PCP: Antwan Waller DO    Date of Admission: 11/15/2022    Date of Service: Pt seen/examined on 11/15/2022   and Admitted to Inpatient with expected LOS greater than two midnights due to medical therapy. Chief Complaint: Tremor alcohol intoxication      History Of Present Illness:    45 y.o. female who presented to [facility] [place] with severe alcohol intoxication. Patient has a history of severe alcoholism and recently was discharged from inpatient alcohol rehab. Patient states that she has relapsed and drank 6 bottles of wine earlier and suddenly started having tremors confusion hallucinations  Patient is admitted for severe withdrawal.    She is remorseful and tearful. She states that she has been in and out of multiple alcohol rehab facilities and has trialed multiple medications including Vivitrol with poor success. 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 She is currently living with her mother in [facility]. She states she has been unable to find a job due to her addiction    She admits she has been seeing things. She drinks primarily wine  She denies suicidality but is very depressed    She is supposed to see Jaya Marte from [facility] psychiatry on December 9th     She is taking Celexa  Past Medical History:          Diagnosis Date    Alcohol abuse     Ankle fracture 2022    bilateral    Anxiety     Depression     Sleeping difficulties        Past Surgical History:          Procedure Laterality Date    BREAST SURGERY  1999    sailine breast     [address] Left 7/16/2021    OPEN REDUCTION INTERNAL FIXATION LEFT DISTAL RADIUS performed by Freddy Asencio MD at [address]  2006    right wrist       Medications Prior to Admission:      Prior to Admission medications    Medication Sig Start Date End Date Taking? Authorizing Provider   busPIRone (BUSPAR) 15 MG tablet Take 7.5 mg by mouth 2 times daily 8/1/22   Historical Provider, MD   LORazepam (ATIVAN) 1 MG tablet Take 1 tablet by mouth every 8 hours as needed for Anxiety for up to 3 days. 11/15/22 11/18/22  Sohan Meeks,    promethazine (PHENERGAN) 25 MG tablet Take 1 tablet by mouth 4 times daily as needed for Nausea 11/15/22 11/18/22  Sohan Meeks DO   citalopram (CELEXA) 10 MG tablet Take 1 tablet by mouth daily 10/15/22   DWIGHT Leong CNP   folic acid (FOLVITE) 1 MG tablet Take 1 tablet by mouth daily 10/15/22   DWIGHT Leong CNP   nicotine (NICODERM CQ) 21 MG/24HR Place 1 patch onto the skin daily 10/15/22   DWIGHT Leong CNP   Multiple Vitamin (MULTIVITAMIN) TABS tablet Take 1 tablet by mouth daily 10/15/22   DWIGHT Leong CNP   thiamine mononitrate (THIAMINE) 100 MG tablet Take 1 tablet by mouth daily 10/15/22   DWIGHT Leong CNP   traZODone (DESYREL) 100 MG tablet Take 100 mg by mouth nightly as needed for Sleep     Historical Provider, MD       Allergies:  Patient has no known allergies. Social History:      The patient currently lives with her mother. She has 4 children. She is . She is estranged from her family other than her mother. 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 She has a 4.00 pack-year smoking history. She has never used smokeless tobacco.  ETOH:   reports current alcohol use. E-cigarette/Vaping       Questions Responses    E-cigarette/Vaping Use Unknown If Ever Used    Start Date     Passive Exposure     Quit Date     Counseling Given     Comments Unknown              Family History:       Reviewed and negative in regards to presenting illness/complaint.         Problem Relation Age of Onset    No Known Problems Mother     No Known Problems Father     Other Brother         anxiety Cancer Maternal Grandmother         lung cancer    Cancer Maternal Grandfather         lung       REVIEW OF SYSTEMS COMPLETED:   Pertinent positives as noted in the HPI. All other systems reviewed and negative. PHYSICAL EXAM PERFORMED:    /74   Pulse (!) 101   Temp 98.3 °F (36.8 °C) (Oral)   Resp 18   Wt 201 lb 11.5 oz (91.5 kg)   SpO2 94%   BMI 34.63 kg/m²     General appearance:  No apparent distress, appears stated age and cooperative. Tearful tremulous  HEENT:  Normal cephalic, atraumatic without obvious deformity. Pupils equal, round, and reactive to light. Extra ocular muscles intact. Conjunctivae/corneas clear. Neck: Supple, with full range of motion. No jugular venous distention. Trachea midline. Respiratory:  Normal respiratory effort. Clear to auscultation, bilaterally without Rales/Wheezes/Rhonchi. Cardiovascular:  Regular rate and rhythm with normal S1/S2 without murmurs, rubs or gallops. Abdomen: Soft, non-tender, non-distended with normal bowel sounds. Musculoskeletal:  No clubbing, cyanosis or edema bilaterally. Full range of motion without deformity. Skin: Skin color, texture, turgor normal.  No rashes or lesions. Neurologic:  Neurovascularly intact without any focal sensory/motor deficits. Cranial nerves: II-XII intact, grossly non-focal.  Psychiatric: Anxious depressed oriented to person place and time  Capillary Refill: Brisk,3 seconds, normal  Peripheral Pulses: +2 palpable, equal bilaterally       Labs:     Recent Labs     11/15/22  0135 11/15/22  0728   WBC 12.6* 9.8   HGB 13.2 12.9   HCT 39.3 38.5    249     Recent Labs     11/15/22  0135 11/15/22  0728    138   K 4.0 4.3    103   CO2 21 21   BUN 10 7   CREATININE 0.8 0.8   CALCIUM 8.2* 8.0*     Recent Labs     11/15/22  0135 11/15/22  0728   AST 50* 44*   ALT 48* 43*   BILITOT <0.2 <0.2   ALKPHOS 71 66     No results for input(s): INR in the last 72 hours.   No results for input(s): CKTOTAL, TROPONINI in the last 72 hours. Urinalysis:      Lab Results   Component Value Date/Time    NITRU Negative 11/15/2022 08:10 AM    WBCUA 2 11/15/2022 08:10 AM    BACTERIA Rare 11/15/2022 08:10 AM    RBCUA 4 11/15/2022 08:10 AM    BLOODU LARGE 11/15/2022 08:10 AM    SPECGRAV 1.021 11/15/2022 08:10 AM    GLUCOSEU Negative 11/15/2022 08:10 AM       Radiology:     No orders to display       Consults:    IP CONSULT TO SOCIAL WORK  IP CONSULT TO PSYCHIATRY    ASSESSMENT:    Alcohol withdrawal with hallucinations/DTs  Major depression  Transaminitis due to alcohol use    PLAN:    Ativan per CIWA protocol  Continue thiamine folate multivitamin  Gentle IV fluids  Psychiatry consult to address her severe depression   consult patient will likely require further inpatient alcohol treatment rehab at discharge  Patient with a recent CAT scan about a month ago showed hepatic steatosis but did not demonstrate cirrhosis yet  Check PT/INR PTT  I am extremely concerned about Ms. Nikolay Sheikh and she is drinking herself to death. DVT Prophylaxis: Lovenox  Diet: ADULT DIET; Regular  Code Status: Full Code    PT/OT Eval Status:     Dispo -2 to 3 days       Shelly Donato MD    Thank you Candi Sotelo DO for the opportunity to be involved in this patient's care. If you have any questions or concerns please feel free to contact me at [phone].

## 2022-11-15 NOTE — ED NOTES
Patient presents to the ED for reports of alcohol withdrawal.  Patient states she is experiencing nausea and intermittent hallucinations. Patient denies fevers, shortness of breath, chest pain, vomiting, diarrhea. Patient alert and oriented, respirations even and easy. Patient to room 32, placed into gown, continuous telemetry and pulse oximetry.      Veronica Gandara RN  11/15/22 0679

## 2022-11-15 NOTE — PROGRESS NOTES
Patient scored 8 on CIWA scale, 1mg PO ativan given.      Electronically signed by Severo Laguerre RN on 11/15/2022 at 12:11 PM

## 2022-11-15 NOTE — ED NOTES
Patient ambulated to and from bathroom with steady even gait, urine specimen obtained.        Luz Johnson RN  11/15/22 [id number]

## 2022-11-15 NOTE — ED NOTES
Pt in bed eyes closed RR 19. Appears calm and comfortable bundled in blanket. No needs made known at this time.       Robyn Schultz RN  11/15/22 9719

## 2022-11-15 NOTE — ED NOTES
Pt called 911 from the lobby because she wants to be admitted to the hospital. Pt has walked into the lobby and had been outside smoking as seen on recorded camera.       Emmanuel Joy RN  11/15/22 2741

## 2022-11-15 NOTE — CARE COORDINATION
SW consult noted. We will follow up with patient in the morning and complete a full assessment of needs and offer resources if patient is agreeable. Respectfully submitted,    HARVEY MccordS  [facility]   [phone]    Electronically signed by ANANT Dorsey on 11/15/2022 at 5:14 PM

## 2022-11-15 NOTE — PROGRESS NOTES
Patient is now in room 3123, VSS on room air, patient is A+Ox4. Fall precautions and seizure precautions are in place, orientation to the room provided. All needs meet at this time. Telemetry applied and working properly.      Electronically signed by [place] RN on 11/15/2022 at 11:32 AM

## 2022-11-15 NOTE — ED NOTES
Pt was able to Independently ambulate herself to the Lobby to await a ride. Pt was stable on her feet, breathing equal and easy on room air. Pt provided a warm blanket, and a cup of Ice water during her wait for a ride.       Ry Wolf RN  11/15/22 8952

## 2022-11-15 NOTE — ED NOTES
Pt asked for a Nicotine patch. MD notified. MD stated the Nicotine patch is shown to lower the seizure threshold thus not recommended to use at this time with this Pt if she's concerned about seizures/withdrawal. Pt updated and agreeable.        Mahamed De La Torre RN  11/15/22 0403

## 2022-11-15 NOTE — ED NOTES
Pt has been unable to provide urine sample for lab. Will attempt to try again after fluids.      Eleanor Solorzano RN  11/15/22 0282

## 2022-11-15 NOTE — ED NOTES
Pt discharged via MD, RN processing Discharge but Pt refused disposition and states she wants to be admitted. MD made aware and at bedside.       Shantel Durán RN  11/15/22 0096

## 2022-11-15 NOTE — ED NOTES
Pt in via [facility]. No Pre Hospital interventions reported. Pt called 911 because she's drank 6 bottles of wine today and now is concerned for withdraw. Pt states she does not drink daily but she binge drinks since her 40's. Pt presents A&0 x 4, walking independently appearing stable breathing equal and easy on room air. Goals, fall prevention and safety have been reviewed with the pt who acknowledges understanding. Bed locked. Lowered. Rails x2. Call light in reach. Bedside table next to bed. No further questions or needs made known at this time.         Harry Gamboa RN  11/15/22 0102

## 2022-11-15 NOTE — ED NOTES
Pt states she has no ride and would like to sleep in the ED room she's in. Pt gently educated that she has been Cared for, treated and medically cleared for discharge by a MD and can not hold up a cardiac bed in the ED. RN attempting to find pt a lyft ride and has encouraged Pt to try and find herself a ride home also. Lyft ride was secured with a 17 min ETA. Pt updated.       Jayla Fuentes RN  11/15/22 1281

## 2022-11-15 NOTE — ED PROVIDER NOTES
[address]   eMERGENCY dEPARTMENT eNCOUnter        Pt Name: Evans Lemus  MRN: [id number]  [place] 1970  Date of evaluation: 11/15/2022  Provider: Magui Newell MD  PCP: Lyle Singh DO      CHIEF COMPLAINT       Chief Complaint   Patient presents with    Delirium Tremens (DTS)     States she is having alcohol withdraw; states having symptoms of tremors, hallucinations, nausea, and ringing in ears; feels dehydrated; last drink was 4pm yesterday afternoon; seen at [facility] earlier       [address] [place] [address]   (Location/Symptom, Timing/Onset, Context/Setting, Quality, Duration, Modifying Factors,Severity)  Note limiting factors. Evans Lemus is a 45 y.o. female   with a history of    has a past medical history of Alcohol abuse, Ankle fracture, Anxiety, Depression, and Sleeping difficulties. Who presents with complaints of alcohol withdrawal.  Patient does have a long history of alcoholism. She presented to the [facility] emergency department last night. She was requesting admission at that time but her CIWA was only 3 so this was declined. It was felt that the patient was purposely having tremors when the medical staff were in the room. She does complain of nausea and vomiting. She does state that she is seeing things. No suicidal or homicidal thoughts. She is a smoker. States her last drink was 4 PM yesterday. Nursing Noteswere all reviewed and agreed with or any disagreements were addressed  in the HPI. REVIEW OF SYSTEMS    (2-9 systems for level 4, 10 or more for level 5)     Review of Systems   Constitutional:  Negative for chills, fatigue and fever. HENT:  Negative for ear pain, rhinorrhea and sore throat. Eyes:  Negative for pain, discharge and visual disturbance. Respiratory:  Negative for cough, shortness of breath and wheezing. Cardiovascular:  Negative for chest pain, palpitations and leg swelling.    Gastrointestinal:  Positive for abdominal pain, nausea and vomiting. Negative for diarrhea. Genitourinary:  Negative for difficulty urinating, dysuria, pelvic pain and vaginal discharge. Musculoskeletal:  Negative for arthralgias, back pain, joint swelling and neck pain. Skin:  Negative for rash. Allergic/Immunologic: Negative for environmental allergies. Neurological:  Positive for tremors. Negative for dizziness, seizures, syncope and headaches. Hematological:  Negative for adenopathy. Psychiatric/Behavioral:  Positive for hallucinations. Negative for dysphoric mood and suicidal ideas. The patient is not nervous/anxious. PAST MEDICAL HISTORY     Past Medical History:   Diagnosis Date    Alcohol abuse     Ankle fracture     bilateral    Anxiety     Depression     Sleeping difficulties          SURGICAL HISTORY     Past Surgical History:   Procedure Laterality Date    BREAST SURGERY      sailine breast      SECTION      FOREARM SURGERY Left 2021    OPEN REDUCTION INTERNAL FIXATION LEFT DISTAL RADIUS performed by Ct Whatley MD at [address] [facility]      right wrist         CURRENTMEDICATIONS       Current Discharge Medication List        CONTINUE these medications which have NOT CHANGED    Details   busPIRone (BUSPAR) 15 MG tablet 7.5 mg 2 times daily      LORazepam (ATIVAN) 1 MG tablet Take 1 tablet by mouth every 8 hours as needed for Anxiety for up to 3 days.   Qty: 9 tablet, Refills: 0    Associated Diagnoses: Alcohol abuse; Dehydration; Sinus tachycardia      promethazine (PHENERGAN) 25 MG tablet Take 1 tablet by mouth 4 times daily as needed for Nausea  Qty: 12 tablet, Refills: 0      citalopram (CELEXA) 10 MG tablet Take 1 tablet by mouth daily  Qty: 30 tablet, Refills: 3      folic acid (FOLVITE) 1 MG tablet Take 1 tablet by mouth daily  Qty: 30 tablet, Refills: 3      nicotine (NICODERM CQ) 21 MG/24HR Place 1 patch onto the skin daily  Qty: 30 patch, Refills: 3      Multiple Vitamin (MULTIVITAMIN) TABS tablet Take 1 tablet by mouth daily  Qty: 30 tablet, Refills: 0      thiamine mononitrate (THIAMINE) 100 MG tablet Take 1 tablet by mouth daily  Qty: 30 tablet, Refills: 0      traZODone (DESYREL) 100 MG tablet Take 100 mg by mouth nightly as needed for Sleep              ALLERGIES     Patient has no known allergies. FAMILY HISTORY       Family History   Problem Relation Age of Onset    No Known Problems Mother     No Known Problems Father     Other Brother         anxiety    Cancer Maternal Grandmother         lung cancer    Cancer Maternal Grandfather         lung          SOCIAL HISTORY       Social History     Socioeconomic History    Marital status: Single     Spouse name: None    Number of children: None    Years of education: None    Highest education level: None   Tobacco Use    Smoking status: Every Day     Packs/day: 0.50     Years: 8.00     Pack years: 4.00     Types: Cigarettes    Smokeless tobacco: Never   Vaping Use    Vaping Use: Unknown   Substance and Sexual Activity    Alcohol use: Yes     Comment: states binge drinks but doesnt drink daily    Drug use: No    Sexual activity: Not Currently       SCREENINGS    [place] Coma Scale  Eye Opening: Spontaneous  Best Verbal Response: Oriented  Best Motor Response: Obeys commands  [place] Coma Scale Score: 15        PHYSICAL EXAM    (up to 7 for level 4, 8 or more for level 5)     ED Triage Vitals [11/15/22 0545]   BP Temp Temp Source Heart Rate Resp SpO2 Height Weight   138/80 99 °F (37.2 °C) Oral (!) 108 18 97 % -- 201 lb 11.5 oz (91.5 kg)      weight is 201 lb 11.5 oz (91.5 kg). Her oral temperature is 98.3 °F (36.8 °C). Her blood pressure is 120/74 and her pulse is 101 (abnormal). Her respiration is 18 and oxygen saturation is 94%. Physical Exam  Constitutional:       Appearance: She is well-developed. She is not diaphoretic. HENT:      Head: Normocephalic and atraumatic.       Right Ear: External ear normal.      Left Ear: External ear normal.   Eyes:      General: No scleral icterus. Right eye: No discharge. Left eye: No discharge. Neck:      Thyroid: No thyromegaly. Vascular: No JVD. Trachea: No tracheal deviation. Cardiovascular:      Rate and Rhythm: Regular rhythm. Tachycardia present. Heart sounds: No murmur heard. No friction rub. No gallop. Pulmonary:      Effort: Pulmonary effort is normal. No respiratory distress. Breath sounds: Normal breath sounds. No stridor. No wheezing or rales. Abdominal:      General: There is no distension. Palpations: Abdomen is soft. Tenderness: There is no abdominal tenderness. There is no guarding or rebound. Musculoskeletal:         General: No tenderness. Cervical back: Normal range of motion. Skin:     General: Skin is warm and dry. Findings: No rash (On exposed body surfaces). Neurological:      Mental Status: She is alert and oriented to person, place, and time. Motor: Tremor present. Coordination: Coordination normal.      Comments: I tend to agree and I do question as to how much of this tremoring is purposeful. She does not have fasciculations of her tongue. Psychiatric:         Behavior: Behavior normal.         Thought Content:  Thought content normal.       DIAGNOSTIC RESULTS   LABS:    Results for orders placed or performed during the hospital encounter of 11/15/22   CBC with Auto Differential   Result Value Ref Range    WBC 9.8 4.0 - 11.0 K/uL    RBC 4.30 4.00 - 5.20 M/uL    Hemoglobin 12.9 12.0 - 16.0 g/dL    Hematocrit 38.5 36.0 - 48.0 %    MCV 89.6 80.0 - 100.0 fL    MCH 29.9 26.0 - 34.0 pg    MCHC 33.3 31.0 - 36.0 g/dL    RDW 14.1 12.4 - 15.4 %    Platelets 849 686 - 741 K/uL    MPV 7.0 5.0 - 10.5 fL    Neutrophils % 77.4 %    Lymphocytes % 12.1 %    Monocytes % 9.9 %    Eosinophils % 0.3 %    Basophils % 0.3 %    Neutrophils Absolute 7.6 1.7 - 7.7 K/uL    Lymphocytes Absolute 1.2 1.0 - 5.1 K/uL Monocytes Absolute 1.0 0.0 - 1.3 K/uL    Eosinophils Absolute 0.0 0.0 - 0.6 K/uL    Basophils Absolute 0.0 0.0 - 0.2 K/uL   CMP w/ Reflex to MG   Result Value Ref Range    Sodium 138 136 - 145 mmol/L    Potassium reflex Magnesium 4.3 3.5 - 5.1 mmol/L    Chloride 103 99 - 110 mmol/L    CO2 21 21 - 32 mmol/L    Anion Gap 14 3 - 16    Glucose 91 70 - 99 mg/dL    BUN 7 7 - 20 mg/dL    Creatinine 0.8 0.6 - 1.1 mg/dL    Est, Glom Filt Rate >60 >60    Calcium 8.0 (L) 8.3 - 10.6 mg/dL    Total Protein 6.6 6.4 - 8.2 g/dL    Albumin 4.2 3.4 - 5.0 g/dL    Albumin/Globulin Ratio 1.8 1.1 - 2.2    Total Bilirubin <0.2 0.0 - 1.0 mg/dL    Alkaline Phosphatase 66 40 - 129 U/L    ALT 43 (H) 10 - 40 U/L    AST 44 (H) 15 - 37 U/L   Lactic Acid   Result Value Ref Range    Lactic Acid 2.6 (H) 0.4 - 2.0 mmol/L   Lipase   Result Value Ref Range    Lipase 22.0 13.0 - 60.0 U/L   ETOH   Result Value Ref Range    Ethanol Lvl None Detected mg/dL   Drug screen multi urine   Result Value Ref Range    Amphetamine Screen, Urine Neg Negative <1000ng/mL    Barbiturate Screen, Ur Neg Negative <200 ng/mL    Benzodiazepine Screen, Urine Neg Negative <200 ng/mL    Cannabinoid Scrn, Ur Neg Negative <50 ng/mL    Cocaine Metabolite Screen, Urine Neg Negative <300 ng/mL    Opiate Scrn, Ur Neg Negative <300 ng/mL    PCP Screen, Urine Neg Negative <25 ng/mL    Methadone Screen, Urine Neg Negative <300 ng/mL    Oxycodone Urine Neg Negative <100 ng/ml    FENTANYL SCREEN, URINE Neg Negative <5 ng/mL    pH, UA 5.5     Drug Screen Comment: see below    Urinalysis with Reflex to Culture    Specimen: Urine   Result Value Ref Range    Color, UA Yellow Straw/Yellow    Clarity, UA CLOUDY (A) Clear    Glucose, Ur Negative Negative mg/dL    Bilirubin Urine Negative Negative    Ketones, Urine Negative Negative mg/dL    Specific Gravity, UA 1.021 1.005 - 1.030    Blood, Urine LARGE (A) Negative    pH, UA 5.5 5.0 - 8.0    Protein, UA TRACE (A) Negative mg/dL time range)   enoxaparin (LOVENOX) injection 40 mg (has no administration in time range)   ondansetron (ZOFRAN-ODT) disintegrating tablet 4 mg (has no administration in time range)     Or   ondansetron (ZOFRAN) injection 4 mg (has no administration in time range)   polyethylene glycol (GLYCOLAX) packet 17 g (has no administration in time range)   acetaminophen (TYLENOL) tablet 650 mg (has no administration in time range)     Or   acetaminophen (TYLENOL) suppository 650 mg (has no administration in time range)   LORazepam (ATIVAN) tablet 1 mg (has no administration in time range)     Or   LORazepam (ATIVAN) injection 1 mg (has no administration in time range)     Or   LORazepam (ATIVAN) tablet 2 mg (has no administration in time range)     Or   LORazepam (ATIVAN) injection 2 mg (has no administration in time range)     Or   LORazepam (ATIVAN) tablet 3 mg (has no administration in time range)     Or   LORazepam (ATIVAN) injection 3 mg (has no administration in time range)     Or   LORazepam (ATIVAN) tablet 4 mg (has no administration in time range)     Or   LORazepam (ATIVAN) injection 4 mg (has no administration in time range)   0.9 % sodium chloride bolus (0 mLs IntraVENous Stopped 11/15/22 0839)   LORazepam (ATIVAN) injection 1 mg (1 mg IntraVENous Given 11/15/22 0738)       Since stable. Alcohol level was 0 and she was just seen within the last 12 hours are requested inpatient care. Is this patient to be included in the SEP-1 Core Measure due to severe sepsis or septic shock? No   Exclusion criteria - the patient is NOT to be included for SEP-1 Core Measure due to: Infection is not suspected    FINAL IMPRESSION      1. Chronic alcoholism ([facility] [address]    2. Alcohol withdrawal syndrome with perceptual disturbance ([facility] [address]          DISPOSITION/PLAN   DISPOSITION Admitted 11/15/2022 10:18:37 AM      PATIENT REFERRED TO:  No follow-up provider specified.     DISCHARGE MEDICATIONS:  Current Discharge Medication List DISCONTINUED MEDICATIONS:  Current Discharge Medication List                 (Please note that portions of this note were completed with a voice recognition program.  Efforts were made to editthe dictations but occasionally words are mis-transcribed.)    Diana Galvan MD (electronically signed)            Diana Galvan MD  11/15/22 [phone]

## 2022-11-15 NOTE — PROGRESS NOTES
Medication Reconciliation    List of medications patient is currently taking is complete. Source of information: 1. Conversation with patient at bedside                                      2. EPIC records      Allergies  Patient has no known allergies. Notes regarding home medications:   1. Patient did not received all of her home medications prior to arrival to the emergency department. 2. Patient did not take her home medications yesterday. 2. Patient was given Ativan and Phenergan this morning. at the Emergency room.     Neda Astudillo, Pharmacy Student  11/15/2022 10:43 AM

## 2022-11-15 NOTE — ED NOTES
Pt appears to shake her Right arm as a tremor only when RN in room or looking at her. Pt is calm and still when alone in room and while walking to bed as witnessed via two RN's.        Derrick Land RN  11/15/22 2919

## 2022-11-15 NOTE — ED PROVIDER NOTES
[address] ENCOUNTER      Pt Name: Bijan Newell  MRN: [id number]  [place] 1970  Date of evaluation: 11/15/2022  Provider: To Garcia, [address] [place] [address]  Chief Complaint   Patient presents with    Alcohol Intoxication     Pt in via [facility]. Pt called 911 because she's drank 6 bottles of wine today and now is concerned for withdraw. Pt states she does not drink daily but she binge drinks since her 40's. Pt presents A&0 x 4, walking independently appearing stable breathing equal and easy on room air. This patient is at risk for COVID-19 viral infection. Therefore, personal protection equipment consisting of a mask and gloves was worn for the exam.    HPI  Bijan Newell is a 45 y.o. female who presents with complaint of alcohol withdrawal.  She states she binge drinks. But she is concerned today that she is in alcohol withdrawal.  She denies several times that she drank every day. I questioned her how she does on her days off and she does not drink and when she does not go into withdrawal and she states \"because I taper down. \"  So it appears that she drinks every day. She drank 6 bottles of wine today and feels that she is in withdrawal.  Her last drink was at 4 PM.  She is shaking. Her pulse is 129. Blood pressure is normal.  She states she is not hallucinating describes her hallucination as [place] football game and [place]\" that she sees. ? REVIEW OF SYSTEMS  All systems negative except as noted in the HPI. Reviewed Nurses' notes and concur. No LMP recorded.  Patient is perimenopausal.    PAST MEDICAL HISTORY  Past Medical History:   Diagnosis Date    Alcohol abuse     Ankle fracture 2022    bilateral    Anxiety     Depression     Sleeping difficulties        FAMILY HISTORY  Family History   Problem Relation Age of Onset    No Known Problems Mother     No Known Problems Father     Other Brother         anxiety    Cancer Maternal Grandmother         lung cancer    Cancer Maternal Grandfather         lung       SOCIAL HISTORY   reports that she has been smoking cigarettes. She has a 4.00 pack-year smoking history. She has never used smokeless tobacco. She reports current alcohol use. She reports that she does not use drugs. SURGICAL HISTORY  Past Surgical History:   Procedure Laterality Date    BREAST SURGERY      sailine breast      SECTION  1988    FOREARM SURGERY Left 2021    OPEN REDUCTION INTERNAL FIXATION LEFT DISTAL RADIUS performed by Aranza Simms MD at [address]  2006    right wrist       CURRENT MEDICATIONS  Current Outpatient Rx   Medication Sig Dispense Refill    busPIRone (BUSPAR) 15 MG tablet       LORazepam (ATIVAN) 1 MG tablet Take 1 tablet by mouth every 8 hours as needed for Anxiety for up to 3 days. 9 tablet 0    promethazine (PHENERGAN) 25 MG tablet Take 1 tablet by mouth 4 times daily as needed for Nausea 12 tablet 0    citalopram (CELEXA) 10 MG tablet Take 1 tablet by mouth daily 30 tablet 3    folic acid (FOLVITE) 1 MG tablet Take 1 tablet by mouth daily (Patient not taking: Reported on 11/15/2022) 30 tablet 3    nicotine (NICODERM CQ) 21 MG/24HR Place 1 patch onto the skin daily 30 patch 3    Multiple Vitamin (MULTIVITAMIN) TABS tablet Take 1 tablet by mouth daily (Patient not taking: Reported on 11/15/2022) 30 tablet 0    thiamine mononitrate (THIAMINE) 100 MG tablet Take 1 tablet by mouth daily (Patient not taking: Reported on 11/15/2022) 30 tablet 0    traZODone (DESYREL) 100 MG tablet Take 100 mg by mouth nightly as needed for Sleep          ALLERGIES  No Known Allergies    Tetanus vaccination status reviewed: tetanus re-vaccination not indicated.     PHYSICAL EXAM  VITAL SIGNS: /63   Pulse (!) 105   Temp 98.6 °F (37 °C) (Tympanic)   Resp 21   Ht 5' 4\" (1.626 m)   Wt 201 lb 11.5 oz (91.5 kg)   SpO2 91%   Breastfeeding No   BMI 34.63 kg/m²   Constitutional: Well-developed, well-nourished, appears shaking but otherwise normal.  Her heart rate is 128 on the monitor. However, her blood pressures normal 136/72, nontoxic, activity: Resting comfortably on the cart, no obvious pain, speaking in full sentences, does not appear ill or toxic. She appears to be tremors but they are coarse tremors. HENT: Normocephalic, Atraumatic, Bilateral external ears normal, TM's were normal, Mucus membranes are moist and oropharynx is patent and clear, No oral exudates, Nose normal.  Eyes:  Conjunctiva normal, No discharge. No scleral icterus. Neck: Normal range of motion, No tenderness, Supple. Lymphatic: No lymphadenopathy noted. Cardiovascular: Normal heart rate, Normal rhythm, no murmurs, no gallops, no rubs. Thorax & Lungs: Normal breath sounds, no respiratory distress, no wheezing, no rales, no rhonchi  Abdomen: Soft, Nontender, No hepatosplenomegaly, No masses, No pulsatile masses, No distension, normal bowel sounds  Skin: Warm, Dry, No erythema, No rash. Extremities: No edema, No tenderness, No cyanosis, No clubbing. No amputations, capillary refill less than 2 seconds. Musculoskeletal:  No tenderness to palpation, no major deformities noted. Neurologic: Alert & oriented x 3, CN II through XII are intact, normal motor function, normal sensory function, no focal deficits noted, no clonus, coarse tremors that appear to go away when her attention is drawn from her tremors.   Psychiatric: Affect normal, Mood normal.    ?  LABORATORY  Labs Reviewed   CBC WITH AUTO DIFFERENTIAL - Abnormal; Notable for the following components:       Result Value    WBC 12.6 (*)     Neutrophils Absolute 10.7 (*)     Lymphocytes Absolute 0.8 (*)     All other components within normal limits   COMPREHENSIVE METABOLIC PANEL W/ REFLEX TO MG FOR LOW K - Abnormal; Notable for the following components:    Glucose 114 (*)     Calcium 8.2 (*)     ALT 48 (*)     AST 50 (*)     All other components within normal limits   LACTIC ACID - Abnormal; Notable for the following components:    Lactic Acid 3.0 (*)     All other components within normal limits   ETHANOL         COURSE & MEDICAL DECISION MAKING  Pertinent Labs reviewed. (See chart for details)    Vitals:    11/15/22 0245 11/15/22 0300 11/15/22 0315 11/15/22 0321   BP: 113/60 103/60 94/69 105/63   Pulse: (!) 107 (!) 106 (!) 104 (!) 105   Resp: 19 19 16 21   Temp:       TempSrc:       SpO2: 91% 90% 91% 91%   Weight:       Height:           Medications   0.9 % sodium chloride bolus (0 mLs IntraVENous Stopped 11/15/22 0343)   LORazepam (ATIVAN) tablet 1 mg (1 mg Oral Given 11/15/22 0117)   promethazine (PHENERGAN) 25 mg in sodium chloride 0.9 % 50 mL IVPB (0 mg IntraVENous Stopped 11/15/22 0232)   0.9 % sodium chloride bolus (0 mLs IntraVENous Stopped 11/15/22 0339)   LORazepam (ATIVAN) tablet 1 mg (1 mg Oral Given 11/15/22 0342)       Discharge Medication List as of 11/15/2022  3:22 AM        START taking these medications    Details   LORazepam (ATIVAN) 1 MG tablet Take 1 tablet by mouth every 8 hours as needed for Anxiety for up to 3 days. , Disp-9 tablet, R-0Normal      promethazine (PHENERGAN) 25 MG tablet Take 1 tablet by mouth 4 times daily as needed for Nausea, Disp-12 tablet, R-0Normal             SEP-1 CORE MEASURE DATA  Exclusion criteria: the patient is NOT to be included for sepsis due to: Infection is not suspected    Patient remained stable in the ED. patient's blood pressure remains normal.  She was not seen shaking until somebody walks into the room and then she starts shaking. Her laboratory work was relatively normal except for white count 12.6 and calcium of 8.2. Liver functions were mildly elevated. Lactic acid was 3.0. Alcohol was 99. Do not feel any imaging is necessary at this time. EKG is not necessary. Patient's heart rate came down with IV fluids. I feel she was mildly dehydrated. Her blood pressure remained in the 120s over 80s. Patient kept asking to be admitted. However, she did not meet criteria. Her CIWA scale was 3. She does not even meet criteria for benzodiazepine administration. She was given 2 mg of Ativan total in the emergency department orally. She slept through most from her department stay. She was given Phenergan. I prescribed Ativan and Phenergan for her as an outpatient. Patient was discharged to follow-up. However, in follow-up it was observed that she left here and went to another hospital.  She was able to ambulate out of the emergency department and was observed smoking outside prior to leaving to going to the next hospital.  Patient was instructed to follow-up with her doctor and return if any problems. The patient's blood pressure was found to be elevated according to CMS/Medicare and the Affordable Care Act/ObamaCare criteria. Elevated blood pressure could occur because of pain or anxiety or other reasons and does not mean that they need to have their blood pressure treated or medications otherwise adjusted. However, this could also be a sign that they will need to have their blood pressure treated or medications changed. The patient was instructed to follow up closely with their personal physician to have their blood pressure rechecked. The patient was instructed to take a list of recent blood pressure readings to their next visit with their personal physician. See discharge instructions for specific medications, discharge information, and treatments. They were verbally instructed to return to emergency if any problems. (This chart has been completed using [address]. Although attempts have been made to ensure accuracy, words and/or phrases may not be transcribed as intended.)    Patient refused pain medicines at the time of their exam.    IMPRESSION(S):  1. Alcohol abuse    2. Dehydration    3. Sinus tachycardia        ?   Recheck Times: 0110, 0250, 0330    Diagnostic considerations include but are not limited to:  Drug or alcohol use or abuse, psychosis, behavioral mental illness, metabolic disturbance, infection, neurologic emergency, hypoglycemia, steroid abuse, medication or drug withdrawal cecilia Cho DO  11/15/22 4971

## 2022-11-15 NOTE — ED NOTES
Report to Guillermo BARRON admission RN. Patient to be transported to admission room 3123 via transport. Patient alert and oriented, updated and agreeable to admission.      Gordy Heimlich, RN  11/15/22 5834

## 2022-11-15 NOTE — ED NOTES
Pt's Lyft ride cancelled in the system. RN's continue to try and arrange a ride for Pt via lyft and searching for a  to accept. Pt updated in lobby and encouraged to help herself find a ride also.       Derrick Land RN  11/15/22 6027

## 2022-11-16 ENCOUNTER — APPOINTMENT (OUTPATIENT)
Dept: GENERAL RADIOLOGY | Age: 52
End: 2022-11-16
Payer: MEDICAID

## 2022-11-16 VITALS
RESPIRATION RATE: 15 BRPM | WEIGHT: 201.06 LBS | SYSTOLIC BLOOD PRESSURE: 143 MMHG | TEMPERATURE: 98.7 F | BODY MASS INDEX: 34.51 KG/M2 | HEART RATE: 97 BPM | DIASTOLIC BLOOD PRESSURE: 91 MMHG | OXYGEN SATURATION: 94 %

## 2022-11-16 LAB
ANION GAP SERPL CALCULATED.3IONS-SCNC: 12 MMOL/L (ref 3–16)
BUN BLDV-MCNC: 7 MG/DL (ref 7–20)
CALCIUM SERPL-MCNC: 8.5 MG/DL (ref 8.3–10.6)
CHLORIDE BLD-SCNC: 103 MMOL/L (ref 99–110)
CO2: 22 MMOL/L (ref 21–32)
CREAT SERPL-MCNC: 0.7 MG/DL (ref 0.6–1.1)
GFR SERPL CREATININE-BSD FRML MDRD: >60 ML/MIN/{1.73_M2}
GLUCOSE BLD-MCNC: 89 MG/DL (ref 70–99)
POTASSIUM REFLEX MAGNESIUM: 4.2 MMOL/L (ref 3.5–5.1)
PROCALCITONIN: 0.04 NG/ML (ref 0–0.15)
SODIUM BLD-SCNC: 137 MMOL/L (ref 136–145)

## 2022-11-16 PROCEDURE — 80048 BASIC METABOLIC PNL TOTAL CA: CPT

## 2022-11-16 PROCEDURE — 36415 COLL VENOUS BLD VENIPUNCTURE: CPT

## 2022-11-16 PROCEDURE — 6360000002 HC RX W HCPCS: Performed by: INTERNAL MEDICINE

## 2022-11-16 PROCEDURE — 6370000000 HC RX 637 (ALT 250 FOR IP): Performed by: INTERNAL MEDICINE

## 2022-11-16 PROCEDURE — 84145 PROCALCITONIN (PCT): CPT

## 2022-11-16 PROCEDURE — 71046 X-RAY EXAM CHEST 2 VIEWS: CPT

## 2022-11-16 RX ADMIN — BUSPIRONE HYDROCHLORIDE 7.5 MG: 5 TABLET ORAL at 00:00

## 2022-11-16 RX ADMIN — ACETAMINOPHEN 650 MG: 325 TABLET ORAL at 00:00

## 2022-11-16 RX ADMIN — TRAZODONE HYDROCHLORIDE 100 MG: 100 TABLET ORAL at 00:00

## 2022-11-16 RX ADMIN — Medication 100 MG: at 09:25

## 2022-11-16 RX ADMIN — BUSPIRONE HYDROCHLORIDE 7.5 MG: 5 TABLET ORAL at 09:25

## 2022-11-16 RX ADMIN — ENOXAPARIN SODIUM 40 MG: 40 INJECTION SUBCUTANEOUS at 09:25

## 2022-11-16 RX ADMIN — CITALOPRAM HYDROBROMIDE 10 MG: 10 TABLET ORAL at 09:25

## 2022-11-16 ASSESSMENT — PAIN DESCRIPTION - DESCRIPTORS: DESCRIPTORS: DISCOMFORT

## 2022-11-16 ASSESSMENT — PAIN SCALES - GENERAL: PAINLEVEL_OUTOF10: 7

## 2022-11-16 ASSESSMENT — PAIN SCALES - WONG BAKER: WONGBAKER_NUMERICALRESPONSE: 0

## 2022-11-16 ASSESSMENT — PAIN DESCRIPTION - LOCATION: LOCATION: GENERALIZED

## 2022-11-16 NOTE — PROGRESS NOTES
Patient in room alert and oriented x4, stable on room air with seizure precautions implemented and in place. CIWA scores being taken. Patient not showing signs of withdrawal that causes her score to be high. Bed in lowest position. Call light within reach. Patient instructed to call out for needs.

## 2022-11-16 NOTE — DISCHARGE INSTR - COC
Continuity of Care Form    Patient Name: Paul Pruitt   :  1970  MRN:  [id number]    Admit date:  11/15/2022  Discharge date:  2022    Code Status Order: Full Code   Advance Directives:     Admitting Physician:  Iris Clark MD  PCP: Bassem Leonardo DO    Discharging Nurse: [facility] Unit/Room#: A1V-8812/3123-01  Discharging Unit Phone Number:     Emergency Contact:   Extended Emergency Contact Information  Primary Emergency Contact: Teresa Landryer [address] Phone: [phone]  Mobile Phone: [phone]  Relation: Parent  Secondary Emergency Contact: [address] [place] [address]  Mobile Phone: [phone]  Relation: Brother/Sister   needed? No    Past Surgical History:  Past Surgical History:   Procedure Laterality Date    BREAST SURGERY      sailine breast      SECTION  1988    FOREARM SURGERY Left 2021    OPEN REDUCTION INTERNAL FIXATION LEFT DISTAL RADIUS performed by Loy Garibay MD at [address] [place] [address] Drive  2006    right wrist       Immunization History:   Immunization History   Administered Date(s) Administered    COVID-19, PFIZER PURPLE top, DILUTE for use, (age 15 y+), 30mcg/0.3mL 2021, 2021, 2021       Active Problems:  Patient Active Problem List   Diagnosis Code    Depression F32. A    Suicidal ideation X98.615    Alcoholic intoxication without complication (HCC) B92.132    Alcohol withdrawal (HCC) F10.939    Tachycardia R00.0    Lactic acidosis E87.20    Drug use disorder F19.90    Episode of recurrent major depressive disorder (HCC) F33.9    Microcytic anemia D50.9    Acute gastroenteritis K52.9    Hypokalemia E87.6    Hypomagnesemia E83.42    Hyponatremia E87.1    Alcohol withdrawal, uncomplicated (HCC) B47.908    High anion gap metabolic acidosis Q30.25    Current smoker F17.200    Leukocytosis D72.829    Alcohol withdrawal delirium ([facility] F10.931    Alcohol related seizure ([facility] R56.9 Closed fracture of left distal radius S52.502A    Alcohol dependence with withdrawal ([facility]) F10.239    Alcohol withdrawal, with unspecified complication ([facility] G17.274       Isolation/Infection:   Isolation            No Isolation          Patient Infection Status       Infection Onset Added Last Indicated Last Indicated By Review Planned Expiration Resolved Resolved By    None active    Resolved    COVID-19 (Rule Out) 10/09/22 10/09/22 10/09/22 COVID-19, Rapid (Ordered)   10/09/22 Rule-Out Test Resulted    COVID-19 (Rule Out) 10/02/21 10/02/21 10/02/21 COVID-19 (Ordered)   10/02/21 Rule-Out Test Resulted            Nurse Assessment:  Last Vital Signs: BP (!) 143/91   Pulse 97   Temp 98.7 °F (37.1 °C) (Oral)   Resp 15   Wt 201 lb 1 oz (91.2 kg)   SpO2 94%   BMI 34.51 kg/m²     Last documented pain score (0-10 scale): Pain Level: 7  Last Weight:   Wt Readings from Last 1 Encounters:   11/16/22 201 lb 1 oz (91.2 kg)     Mental Status:  oriented and alert    IV Access:  - None    Nursing Mobility/ADLs:  Walking   Independent  Transfer  Independent  Bathing  Independent  Dressing  Independent  [address]  Independent  Med Delivery   whole    Wound Care Documentation and Therapy:        Elimination:  Continence: Bowel: Yes  Bladder: Yes  Urinary Catheter: None   Colostomy/Ileostomy/Ileal Conduit: No       Date of Last BM: N/A    Intake/Output Summary (Last 24 hours) at 11/16/2022 1155  Last data filed at 11/16/2022 0956  Gross per 24 hour   Intake 955 ml   Output --   Net 955 ml     I/O last 3 completed shifts:   In: 0510 [P.O.:715; IV Piggyback:1000]  Out: -     Safety Concerns:     None    Impairments/Disabilities:      None    Nutrition Therapy:  Current Nutrition Therapy:   - Oral Diet:  General    Routes of Feeding: Oral  Liquids: No Restrictions  Daily Fluid Restriction: no  Last Modified Barium Swallow with Video (Video Swallowing Test): not done    Treatments at the Time of Hospital Discharge:   Respiratory Treatments: none  Oxygen Therapy:  is not on home oxygen therapy. Ventilator:    - No ventilator support    Rehab Therapies: none  Weight Bearing Status/Restrictions: No weight bearing restrictions  Other Medical Equipment (for information only, NOT a DME order):  none  Other Treatments: none    Patient's personal belongings (please select all that are sent with patient):  None    RN SIGNATURE:  Electronically signed by Darek Mcarthur RN on 11/16/22 at 12:06 PM EST    CASE MANAGEMENT/SOCIAL WORK SECTION    Inpatient Status Date: ***    Readmission Risk Assessment Score:  Readmission Risk              Risk of Unplanned Readmission:  20           Discharging to Facility/ Agency   Name:   Address:  Phone:  Fax:    Dialysis Facility (if applicable)   Name:  Address:  Dialysis Schedule:  Phone:  Fax:    / signature: {Esignature:[id number]}    PHYSICIAN SECTION    Prognosis: Fair    Condition at Discharge: Stable    Rehab Potential (if transferring to Rehab): Fair    Recommended Labs or Other Treatments After Discharge: Follow-up with PCP within 7 days from discharge, not doing so could have life-threatening consequences. Take medication as instructed;  return to the emergency department if persistent symptoms, experiencing side effects from medication including nausea vomiting or unable to keep medications down.    Alcohol cessation recommended        PHYSICIAN SIGNATURE:  Electronically signed by Fabrizio Prabhakar MD on 11/16/22 at 11:55 AM EST

## 2022-11-16 NOTE — PROGRESS NOTES
Patient resting quietly in bed. She is alert and oriented x4, stable on room air and ambulatory. She is not complaining of pain or displaying signs of withdrawal. Standard and seizure precautions are in place. Bed in lowest position. Call light within reach. Patient instructed to utilize call light for needs.

## 2022-11-16 NOTE — PLAN OF CARE
Problem: Discharge Planning  Goal: Discharge to home or other facility with appropriate resources  11/16/2022 1113 by Sarah Alba RN  Outcome: Progressing     Problem: Safety - Adult  Goal: Free from fall injury  11/16/2022 1113 by Sarah Alba RN  Outcome: Progressing     Problem: Pain  Goal: Verbalizes/displays adequate comfort level or baseline comfort level  11/16/2022 1113 by Sarah Alba RN  Outcome: Progressing

## 2022-11-16 NOTE — CARE COORDINATION
Referral per MD re: ETOH treatment options. Patient discharged before seen. Will attempt a phone call later today.    Electronically signed by Bon Law on 11/16/2022 at 12:25 PM

## 2022-11-16 NOTE — PROGRESS NOTES
Hospitalist Progress Note      PCP: Jyotsna Lopez DO    Date of Admission: 11/15/2022    Chief Complaint:   Chief Complaint   Patient presents with    Delirium Tremens (DTS)     States she is having alcohol withdraw; states having symptoms of tremors, hallucinations, nausea, and ringing in ears; feels dehydrated; last drink was 4pm yesterday afternoon; seen at [facility] earlier       Hospital Course: 70-year-old female with past medical history significant for depression, alcohol and tobacco use disorder presents with alcohol intoxication. Her initial alcohol level was 99. Patient reportedly to multiple wine bottles. 11/16  Patient reports feeling better today, she has some tremor and anxiety after not drinking for approximately 48 hours. She reports cough and chest congestion    Subjective: as above      Medications:  Reviewed    Infusion Medications    sodium chloride       Scheduled Medications    busPIRone  7.5 mg Oral BID    citalopram  10 mg Oral Daily    sodium chloride flush  5-40 mL IntraVENous 2 times per day    thiamine  100 mg Oral Daily    enoxaparin  40 mg SubCUTAneous Daily    nicotine  1 patch TransDERmal Q24H     PRN Meds: phenol, traZODone, sodium chloride flush, sodium chloride, ondansetron **OR** ondansetron, polyethylene glycol, acetaminophen **OR** acetaminophen, LORazepam **OR** LORazepam **OR** LORazepam **OR** LORazepam **OR** LORazepam **OR** LORazepam **OR** LORazepam **OR** LORazepam      Intake/Output Summary (Last 24 hours) at 11/16/2022 0905  Last data filed at 11/15/2022 1728  Gross per 24 hour   Intake 715 ml   Output --   Net 715 ml       Physical Exam Performed:    BP (!) 143/91   Pulse 97   Temp 98.7 °F (37.1 °C) (Oral)   Resp 15   Wt 201 lb 1 oz (91.2 kg)   SpO2 94%   BMI 34.51 kg/m²     General appearance: No apparent distress, appears stated age and cooperative. HEENT: Pupils equal, round, and reactive to light. Conjunctivae/corneas clear.   Neck: Supple, with full range of motion. No jugular venous distention. Trachea midline. Respiratory: Right upper lobe crackles  Cardiovascular: Regular rate and rhythm with normal S1/S2 without murmurs, rubs or gallops. Abdomen: Soft, non-tender, non-distended with normal bowel sounds. Musculoskeletal: No clubbing, cyanosis or edema bilaterally. Full range of motion without deformity. Skin: Skin color, texture, turgor normal.  No rashes or lesions. Neurologic:  Neurovascularly intact without any focal sensory/motor deficits. Cranial nerves: II-XII intact, grossly non-focal.  Psychiatric: Alert and oriented, thought content appropriate, normal insight  Capillary Refill: Brisk, 3 seconds, normal   Peripheral Pulses: +2 palpable, equal bilaterally       Labs:   Recent Labs     11/15/22  0135 11/15/22  0728   WBC 12.6* 9.8   HGB 13.2 12.9   HCT 39.3 38.5    249     Recent Labs     11/15/22  0135 11/15/22  0728 11/16/22  0433    138 137   K 4.0 4.3 4.2    103 103   CO2 21 21 22   BUN 10 7 7   CREATININE 0.8 0.8 0.7   CALCIUM 8.2* 8.0* 8.5     Recent Labs     11/15/22  0135 11/15/22  0728   AST 50* 44*   ALT 48* 43*   BILITOT <0.2 <0.2   ALKPHOS 71 66     No results for input(s): INR in the last 72 hours. No results for input(s): Hamp Memory in the last 72 hours.     Urinalysis:      Lab Results   Component Value Date/Time    NITRU Negative 11/15/2022 08:10 AM    WBCUA 2 11/15/2022 08:10 AM    BACTERIA Rare 11/15/2022 08:10 AM    RBCUA 4 11/15/2022 08:10 AM    BLOODU LARGE 11/15/2022 08:10 AM    SPECGRAV 1.021 11/15/2022 08:10 AM    GLUCOSEU Negative 11/15/2022 08:10 AM       Radiology:  XR CHEST (2 VW)    (Results Pending)           Assessment/Plan:    Active Hospital Problems    Diagnosis     Alcohol withdrawal delirium ([facility] [F10.721]      Alcohol use disorder  Alcohol intoxication with alcohol withdrawal  Continue [facility] protocol  Agree with psych consultation    Cough/chest congestion  Check chest x-ray to rule out pneumonia      DVT Prophylaxis: +  Diet: ADULT DIET; Regular  Code Status: Full Code  PT/OT Eval Status: -    Dispo - ?         Jeanne Cordova MD

## 2022-11-16 NOTE — DISCHARGE SUMMARY
Hospital Medicine Discharge Summary    Patient ID: Jeb aBlbuena      Patient's PCP: [address] [place] [address],     Admit Date: 11/15/2022     Discharge Date:   11/16/22      Admitting Provider: Lexi Echevarria MD     Discharge Provider: Pippa Flores MD     Discharge Diagnoses: Active Hospital Problems    Diagnosis     Alcohol withdrawal delirium ([facility] [F10.931]        The patient was seen and examined on day of discharge and this discharge summary is in conjunction with any daily progress note from day of discharge. Hospital Course: 18-year-old female with past medical history significant for alcohol use disorder presents with acute alcohol intoxication. Her alcohol level on admission was 99    Patient was admitted for observation to watch for withdrawal symptoms. The following day after admission patient had an unexpected recovery, she was feeling better. She has good insight of her problem and denies SI ideation. Patient was counseled on alcohol cessation. Most of her work-up was unremarkable including chest x-ray. Planing of a cough which is possibly related to bronchitis. Physical Exam Performed:     BP (!) 143/91   Pulse 97   Temp 98.7 °F (37.1 °C) (Oral)   Resp 15   Wt 201 lb 1 oz (91.2 kg)   SpO2 94%   BMI 34.51 kg/m²       General appearance:  No apparent distress, appears stated age and cooperative. HEENT:  Normal cephalic, atraumatic without obvious deformity. Pupils equal, round, and reactive to light. Extra ocular muscles intact. Conjunctivae/corneas clear. Neck: Supple, with full range of motion. No jugular venous distention. Trachea midline. Respiratory:  Normal respiratory effort. Clear to auscultation, bilaterally without Rales/Wheezes/Rhonchi. Cardiovascular:  Regular rate and rhythm with normal S1/S2 without murmurs, rubs or gallops. Abdomen: Soft, non-tender, non-distended with normal bowel sounds.   Musculoskeletal:  No clubbing, cyanosis or edema bilaterally. Full range of motion without deformity. Skin: Skin color, texture, turgor normal.  No rashes or lesions. Neurologic:  Neurovascularly intact without any focal sensory/motor deficits. Cranial nerves: II-XII intact, grossly non-focal.  Psychiatric:  Alert and oriented, thought content appropriate, normal insight  Capillary Refill: Brisk,< 3 seconds   Peripheral Pulses: +2 palpable, equal bilaterally       Labs: For convenience and continuity at follow-up the following most recent labs are provided:      CBC:    Lab Results   Component Value Date/Time    WBC 9.8 11/15/2022 07:28 AM    HGB 12.9 11/15/2022 07:28 AM    HCT 38.5 11/15/2022 07:28 AM     11/15/2022 07:28 AM       Renal:    Lab Results   Component Value Date/Time     11/16/2022 04:33 AM    K 4.2 11/16/2022 04:33 AM     11/16/2022 04:33 AM    CO2 22 11/16/2022 04:33 AM    BUN 7 11/16/2022 04:33 AM    CREATININE 0.7 11/16/2022 04:33 AM    CALCIUM 8.5 11/16/2022 04:33 AM    PHOS 2.5 06/04/2021 05:48 AM         Significant Diagnostic Studies    Radiology:   XR CHEST (2 VW)   Final Result   No radiographic evidence of acute pulmonary disease. Consults:     IP CONSULT TO SOCIAL WORK  IP CONSULT TO PSYCHIATRY    Disposition:  home     Condition at Discharge: Stable    Discharge Instructions/Follow-up:  Follow-up with PCP within 7 days from discharge, not doing so could have life-threatening consequences. Take medication as instructed;  return to the emergency department if persistent symptoms, experiencing side effects from medication including nausea vomiting or unable to keep medications down.        Code Status:  Full Code     Activity: activity as tolerated    Diet: cardiac diet      Discharge Medications:     Current Discharge Medication List             Details   busPIRone (BUSPAR) 15 MG tablet Take 7.5 mg by mouth 2 times daily      LORazepam (ATIVAN) 1 MG tablet Take 1 tablet by mouth every 8 hours as needed

## 2022-11-16 NOTE — PROGRESS NOTES
Patient is now ready for discharge, family is here to  patient. Discharge information discussed with the patient who is A+Ox4 - all questions answered and all needs are meet. Patient was educated and stated she would follow up with Psych MD (MD Sherryle Cater) and follow up with rehab services. 25 minutes of education provided and patient accepting. IV removed without complications, patient denies need for wheelchair and does not wish to wait for retail medication. Retail updated. Will discharge when patient leaves the floor.      Electronically signed by Fred Villaseñor RN on 11/16/2022 at 12:20 PM

## 2022-12-09 ENCOUNTER — SCHEDULED TELEPHONE ENCOUNTER (OUTPATIENT)
Dept: PSYCHIATRY | Age: 52
End: 2022-12-09

## 2022-12-09 DIAGNOSIS — F32.A DEPRESSION, UNSPECIFIED DEPRESSION TYPE: Primary | ICD-10-CM

## 2022-12-09 ASSESSMENT — PATIENT HEALTH QUESTIONNAIRE - PHQ9
4. FEELING TIRED OR HAVING LITTLE ENERGY: 2
2. FEELING DOWN, DEPRESSED OR HOPELESS: 2
SUM OF ALL RESPONSES TO PHQ QUESTIONS 1-9: 12
SUM OF ALL RESPONSES TO PHQ QUESTIONS 1-9: 12
5. POOR APPETITE OR OVEREATING: 2
10. IF YOU CHECKED OFF ANY PROBLEMS, HOW DIFFICULT HAVE THESE PROBLEMS MADE IT FOR YOU TO DO YOUR WORK, TAKE CARE OF THINGS AT HOME, OR GET ALONG WITH OTHER PEOPLE: 2
3. TROUBLE FALLING OR STAYING ASLEEP: 1
7. TROUBLE CONCENTRATING ON THINGS, SUCH AS READING THE NEWSPAPER OR WATCHING TELEVISION: 1
1. LITTLE INTEREST OR PLEASURE IN DOING THINGS: 2
9. THOUGHTS THAT YOU WOULD BE BETTER OFF DEAD, OR OF HURTING YOURSELF: 0
SUM OF ALL RESPONSES TO PHQ QUESTIONS 1-9: 12
SUM OF ALL RESPONSES TO PHQ9 QUESTIONS 1 & 2: 4
6. FEELING BAD ABOUT YOURSELF - OR THAT YOU ARE A FAILURE OR HAVE LET YOURSELF OR YOUR FAMILY DOWN: 2
8. MOVING OR SPEAKING SO SLOWLY THAT OTHER PEOPLE COULD HAVE NOTICED. OR THE OPPOSITE, BEING SO FIGETY OR RESTLESS THAT YOU HAVE BEEN MOVING AROUND A LOT MORE THAN USUAL: 0
SUM OF ALL RESPONSES TO PHQ QUESTIONS 1-9: 12

## 2022-12-09 ASSESSMENT — ANXIETY QUESTIONNAIRES
7. FEELING AFRAID AS IF SOMETHING AWFUL MIGHT HAPPEN: 2
3. WORRYING TOO MUCH ABOUT DIFFERENT THINGS: 3
6. BECOMING EASILY ANNOYED OR IRRITABLE: 0
IF YOU CHECKED OFF ANY PROBLEMS ON THIS QUESTIONNAIRE, HOW DIFFICULT HAVE THESE PROBLEMS MADE IT FOR YOU TO DO YOUR WORK, TAKE CARE OF THINGS AT HOME, OR GET ALONG WITH OTHER PEOPLE: VERY DIFFICULT
5. BEING SO RESTLESS THAT IT IS HARD TO SIT STILL: 2
2. NOT BEING ABLE TO STOP OR CONTROL WORRYING: 3
1. FEELING NERVOUS, ANXIOUS, OR ON EDGE: 3
GAD7 TOTAL SCORE: 16
4. TROUBLE RELAXING: 3

## 2023-01-01 ENCOUNTER — HOSPITAL ENCOUNTER (EMERGENCY)
Age: 53
Discharge: HOME | End: 2023-01-01
Attending: EMERGENCY MEDICINE
Payer: MEDICAID

## 2023-01-01 VITALS
HEIGHT: 64 IN | WEIGHT: 200 LBS | TEMPERATURE: 98 F | RESPIRATION RATE: 16 BRPM | DIASTOLIC BLOOD PRESSURE: 66 MMHG | SYSTOLIC BLOOD PRESSURE: 109 MMHG | OXYGEN SATURATION: 97 % | BODY MASS INDEX: 34.15 KG/M2 | HEART RATE: 103 BPM

## 2023-01-01 DIAGNOSIS — F10.920 ACUTE ALCOHOLIC INTOXICATION WITHOUT COMPLICATION (HCC): Primary | ICD-10-CM

## 2023-01-01 LAB
A/G RATIO: 1.5 (ref 1.1–2.2)
ACETAMINOPHEN LEVEL: <5 UG/ML (ref 10–30)
ALBUMIN SERPL-MCNC: 4.9 G/DL (ref 3.4–5)
ALP BLD-CCNC: 85 U/L (ref 40–129)
ALT SERPL-CCNC: 35 U/L (ref 10–40)
ANION GAP SERPL CALCULATED.3IONS-SCNC: 21 MMOL/L (ref 3–16)
AST SERPL-CCNC: 43 U/L (ref 15–37)
BASOPHILS ABSOLUTE: 0.1 K/UL (ref 0–0.2)
BASOPHILS RELATIVE PERCENT: 0.3 %
BILIRUB SERPL-MCNC: <0.2 MG/DL (ref 0–1)
BUN BLDV-MCNC: 10 MG/DL (ref 7–20)
CALCIUM SERPL-MCNC: 8.6 MG/DL (ref 8.3–10.6)
CHLORIDE BLD-SCNC: 98 MMOL/L (ref 99–110)
CO2: 21 MMOL/L (ref 21–32)
CREAT SERPL-MCNC: 0.7 MG/DL (ref 0.6–1.1)
EOSINOPHILS ABSOLUTE: 0 K/UL (ref 0–0.6)
EOSINOPHILS RELATIVE PERCENT: 0.1 %
ETHANOL: 329 MG/DL (ref 0–0.08)
GFR SERPL CREATININE-BSD FRML MDRD: >60 ML/MIN/{1.73_M2}
GLUCOSE BLD-MCNC: 116 MG/DL (ref 70–99)
HCT VFR BLD CALC: 45.5 % (ref 36–48)
HEMOGLOBIN: 15.8 G/DL (ref 12–16)
IMMATURE GRANULOCYTES #: 0.1 K/UL (ref 0–0.2)
IMMATURE GRANULOCYTES %: 0.3 %
LIPASE: 54 U/L (ref 13–60)
LYMPHOCYTES ABSOLUTE: 4.2 K/UL (ref 1–5.1)
LYMPHOCYTES RELATIVE PERCENT: 22.4 %
MCH RBC QN AUTO: 29.5 PG (ref 26–34)
MCHC RBC AUTO-ENTMCNC: 34.7 G/DL (ref 32–36.4)
MCV RBC AUTO: 84.9 FL (ref 80–100)
MONOCYTES ABSOLUTE: 0.7 K/UL (ref 0–1.3)
MONOCYTES RELATIVE PERCENT: 3.8 %
NEUTROPHILS ABSOLUTE: 13.8 K/UL (ref 1.7–7.7)
NEUTROPHILS RELATIVE PERCENT: 73.1 %
PDW BLD-RTO: 13.3 % (ref 12.4–15.4)
PLATELET # BLD: 365 K/UL (ref 135–450)
PMV BLD AUTO: 8.8 FL (ref 5–10.5)
POTASSIUM REFLEX MAGNESIUM: 4.5 MMOL/L (ref 3.5–5.1)
RBC # BLD: 5.36 M/UL (ref 4–5.2)
SALICYLATE, SERUM: <0.3 MG/DL (ref 15–30)
SODIUM BLD-SCNC: 140 MMOL/L (ref 136–145)
TOTAL PROTEIN: 8.2 G/DL (ref 6.4–8.2)
WBC # BLD: 18.9 K/UL (ref 4–11)

## 2023-01-01 PROCEDURE — 82077 ASSAY SPEC XCP UR&BREATH IA: CPT

## 2023-01-01 PROCEDURE — 36415 COLL VENOUS BLD VENIPUNCTURE: CPT

## 2023-01-01 PROCEDURE — 6370000000 HC RX 637 (ALT 250 FOR IP): Performed by: EMERGENCY MEDICINE

## 2023-01-01 PROCEDURE — 85025 COMPLETE CBC W/AUTO DIFF WBC: CPT

## 2023-01-01 PROCEDURE — 96376 TX/PRO/DX INJ SAME DRUG ADON: CPT

## 2023-01-01 PROCEDURE — 83690 ASSAY OF LIPASE: CPT

## 2023-01-01 PROCEDURE — 80143 DRUG ASSAY ACETAMINOPHEN: CPT

## 2023-01-01 PROCEDURE — 2580000003 HC RX 258: Performed by: EMERGENCY MEDICINE

## 2023-01-01 PROCEDURE — 80179 DRUG ASSAY SALICYLATE: CPT

## 2023-01-01 PROCEDURE — 80053 COMPREHEN METABOLIC PANEL: CPT

## 2023-01-01 PROCEDURE — 6360000002 HC RX W HCPCS: Performed by: EMERGENCY MEDICINE

## 2023-01-01 PROCEDURE — 96374 THER/PROPH/DIAG INJ IV PUSH: CPT

## 2023-01-01 PROCEDURE — 99284 EMERGENCY DEPT VISIT MOD MDM: CPT

## 2023-01-01 RX ORDER — LORAZEPAM 1 MG/1
2 TABLET ORAL ONCE
Status: COMPLETED | OUTPATIENT
Start: 2023-01-01 | End: 2023-01-01

## 2023-01-01 RX ORDER — 0.9 % SODIUM CHLORIDE 0.9 %
1000 INTRAVENOUS SOLUTION INTRAVENOUS ONCE
Status: COMPLETED | OUTPATIENT
Start: 2023-01-01 | End: 2023-01-01

## 2023-01-01 RX ORDER — ONDANSETRON 2 MG/ML
4 INJECTION INTRAMUSCULAR; INTRAVENOUS ONCE
Status: COMPLETED | OUTPATIENT
Start: 2023-01-01 | End: 2023-01-01

## 2023-01-01 RX ADMIN — SODIUM CHLORIDE 1000 ML: 9 INJECTION, SOLUTION INTRAVENOUS at 05:51

## 2023-01-01 RX ADMIN — LORAZEPAM 2 MG: 1 TABLET ORAL at 07:50

## 2023-01-01 RX ADMIN — ONDANSETRON 4 MG: 2 INJECTION INTRAMUSCULAR; INTRAVENOUS at 07:25

## 2023-01-01 RX ADMIN — ONDANSETRON 4 MG: 2 INJECTION INTRAMUSCULAR; INTRAVENOUS at 05:51

## 2023-01-01 ASSESSMENT — PAIN SCALES - GENERAL
PAINLEVEL_OUTOF10: 0
PAINLEVEL_OUTOF10: 0

## 2023-01-01 ASSESSMENT — PAIN - FUNCTIONAL ASSESSMENT
PAIN_FUNCTIONAL_ASSESSMENT: 0-10
PAIN_FUNCTIONAL_ASSESSMENT: 0-10

## 2023-01-01 ASSESSMENT — LIFESTYLE VARIABLES
HOW OFTEN DO YOU HAVE A DRINK CONTAINING ALCOHOL: PATIENT DECLINED
HOW MANY STANDARD DRINKS CONTAINING ALCOHOL DO YOU HAVE ON A TYPICAL DAY: PATIENT DECLINED

## 2023-01-01 NOTE — ED NOTES
Dr Suly Graham aware patient asked for medication for her nerves.        Gio Cruz RN  01/01/23 [address], RN  01/01/23 1515

## 2023-01-01 NOTE — ED PROVIDER NOTES
[address]      Pt Name: Seng Toth  MRN: [id number]  [place] 1970  Date of evaluation: 1/1/2023  Provider: Jazmine Naqvi DO    CHIEF COMPLAINT  Chief Complaint   Patient presents with    Alcohol Intoxication     Reports she is cold and shaky/ having n/v from drinking too much alcohol again/ EMS reports she has problem w mother goes to hotel and drinks alcohol calls  tells them she is dying. EMS states she does this monthly         This patient was turned over to me at 0700 By Dr. Angelika Treadwell. They were examined by me and I agree with the history and physical examination of Dr. Angelika Treadwell. After my evaluation of this patient, and review of the labs, they will be discharged. Past Medical History:   Diagnosis Date    Alcohol abuse     Ankle fracture 2022    bilateral    Anxiety     Depression     Sleeping difficulties        Vitals:    01/01/23 0800   BP: 111/71   Pulse: (!) 104   Resp: 17   Temp:    SpO2: 91%       MDM:  Seng Toth is a 45 y.o. female who presents with alcohol intoxication. She got in a fight with her mother was drinking too much alcohol \"again. \"  Patient does this frequently. When she has an argument with her mother she leaves and goes to the hotel and drinks alcohol. She calls the  and tells the cart she is dying. They called 911 and they bring her to the emergency department. Patient states herself that she has been through this \"more than 100 times. \"  Patient had shaking when I walked into the room. It is noted when patient's attention gets drawn away from her physical condition that her shaking stops. She is only shaking on the right side when I talk to her. That stops when her attention is drawn away from her symptoms. Her blood pressure is 109/66.   This does not appear to be withdrawal.  Patient was discharged to follow-up with her doctor and return to emergency department for any problems. Patient ambulated out of emergency department without difficulty. She was upset because she received oral Ativan instead of IV Ativan. She wanted IV Ativan in the emergency department. Patient is requesting to be admitted to the hospital.  However, her blood pressure was normal.  She was mildly tachycardic. There is no indication to admit her to the hospital.  White count was mildly elevated at 18.9. Her temperature was normal.  Her respiratory rate was normal.  Pulse ox was in the 90s at discharge. She is not dyspneic. She is speaking in full sentences. We were attempting to give the patient referrals to substance abuse programs. However, she ambulated out of emergency department without receiving her discharge instructions. She had no difficulty walking. I did offer the patient to mental health admission but she refused at this time. She states she does not need help with her mental status. She needs help with her \"withdrawal.\"  However, she does not appear to be in withdrawal at this time. She was instructed return if any problems verbally. Vitals:    01/01/23 0800   BP: 111/71   Pulse: (!) 104   Resp: 17   Temp:    SpO2: 91%       Labs Reviewed   CBC WITH AUTO DIFFERENTIAL - Abnormal; Notable for the following components:       Result Value    WBC 18.9 (*)     RBC 5.36 (*)     Neutrophils Absolute 13.8 (*)     All other components within normal limits   COMPREHENSIVE METABOLIC PANEL W/ REFLEX TO MG FOR LOW K - Abnormal; Notable for the following components:    Chloride 98 (*)     Anion Gap 21 (*)     Glucose 116 (*)     AST 43 (*)     All other components within normal limits   ETHANOL   LIPASE   URINE DRUG SCREEN   ACETAMINOPHEN LEVEL   SALICYLATE LEVEL       No orders to display       Pertinent Labs reviewed. (See chart for details)    Patient remained stable in the ED.      The patient's blood pressure was not found to be elevated according to CMS/Medicare and the Affordable Care Act/ObamaCare criteria. See discharge instructions for specific medications, discharge information, and treatments. They were verbally instructed to return to emergency if any problems. Medications   0.9 % sodium chloride bolus (0 mLs IntraVENous Stopped 1/1/23 0626)   ondansetron (ZOFRAN) injection 4 mg (4 mg IntraVENous Given 1/1/23 0551)   ondansetron (ZOFRAN) injection 4 mg (4 mg IntraVENous Given 1/1/23 0725)   LORazepam (ATIVAN) tablet 2 mg (2 mg Oral Given 1/1/23 0880)       New Prescriptions    No medications on file       (This chart has been completed using [address]. Although attempts have been made to ensure accuracy, words and/or phrases may not be transcribed as intended.)    Patient refused pain medicines at the time of their exam.    IMPRESSION(S):  1. Acute alcoholic intoxication without complication ([facility]      ? Recheck Times: 0830 patient eloped before receiving discharge instructions.            Sarahi Trevnio DO  01/01/23 2148

## 2023-01-01 NOTE — ED NOTES
Patient has right arm shaking. Patient stating she does not think the ativan is working and asked why we gave it by mouth. I instructed patient I gave as it was ordered.        Magda Jenkins RN  01/01/23 5107

## 2023-01-01 NOTE — ED NOTES
Pt states she feels better/ No n/v noted during ED visit. Md updated.       Amrita Gimenez RN  01/01/23 1999

## 2023-01-01 NOTE — ED PROVIDER NOTES
CHIEF COMPLAINT  Chief Complaint   Patient presents with    Alcohol Intoxication     Reports she is cold and shaky/ having n/v from drinking too much alcohol again/ EMS reports she has problem w mother goes to hotel and drinks alcohol calls  tells them she is dying. EMS states she does this monthly       HISTORY OF PRESENT ILLNESS  Ham Smith is a 45 y.o. female who presents to the ED complaining of having drank 1/5 of alcohol last night with associated nausea, vomiting and tremors, feeling cold, having had an argument with her mother last night, checking into a hotel, drink heavily and then called EMS to bring her to the emergency room. Review of the medical record reveals a similar presentation 5 times over the last 2-1/2 months. Patient denies any current suicidal ideation although she does report that she has chronic depression. Patient denies any coingestions. Patient reports shaking of her arms and is concerned that she may be having some withdrawal.  No current headaches. No diarrhea. No chest pain, abdominal pain. No back pain. No dysuria. No other complaints, modifying factors or associated symptoms. Nursing notes reviewed.    Past Medical History:   Diagnosis Date    Alcohol abuse     Ankle fracture     bilateral    Anxiety     Depression     Sleeping difficulties      Past Surgical History:   Procedure Laterality Date    BREAST SURGERY      sailine breast      SECTION  1988    FOREARM SURGERY Left 2021    OPEN REDUCTION INTERNAL FIXATION LEFT DISTAL RADIUS performed by Onel Camp MD at [facility]  2006    right wrist     Family History   Problem Relation Age of Onset    No Known Problems Mother     No Known Problems Father     Other Brother         anxiety    Cancer Maternal Grandmother         lung cancer    Cancer Maternal Grandfather         lung     Social History     Socioeconomic History    Marital status: Single     Spouse name: Not on file    Number of children: Not on file    Years of education: Not on file    Highest education level: Not on file   Occupational History    Not on file   Tobacco Use    Smoking status: Every Day     Packs/day: 0.50     Years: 8.00     Pack years: 4.00     Types: Cigarettes    Smokeless tobacco: Never   Vaping Use    Vaping Use: Unknown   Substance and Sexual Activity    Alcohol use: Yes     Comment: states binge drinks but doesnt drink daily    Drug use: No    Sexual activity: Not Currently   Other Topics Concern    Not on file   Social History Narrative    Not on file     Social Determinants of Health     Financial Resource Strain: Not on file   Food Insecurity: Not on file   Transportation Needs: Not on file   Physical Activity: Not on file   Stress: Not on file   Social Connections: Not on file   Intimate Partner Violence: Not on file   Housing Stability: Not on file     Current Facility-Administered Medications   Medication Dose Route Frequency Provider Last Rate Last Admin    0.9 % sodium chloride bolus  1,000 mL IntraVENous Once Gladys Robertson .6 mL/hr at 01/01/23 0551 1,000 mL at 01/01/23 0551    ondansetron (ZOFRAN) injection 4 mg  4 mg IntraVENous Once Gladys Robertson MD         Current Outpatient Medications   Medication Sig Dispense Refill    busPIRone (BUSPAR) 15 MG tablet Take 7.5 mg by mouth 2 times daily (Patient not taking: Reported on 1/1/2023)      citalopram (CELEXA) 10 MG tablet Take 1 tablet by mouth daily (Patient not taking: Reported on 1/1/2023) 30 tablet 3    folic acid (FOLVITE) 1 MG tablet Take 1 tablet by mouth daily (Patient not taking: Reported on 1/1/2023) 30 tablet 3    nicotine (NICODERM CQ) 21 MG/24HR Place 1 patch onto the skin daily (Patient not taking: Reported on 1/1/2023) 30 patch 3    Multiple Vitamin (MULTIVITAMIN) TABS tablet Take 1 tablet by mouth daily (Patient not taking: Reported on 1/1/2023) 30 tablet 0    thiamine mononitrate (THIAMINE) 100 MG tablet Take 1 tablet by mouth daily (Patient not taking: Reported on 1/1/2023) 30 tablet 0    traZODone (DESYREL) 100 MG tablet Take 100 mg by mouth nightly as needed for Sleep  (Patient not taking: Reported on 1/1/2023)       No Known Allergies    REVIEW OF SYSTEMS  Positives and pertinent negatives as per HPI. Six other systems were reviewed and are negative. Nursing notes pertaining to ROS were reviewed. PHYSICAL EXAM   BP (!) 126/90   Pulse (!) 107   Temp 98 °F (36.7 °C) (Tympanic)   Resp 18   Ht 5' 4\" (1.626 m)   Wt 200 lb (90.7 kg)   LMP  (LMP Unknown)   SpO2 94%   BMI 34.33 kg/m²   General: Alert and oriented x 3, NAD. No increased work of breathing or accessory muscle use. Non-ill appearing. Appropriate and interactive  Eyes: PERRL, no scleral icterus, injection or exudate. EOMI. HENT: Atraumatic. Oral pharynx is clear, moist, no enanthem. No tonsillar hypertropy or exudate. Nasal mucous membranes are clear. Neck:  No Lymphadenopathy. Non-tender to palpation. Normal ROM. No JVD. No thyromegaly. No Mass. PULMONARY: Lungs clear bilaterally without wheezes, rales or rhonchi. Good air movement bilaterally. CV: Regular rate and rhythm without murmurs, rubs or gallops. ABD: Soft, non-tender, non-distended, normal bowel sounds, no hepatosplenomegaly, no masses. No peritoneal signs, rebound or guarding. Back:  No CVAT, no rash. EXT: No cyanosis or clubbing. No rash. CR < 2 seconds. No tenderness to palpation. No lower extremity edema. +2 pulses in upper/lower extremities bilaterally. Skin is warm and dry. Patient has moderate high-frequency movements of both arms but when talking or distracted these movements completely resolved and has no fine resting tremor. No dysdiadochokinesis. No past-pointing. PSYCH: normal affect    LABS  I have reviewed all labs for this visit.    Results for orders placed or performed during the hospital encounter of 01/01/23   CBC with Auto Differential   Result Value Ref Range    WBC 18.9 (H) 4.0 - 11.0 K/uL    RBC 5.36 (H) 4.00 - 5.20 M/uL    Hemoglobin 15.8 12.0 - 16.0 g/dL    Hematocrit 45.5 36.0 - 48.0 %    MCV 84.9 80.0 - 100.0 fL    MCH 29.5 26.0 - 34.0 pg    MCHC 34.7 32.0 - 36.4 g/dL    RDW 13.3 12.4 - 15.4 %    Platelets 859 637 - 630 K/uL    MPV 8.8 5.0 - 10.5 fL    Neutrophils % 73.1 %    Immature Granulocytes % 0.3 %    Lymphocytes % 22.4 %    Monocytes % 3.8 %    Eosinophils % 0.1 %    Basophils % 0.3 %    Neutrophils Absolute 13.8 (H) 1.7 - 7.7 K/uL    Immature Granulocytes # 0.1 0.0 - 0.2 K/uL    Lymphocytes Absolute 4.2 1.0 - 5.1 K/uL    Monocytes Absolute 0.7 0.0 - 1.3 K/uL    Eosinophils Absolute 0.0 0.0 - 0.6 K/uL    Basophils Absolute 0.1 0.0 - 0.2 K/uL           ED COURSE/MDM  Acute alcohol intoxication with no evidence of current withdrawal or coingestions in a patient with a history of depression but no current report of suicidal ideation or plan. No current hallucinations. Pt will be signed over to Dr. Lizeth Newell at 0700 to monitor the patient until stable enough to be discharged. Patient was given scripts for the following medications. I counseled patient how to take these medications. New Prescriptions    No medications on file         CLINICAL IMPRESSION  1. Acute alcoholic intoxication without complication (HCC)        Blood pressure (!) 126/90, pulse (!) 107, temperature 98 °F (36.7 °C), temperature source Tympanic, resp. rate 18, height 5' 4\" (1.626 m), weight 200 lb (90.7 kg), SpO2 94 %, not currently breastfeeding. Follow-up with:  No follow-up provider specified.        Harpreet Combs MD  01/01/23 1360

## 2023-01-01 NOTE — ED NOTES
Patient walked out without any paper work. No tremors. Without treatment center information.        Jeff Colmenares RN  01/01/23 4719       Jeff Colmenares RN  01/01/23 [address], RN  01/01/23 2533

## 2023-01-01 NOTE — ED NOTES
Warm blankets applied lights dimmed/ pt instructed of plan of care and process. No n/v noted while in ED. Call light in reach. Pt also instructed of need for clean catch urine. MD Spike Wu into see pt and evaluate.       Nicki Lorenzo, BEATRICE  01/01/23 [address], RN  01/01/23 [address], RN  01/01/23 7293

## 2023-01-02 ENCOUNTER — HOSPITAL ENCOUNTER (EMERGENCY)
Age: 53
Discharge: LEFT BEFORE DISCHARGE | End: 2023-01-02

## 2023-01-02 ENCOUNTER — HOSPITAL ENCOUNTER (INPATIENT)
Age: 53
LOS: 2 days | Discharge: HOME | DRG: 897 | End: 2023-01-05
Attending: EMERGENCY MEDICINE | Admitting: INTERNAL MEDICINE
Payer: COMMERCIAL

## 2023-01-02 DIAGNOSIS — F10.239 ALCOHOL DEPENDENCE WITH WITHDRAWAL WITH COMPLICATION (HCC): Primary | ICD-10-CM

## 2023-01-02 PROCEDURE — 99285 EMERGENCY DEPT VISIT HI MDM: CPT

## 2023-01-02 RX ORDER — SODIUM CHLORIDE, SODIUM LACTATE, POTASSIUM CHLORIDE, AND CALCIUM CHLORIDE .6; .31; .03; .02 G/100ML; G/100ML; G/100ML; G/100ML
1000 INJECTION, SOLUTION INTRAVENOUS ONCE
Status: COMPLETED | OUTPATIENT
Start: 2023-01-03 | End: 2023-01-03

## 2023-01-02 RX ORDER — LORAZEPAM 2 MG/ML
1 INJECTION INTRAMUSCULAR ONCE
Status: COMPLETED | OUTPATIENT
Start: 2023-01-03 | End: 2023-01-03

## 2023-01-03 PROBLEM — F10.939 ALCOHOL WITHDRAWAL SYNDROME WITH COMPLICATION, WITH UNSPECIFIED COMPLICATION (HCC): Status: ACTIVE | Noted: 2023-01-03

## 2023-01-03 LAB
A/G RATIO: 1.7 (ref 1.1–2.2)
ACETAMINOPHEN LEVEL: <5 UG/ML (ref 10–30)
ALBUMIN SERPL-MCNC: 4.3 G/DL (ref 3.4–5)
ALP BLD-CCNC: 89 U/L (ref 40–129)
ALT SERPL-CCNC: 87 U/L (ref 10–40)
AMPHETAMINE SCREEN, URINE: NORMAL
ANION GAP SERPL CALCULATED.3IONS-SCNC: 17 MMOL/L (ref 3–16)
AST SERPL-CCNC: 95 U/L (ref 15–37)
BACTERIA: ABNORMAL /HPF
BARBITURATE SCREEN URINE: NORMAL
BASOPHILS ABSOLUTE: 0.1 K/UL (ref 0–0.2)
BASOPHILS RELATIVE PERCENT: 0.5 %
BENZODIAZEPINE SCREEN, URINE: NORMAL
BILIRUB SERPL-MCNC: 0.5 MG/DL (ref 0–1)
BILIRUBIN URINE: ABNORMAL
BLOOD, URINE: ABNORMAL
BUDDING YEAST: PRESENT
BUN BLDV-MCNC: 6 MG/DL (ref 7–20)
CALCIUM SERPL-MCNC: 8.9 MG/DL (ref 8.3–10.6)
CANNABINOID SCREEN URINE: NORMAL
CHLORIDE BLD-SCNC: 93 MMOL/L (ref 99–110)
CLARITY: ABNORMAL
CO2: 24 MMOL/L (ref 21–32)
COCAINE METABOLITE SCREEN URINE: NORMAL
COLOR: ABNORMAL
CREAT SERPL-MCNC: 0.7 MG/DL (ref 0.6–1.1)
EOSINOPHILS ABSOLUTE: 0 K/UL (ref 0–0.6)
EOSINOPHILS RELATIVE PERCENT: 0.1 %
EPITHELIAL CELLS, UA: 2 /HPF (ref 0–5)
ETHANOL: NORMAL MG/DL (ref 0–0.08)
FENTANYL SCREEN, URINE: NORMAL
GFR SERPL CREATININE-BSD FRML MDRD: >60 ML/MIN/{1.73_M2}
GLUCOSE BLD-MCNC: 94 MG/DL (ref 70–99)
GLUCOSE URINE: ABNORMAL MG/DL
HCT VFR BLD CALC: 42.7 % (ref 36–48)
HEMOGLOBIN: 14.1 G/DL (ref 12–16)
HYALINE CASTS: 1 /LPF (ref 0–8)
KETONES, URINE: ABNORMAL MG/DL
LEUKOCYTE ESTERASE, URINE: ABNORMAL
LYMPHOCYTES ABSOLUTE: 3 K/UL (ref 1–5.1)
LYMPHOCYTES RELATIVE PERCENT: 17.2 %
Lab: NORMAL
MCH RBC QN AUTO: 29 PG (ref 26–34)
MCHC RBC AUTO-ENTMCNC: 32.9 G/DL (ref 31–36)
MCV RBC AUTO: 88.2 FL (ref 80–100)
METHADONE SCREEN, URINE: NORMAL
MICROSCOPIC EXAMINATION: YES
MONOCYTES ABSOLUTE: 1.2 K/UL (ref 0–1.3)
MONOCYTES RELATIVE PERCENT: 6.6 %
NEUTROPHILS ABSOLUTE: 13.2 K/UL (ref 1.7–7.7)
NEUTROPHILS RELATIVE PERCENT: 75.6 %
NITRITE, URINE: ABNORMAL
OPIATE SCREEN URINE: NORMAL
OXYCODONE URINE: NORMAL
PDW BLD-RTO: 14.3 % (ref 12.4–15.4)
PH UA: 7
PH UA: ABNORMAL (ref 5–8)
PHENCYCLIDINE SCREEN URINE: NORMAL
PLATELET # BLD: 288 K/UL (ref 135–450)
PMV BLD AUTO: 7.9 FL (ref 5–10.5)
POTASSIUM REFLEX MAGNESIUM: 3.6 MMOL/L (ref 3.5–5.1)
PROTEIN UA: ABNORMAL MG/DL
RAPID INFLUENZA  B AGN: NEGATIVE
RAPID INFLUENZA A AGN: NEGATIVE
RBC # BLD: 4.85 M/UL (ref 4–5.2)
RBC UA: 8 /HPF (ref 0–4)
SALICYLATE, SERUM: <0.3 MG/DL (ref 15–30)
SARS-COV-2, NAAT: NOT DETECTED
SODIUM BLD-SCNC: 134 MMOL/L (ref 136–145)
SPECIFIC GRAVITY UA: ABNORMAL (ref 1–1.03)
TOTAL PROTEIN: 6.8 G/DL (ref 6.4–8.2)
URINE REFLEX TO CULTURE: YES
URINE TYPE: ABNORMAL
UROBILINOGEN, URINE: ABNORMAL E.U./DL
WBC # BLD: 17.5 K/UL (ref 4–11)
WBC UA: 139 /HPF (ref 0–5)

## 2023-01-03 PROCEDURE — 80307 DRUG TEST PRSMV CHEM ANLYZR: CPT

## 2023-01-03 PROCEDURE — 87635 SARS-COV-2 COVID-19 AMP PRB: CPT

## 2023-01-03 PROCEDURE — 87186 SC STD MICRODIL/AGAR DIL: CPT

## 2023-01-03 PROCEDURE — 96374 THER/PROPH/DIAG INJ IV PUSH: CPT

## 2023-01-03 PROCEDURE — 2580000003 HC RX 258: Performed by: INTERNAL MEDICINE

## 2023-01-03 PROCEDURE — 87088 URINE BACTERIA CULTURE: CPT

## 2023-01-03 PROCEDURE — 82077 ASSAY SPEC XCP UR&BREATH IA: CPT

## 2023-01-03 PROCEDURE — HZ2ZZZZ DETOXIFICATION SERVICES FOR SUBSTANCE ABUSE TREATMENT: ICD-10-PCS | Performed by: INTERNAL MEDICINE

## 2023-01-03 PROCEDURE — 87086 URINE CULTURE/COLONY COUNT: CPT

## 2023-01-03 PROCEDURE — 80143 DRUG ASSAY ACETAMINOPHEN: CPT

## 2023-01-03 PROCEDURE — 85025 COMPLETE CBC W/AUTO DIFF WBC: CPT

## 2023-01-03 PROCEDURE — 80179 DRUG ASSAY SALICYLATE: CPT

## 2023-01-03 PROCEDURE — 96361 HYDRATE IV INFUSION ADD-ON: CPT

## 2023-01-03 PROCEDURE — 6360000002 HC RX W HCPCS: Performed by: INTERNAL MEDICINE

## 2023-01-03 PROCEDURE — 2580000003 HC RX 258: Performed by: EMERGENCY MEDICINE

## 2023-01-03 PROCEDURE — 80053 COMPREHEN METABOLIC PANEL: CPT

## 2023-01-03 PROCEDURE — 81003 URINALYSIS AUTO W/O SCOPE: CPT

## 2023-01-03 PROCEDURE — 1200000000 HC SEMI PRIVATE

## 2023-01-03 PROCEDURE — 6360000002 HC RX W HCPCS: Performed by: EMERGENCY MEDICINE

## 2023-01-03 PROCEDURE — 6370000000 HC RX 637 (ALT 250 FOR IP): Performed by: INTERNAL MEDICINE

## 2023-01-03 PROCEDURE — 87804 INFLUENZA ASSAY W/OPTIC: CPT

## 2023-01-03 RX ORDER — GAUZE BANDAGE 2" X 2"
100 BANDAGE TOPICAL DAILY
Status: DISCONTINUED | OUTPATIENT
Start: 2023-01-03 | End: 2023-01-05 | Stop reason: HOSPADM

## 2023-01-03 RX ORDER — POLYETHYLENE GLYCOL 3350 17 G/17G
17 POWDER, FOR SOLUTION ORAL DAILY PRN
Status: DISCONTINUED | OUTPATIENT
Start: 2023-01-03 | End: 2023-01-05 | Stop reason: HOSPADM

## 2023-01-03 RX ORDER — LORAZEPAM 1 MG/1
3 TABLET ORAL
Status: DISCONTINUED | OUTPATIENT
Start: 2023-01-03 | End: 2023-01-05 | Stop reason: HOSPADM

## 2023-01-03 RX ORDER — ONDANSETRON 4 MG/1
4 TABLET, ORALLY DISINTEGRATING ORAL EVERY 8 HOURS PRN
Status: DISCONTINUED | OUTPATIENT
Start: 2023-01-03 | End: 2023-01-05 | Stop reason: HOSPADM

## 2023-01-03 RX ORDER — POTASSIUM CHLORIDE 7.45 MG/ML
10 INJECTION INTRAVENOUS PRN
Status: DISCONTINUED | OUTPATIENT
Start: 2023-01-03 | End: 2023-01-05 | Stop reason: HOSPADM

## 2023-01-03 RX ORDER — LORAZEPAM 2 MG/ML
3 INJECTION INTRAMUSCULAR
Status: DISCONTINUED | OUTPATIENT
Start: 2023-01-03 | End: 2023-01-05 | Stop reason: HOSPADM

## 2023-01-03 RX ORDER — LORAZEPAM 2 MG/ML
4 INJECTION INTRAMUSCULAR
Status: DISCONTINUED | OUTPATIENT
Start: 2023-01-03 | End: 2023-01-05 | Stop reason: HOSPADM

## 2023-01-03 RX ORDER — ACETAMINOPHEN 325 MG/1
650 TABLET ORAL EVERY 6 HOURS PRN
Status: DISCONTINUED | OUTPATIENT
Start: 2023-01-03 | End: 2023-01-05 | Stop reason: HOSPADM

## 2023-01-03 RX ORDER — OMEGA-3 FATTY ACIDS CAP DELAYED RELEASE 1000 MG 1000 MG
1000 CAPSULE DELAYED RELEASE ORAL DAILY
Status: ON HOLD | COMMUNITY
End: 2023-01-10

## 2023-01-03 RX ORDER — SODIUM CHLORIDE 0.9 % (FLUSH) 0.9 %
5-40 SYRINGE (ML) INJECTION EVERY 12 HOURS SCHEDULED
Status: DISCONTINUED | OUTPATIENT
Start: 2023-01-03 | End: 2023-01-05 | Stop reason: HOSPADM

## 2023-01-03 RX ORDER — MAGNESIUM SULFATE IN WATER 40 MG/ML
2000 INJECTION, SOLUTION INTRAVENOUS PRN
Status: DISCONTINUED | OUTPATIENT
Start: 2023-01-03 | End: 2023-01-05 | Stop reason: HOSPADM

## 2023-01-03 RX ORDER — SODIUM CHLORIDE 9 MG/ML
INJECTION, SOLUTION INTRAVENOUS CONTINUOUS
Status: DISCONTINUED | OUTPATIENT
Start: 2023-01-03 | End: 2023-01-05 | Stop reason: HOSPADM

## 2023-01-03 RX ORDER — LORAZEPAM 1 MG/1
4 TABLET ORAL
Status: DISCONTINUED | OUTPATIENT
Start: 2023-01-03 | End: 2023-01-05 | Stop reason: HOSPADM

## 2023-01-03 RX ORDER — MULTIVITAMIN WITH IRON
1 TABLET ORAL DAILY
Status: DISCONTINUED | OUTPATIENT
Start: 2023-01-03 | End: 2023-01-05 | Stop reason: HOSPADM

## 2023-01-03 RX ORDER — LORAZEPAM 2 MG/ML
2 INJECTION INTRAMUSCULAR
Status: DISCONTINUED | OUTPATIENT
Start: 2023-01-03 | End: 2023-01-05 | Stop reason: HOSPADM

## 2023-01-03 RX ORDER — LORAZEPAM 1 MG/1
2 TABLET ORAL
Status: DISCONTINUED | OUTPATIENT
Start: 2023-01-03 | End: 2023-01-05 | Stop reason: HOSPADM

## 2023-01-03 RX ORDER — ENOXAPARIN SODIUM 100 MG/ML
40 INJECTION SUBCUTANEOUS DAILY
Status: DISCONTINUED | OUTPATIENT
Start: 2023-01-03 | End: 2023-01-05 | Stop reason: HOSPADM

## 2023-01-03 RX ORDER — SODIUM CHLORIDE 9 MG/ML
INJECTION, SOLUTION INTRAVENOUS PRN
Status: DISCONTINUED | OUTPATIENT
Start: 2023-01-03 | End: 2023-01-05 | Stop reason: HOSPADM

## 2023-01-03 RX ORDER — LORAZEPAM 1 MG/1
1 TABLET ORAL
Status: DISCONTINUED | OUTPATIENT
Start: 2023-01-03 | End: 2023-01-05 | Stop reason: HOSPADM

## 2023-01-03 RX ORDER — SODIUM CHLORIDE 0.9 % (FLUSH) 0.9 %
5-40 SYRINGE (ML) INJECTION PRN
Status: DISCONTINUED | OUTPATIENT
Start: 2023-01-03 | End: 2023-01-05 | Stop reason: HOSPADM

## 2023-01-03 RX ORDER — LORAZEPAM 2 MG/ML
1 INJECTION INTRAMUSCULAR
Status: DISCONTINUED | OUTPATIENT
Start: 2023-01-03 | End: 2023-01-05 | Stop reason: HOSPADM

## 2023-01-03 RX ORDER — ONDANSETRON 2 MG/ML
4 INJECTION INTRAMUSCULAR; INTRAVENOUS EVERY 6 HOURS PRN
Status: DISCONTINUED | OUTPATIENT
Start: 2023-01-03 | End: 2023-01-05 | Stop reason: HOSPADM

## 2023-01-03 RX ORDER — ACETAMINOPHEN 650 MG/1
650 SUPPOSITORY RECTAL EVERY 6 HOURS PRN
Status: DISCONTINUED | OUTPATIENT
Start: 2023-01-03 | End: 2023-01-05 | Stop reason: HOSPADM

## 2023-01-03 RX ADMIN — SODIUM CHLORIDE, POTASSIUM CHLORIDE, SODIUM LACTATE AND CALCIUM CHLORIDE 1000 ML: 600; 310; 30; 20 INJECTION, SOLUTION INTRAVENOUS at 00:42

## 2023-01-03 RX ADMIN — LORAZEPAM 2 MG: 2 INJECTION INTRAMUSCULAR at 12:56

## 2023-01-03 RX ADMIN — CEFTRIAXONE 1000 MG: 1 INJECTION, POWDER, FOR SOLUTION INTRAMUSCULAR; INTRAVENOUS at 18:56

## 2023-01-03 RX ADMIN — SODIUM CHLORIDE: 9 INJECTION, SOLUTION INTRAVENOUS at 03:42

## 2023-01-03 RX ADMIN — LORAZEPAM 1 MG: 2 INJECTION INTRAMUSCULAR; INTRAVENOUS at 00:39

## 2023-01-03 RX ADMIN — LORAZEPAM 2 MG: 2 INJECTION INTRAMUSCULAR at 08:25

## 2023-01-03 RX ADMIN — LORAZEPAM 2 MG: 2 INJECTION INTRAMUSCULAR at 20:32

## 2023-01-03 RX ADMIN — LORAZEPAM 1 MG: 1 TABLET ORAL at 17:44

## 2023-01-03 RX ADMIN — ENOXAPARIN SODIUM 40 MG: 100 INJECTION SUBCUTANEOUS at 08:24

## 2023-01-03 RX ADMIN — SODIUM CHLORIDE: 9 INJECTION, SOLUTION INTRAVENOUS at 17:45

## 2023-01-03 RX ADMIN — SODIUM CHLORIDE, PRESERVATIVE FREE 10 ML: 5 INJECTION INTRAVENOUS at 22:59

## 2023-01-03 RX ADMIN — THERA TABS 1 TABLET: TAB at 08:23

## 2023-01-03 RX ADMIN — ONDANSETRON 4 MG: 2 INJECTION INTRAMUSCULAR; INTRAVENOUS at 23:06

## 2023-01-03 RX ADMIN — SODIUM CHLORIDE: 9 INJECTION, SOLUTION INTRAVENOUS at 23:12

## 2023-01-03 RX ADMIN — Medication 100 MG: at 08:23

## 2023-01-03 RX ADMIN — LORAZEPAM 2 MG: 2 INJECTION INTRAMUSCULAR at 22:59

## 2023-01-03 RX ADMIN — SODIUM CHLORIDE, PRESERVATIVE FREE 10 ML: 5 INJECTION INTRAVENOUS at 09:05

## 2023-01-03 ASSESSMENT — LIFESTYLE VARIABLES
HOW OFTEN DO YOU HAVE A DRINK CONTAINING ALCOHOL: 2-3 TIMES A WEEK
HOW MANY STANDARD DRINKS CONTAINING ALCOHOL DO YOU HAVE ON A TYPICAL DAY: 5 OR 6

## 2023-01-03 ASSESSMENT — PAIN SCALES - GENERAL
PAINLEVEL_OUTOF10: 0

## 2023-01-03 ASSESSMENT — PAIN - FUNCTIONAL ASSESSMENT: PAIN_FUNCTIONAL_ASSESSMENT: 0-10

## 2023-01-03 NOTE — CARE COORDINATION
INITIAL CASE MANAGEMENT ASSESSMENT     Reviewed chart, met with patient to assess possible discharge needs. Explained Case Management role/services. SW interviewed patient alone a bedside  today. Living Situation: Patient resides in an apartment home alone with no pets. Prior to medical admission she reports that she had no trouble getting in and out of the property. ADLs: Prior to medical admission she was reportedly independent. She stated that she doesn't anticipate any needs at discharge. DME: Prior to medical admission patient reports that she used no durable medical equipment. She stated that she doesn't anticipate any needs at discharge. PT/OT: Not ordered. Active Services: Prior to medical admission patient reports that she had no active services in place. She stated that she doesn't anticipate any needs at discharge. Transportation: Prior to medical admission patient reports that she used family and friends to get back and forth to appointments and errands. She stated that they will likely transport her home discharge. Medications: Patient receives medications from [facility]. At this time there are no issues with access or affordability. PCP: Christian Presley, [address] (phone) and [phone] (Fax) is her primary care physician. SW consult: SW asked patient direct questions about alcoholism. She perceives her drinking as a problem. She is aware that she can transition to medical detox at a facility and they can assist with sober living (which is what she would prefer). She does understand that we can't directly refer to a sober living facility at this time. We are waiting to hear from [facility]. PLAN/COMMENTS:   1) Placement for alcoholism detox (inpatient). 2) Collateral call to family prior to discharge. SW provided contact information for patient or family to call with any questions. SW will follow and assist as needed. Respectfully submitted,     ANANT Velasco  [facility]   [phone]     Electronically signed by ANANT Molina on 1/3/2023 at 12:47 PM

## 2023-01-03 NOTE — CARE COORDINATION
CHOCO faxed a referral to the [facility] today at [phone] and sent the following information:    Hospital Facesheet  H&P  Medication List  SW assessment  Most recent labs    We are waiting to hear if they can accept. Respectfully submitted,    Funmi HOUSTON, WINSTON-S  [facility]   [phone]    Electronically signed by ANANT Dotson on 1/3/2023 at 1:04 PM

## 2023-01-03 NOTE — CARE COORDINATION
CHOCO placed phone call o the [facility] to verify that they have inpatient detox capabilities. SW called [phone] and the  confirmed that they do. CHOCO advised that we may have a direct admit and asked them to call us back as soon as possible to discuss this patient. CHOCO advised that her CIWA score is between 12-13 at this time. Respectfully submitted,    HARVEY ChangS  [facility]   [phone]    Electronically signed by ANANT Harvey on 1/3/2023 at 12:38 PM

## 2023-01-03 NOTE — ED NOTES
Patient given turkey sandwich, drink, and warm blanket at this time. All patient needs met at this time. No acute distress noted.       Branden Mehta RN  01/03/23 9220

## 2023-01-03 NOTE — CARE COORDINATION
SW consult noted for consideration of rehab. SW will assess momentarily. Respectfully submitted,    ANANT Mckeon  [facility]   [phone]    Electronically signed by ANANT Hernandez on 1/3/2023 at 9:14 AM

## 2023-01-03 NOTE — CARE COORDINATION
SW placed phone call to [facility] liaison [place] and she advised that she needed to speak with the patient to do a phone intake. She stated that she does not believe Tania Schuyler was succesful in reaching the patient. Her phone number is [phone] and her fax number is [phone]. SW was able to fax info so their medical team can review. She will let us know the outcome of the call with the patient. Respectfully submitted,    Funmi Mcneill Sit ANANT HOUSTON  [facility]   [phone]    Electronically signed by ANANT Mendenhall on 1/3/2023 at 4:34 PM

## 2023-01-03 NOTE — ED PROVIDER NOTES
[address] [facility]      Pt Name: Josie Townsend  MRN: [id number]  [place] 1970  Date of evaluation: 1/2/2023  Provider: Salome Elizondo DO    CHIEF COMPLAINT  Chief Complaint   Patient presents with    Alcohol Intoxication     States she is in withdraw; states last drink was this am; states she is shaking, hallucinations, vomiting, and nausea; states she doesn't drink everyday but binges alcohol         This patient is at risk for a communicable infection. Therefore, personal protection equipment consisting of a mask was worn for the exam.    HPI  Josie Townsend is a 45 y.o. female who presents with shaking. She states she is in alcohol withdrawal.  However, I saw her yesterday at [facility] ED. She was having shaking at that time. However, her blood pressure was 109 over 60s. She was having shaking that resolved when she had her attention drawn away from her right upper extremity. She states her last drink was at 8 AM today. Her alcohol level yesterday was 369 and she claims she was in alcohol withdrawal.  She has a history of getting into arguments with her mother and then going to a hotel and drinking heavily. She then called EMS who comes to pick her up and takes her to the freestanding emergency department at [facility]. She always request Ativan. She walked out without discharge instructions from the emergency department last night because she was angry that she was not being admitted to the hospital.  She states she is having hallucinations and sees figures of people. Patient states that she is no longer at the hotel has moved back in with her mother. She states been nauseated and vomited 15 times today. She denies any diarrhea. She denies any fevers or chills. ? REVIEW OF SYSTEMS  All systems negative except as noted in the HPI. Reviewed Nurses' notes and concur. No LMP recorded (lmp unknown).  Patient is perimenopausal.    PAST MEDICAL HISTORY  Past Medical History:   Diagnosis Date    Alcohol abuse     Ankle fracture     bilateral    Anxiety     Depression     Sleeping difficulties        FAMILY HISTORY  Family History   Problem Relation Age of Onset    No Known Problems Mother     No Known Problems Father     Other Brother         anxiety    Cancer Maternal Grandmother         lung cancer    Cancer Maternal Grandfather         lung       SOCIAL HISTORY   reports that she has been smoking cigarettes. She has a 4.00 pack-year smoking history. She has never used smokeless tobacco. She reports current alcohol use. She reports that she does not use drugs.     SURGICAL HISTORY  Past Surgical History:   Procedure Laterality Date    BREAST SURGERY      sailine breast      SECTION  1988    FOREARM SURGERY Left 2021    OPEN REDUCTION INTERNAL FIXATION LEFT DISTAL RADIUS performed by Artemio Oneill MD at [address]  2006    right wrist       CURRENT MEDICATIONS  Current Outpatient Rx   Medication Sig Dispense Refill    busPIRone (BUSPAR) 15 MG tablet Take 7.5 mg by mouth 2 times daily (Patient not taking: Reported on 2023)      citalopram (CELEXA) 10 MG tablet Take 1 tablet by mouth daily (Patient not taking: Reported on 2023) 30 tablet 3    folic acid (FOLVITE) 1 MG tablet Take 1 tablet by mouth daily (Patient not taking: Reported on 2023) 30 tablet 3    nicotine (NICODERM CQ) 21 MG/24HR Place 1 patch onto the skin daily (Patient not taking: Reported on 2023) 30 patch 3    Multiple Vitamin (MULTIVITAMIN) TABS tablet Take 1 tablet by mouth daily (Patient not taking: Reported on 2023) 30 tablet 0    thiamine mononitrate (THIAMINE) 100 MG tablet Take 1 tablet by mouth daily (Patient not taking: Reported on 2023) 30 tablet 0    traZODone (DESYREL) 100 MG tablet Take 100 mg by mouth nightly as needed for Sleep  (Patient not taking: Reported on 2023) ALLERGIES  No Known Allergies    Tetanus vaccination status reviewed: tetanus re-vaccination not indicated. PHYSICAL EXAM  VITAL SIGNS: BP (!) 163/81   Pulse (!) 102   Temp 99 °F (37.2 °C) (Oral)   Resp 20   LMP  (LMP Unknown)   SpO2 96%   Constitutional: Well-developed, well-nourished, appears mildly anxious but otherwise normal, nontoxic, activity: Sitting in the chair in [place] and appears mildly anxious. Her right upper extremity is shaking but she is having no shaking of the other extremities. HENT: Normocephalic, Atraumatic, Bilateral external ears normal, TM's were normal, Mucus membranes are mildly dry and oropharynx is patent and clear, No oral exudates, Nose normal.  Eyes: PERRLA, EOMI, Conjunctiva normal, No discharge. No scleral icterus. Neck: Normal range of motion, No tenderness, Supple. Lymphatic: No lymphadenopathy noted. Cardiovascular: Normal heart rate, Normal rhythm, no murmurs, no gallops, no rubs. Thorax & Lungs: Normal breath sounds, no respiratory distress, no wheezing, no rales, no rhonchi  Abdomen: Soft, Nontender, No hepatosplenomegaly, No masses, No pulsatile masses, No distension, normal bowel sounds  Skin: Warm, Dry, No erythema, No rash. Back: No tenderness, Full range of motion, No scoliosis. Extremities: No edema, No tenderness, No amputations, capillary refill less than 2 seconds. Musculoskeletal:  no major deformities noted. Neurologic: Alert & oriented x 3, CN II through XII are intact, normal motor function, normal sensory function, no focal deficits noted, no clonus, coarse tremors of the right upper extremity. Psychiatric: Affect normal, Mood mildly anxious. COURSE & MEDICAL DECISION MAKING  Pertinent Labs reviewed.  (See chart for details)    LABORATORY  Labs Reviewed   CBC WITH AUTO DIFFERENTIAL - Abnormal; Notable for the following components:       Result Value    WBC 17.5 (*)     Neutrophils Absolute 13.2 (*)     All other components within normal limits   COMPREHENSIVE METABOLIC PANEL W/ REFLEX TO MG FOR LOW K - Abnormal; Notable for the following components:    Sodium 134 (*)     Chloride 93 (*)     Anion Gap 17 (*)     BUN 6 (*)     ALT 87 (*)     AST 95 (*)     All other components within normal limits   URINALYSIS WITH REFLEX TO CULTURE - Abnormal; Notable for the following components:    Color, UA ORANGE (*)     Clarity, UA CLOUDY (*)     Glucose, Ur Color Interfer (*)     Bilirubin Urine Color Interfer (*)     Ketones, Urine Color Interfer (*)     Blood, Urine Color Interfer (*)     pH, UA Color Interfer (*)     Protein, UA Color Interfer (*)     Urobilinogen, Urine Color Interfer (*)     Nitrite, Urine Color Interfer (*)     Leukocyte Esterase, Urine Color Interfer (*)     All other components within normal limits   ACETAMINOPHEN LEVEL - Abnormal; Notable for the following components:    Acetaminophen Level <5 (*)     All other components within normal limits   SALICYLATE LEVEL - Abnormal; Notable for the following components:    Salicylate, Serum <5.8 (*)     All other components within normal limits   COVID-19, RAPID   RAPID INFLUENZA A/B ANTIGENS   ETHANOL   URINE DRUG SCREEN   MICROSCOPIC URINALYSIS       Vitals:    01/02/23 2330   BP: (!) 163/81   Pulse: (!) 102   Resp: 20   Temp: 99 °F (37.2 °C)   TempSrc: Oral   SpO2: 96%       Medications   lactated ringers bolus (1,000 mLs IntraVENous New Bag 1/3/23 0042)   LORazepam (ATIVAN) injection 1 mg (1 mg IntraVENous Given 1/3/23 0039)       New Prescriptions    No medications on file       SEP-1 CORE MEASURE DATA  Exclusion criteria: the patient is NOT to be included for sepsis due to:  SIRS criteria are not met    Patient remained stable in the ED. her alcohol level was undetected tonight. Her blood pressure was elevated. Pulse rate was up. Therefore, patient appears to be in alcohol withdrawal.  She was given Ativan 1 mg IV in the emergency department and seemed to improve with that.   She was prescribed Ativan as an out patient last night but she walked out without the prescription. Her CBC showed elevated white count at 17.5. Her alcohol level was 0. Patient was given a liter of IV fluids in the emergency department. She does appear to be in alcohol withdrawal tonight and therefore I feel patient needs admitted to the hospital for further evaluation and treatment. Hospitalist was notified at 0110    Diagnostic considerations include but are not limited to:     Hypoxemia/ischemic encephalopathy, hepatic encephalopathy   Seizure or postictal state   Alterations of glucose such as hypoglycemia and hyperglycemia    Alterations in perfusions such as hypotension and hypoperfusion    Alterations in electrolytes such as disturbances in sodium or calcium   Infectious processes such as sepsis from a pneumonia or urinary tract infection    Substance use or withdrawal, especially alcohol and drugs    Medication adverse event or interaction    Vitamin deficiencies such as Wernicke's encephalopathy    CNS lesion, injury, infection (CVA, subdural hematoma, meningitis, encephalitis)    Alterations in hormones such as thyroid or adrenal abnormalities    Alterations in cardiac functioning such as arrhythmia, MI or CHF    Alteration in temperature such as hyperthermia or hypothermia    Dehydration, sleep deprivation   Change in medical regimen    Alteration in lifestyle, environment, or personal relationships      CBC-CBC was ordered to rule out anemia, infection, abnormal platelet count, polycythemia, abnormal Red cell pathology, or any other pathology that might be causing the patient's symptoms    CMP-CMP was ordered to rule out electrolyte abnormalities, liver dysfunction, kidney dysfunction, electrolyte imbalance, abnormal transaminases, or any other pathology that might be causing the patient's symptoms.     Urine-urinalysis was ordered to rule out infection, renal failure, dehydration, pregnancy, proteinuria, bilirubinuria, or any other pathology that might be causing the patient's symptoms    The patient's blood pressure was found to be elevated according to CMS/Medicare and the Affordable Care Act/ObamaCare criteria. Elevated blood pressure could occur because of pain or anxiety or other reasons and does not mean that they need to have their blood pressure treated or medications otherwise adjusted. However, this could also be a sign that they will need to have their blood pressure treated or medications changed. The patient was instructed to follow up closely with their personal physician to have their blood pressure rechecked. The patient was instructed to take a list of recent blood pressure readings to their next visit with their personal physician. I reviewed old records     (This chart has been completed using [address]. Although attempts have been made to ensure accuracy, words and/or phrases may not be transcribed as intended.)    Patient refused pain medicines at the time of her exam.    IMPRESSION(S):  1. Alcohol dependence with withdrawal with complication ([address]        ?   Recheck Times: [address], DO  01/03/23 0114

## 2023-01-03 NOTE — CARE COORDINATION
SW received phone call back from [facility] rep [facility] stating that he couldn't open the secure email from this writer. SW asked if she could forward the same info via fax. He stated that he would like to speak with the patient first before we fax anything to intake. We were able to find a phone so the could communicate directly. He will let this writer know about the outcome and if he needs anything else. Respectfully submitted,    ANANT Cao  [facility]   [phone]    Electronically signed by ANANT Morin on 1/3/2023 at 3:18 PM

## 2023-01-03 NOTE — H&P
Hospital Medicine History & Physical      PCP: Niesha Sheikh DO    Date of Admission: 2023    Date of Service: Pt seen/examined on 1/3/2023   and Admitted to Inpatient with expected LOS greater than two midnights due to medical therapy. Chief Complaint: Tremors hallucinations nausea vomiting      History Of Present Illness:      45 y.o. female who presented to [facility] with 1 day history of nausea vomiting visual hallucinations and tremulousness. Patient has severe alcoholism last drink was yesterday. She has not had any alcohol today. She developed severe shaking and tremors and decided to come to the hospital.  Patient states that she has been having visual hallucinations as well and has had vomiting and fatigue and generalized malaise  Patient has a history of recurring admissions to the hospital for alcohol withdrawal.  Patient is remorseful and tells me that she would like to get enrolled in rehab (mentions a facility called [facility]). Past Medical History:          Diagnosis Date    Alcohol abuse     Ankle fracture     bilateral    Anxiety     Depression     Sleeping difficulties        Past Surgical History:          Procedure Laterality Date    BREAST SURGERY      sailine breast      SECTION  1988    FOREARM SURGERY Left 2021    OPEN REDUCTION INTERNAL FIXATION LEFT DISTAL RADIUS performed by Katelin Dietrich MD at [address]      right wrist       Medications Prior to Admission:      Prior to Admission medications    Medication Sig Start Date End Date Taking?  Authorizing Provider   busPIRone (BUSPAR) 15 MG tablet Take 7.5 mg by mouth 2 times daily  Patient not taking: Reported on 2023   Historical Provider, MD   citalopram (CELEXA) 10 MG tablet Take 1 tablet by mouth daily  Patient not taking: Reported on 2023 10/15/22   DWIGHT Kilgore - CNP   folic acid (FOLVITE) 1 MG tablet Take 1 tablet by mouth daily  Patient not taking: Reported on 1/1/2023 10/15/22   DWIGHT Katz CNP   nicotine (NICODERM CQ) 21 MG/24HR Place 1 patch onto the skin daily  Patient not taking: Reported on 1/1/2023 10/15/22   DWIGHT Katz CNP   Multiple Vitamin (MULTIVITAMIN) TABS tablet Take 1 tablet by mouth daily  Patient not taking: Reported on 1/1/2023 10/15/22   DWIGHT Katz CNP   thiamine mononitrate (THIAMINE) 100 MG tablet Take 1 tablet by mouth daily  Patient not taking: Reported on 1/1/2023 10/15/22   DWIGHT Katz CNP   traZODone (DESYREL) 100 MG tablet Take 100 mg by mouth nightly as needed for Sleep   Patient not taking: Reported on 1/1/2023    Historical Provider, MD       Allergies:  Patient has no known allergies. Social History:      The patient currently lives alone    TOBACCO:   reports that she has been smoking cigarettes. She has a 4.00 pack-year smoking history. She has never used smokeless tobacco.  ETOH:   reports current alcohol use. E-cigarette/Vaping       Questions Responses    E-cigarette/Vaping Use Unknown If Ever Used    Start Date     Passive Exposure     Quit Date     Counseling Given     Comments Unknown              Family History:       Reviewed and negative in regards to presenting illnss/complaint. Problem Relation Age of Onset    No Known Problems Mother     No Known Problems Father     Other Brother         anxiety    Cancer Maternal Grandmother         lung cancer    Cancer Maternal Grandfather         lung       REVIEW OF SYSTEMS COMPLETED:   Pertinent positives as noted in the HPI. All other systems reviewed and negative. PHYSICAL EXAM PERFORMED:    /64   Pulse 90   Temp 99 °F (37.2 °C) (Oral)   Resp 17   LMP  (LMP Unknown)   SpO2 95%     General appearance: Tremulous. HEENT:  Normal cephalic, \"muddy\" sclera without obvious deformity. Pupils equal, round, and reactive to light. Extra ocular muscles intact.  Conjunctivae/corneas clear.  Neck: Supple, with full range of motion. No jugular venous distention. Trachea midline. Respiratory:  Normal respiratory effort. Clear to auscultation, bilaterally without Rales/Wheezes/Rhonchi. Cardiovascular:  Regular rate and rhythm with normal S1/S2 without murmurs, rubs or gallops. Abdomen: Soft, non-tender, non-distended with normal bowel sounds. Musculoskeletal:  No clubbing, cyanosis or edema bilaterally. Full range of motion without deformity. Skin: Skin color, texture, turgor normal.  No rashes or lesions. Neurologic: Tremor at rest neurovascularly intact without any focal sensory/motor deficits. Cranial nerves: II-XII intact, grossly non-focal.  Psychiatric:  Alert and oriented, thought content appropriate, normal insight  Capillary Refill: Brisk,3 seconds, normal  Peripheral Pulses: +2 palpable, equal bilaterally       Labs:     Recent Labs     01/01/23 0540 01/03/23 0008   WBC 18.9* 17.5*   HGB 15.8 14.1   HCT 45.5 42.7    288     Recent Labs     01/01/23 0540 01/03/23 0008    134*   K 4.5 3.6   CL 98* 93*   CO2 21 24   BUN 10 6*   CREATININE 0.7 0.7   CALCIUM 8.6 8.9     Recent Labs     01/01/23  0540 01/03/23  0008   AST 43* 95*   ALT 35 87*   BILITOT <0.2 0.5   ALKPHOS 85 89     No results for input(s): INR in the last 72 hours. No results for input(s): Adeline Jennings in the last 72 hours.     Urinalysis:      Lab Results   Component Value Date/Time    NITRU Color Interfer 01/03/2023 12:08 AM    WBCUA 139 01/03/2023 12:08 AM    BACTERIA 4+ 01/03/2023 12:08 AM    RBCUA 8 01/03/2023 12:08 AM    BLOODU Color Interfer 01/03/2023 12:08 AM    SPECGRAV Color Interfer 01/03/2023 12:08 AM    GLUCOSEU Color Interfer 01/03/2023 12:08 AM       Radiology:         No orders to display       Consults:    IP CONSULT TO SOCIAL WORK    ASSESSMENT:    Delirium tremens due to alcohol withdrawal/alcohol abuse      PLAN:      Started on Ativan per JENNIFER    Thiamine folate multivitamin IV    Pepcid IV for GI prophylaxis    Zofran IV for nausea    IV fluids normal saline at 125 MLS per hour     consult to evaluate for possible inpatient   rehab    Per review of electronic health record patient was supposed to see psychiatry on 12/13/2022 but canceled appointment due to illness    DVT Prophylaxis: Lovenox  Diet: ADULT DIET; Regular  Code Status: Full Code        Dispo -1 to 2 days       Dony Wolf MD    Thank you Ani Dia DO for the opportunity to be involved in this patient's care. If you have any questions or concerns please feel free to contact me at [phone].

## 2023-01-03 NOTE — PROGRESS NOTES
Patient came in due to alcohol withdrawal;  I connected with pt to see if she was interested in resources to help. Per patient interested in sober living and support groups. Pt also stated she is not a fan of [facility] or any support out in [place]. I have connected with [facility]. Provided patient with my contact information for further assistance with resources. I sent Manny Mckenna an email to update.

## 2023-01-03 NOTE — CARE COORDINATION
SW received phone call from [facility] stating that they don't accept her insurance. SW was informed that they forwarded her contact info over to [facility] as they informed that they are in network. CHOCO spoke to [place] in admissions at [facility] and he can be reached at [phone]. He asked this writer to forward clinical info to his attention at [address] [email or web address]    CHOCO will forward the following information in a secure email:    Hospital Facesheet  H&P  Medication List  SW assessment  Most recent labs    Brett Rajanscot stated that he needed to speak to the patient well. When the bedside nurse calls back we will advise. Respectfully submitted,    ANANT Cao  [facility]   [phone]    Electronically signed by ANANT Morin on 1/3/2023 at 2:31 PM

## 2023-01-04 LAB
A/G RATIO: 1.9 (ref 1.1–2.2)
ALBUMIN SERPL-MCNC: 3.7 G/DL (ref 3.4–5)
ALP BLD-CCNC: 88 U/L (ref 40–129)
ALT SERPL-CCNC: 60 U/L (ref 10–40)
ANION GAP SERPL CALCULATED.3IONS-SCNC: 12 MMOL/L (ref 3–16)
AST SERPL-CCNC: 56 U/L (ref 15–37)
BASOPHILS ABSOLUTE: 0 K/UL (ref 0–0.2)
BASOPHILS RELATIVE PERCENT: 0.3 %
BILIRUB SERPL-MCNC: 0.6 MG/DL (ref 0–1)
BUN BLDV-MCNC: 5 MG/DL (ref 7–20)
CALCIUM SERPL-MCNC: 8.5 MG/DL (ref 8.3–10.6)
CHLORIDE BLD-SCNC: 103 MMOL/L (ref 99–110)
CO2: 24 MMOL/L (ref 21–32)
CREAT SERPL-MCNC: 0.7 MG/DL (ref 0.6–1.1)
EKG ATRIAL RATE: 91 BPM
EKG DIAGNOSIS: NORMAL
EKG P AXIS: 28 DEGREES
EKG P-R INTERVAL: 130 MS
EKG Q-T INTERVAL: 350 MS
EKG QRS DURATION: 70 MS
EKG QTC CALCULATION (BAZETT): 430 MS
EKG R AXIS: 53 DEGREES
EKG T AXIS: 48 DEGREES
EKG VENTRICULAR RATE: 91 BPM
EOSINOPHILS ABSOLUTE: 0.1 K/UL (ref 0–0.6)
EOSINOPHILS RELATIVE PERCENT: 1.3 %
GFR SERPL CREATININE-BSD FRML MDRD: >60 ML/MIN/{1.73_M2}
GLUCOSE BLD-MCNC: 104 MG/DL (ref 70–99)
HCT VFR BLD CALC: 39.3 % (ref 36–48)
HEMOGLOBIN: 12.9 G/DL (ref 12–16)
LYMPHOCYTES ABSOLUTE: 2.2 K/UL (ref 1–5.1)
LYMPHOCYTES RELATIVE PERCENT: 23.5 %
MCH RBC QN AUTO: 29.7 PG (ref 26–34)
MCHC RBC AUTO-ENTMCNC: 32.9 G/DL (ref 31–36)
MCV RBC AUTO: 90.4 FL (ref 80–100)
MONOCYTES ABSOLUTE: 0.9 K/UL (ref 0–1.3)
MONOCYTES RELATIVE PERCENT: 9.5 %
NEUTROPHILS ABSOLUTE: 6.1 K/UL (ref 1.7–7.7)
NEUTROPHILS RELATIVE PERCENT: 65.4 %
PDW BLD-RTO: 14 % (ref 12.4–15.4)
PLATELET # BLD: 216 K/UL (ref 135–450)
PMV BLD AUTO: 7.8 FL (ref 5–10.5)
POTASSIUM REFLEX MAGNESIUM: 3.7 MMOL/L (ref 3.5–5.1)
RBC # BLD: 4.34 M/UL (ref 4–5.2)
SODIUM BLD-SCNC: 139 MMOL/L (ref 136–145)
TOTAL PROTEIN: 5.6 G/DL (ref 6.4–8.2)
WBC # BLD: 9.2 K/UL (ref 4–11)

## 2023-01-04 PROCEDURE — 6360000002 HC RX W HCPCS: Performed by: FAMILY MEDICINE

## 2023-01-04 PROCEDURE — 93005 ELECTROCARDIOGRAM TRACING: CPT | Performed by: FAMILY MEDICINE

## 2023-01-04 PROCEDURE — 36415 COLL VENOUS BLD VENIPUNCTURE: CPT

## 2023-01-04 PROCEDURE — 93010 ELECTROCARDIOGRAM REPORT: CPT | Performed by: INTERNAL MEDICINE

## 2023-01-04 PROCEDURE — 6360000002 HC RX W HCPCS: Performed by: INTERNAL MEDICINE

## 2023-01-04 PROCEDURE — 85025 COMPLETE CBC W/AUTO DIFF WBC: CPT

## 2023-01-04 PROCEDURE — 2580000003 HC RX 258: Performed by: INTERNAL MEDICINE

## 2023-01-04 PROCEDURE — 6370000000 HC RX 637 (ALT 250 FOR IP): Performed by: INTERNAL MEDICINE

## 2023-01-04 PROCEDURE — 1200000000 HC SEMI PRIVATE

## 2023-01-04 PROCEDURE — 80053 COMPREHEN METABOLIC PANEL: CPT

## 2023-01-04 PROCEDURE — 6370000000 HC RX 637 (ALT 250 FOR IP): Performed by: FAMILY MEDICINE

## 2023-01-04 PROCEDURE — 94760 N-INVAS EAR/PLS OXIMETRY 1: CPT

## 2023-01-04 RX ORDER — HALOPERIDOL 5 MG/ML
5 INJECTION INTRAMUSCULAR EVERY 6 HOURS PRN
Status: DISCONTINUED | OUTPATIENT
Start: 2023-01-04 | End: 2023-01-05 | Stop reason: HOSPADM

## 2023-01-04 RX ORDER — NICOTINE 21 MG/24HR
1 PATCH, TRANSDERMAL 24 HOURS TRANSDERMAL DAILY
Status: DISCONTINUED | OUTPATIENT
Start: 2023-01-04 | End: 2023-01-05 | Stop reason: HOSPADM

## 2023-01-04 RX ORDER — CITALOPRAM 10 MG/1
10 TABLET ORAL DAILY
Status: DISCONTINUED | OUTPATIENT
Start: 2023-01-04 | End: 2023-01-05 | Stop reason: HOSPADM

## 2023-01-04 RX ADMIN — CITALOPRAM HYDROBROMIDE 10 MG: 10 TABLET ORAL at 09:11

## 2023-01-04 RX ADMIN — LORAZEPAM 1 MG: 2 INJECTION INTRAMUSCULAR; INTRAVENOUS at 17:38

## 2023-01-04 RX ADMIN — HALOPERIDOL LACTATE 5 MG: 5 INJECTION, SOLUTION INTRAMUSCULAR at 15:29

## 2023-01-04 RX ADMIN — LORAZEPAM 2 MG: 2 INJECTION INTRAMUSCULAR at 04:42

## 2023-01-04 RX ADMIN — CEFTRIAXONE 1000 MG: 1 INJECTION, POWDER, FOR SOLUTION INTRAMUSCULAR; INTRAVENOUS at 17:42

## 2023-01-04 RX ADMIN — LORAZEPAM 2 MG: 2 INJECTION INTRAMUSCULAR at 14:09

## 2023-01-04 RX ADMIN — SODIUM CHLORIDE, PRESERVATIVE FREE 10 ML: 5 INJECTION INTRAVENOUS at 23:19

## 2023-01-04 RX ADMIN — LORAZEPAM 2 MG: 1 TABLET ORAL at 13:09

## 2023-01-04 RX ADMIN — ONDANSETRON 4 MG: 2 INJECTION INTRAMUSCULAR; INTRAVENOUS at 23:23

## 2023-01-04 RX ADMIN — LORAZEPAM 2 MG: 1 TABLET ORAL at 09:11

## 2023-01-04 RX ADMIN — SODIUM CHLORIDE, PRESERVATIVE FREE 10 ML: 5 INJECTION INTRAVENOUS at 09:12

## 2023-01-04 RX ADMIN — THERA TABS 1 TABLET: TAB at 09:11

## 2023-01-04 RX ADMIN — Medication 100 MG: at 09:11

## 2023-01-04 RX ADMIN — ENOXAPARIN SODIUM 40 MG: 100 INJECTION SUBCUTANEOUS at 09:11

## 2023-01-04 RX ADMIN — LORAZEPAM 1 MG: 1 TABLET ORAL at 11:16

## 2023-01-04 RX ADMIN — ONDANSETRON 4 MG: 2 INJECTION INTRAMUSCULAR; INTRAVENOUS at 11:17

## 2023-01-04 ASSESSMENT — PAIN SCALES - GENERAL: PAINLEVEL_OUTOF10: 0

## 2023-01-04 NOTE — PROGRESS NOTES
Patient came up from ED with alcohol withdrawal symptoms. Patient c/o Tremors, sweating, anxiousness, nausea and a mild headache. Vitals are stable. CIWA score is 13. Patient had 2 mg of Ativan at 2259. Patient is now resting.

## 2023-01-04 NOTE — PLAN OF CARE
Problem: Discharge Planning  Goal: Discharge to home or other facility with appropriate resources  Outcome: Progressing  Flowsheets (Taken 1/3/2023 3128)  Discharge to home or other facility with appropriate resources:   Identify barriers to discharge with patient and caregiver   Identify discharge learning needs (meds, wound care, etc)   Refer to discharge planning if patient needs post-hospital services based on physician order or complex needs related to functional status, cognitive ability or social support system   Arrange for needed discharge resources and transportation as appropriate     Problem: Pain  Goal: Verbalizes/displays adequate comfort level or baseline comfort level  Outcome: Progressing     Problem: Safety - Adult  Goal: Free from fall injury  Outcome: Progressing     Problem: Neurosensory - Adult  Goal: Achieves stable or improved neurological status  Outcome: Progressing  Goal: Absence of seizures  Outcome: Progressing  Goal: Remains free of injury related to seizures activity  Outcome: Progressing  Goal: Achieves maximal functionality and self care  Outcome: Progressing     Problem: Cardiovascular - Adult  Goal: Maintains optimal cardiac output and hemodynamic stability  Outcome: Progressing  Goal: Absence of cardiac dysrhythmias or at baseline  Outcome: Progressing     Problem: Skin/Tissue Integrity - Adult  Goal: Skin integrity remains intact  Outcome: Progressing  Goal: Incisions, wounds, or drain sites healing without S/S of infection  Outcome: Progressing  Goal: Oral mucous membranes remain intact  Outcome: Progressing     Problem: Gastrointestinal - Adult  Goal: Minimal or absence of nausea and vomiting  Outcome: Progressing  Goal: Maintains or returns to baseline bowel function  Outcome: Progressing  Goal: Maintains adequate nutritional intake  Outcome: Progressing     Problem: Infection - Adult  Goal: Absence of infection at discharge  Outcome: Progressing  Goal: Absence of infection during hospitalization  Outcome: Progressing  Goal: Absence of fever/infection during anticipated neutropenic period  Outcome: Progressing

## 2023-01-04 NOTE — PROGRESS NOTES
Patient scored 14 for CIWA, given 2mg of PO Ativan per order. Patient resting, RR >12. All needs meet at this time. Patient is anxious ad tearful - sitter for suicide precautions now at bedside.      Electronically signed by Anders Mccoy RN on 1/4/2023 at 10:49 AM

## 2023-01-04 NOTE — PLAN OF CARE
Problem: Discharge Planning  Goal: Discharge to home or other facility with appropriate resources  1/4/2023 0951 by Atul Patino RN  Outcome: Progressing     Problem: Pain  Goal: Verbalizes/displays adequate comfort level or baseline comfort level  1/4/2023 0951 by Atul Patino RN  Outcome: Progressing     Problem: Safety - Adult  Goal: Free from fall injury  1/4/2023 0951 by Atul Patino RN  Outcome: Progressing     Problem: Neurosensory - Adult  Goal: Achieves stable or improved neurological status  1/4/2023 0951 by Atul Patino RN  Outcome: Progressing     Problem: Neurosensory - Adult  Goal: Absence of seizures  1/4/2023 0951 by Atul Patino RN  Outcome: Progressing     Problem: Neurosensory - Adult  Goal: Remains free of injury related to seizures activity  1/4/2023 0951 by Atul Patino RN  Outcome: Progressing     Problem: Neurosensory - Adult  Goal: Achieves maximal functionality and self care  1/4/2023 0951 by Atul Patino RN  Outcome: Progressing

## 2023-01-04 NOTE — ED NOTES
Report given to Rogelio King RN. SBAR discussed. All questions answered. RN verbalized understanding.       Diana Bence, RN  01/03/23 8415

## 2023-01-04 NOTE — PROGRESS NOTES
Patient stating she wants to leave AMA, MD Nettie Glaser spoke with this RN and placed a suicide precaution sitter and consulted psych for SI. MD Moser notified that patient continues to request to leave AMA. Electronically signed by Jonny Tompkins RN on 1/4/2023 at [address] [facility] PM    Patient continue to states she wants to leave AMA. MD [address] [place] [address] notified.      Electronically signed by Jonny Tompkins RN on 1/4/2023 at 5:19 PM

## 2023-01-04 NOTE — PROGRESS NOTES
Hospitalist Progress Note      PCP: Jazmin Jack DO    Date of Admission: 1/2/2023    Chief Complaint: alcohol withdrawal    Hospital Course:      Subjective: feeling down and depressed. States she and others would be better off if she jumped off the top of the hospital.       Medications:  Reviewed    Infusion Medications    sodium chloride      sodium chloride 100 mL/hr at 01/03/23 2312     Scheduled Medications    sodium chloride flush  5-40 mL IntraVENous 2 times per day    thiamine  100 mg Oral Daily    enoxaparin  40 mg SubCUTAneous Daily    multivitamin  1 tablet Oral Daily    cefTRIAXone (ROCEPHIN) IV  1,000 mg IntraVENous Q24H     PRN Meds: sodium chloride flush, sodium chloride, ondansetron **OR** ondansetron, polyethylene glycol, acetaminophen **OR** acetaminophen, LORazepam **OR** LORazepam **OR** LORazepam **OR** LORazepam **OR** LORazepam **OR** LORazepam **OR** LORazepam **OR** LORazepam, magnesium sulfate, potassium chloride      Intake/Output Summary (Last 24 hours) at 1/4/2023 0846  Last data filed at 1/3/2023 2315  Gross per 24 hour   Intake 300 ml   Output --   Net 300 ml       Exam:    BP (!) 134/97   Pulse 89   Temp 98 °F (36.7 °C) (Oral)   Resp 18   Ht 5' 4.02\" (1.626 m)   Wt 198 lb 6.6 oz (90 kg)   LMP  (LMP Unknown)   SpO2 94%   BMI 34.04 kg/m²     General appearance: No apparent distress, appears stated age and cooperative. HEENT: Pupils equal, round, and reactive to light. Conjunctivae/corneas clear. Neck: Supple, with full range of motion. No jugular venous distention. Trachea midline. Respiratory:  Normal respiratory effort. Clear to auscultation, bilaterally without Rales/Wheezes/Rhonchi. Cardiovascular: Regular rate and rhythm with normal S1/S2 without murmurs, rubs or gallops. Abdomen: Soft, non-tender, non-distended with normal bowel sounds. Musculoskeletal: No clubbing, cyanosis or edema bilaterally. Full range of motion without deformity.   Skin: Skin color, texture, turgor normal.  No rashes or lesions. Neurologic:  Neurovascularly intact without any focal sensory/motor deficits. Cranial nerves: II-XII intact, grossly non-focal.  Upper extremity tremors severe but then resolves when distracted or providers not in the room  Psychiatric: Alert and oriented, thought content appropriate, normal insight  Capillary Refill: Brisk,< 3 seconds   Peripheral Pulses: +2 palpable, equal bilaterally       Labs:   Recent Labs     01/03/23  0008 01/04/23  0537   WBC 17.5* 9.2   HGB 14.1 12.9   HCT 42.7 39.3    216     Recent Labs     01/03/23  0008 01/04/23  0538   * 139   K 3.6 3.7   CL 93* 103   CO2 24 24   BUN 6* 5*   CREATININE 0.7 0.7   CALCIUM 8.9 8.5     Recent Labs     01/03/23  0008 01/04/23  0538   AST 95* 56*   ALT 87* 60*   BILITOT 0.5 0.6   ALKPHOS 89 88     No results for input(s): INR in the last 72 hours. No results for input(s): Ardelle Chalk in the last 72 hours. Urinalysis:      Lab Results   Component Value Date/Time    NITRU Color Interfer 01/03/2023 12:08 AM    WBCUA 139 01/03/2023 12:08 AM    BACTERIA 4+ 01/03/2023 12:08 AM    RBCUA 8 01/03/2023 12:08 AM    BLOODU Color Interfer 01/03/2023 12:08 AM    SPECGRAV Color Interfer 01/03/2023 12:08 AM    GLUCOSEU Color Interfer 01/03/2023 12:08 AM       Radiology:  No orders to display           Assessment/Plan:    Active Hospital Problems    Diagnosis Date Noted    Alcohol withdrawal syndrome with complication, with unspecified complication [facility]) [R03.106] 01/03/2023     Priority: Medium     Alcohol withdrawal  - last drink was on 1/2  - Ativan per CIWA protocol  - thiamine  - haldol PRN   - IVF  - supportive care with pepcid and zofran    Potential UTI:  - cont ceftriaxone  -f/u urine cultures    Suicidal ideation  Mood d/o:  - cont celexa    Tobacco abuse:    - cessation counseling when appropriate  - nicotine patch ordered    DVT Prophylaxis: lovenox  Diet: ADULT DIET;  Regular  Code Status: Full Code    PT/OT Eval Status: n/a    Dispo - MedSurg.     Ember Carvalho MD

## 2023-01-05 ENCOUNTER — APPOINTMENT (OUTPATIENT)
Dept: GENERAL RADIOLOGY | Age: 53
DRG: 817 | End: 2023-01-05
Payer: MEDICAID

## 2023-01-05 ENCOUNTER — HOSPITAL ENCOUNTER (INPATIENT)
Age: 53
LOS: 2 days | Discharge: ANOTHER HOSPITAL | DRG: 817 | End: 2023-01-07
Attending: EMERGENCY MEDICINE | Admitting: INTERNAL MEDICINE
Payer: MEDICAID

## 2023-01-05 VITALS
SYSTOLIC BLOOD PRESSURE: 137 MMHG | OXYGEN SATURATION: 94 % | HEIGHT: 64 IN | HEART RATE: 78 BPM | DIASTOLIC BLOOD PRESSURE: 90 MMHG | RESPIRATION RATE: 16 BRPM | TEMPERATURE: 97.8 F | WEIGHT: 212.74 LBS | BODY MASS INDEX: 36.32 KG/M2

## 2023-01-05 DIAGNOSIS — R45.851 SUICIDAL IDEATIONS: ICD-10-CM

## 2023-01-05 DIAGNOSIS — T50.902A INTENTIONAL DRUG OVERDOSE, INITIAL ENCOUNTER (HCC): Primary | ICD-10-CM

## 2023-01-05 DIAGNOSIS — F10.939 ALCOHOL WITHDRAWAL SYNDROME WITH COMPLICATION (HCC): ICD-10-CM

## 2023-01-05 PROBLEM — T14.91XA SUICIDE ATTEMPT (HCC): Status: ACTIVE | Noted: 2023-01-05

## 2023-01-05 LAB
A/G RATIO: 1.4 (ref 1.1–2.2)
A/G RATIO: 1.4 (ref 1.1–2.2)
ACETAMINOPHEN LEVEL: <5 UG/ML (ref 10–30)
ALBUMIN SERPL-MCNC: 3.3 G/DL (ref 3.4–5)
ALBUMIN SERPL-MCNC: 4.2 G/DL (ref 3.4–5)
ALP BLD-CCNC: 80 U/L (ref 40–129)
ALP BLD-CCNC: 87 U/L (ref 40–129)
ALT SERPL-CCNC: 50 U/L (ref 10–40)
ALT SERPL-CCNC: 54 U/L (ref 10–40)
AMPHETAMINE SCREEN, URINE: NORMAL
ANION GAP SERPL CALCULATED.3IONS-SCNC: 12 MMOL/L (ref 3–16)
ANION GAP SERPL CALCULATED.3IONS-SCNC: 14 MMOL/L (ref 3–16)
AST SERPL-CCNC: 33 U/L (ref 15–37)
AST SERPL-CCNC: 41 U/L (ref 15–37)
BARBITURATE SCREEN URINE: NORMAL
BASOPHILS ABSOLUTE: 0.1 K/UL (ref 0–0.2)
BASOPHILS RELATIVE PERCENT: 0.9 %
BENZODIAZEPINE SCREEN, URINE: NORMAL
BILIRUB SERPL-MCNC: 0.3 MG/DL (ref 0–1)
BILIRUB SERPL-MCNC: <0.2 MG/DL (ref 0–1)
BILIRUBIN URINE: NEGATIVE
BLOOD, URINE: NEGATIVE
BUN BLDV-MCNC: 6 MG/DL (ref 7–20)
BUN BLDV-MCNC: 6 MG/DL (ref 7–20)
CALCIUM SERPL-MCNC: 8.6 MG/DL (ref 8.3–10.6)
CALCIUM SERPL-MCNC: 9.6 MG/DL (ref 8.3–10.6)
CANNABINOID SCREEN URINE: NORMAL
CHLORIDE BLD-SCNC: 103 MMOL/L (ref 99–110)
CHLORIDE BLD-SCNC: 106 MMOL/L (ref 99–110)
CLARITY: CLEAR
CO2: 22 MMOL/L (ref 21–32)
CO2: 23 MMOL/L (ref 21–32)
COCAINE METABOLITE SCREEN URINE: NORMAL
COLOR: YELLOW
CREAT SERPL-MCNC: 0.6 MG/DL (ref 0.6–1.1)
CREAT SERPL-MCNC: 0.7 MG/DL (ref 0.6–1.1)
EOSINOPHILS ABSOLUTE: 0.1 K/UL (ref 0–0.6)
EOSINOPHILS RELATIVE PERCENT: 0.8 %
ETHANOL: 63 MG/DL (ref 0–0.08)
FENTANYL SCREEN, URINE: NORMAL
GFR SERPL CREATININE-BSD FRML MDRD: >60 ML/MIN/{1.73_M2}
GFR SERPL CREATININE-BSD FRML MDRD: >60 ML/MIN/{1.73_M2}
GLUCOSE BLD-MCNC: 114 MG/DL (ref 70–99)
GLUCOSE BLD-MCNC: 90 MG/DL (ref 70–99)
GLUCOSE URINE: NEGATIVE MG/DL
HCG QUALITATIVE: NEGATIVE
HCT VFR BLD CALC: 41.5 % (ref 36–48)
HEMOGLOBIN: 13.7 G/DL (ref 12–16)
KETONES, URINE: NEGATIVE MG/DL
LEUKOCYTE ESTERASE, URINE: NEGATIVE
LYMPHOCYTES ABSOLUTE: 2 K/UL (ref 1–5.1)
LYMPHOCYTES RELATIVE PERCENT: 23.1 %
Lab: NORMAL
MCH RBC QN AUTO: 29.9 PG (ref 26–34)
MCHC RBC AUTO-ENTMCNC: 33.1 G/DL (ref 31–36)
MCV RBC AUTO: 90.3 FL (ref 80–100)
METHADONE SCREEN, URINE: NORMAL
MICROSCOPIC EXAMINATION: NORMAL
MONOCYTES ABSOLUTE: 0.4 K/UL (ref 0–1.3)
MONOCYTES RELATIVE PERCENT: 5.2 %
NEUTROPHILS ABSOLUTE: 6.1 K/UL (ref 1.7–7.7)
NEUTROPHILS RELATIVE PERCENT: 70 %
NITRITE, URINE: NEGATIVE
OPIATE SCREEN URINE: NORMAL
ORGANISM: ABNORMAL
OXYCODONE URINE: NORMAL
PDW BLD-RTO: 13.4 % (ref 12.4–15.4)
PH UA: 6.5
PH UA: 6.5 (ref 5–8)
PHENCYCLIDINE SCREEN URINE: NORMAL
PLATELET # BLD: 249 K/UL (ref 135–450)
PMV BLD AUTO: 8.1 FL (ref 5–10.5)
POTASSIUM REFLEX MAGNESIUM: 4 MMOL/L (ref 3.5–5.1)
POTASSIUM SERPL-SCNC: 3.6 MMOL/L (ref 3.5–5.1)
PROTEIN UA: NEGATIVE MG/DL
RBC # BLD: 4.59 M/UL (ref 4–5.2)
SALICYLATE, SERUM: <0.3 MG/DL (ref 15–30)
SODIUM BLD-SCNC: 137 MMOL/L (ref 136–145)
SODIUM BLD-SCNC: 143 MMOL/L (ref 136–145)
SPECIFIC GRAVITY UA: 1.01 (ref 1–1.03)
TOTAL PROTEIN: 5.7 G/DL (ref 6.4–8.2)
TOTAL PROTEIN: 7.2 G/DL (ref 6.4–8.2)
URINE CULTURE, ROUTINE: ABNORMAL
URINE REFLEX TO CULTURE: NORMAL
URINE TYPE: NORMAL
UROBILINOGEN, URINE: 0.2 E.U./DL
WBC # BLD: 8.7 K/UL (ref 4–11)

## 2023-01-05 PROCEDURE — 80143 DRUG ASSAY ACETAMINOPHEN: CPT

## 2023-01-05 PROCEDURE — 2500000003 HC RX 250 WO HCPCS: Performed by: PHYSICIAN ASSISTANT

## 2023-01-05 PROCEDURE — 80307 DRUG TEST PRSMV CHEM ANLYZR: CPT

## 2023-01-05 PROCEDURE — 96374 THER/PROPH/DIAG INJ IV PUSH: CPT

## 2023-01-05 PROCEDURE — 94760 N-INVAS EAR/PLS OXIMETRY 1: CPT

## 2023-01-05 PROCEDURE — 71045 X-RAY EXAM CHEST 1 VIEW: CPT

## 2023-01-05 PROCEDURE — 6360000002 HC RX W HCPCS: Performed by: INTERNAL MEDICINE

## 2023-01-05 PROCEDURE — 6370000000 HC RX 637 (ALT 250 FOR IP): Performed by: INTERNAL MEDICINE

## 2023-01-05 PROCEDURE — 82077 ASSAY SPEC XCP UR&BREATH IA: CPT

## 2023-01-05 PROCEDURE — 80179 DRUG ASSAY SALICYLATE: CPT

## 2023-01-05 PROCEDURE — 2580000003 HC RX 258: Performed by: INTERNAL MEDICINE

## 2023-01-05 PROCEDURE — 6360000002 HC RX W HCPCS: Performed by: PHYSICIAN ASSISTANT

## 2023-01-05 PROCEDURE — 80053 COMPREHEN METABOLIC PANEL: CPT

## 2023-01-05 PROCEDURE — 36415 COLL VENOUS BLD VENIPUNCTURE: CPT

## 2023-01-05 PROCEDURE — 85025 COMPLETE CBC W/AUTO DIFF WBC: CPT

## 2023-01-05 PROCEDURE — 2580000003 HC RX 258: Performed by: PHYSICIAN ASSISTANT

## 2023-01-05 PROCEDURE — 93005 ELECTROCARDIOGRAM TRACING: CPT | Performed by: PHYSICIAN ASSISTANT

## 2023-01-05 PROCEDURE — 6370000000 HC RX 637 (ALT 250 FOR IP): Performed by: FAMILY MEDICINE

## 2023-01-05 PROCEDURE — 81003 URINALYSIS AUTO W/O SCOPE: CPT

## 2023-01-05 PROCEDURE — 84703 CHORIONIC GONADOTROPIN ASSAY: CPT

## 2023-01-05 PROCEDURE — 2580000003 HC RX 258: Performed by: NURSE PRACTITIONER

## 2023-01-05 PROCEDURE — 2060000000 HC ICU INTERMEDIATE R&B

## 2023-01-05 PROCEDURE — 99285 EMERGENCY DEPT VISIT HI MDM: CPT

## 2023-01-05 RX ORDER — SODIUM CHLORIDE 9 MG/ML
INJECTION, SOLUTION INTRAVENOUS PRN
Status: DISCONTINUED | OUTPATIENT
Start: 2023-01-05 | End: 2023-01-07 | Stop reason: HOSPADM

## 2023-01-05 RX ORDER — POLYETHYLENE GLYCOL 3350 17 G/17G
17 POWDER, FOR SOLUTION ORAL DAILY PRN
Status: DISCONTINUED | OUTPATIENT
Start: 2023-01-05 | End: 2023-01-07 | Stop reason: HOSPADM

## 2023-01-05 RX ORDER — ACETAMINOPHEN 650 MG/1
650 SUPPOSITORY RECTAL EVERY 6 HOURS PRN
Status: DISCONTINUED | OUTPATIENT
Start: 2023-01-05 | End: 2023-01-07 | Stop reason: HOSPADM

## 2023-01-05 RX ORDER — LORAZEPAM 2 MG/ML
1 INJECTION INTRAMUSCULAR ONCE
Status: COMPLETED | OUTPATIENT
Start: 2023-01-05 | End: 2023-01-05

## 2023-01-05 RX ORDER — ONDANSETRON 2 MG/ML
4 INJECTION INTRAMUSCULAR; INTRAVENOUS EVERY 6 HOURS PRN
Status: DISCONTINUED | OUTPATIENT
Start: 2023-01-05 | End: 2023-01-07 | Stop reason: HOSPADM

## 2023-01-05 RX ORDER — GAUZE BANDAGE 2" X 2"
100 BANDAGE TOPICAL DAILY
Status: DISCONTINUED | OUTPATIENT
Start: 2023-01-06 | End: 2023-01-07 | Stop reason: HOSPADM

## 2023-01-05 RX ORDER — SODIUM CHLORIDE 0.9 % (FLUSH) 0.9 %
5-40 SYRINGE (ML) INJECTION EVERY 12 HOURS SCHEDULED
Status: DISCONTINUED | OUTPATIENT
Start: 2023-01-05 | End: 2023-01-07 | Stop reason: HOSPADM

## 2023-01-05 RX ORDER — FOLIC ACID 1 MG/1
1 TABLET ORAL DAILY
Status: DISCONTINUED | OUTPATIENT
Start: 2023-01-06 | End: 2023-01-07 | Stop reason: HOSPADM

## 2023-01-05 RX ORDER — ENOXAPARIN SODIUM 100 MG/ML
40 INJECTION SUBCUTANEOUS DAILY
Status: DISCONTINUED | OUTPATIENT
Start: 2023-01-06 | End: 2023-01-07 | Stop reason: HOSPADM

## 2023-01-05 RX ORDER — ONDANSETRON 4 MG/1
4 TABLET, ORALLY DISINTEGRATING ORAL EVERY 8 HOURS PRN
Status: DISCONTINUED | OUTPATIENT
Start: 2023-01-05 | End: 2023-01-07 | Stop reason: HOSPADM

## 2023-01-05 RX ORDER — SODIUM CHLORIDE 0.9 % (FLUSH) 0.9 %
5-40 SYRINGE (ML) INJECTION PRN
Status: DISCONTINUED | OUTPATIENT
Start: 2023-01-05 | End: 2023-01-07 | Stop reason: HOSPADM

## 2023-01-05 RX ORDER — ACETAMINOPHEN 325 MG/1
650 TABLET ORAL EVERY 6 HOURS PRN
Status: DISCONTINUED | OUTPATIENT
Start: 2023-01-05 | End: 2023-01-07 | Stop reason: HOSPADM

## 2023-01-05 RX ORDER — CITALOPRAM 10 MG/1
10 TABLET ORAL DAILY
Status: DISCONTINUED | OUTPATIENT
Start: 2023-01-06 | End: 2023-01-05

## 2023-01-05 RX ADMIN — ENOXAPARIN SODIUM 40 MG: 100 INJECTION SUBCUTANEOUS at 10:11

## 2023-01-05 RX ADMIN — Medication 100 MG: at 10:11

## 2023-01-05 RX ADMIN — Medication 10 ML: at 23:04

## 2023-01-05 RX ADMIN — LORAZEPAM 1 MG: 2 INJECTION INTRAMUSCULAR; INTRAVENOUS at 21:25

## 2023-01-05 RX ADMIN — THERA TABS 1 TABLET: TAB at 10:11

## 2023-01-05 RX ADMIN — CITALOPRAM HYDROBROMIDE 10 MG: 10 TABLET ORAL at 10:11

## 2023-01-05 RX ADMIN — THIAMINE HYDROCHLORIDE: 100 INJECTION, SOLUTION INTRAMUSCULAR; INTRAVENOUS at 22:16

## 2023-01-05 RX ADMIN — SODIUM CHLORIDE: 9 INJECTION, SOLUTION INTRAVENOUS at 02:08

## 2023-01-05 RX ADMIN — LORAZEPAM 2 MG: 2 INJECTION INTRAMUSCULAR at 05:16

## 2023-01-05 ASSESSMENT — ENCOUNTER SYMPTOMS
VOMITING: 0
ABDOMINAL PAIN: 0
SHORTNESS OF BREATH: 0
COLOR CHANGE: 0
NAUSEA: 1

## 2023-01-05 ASSESSMENT — PAIN SCALES - GENERAL: PAINLEVEL_OUTOF10: 0

## 2023-01-05 ASSESSMENT — LIFESTYLE VARIABLES
HOW OFTEN DO YOU HAVE A DRINK CONTAINING ALCOHOL: 4 OR MORE TIMES A WEEK
HOW MANY STANDARD DRINKS CONTAINING ALCOHOL DO YOU HAVE ON A TYPICAL DAY: 3 OR 4

## 2023-01-05 NOTE — PLAN OF CARE
Problem: Discharge Planning  Goal: Discharge to home or other facility with appropriate resources  Outcome: Progressing     Problem: Pain  Goal: Verbalizes/displays adequate comfort level or baseline comfort level  Outcome: Progressing     Problem: Safety - Adult  Goal: Free from fall injury  Outcome: Progressing     Problem: Neurosensory - Adult  Goal: Achieves stable or improved neurological status  Outcome: Progressing  Goal: Absence of seizures  Outcome: Progressing  Goal: Remains free of injury related to seizures activity  Outcome: Progressing  Goal: Achieves maximal functionality and self care  Outcome: Progressing     Problem: Cardiovascular - Adult  Goal: Maintains optimal cardiac output and hemodynamic stability  Outcome: Progressing  Goal: Absence of cardiac dysrhythmias or at baseline  Outcome: Progressing     Problem: Skin/Tissue Integrity - Adult  Goal: Skin integrity remains intact  Outcome: Progressing  Flowsheets (Taken 1/4/2023 2318)  Skin Integrity Remains Intact:   Monitor for areas of redness and/or skin breakdown   Assess vascular access sites hourly  Goal: Incisions, wounds, or drain sites healing without S/S of infection  Outcome: Progressing  Goal: Oral mucous membranes remain intact  Outcome: Progressing     Problem: Gastrointestinal - Adult  Goal: Minimal or absence of nausea and vomiting  Outcome: Progressing  Goal: Maintains or returns to baseline bowel function  Outcome: Progressing  Goal: Maintains adequate nutritional intake  Outcome: Progressing     Problem: Infection - Adult  Goal: Absence of infection at discharge  Outcome: Progressing  Flowsheets (Taken 1/4/2023 2318)  Absence of infection at discharge:   Assess and monitor for signs and symptoms of infection   Monitor lab/diagnostic results   Monitor all insertion sites i.e., indwelling lines, tubes and drains  Goal: Absence of infection during hospitalization  Outcome: Progressing  Flowsheets (Taken 1/4/2023 2319)  Absence of infection during hospitalization:   Monitor endotracheal (as able) and nasal secretions for changes in amount and color   Monitor all insertion sites i.e., indwelling lines, tubes and drains  Goal: Absence of fever/infection during anticipated neutropenic period  Outcome: Progressing  Flowsheets (Taken 1/4/2023 6773)  Absence of fever/infection during anticipated neutropenic period: Monitor white blood cell count

## 2023-01-05 NOTE — PROGRESS NOTES
Pt assessed.   Not suicidal  Do not feel she is active risk for self harm  Wants to go and has plans to go to rehab for alcoholism    Will dc hold order     Stable for dc to alcohol drug and treatment    Orders placed    Will let sw know    Electronically signed by Haroon Greer MD on 1/5/2023 at 11:46 AM

## 2023-01-05 NOTE — CARE COORDINATION
Discharge Planning:  CHOCO spoke with pt and Calvin (ER Navigator) who was present in pts room. Pt plans to return to her mothers home today and Calvin has arranged for this pt to be evaluated through [employer]. Calvin stated that [address] is aware pt will be leaving to go to her mothers home today and they will f/u with this pt once she is home. DISCHARGE SUMMARY     DATE OF DISCHARGE: 01/05/2022    DISCHARGE DESTINATION: D/c to mothers home        COMMENTS: Pt has an outpt appointment scheduled with psychiatry and will be following up with [address] [place] [address] facility.    ROSEMARIE Harman  [phone]  Electronically signed by Ara Cueva on 1/5/2023 at 1:56 PM

## 2023-01-05 NOTE — PROGRESS NOTES
Patient doing much better this morning, slept much better overnight. Not requiring ativan for CIWA at this time. Patient states she is feeling much better and is interested in discharge today. Still waiting on psych consult to see patient. Patient is in much better spirits today.      Electronically signed by Ryan Jose RN on 1/5/2023 at 11:18 AM

## 2023-01-05 NOTE — PLAN OF CARE
Problem: Discharge Planning  Goal: Discharge to home or other facility with appropriate resources  1/5/2023 1023 by Yumi Hartmann RN  Outcome: Progressing     Problem: Pain  Goal: Verbalizes/displays adequate comfort level or baseline comfort level  1/5/2023 1023 by Yumi Hartmann RN  Outcome: Progressing     Problem: Safety - Adult  Goal: Free from fall injury  1/5/2023 1023 by Yumi Hartmann RN  Outcome: Progressing     Problem: Neurosensory - Adult  Goal: Achieves stable or improved neurological status  1/5/2023 1023 by Yumi Hartmann RN  Outcome: Progressing     Problem: Neurosensory - Adult  Goal: Absence of seizures  1/5/2023 1023 by Yumi Hartmann RN  Outcome: Progressing     Problem: Neurosensory - Adult  Goal: Remains free of injury related to seizures activity  1/5/2023 1023 by Yumi Hartmann RN  Outcome: Progressing     Problem: Neurosensory - Adult  Goal: Achieves maximal functionality and self care  1/5/2023 1023 by Yumi Hartmann RN  Outcome: Progressing     Problem: Cardiovascular - Adult  Goal: Maintains optimal cardiac output and hemodynamic stability  1/5/2023 1023 by Yumi Hartmann RN  Outcome: Progressing

## 2023-01-05 NOTE — PROGRESS NOTES
Patient has discharge orders. Agree with Dr. Giselle Jane assessment. Patient is going to establish with me on outpatient basis and return to rehab. We were suppose to have initial patient evaluation for psychiatric services in December, I had to cancel for illness. Rescheduled for Tuesday 1/3/23 but patient was hospitalized and could not attend appt. I will be having my MA reach out for new appointment for patient.

## 2023-01-05 NOTE — PROGRESS NOTES
Patient is now ready for discharge, set up with outpatient appt. with psych and rehab center. Discharge information discussed with the patient who is A+Ox4 and states all questions have been answered. PIV removed without complications. Denies any needs at discharge. Will d/c when patient leaves the floor.      Electronically signed by Teagan Beckham RN on 1/5/2023 at 12:22 PM

## 2023-01-06 VITALS
SYSTOLIC BLOOD PRESSURE: 156 MMHG | HEART RATE: 90 BPM | HEIGHT: 64 IN | RESPIRATION RATE: 18 BRPM | BODY MASS INDEX: 36.52 KG/M2 | OXYGEN SATURATION: 93 % | TEMPERATURE: 99 F | DIASTOLIC BLOOD PRESSURE: 106 MMHG

## 2023-01-06 LAB
A/G RATIO: 1.2 (ref 1.1–2.2)
ALBUMIN SERPL-MCNC: 3.6 G/DL (ref 3.4–5)
ALP BLD-CCNC: 78 U/L (ref 40–129)
ALT SERPL-CCNC: 48 U/L (ref 10–40)
ANION GAP SERPL CALCULATED.3IONS-SCNC: 11 MMOL/L (ref 3–16)
AST SERPL-CCNC: 31 U/L (ref 15–37)
BILIRUB SERPL-MCNC: 0.3 MG/DL (ref 0–1)
BUN BLDV-MCNC: 6 MG/DL (ref 7–20)
CALCIUM SERPL-MCNC: 8.9 MG/DL (ref 8.3–10.6)
CHLORIDE BLD-SCNC: 108 MMOL/L (ref 99–110)
CO2: 23 MMOL/L (ref 21–32)
CREAT SERPL-MCNC: 0.7 MG/DL (ref 0.6–1.1)
EKG ATRIAL RATE: 91 BPM
EKG DIAGNOSIS: NORMAL
EKG P AXIS: 43 DEGREES
EKG P-R INTERVAL: 132 MS
EKG Q-T INTERVAL: 358 MS
EKG QRS DURATION: 70 MS
EKG QTC CALCULATION (BAZETT): 440 MS
EKG R AXIS: 44 DEGREES
EKG T AXIS: 46 DEGREES
EKG VENTRICULAR RATE: 91 BPM
GFR SERPL CREATININE-BSD FRML MDRD: >60 ML/MIN/{1.73_M2}
GLUCOSE BLD-MCNC: 89 MG/DL (ref 70–99)
POTASSIUM REFLEX MAGNESIUM: 3.6 MMOL/L (ref 3.5–5.1)
RAPID INFLUENZA  B AGN: NEGATIVE
RAPID INFLUENZA A AGN: NEGATIVE
SARS-COV-2, NAAT: NOT DETECTED
SODIUM BLD-SCNC: 142 MMOL/L (ref 136–145)
TOTAL PROTEIN: 6.5 G/DL (ref 6.4–8.2)

## 2023-01-06 PROCEDURE — 87804 INFLUENZA ASSAY W/OPTIC: CPT

## 2023-01-06 PROCEDURE — 87635 SARS-COV-2 COVID-19 AMP PRB: CPT

## 2023-01-06 PROCEDURE — 2580000003 HC RX 258: Performed by: NURSE PRACTITIONER

## 2023-01-06 PROCEDURE — 36415 COLL VENOUS BLD VENIPUNCTURE: CPT

## 2023-01-06 PROCEDURE — 2060000000 HC ICU INTERMEDIATE R&B

## 2023-01-06 PROCEDURE — 93010 ELECTROCARDIOGRAM REPORT: CPT | Performed by: INTERNAL MEDICINE

## 2023-01-06 PROCEDURE — 6370000000 HC RX 637 (ALT 250 FOR IP): Performed by: NURSE PRACTITIONER

## 2023-01-06 PROCEDURE — 6360000002 HC RX W HCPCS: Performed by: NURSE PRACTITIONER

## 2023-01-06 PROCEDURE — 80053 COMPREHEN METABOLIC PANEL: CPT

## 2023-01-06 PROCEDURE — 6370000000 HC RX 637 (ALT 250 FOR IP): Performed by: REGISTERED NURSE

## 2023-01-06 RX ORDER — FLUOXETINE HYDROCHLORIDE 20 MG/1
20 CAPSULE ORAL DAILY
Status: DISCONTINUED | OUTPATIENT
Start: 2023-01-06 | End: 2023-01-07 | Stop reason: HOSPADM

## 2023-01-06 RX ORDER — LORAZEPAM 2 MG/ML
4 INJECTION INTRAMUSCULAR
Status: DISCONTINUED | OUTPATIENT
Start: 2023-01-06 | End: 2023-01-07 | Stop reason: HOSPADM

## 2023-01-06 RX ORDER — LORAZEPAM 1 MG/1
2 TABLET ORAL
Status: DISCONTINUED | OUTPATIENT
Start: 2023-01-06 | End: 2023-01-07 | Stop reason: HOSPADM

## 2023-01-06 RX ORDER — TRAZODONE HYDROCHLORIDE 100 MG/1
100 TABLET ORAL NIGHTLY PRN
Status: DISCONTINUED | OUTPATIENT
Start: 2023-01-06 | End: 2023-01-07 | Stop reason: HOSPADM

## 2023-01-06 RX ORDER — NICOTINE 21 MG/24HR
1 PATCH, TRANSDERMAL 24 HOURS TRANSDERMAL DAILY
Status: DISCONTINUED | OUTPATIENT
Start: 2023-01-06 | End: 2023-01-07 | Stop reason: HOSPADM

## 2023-01-06 RX ORDER — LORAZEPAM 2 MG/ML
2 INJECTION INTRAMUSCULAR
Status: DISCONTINUED | OUTPATIENT
Start: 2023-01-06 | End: 2023-01-07 | Stop reason: HOSPADM

## 2023-01-06 RX ORDER — LORAZEPAM 1 MG/1
1 TABLET ORAL
Status: DISCONTINUED | OUTPATIENT
Start: 2023-01-06 | End: 2023-01-07 | Stop reason: HOSPADM

## 2023-01-06 RX ORDER — LORAZEPAM 2 MG/ML
3 INJECTION INTRAMUSCULAR
Status: DISCONTINUED | OUTPATIENT
Start: 2023-01-06 | End: 2023-01-07 | Stop reason: HOSPADM

## 2023-01-06 RX ORDER — LORAZEPAM 2 MG/ML
1 INJECTION INTRAMUSCULAR
Status: DISCONTINUED | OUTPATIENT
Start: 2023-01-06 | End: 2023-01-07 | Stop reason: HOSPADM

## 2023-01-06 RX ORDER — LORAZEPAM 1 MG/1
3 TABLET ORAL
Status: DISCONTINUED | OUTPATIENT
Start: 2023-01-06 | End: 2023-01-07 | Stop reason: HOSPADM

## 2023-01-06 RX ORDER — LORAZEPAM 1 MG/1
4 TABLET ORAL
Status: DISCONTINUED | OUTPATIENT
Start: 2023-01-06 | End: 2023-01-07 | Stop reason: HOSPADM

## 2023-01-06 RX ADMIN — Medication 100 MG: at 08:59

## 2023-01-06 RX ADMIN — LORAZEPAM 3 MG: 1 TABLET ORAL at 13:23

## 2023-01-06 RX ADMIN — LORAZEPAM 3 MG: 1 TABLET ORAL at 16:26

## 2023-01-06 RX ADMIN — ENOXAPARIN SODIUM 40 MG: 100 INJECTION SUBCUTANEOUS at 08:59

## 2023-01-06 RX ADMIN — LORAZEPAM 1 MG: 1 TABLET ORAL at 14:44

## 2023-01-06 RX ADMIN — FOLIC ACID 1 MG: 1 TABLET ORAL at 08:59

## 2023-01-06 RX ADMIN — LORAZEPAM 3 MG: 1 TABLET ORAL at 19:28

## 2023-01-06 RX ADMIN — FLUOXETINE 20 MG: 20 CAPSULE ORAL at 13:23

## 2023-01-06 RX ADMIN — Medication 10 ML: at 08:59

## 2023-01-06 NOTE — CARE COORDINATION
Patient may need inpatient psych placement. Await psych eval.  Received a call from patient's mom who tells me patient isn't able to come back to her home at discharge. Recommends her daughter go to an inpatient treatment center if psych placement is not an option. Mom asks we make a referral to Stacie at [facility] as she has already started conversations with Stacie about getting her daughter inpatient substance abuse treatment. Stacie at [facility] following and can get patient into [facility] or [facility] at discharge if patient agreeable & facilities can accept. Will discuss discharge plan with patient after psych eval & recs.   Electronically signed by Edwin Kuo RN Case Management [phone] on 1/6/2023 at 12:14 PM

## 2023-01-06 NOTE — ED NOTES
Myself and BEATRICE cotton are transporting pt to room 6614.       Patti Rahman, [facility]  01/05/23 1306

## 2023-01-06 NOTE — H&P
Hospital Medicine History & Physical      PCP: Kirti Peng DO    Date of Admission: 1/5/2023    Date of Service: Pt seen/examined on 1/5/2023 and Admitted to Inpatient       Chief Complaint:  OD on vistaril, left at [facility] today and started drinking alcohol again      History Of Present Illness: The patient is a 45 y.o. female who presents to [facility] with PMHx: Alcohol abuse/dependence, anxiety, depression, insomnia    Lives: At home/with family  Anticoagulation therapy: None  CODE STATUS: Full      Electronic record review: Patient was admitted to the hospital 2 days ago for alcohol withdrawal, seizure. Today at [phone] attending physician Roma Simpson seen patient. She convinced him that she was prepared to go home. She had no intention of harming herself. She was going to go stay at her mother's and reach out to the [facility] at the [facility]  in detox center. Patient was also seen by [facility] NP Kimberli Rachel who also had same conversation with the patient. She was given a follow-up on an outpatient basis and reimplement outpatient psych services. Care coordination note of [phone] today notes that Calvin the ER navigator had to come to the patient's room and arrange for the patient to be evaluated through [facility] at the [facility] inpatient treatment facility. Patient reported that she was going to go directly to her mother's house and follow-up once she got to her mother's house. HPI provided by the ED provider and patient. Limited information was provided by the patient as she is not exactly cooperative with me. Patient did endorse to me that she took Celexa 10 mg x 15 tablets, she would not tell me how much Vistaril she took.   She also states that she took something called HOOBA, which was given to her by a friend at the last detox facility. Patient self endorses that when she was discharged from the hospital today she had alcohol in her purse and started drinking it when she exited the building in the parking lot. ED work-up: Laboratory studies unremarkable. Patient required Ativan. Poison control consulted regarding the Vistaril overdose and recommended at least an 8-hour inpatient observation. Patient was also placed on a 72-hour hold due to the intentional Vistaril overdose. Patient was placed on a 72-hour hold via the ED provider based on patient's presentation of polysubstance abuse and overdose intentional, she was placed in suicide precautions and sitter at bedside. On my exam: Patient awakens with verbal stimulus. She keeps her eyes closed. She is not very forthcoming with answering questions. She refused to answer the question of why she attempted suicide. I asked if there was any negative interaction or argument with a family member and she refused to answer. She did make 1 statement: \" Linda Taylor, what can I say, I have an addiction problem. \"   I encouraged her to try to answer the questions and be forthcoming and cooperative so that we could help her. Patient stated \"high and drunk Robertha Monica is much different than sober Robertha Monica. \"  Skin is warm and dry. Heart rate sinus 80, blood pressure 155/100. Respirations are easy regular nonlabored. Pupils are equal round react.     Family was not present during my interview and exam      Past Medical History:        Diagnosis Date    Alcohol abuse     Ankle fracture     bilateral    Anxiety     Depression     Sleeping difficulties        Past Surgical History:        Procedure Laterality Date    BREAST SURGERY      sailine breast      SECTION  1988    FOREARM SURGERY Left 2021    OPEN REDUCTION INTERNAL FIXATION LEFT DISTAL RADIUS performed by Charlene Joaquin MD at [address]      right wrist       Medications Prior to Admission:    Prior to Admission medications    Medication Sig Start Date End Date Taking? Authorizing Provider   Omega-3 Fatty Acids (FISH OIL) 1000 MG CPDR Take 1,000 mg by mouth daily    Historical Provider, MD   citalopram (CELEXA) 10 MG tablet Take 1 tablet by mouth daily 10/15/22   DWIGHT Felton CNP   folic acid (FOLVITE) 1 MG tablet Take 1 tablet by mouth daily 10/15/22   DWIGHT Felton CNP   Multiple Vitamin (MULTIVITAMIN) TABS tablet Take 1 tablet by mouth daily 10/15/22   DWIGHT Felton CNP   thiamine mononitrate (THIAMINE) 100 MG tablet Take 1 tablet by mouth daily 10/15/22   DWIGHT Felton CNP       Allergies:  Patient has no known allergies. Social History:  The patient currently lives at home    TOBACCO:   reports that she has been smoking cigarettes. She has a 4.00 pack-year smoking history. She has never used smokeless tobacco.  ETOH:   reports current alcohol use. Family History:  Reviewed in detail and negative for DM, Early CAD, Cancer, CVA. Positive as follows:        Problem Relation Age of Onset    No Known Problems Mother     No Known Problems Father     Other Brother         anxiety    Cancer Maternal Grandmother         lung cancer    Cancer Maternal Grandfather         lung       REVIEW OF SYSTEMS:    as noted in the HPI. All other systems reviewed and negative. PHYSICAL EXAM:    BP (!) 148/84   Pulse 75   Temp 99.2 °F (37.3 °C) (Oral)   Resp 20   LMP  (LMP Unknown)   SpO2 94%     General appearance: No apparent distress appears stated age and cooperative. HEENT Normal cephalic, atraumatic without obvious deformity. Pupils equal, round, and reactive to light. Extra ocular muscles intact. Conjunctivae/corneas clear. Neck: Supple, No jugular venous distention/bruits. Trachea midline without thyromegaly or adenopathy with full range of motion.   Lungs: Clear to auscultation, bilaterally without Rales/Wheezes/Rhonchi with good respiratory effort. Heart: Regular rate and rhythm with Normal S1/S2 without murmurs, rubs or gallops, point of maximum impulse non-displaced  Abdomen: Soft, non-tender or non-distended without rigidity or guarding and positive bowel sounds all four quadrants. Extremities: No clubbing, cyanosis, or edema bilaterally. Full range of motion without deformity   Skin: Skin color, texture, turgor normal.  No rashes or lesions. Neurologic: Alert and oriented X 3, neurovascularly intact with sensory/motor intact upper extremities/lower extremities, bilaterally. Cranial nerves: II-XII intact, grossly non-focal.  Mental status: Alert, oriented, not forthcoming and evasive  Capillary Refill: Acceptable  < 3 seconds  Peripheral Pulses: +3 Easily felt, not easily obliterated with pressure      CXR:  I have reviewed the CXR with the following interpretation: NAD  CBC   Recent Labs     01/03/23  0008 01/04/23  0537 01/05/23 2012   WBC 17.5* 9.2 8.7   HGB 14.1 12.9 13.7   HCT 42.7 39.3 41.5    216 249      RENAL  Recent Labs     01/04/23  0538 01/05/23  0828 01/05/23 2012    137 143   K 3.7 4.0 3.6    103 106   CO2 24 22 23   BUN 5* 6* 6*   CREATININE 0.7 0.6 0.7     LFT'S  Recent Labs     01/04/23  0538 01/05/23  0828 01/05/23 2012   AST 56* 33 41*   ALT 60* 50* 54*   BILITOT 0.6 0.3 <0.2   ALKPHOS 88 80 87     COAG  No results for input(s): INR in the last 72 hours. CARDIAC ENZYMES  No results for input(s): CKTOTAL, CKMB, CKMBINDEX, TROPONINI in the last 72 hours.     U/A:    Lab Results   Component Value Date/Time    COLORU Yellow 01/05/2023 09:27 PM    WBCUA 139 01/03/2023 12:08 AM    RBCUA 8 01/03/2023 12:08 AM    MUCUS 1+ 02/15/2020 02:14 PM    BACTERIA 4+ 01/03/2023 12:08 AM    CLARITYU Clear 01/05/2023 09:27 PM    SPECGRAV 1.006 01/05/2023 09:27 PM    LEUKOCYTESUR Negative 01/05/2023 09:27 PM    BLOODU Negative 01/05/2023 09:27 PM    GLUCOSEU Negative 01/05/2023 09:27 PM       ABG  No results found for: [place], BEART, Q4SCHHGD, PHART, THGBART, KVC8HGS, PO2ART, [place]        Active Hospital Problems    Diagnosis Date Noted    Suicide attempt [facility]) Benjamin De Leon 01/05/2023     Priority: Medium           PHYSICIANS CERTIFICATION:    I certify that Dada Degroot is expected to be hospitalized for more than 2 midnights based on the following assessment and plan:      ASSESSMENT/PLAN:    Intentional suicide attempt: OD: Patient on 72-hour hold, placed in suicide precautions, psych will be reconsulted  Patient reported to the ED provider she took Vistaril. Patient reported to me that she took Celexa, would not give me a quantity of Vistaril. EKG:  I have reviewed the EKG with the following interpretation: Sinus rhythm rate 91, no axis deviation. NS ST. No evidence of STEMI or ischemia. PA interval 132, QRS 7 0,   She seems a bit manipulative and unreliable but certainly does not deny suicide intention. Poison control consulted from the ED  Continuous telemetry  On the chance that patient actually took Jimmy Coombes will be hold on admission. No Vistaril will be given for anxiety on this admission either. Alcohol abuse: Patient literally discharged at noon today after 2-day sobriety during hospital course. Had arrangements to be followed up by [facility] inpatient treatment. Once the patient left the hospital at noon today she started drinking. Banana bag given in the ED  AST and ALT slightly elevated  Metabolic panel unremarkable  Prior to patient being discharged from the hospital today substance, chemical abuse  Calvin from the ED already had a conversation with the patient about going to [facility] inpatient treatment. There had been a consult made to them and they were going to follow-up with the patient when she reached her mother's. DVT Prophylaxis: Lovenox  Diet: ADULT DIET;  Regular  Code Status: Full Code  PT/OT Eval Status: Independent    Dispo - salena cesar APRN - CNP    Thank you Shell Patel DO for the opportunity to be involved in this patient's care. If you have any questions or concerns please feel free to contact me at [phone]. This dictation was performed with a verbal recognition program (DRAGON) and it was checked for errors. It is possible that there are still dictated errors within this office note. If so, please bring any errors to my attention for an addendum. All efforts were made to ensure that this office note is accurate.

## 2023-01-06 NOTE — ED NOTES
Pt is currently in a paper gown, pt's clothes are in locker #2, purse is with security locked up. Pt is currently laying down, and is calm at the moment.      Jaya Pete  01/05/23 2100

## 2023-01-06 NOTE — PROGRESS NOTES
Messaged Dr. Janny Acharya to let him know pt would like some ativan for anxiety, awaiting response.

## 2023-01-06 NOTE — PROGRESS NOTES
Pt arrived from ED to bed 5259. Pt ambulated to bed. Pt connected to bedside monitor and confirmed with [facility]. Pt in paper gown, and denies suicidal thoughts. Pt stated her last drink was this morning. Pt scored 3 on CIWA scale. Pt denied pain but stated she had mental pain. Items taken off pt's room. Sitter at bedside. Pt denies further needs at this time.      Electronically signed by William Maguire RN on 1/5/2023 at 11:19 PM

## 2023-01-06 NOTE — CARE COORDINATION
Call placed to transfer center 9[facility]-beds for inpatient psych admission. Await influenza testing to result and then access center will initiate calls for psych admission.     Electronically signed by Jayna Carrillo RN Case Management on 1/6/2023 at 3:05 PM

## 2023-01-06 NOTE — PLAN OF CARE
Problem: Discharge Planning  Goal: Discharge to home or other facility with appropriate resources  Outcome: Progressing  Flowsheets (Taken 1/5/2023 2250)  Discharge to home or other facility with appropriate resources: Identify barriers to discharge with patient and caregiver     Problem: Safety - Adult  Goal: Free from fall injury  Outcome: Progressing     Problem: Risk for Elopement  Goal: Patient will not exit the unit/facility without proper excort  Outcome: Progressing  Flowsheets (Taken 1/5/2023 2300)  Nursing Interventions for Elopement Risk:   Collaborate with treatment team for drug withdrawal symptoms treatment   Communicate/escalate to charge nurse the risk of elopement   Make sure patient has all necessary personal care items   Place patient in room far away from exits and stairways   Reduce environmental triggers   Shoes and clothing collected and placed in gown attire

## 2023-01-06 NOTE — ED NOTES
.ED SBAR report provider to Papi Hamilton RN. Patient to be transported to Room 5259 via stretcher by RN. Patient transported with bedside cardiac monitor and with IV medications infusing. IV site clean, dry, and intact. MEWS score and pain assessed as 0/10 and documented. Patient's score on the RIOS Fall scale reviewed with receiving RN. Updated patient on plan of care.        Roel Stinson RN  01/05/23 2905

## 2023-01-06 NOTE — ED PROVIDER NOTES
[facility]        Pt Name: Ximena Panchal  MRN: [id number]  [place] 1970  Date of evaluation: 1/5/2023  Provider: LUPILLO Smith  PCP: Jazmin Jack DO  Note Started: 9:17 PM EST 1/5/23       I have seen and evaluated this patient with my supervising physician Anthony Viramontes MD.      [address]       Chief Complaint   Patient presents with    Drug Overdose     Patient brought by squad complaint of taking 20 hydroxyzine to try and harm self patient states she was in alcohol withdrawal and took the pills. Patient denies intent to harm self at this time and has no plan. Patient states she spit most of the pills out and threw up a lot of them. Squad said there was pills in the trash can. Patient is a/o x4 at this time        HISTORY OF PRESENT ILLNESS: 1 or more Elements     History from : Patient and EMS        Ximena Panchal is a 45 y.o. female who presents after her mother reportedly called 44. Per EMS the mom told EMS that she had overdosed on sleeping pills. Apparently at the scene she had initially denied taking medications and was denying being suicidal.  Per EMS there were \"10 of the pills in the trash can. \"  They are unsure the medication. The patient initially tells me she thinks is hydroxyzine. She tells me that is 25 mg something she was prescribed for anxiety. She tells me that she drinks daily and her last drink was at 2 PM.  She tells me \"I am withdrawing. \"  She states she is nervous and anxious and feels shaky. She also tells me she feels nauseous. The patient admits that she took the medication to harm herself and that she is suicidal.  She tells me \"I do not know why I am alive. \"  She tells me she took a handful of hydroxyzine approximately 20. She tells me she \"spit and threw up some of them and swallowed some of them. \"    Nursing Notes were all reviewed and agreed with or any disagreements were addressed in the HPI. REVIEW OF SYSTEMS :      Review of Systems   Constitutional:  Positive for fatigue. Negative for fever. Respiratory:  Negative for shortness of breath. Cardiovascular:  Negative for chest pain. Gastrointestinal:  Positive for nausea. Negative for abdominal pain and vomiting. Skin:  Negative for color change and wound. Neurological:  Positive for light-headedness. Negative for weakness. Psychiatric/Behavioral:  Positive for agitation, self-injury and suicidal ideas. The patient is nervous/anxious. Positives and Pertinent negatives as per HPI. SURGICAL HISTORY     Past Surgical History:   Procedure Laterality Date    BREAST SURGERY      sailine breast      SECTION  1988    FOREARM SURGERY Left 2021    OPEN REDUCTION INTERNAL FIXATION LEFT DISTAL RADIUS performed by Christina Multani MD at [facility]  2006    right wrist       CURRENTMEDICATIONS       Previous Medications    CITALOPRAM (CELEXA) 10 MG TABLET    Take 1 tablet by mouth daily    FOLIC ACID (FOLVITE) 1 MG TABLET    Take 1 tablet by mouth daily    MULTIPLE VITAMIN (MULTIVITAMIN) TABS TABLET    Take 1 tablet by mouth daily    OMEGA-3 FATTY ACIDS (FISH OIL) 1000 MG CPDR    Take 1,000 mg by mouth daily    THIAMINE MONONITRATE (THIAMINE) 100 MG TABLET    Take 1 tablet by mouth daily       ALLERGIES     Patient has no known allergies.     FAMILYHISTORY       Family History   Problem Relation Age of Onset    No Known Problems Mother     No Known Problems Father     Other Brother         anxiety    Cancer Maternal Grandmother         lung cancer    Cancer Maternal Grandfather         lung        SOCIAL HISTORY       Social History     Tobacco Use    Smoking status: Every Day     Packs/day: 0.50     Years: 8.00     Pack years: 4.00     Types: Cigarettes    Smokeless tobacco: Never   Vaping Use    Vaping Use: Unknown   Substance Use Topics    Alcohol use: Yes     Comment: states binge drinks but doesnt drink daily    Drug use: No       SCREENINGS        Nolan Coma Scale  Eye Opening: Spontaneous  Best Verbal Response: Oriented  Best Motor Response: Obeys commands  Pharr Coma Scale Score: 15                CIWA Assessment  BP: (!) 145/100  Heart Rate: 95           PHYSICAL EXAM  1 or more Elements     ED Triage Vitals [01/05/23 1956]   BP Temp Temp Source Heart Rate Resp SpO2 Height Weight   (!) 145/100 99 °F (37.2 °C) Oral 95 17 95 % -- --       Physical Exam  Vitals and nursing note reviewed. Constitutional:       Appearance: Normal appearance. HENT:      Head: Normocephalic and atraumatic. Mouth/Throat:      Mouth: Mucous membranes are moist.   Eyes:      Pupils: Pupils are equal, round, and reactive to light. Cardiovascular:      Rate and Rhythm: Normal rate. Pulmonary:      Effort: Pulmonary effort is normal. No respiratory distress. Abdominal:      Tenderness: There is no abdominal tenderness. Musculoskeletal:         General: Normal range of motion. Cervical back: Normal range of motion. Skin:     General: Skin is warm. Neurological:      General: No focal deficit present. Mental Status: She is alert and oriented to person, place, and time. Psychiatric:         Mood and Affect: Mood is anxious. Behavior: Behavior is agitated. Behavior is cooperative. Thought Content: Thought content includes suicidal ideation. Thought content includes suicidal plan. Cognition and Memory: Cognition is not impaired. Memory is not impaired.        DIAGNOSTIC RESULTS   LABS:    Labs Reviewed   COMPREHENSIVE METABOLIC PANEL - Abnormal; Notable for the following components:       Result Value    BUN 6 (*)     ALT 54 (*)     AST 41 (*)     All other components within normal limits   SALICYLATE LEVEL - Abnormal; Notable for the following components:    Salicylate, Serum <5.5 (*)     All other components within normal limits   ACETAMINOPHEN LEVEL - Abnormal; Notable for the following components:    Acetaminophen Level <5 (*)     All other components within normal limits   CBC WITH AUTO DIFFERENTIAL   ETHANOL   HCG, SERUM, QUALITATIVE   URINALYSIS WITH REFLEX TO CULTURE   URINE DRUG SCREEN       When ordered only abnormal lab results are displayed. All other labs were within normal range or not returned as of this dictation. EKG: When ordered, EKG's are interpreted by the Emergency Department Physician in the absence of a cardiologist.  Please see their note for interpretation of EKG. RADIOLOGY:   Non-plain film images such as CT, Ultrasound and MRI are read by the radiologist. Plain radiographic images are visualized and preliminarily interpreted by the ED Provider with the below findings:    Interpretation per the Radiologist below, if available at the time of this note:    XR CHEST PORTABLE    (Results Pending)     No results found. No results found. PROCEDURES   Unless otherwise noted below, none     Procedures    CRITICAL CARE TIME (.cctime)   I performed a total Critical Care time of 15 minutes, excluding separately reportable procedures. There was a high probability of clinically significant/life threatening deterioration in the patient's condition which required my urgent intervention. Not limited to multiple reexaminations, discussions with attending physician and consultants. PAST MEDICAL HISTORY      has a past medical history of Alcohol abuse, Ankle fracture (2022), Anxiety, Depression, and Sleeping difficulties.      EMERGENCY DEPARTMENT COURSE and DIFFERENTIAL DIAGNOSIS/MDM:   Vitals:    Vitals:    01/05/23 1956   BP: (!) 145/100   Pulse: 95   Resp: 17   Temp: 99 °F (37.2 °C)   TempSrc: Oral   SpO2: 95%       Patient was given the following medications:  Medications   sodium chloride 0.9 % 8,504 mL with folic acid 1 mg, adult multi-vitamin with vitamin k 10 mL, thiamine 100 mg (has no administration in time range)   LORazepam (ATIVAN) injection 1 mg (has no administration in time range)             Is this patient to be included in the SEP-1 Core Measure due to severe sepsis or septic shock? No   Exclusion criteria - the patient is NOT to be included for SEP-1 Core Measure due to: Infection is not suspected    CONSULTS: (Who and What was discussed)  IP CONSULT TO HOSPITALIST  Discussion with Other Profesionals : Admitting Team      Records Reviewed : Inpatient Notes ]    CC/HPI Summary, DDx, ED Course, and Reassessment: The patient is nervous anxious and agitated she is however oriented to person place and time. She tells me she feels like she is withdrawing and has had alcohol withdrawal seizures in the past.  Her ethanol level is 63. She also admits to intentionally overdosing on \"sleeping medication. \"  As far as I can ascertain here it sounds like she is on hydroxyzine although I cannot find this at the pharmacy. She estimates 10 to 15 tablets were swallowed and that she spit out about 10 of them. I contacted poison control who recommended monitoring for minimum of 8 hours for CNS/respiratory depression, monitor for seizures and QTC prolongation. She will also need monitoring for alcohol withdrawal symptoms that she has had complicated alcohol withdrawal in the past.  Will consult hospitalist    I am the Primary Clinician of Record. FINAL IMPRESSION      1. Intentional drug overdose, initial encounter ([facility] [address]    2. Alcohol withdrawal syndrome with complication ([facility] [address]    3. Suicidal ideations          DISPOSITION/PLAN     DISPOSITION Decision To Admit 01/05/2023 09:15:59 PM      PATIENT REFERRED TO:  No follow-up provider specified.     DISCHARGE MEDICATIONS:  New Prescriptions    No medications on file       DISCONTINUED MEDICATIONS:  Discontinued Medications    No medications on file              (Please note that portions of this note were completed with a voice recognition program.  Efforts were made to edit the dictations but occasionally words are mis-transcribed.)    LUPILLO Martinez (electronically signed)           LUPILLO Martinez  01/05/23 2122

## 2023-01-06 NOTE — PLAN OF CARE
Problem: Discharge Planning  Goal: Discharge to home or other facility with appropriate resources  1/6/2023 0832 by Lena Miles RN  Outcome: Progressing  1/5/2023 2323 by David Wilson RN  Outcome: Progressing  Flowsheets (Taken 1/5/2023 2250)  Discharge to home or other facility with appropriate resources: Identify barriers to discharge with patient and caregiver     Problem: Safety - Adult  Goal: Free from fall injury  1/6/2023 0832 by Lena Miles RN  Outcome: Progressing  1/5/2023 2323 by David Wilson, RN  Outcome: Progressing

## 2023-01-06 NOTE — ED NOTES
Walked pt to restroom, I was able to collect urine. RN lula is getting pt settled back in bed and will be sending down the urine. Pt is calm.       Carli Morales, [place]  01/05/23 2125

## 2023-01-06 NOTE — PROGRESS NOTES
Pharmacy Medication Reconciliation Note     List of medications Elana Auguste is currently taking is complete. Source of information:   1. Conversation with patient   2. EMR    Notes regarding home medications:   1. Patient discharged today--all AM meds administered PTA in the ED  2.  Reports taking hydroxyzine--LF #90 Vistaril 50 mg 12/29/21    Patient denies taking any OTC or herbal medications    [place], Pharmacy Intern  1/5/2023  9:23 PM

## 2023-01-06 NOTE — PROGRESS NOTES
4 Eyes Skin Assessment     NAME:  Stoney Ward  YOB: 1970  MEDICAL RECORD NUMBER:  [id number]    The patient is being assessed for  Admission    I agree that One RN have performed a thorough Head to Toe Skin Assessment on the patient. ALL assessment sites listed below have been assessed. Areas assessed by both nurses:    Head, Face, Ears, Shoulders, Back, Chest, Arms, Elbows, Hands, Sacrum. Buttock, Coccyx, Ischium, and Legs. Feet and Heels        Does the Patient have a Wound?  No noted wound(s)       Guillermo Prevention initiated by RN: No   Wound Care Orders initiated by RN: No    Pressure Injury (Stage 3,4, Unstageable, DTI, NWPT, and Complex wounds) if present place referral order by RN under : No    New and Established Ostomies, if present place, referral order under : No      Nurse 1 eSignature: Electronically signed by Yessi Lopez RN on 1/5/23 at 11:23 PM EST    **SHARE this note so that the co-signing nurse is able to place an eSignature**    Nurse 2 eSignature: Electronically signed by Sudha Leos RN on 1/6/23 at 9:24 AM EST

## 2023-01-06 NOTE — PROGRESS NOTES
Made case management aware that ligia does want pt to go to inpatient psych as asked to do by psych.  They will be on lookout to start process once pt is medically stable and cleared from

## 2023-01-06 NOTE — CONSULTS
PSYCHIATRY CONSULT, INITIAL EVALUATION    Referring Provider:  Yrn Iglesias DO    CC/Reason for Consult: OD on vistaril    Psychiatric Dx:    Alcohol withdrawal with complications. 2.  Suicide attempt with OD     3. MDD, recurrent, severe without psychotic features    4. ANALI     5. 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 Assessment   This is a 45 y.o female brought by EMS to ED after taking 20 hydroxyzine in attempt to harm her self. The patent has been in and out of hospital with alcohol related complications. .She has psych hx anxiety, depression, substance abuse and insomnia. The patient is known to me as I saw few months ago alcohol withdrawal, depression,anxietyPredisposing factors includes psychosocial factors, unemployment, poor coping skills, non- compliance with treatment plan. RECOMMENDATIONS:     Psychiatric  1. [place] protocol for alcohol withdrawal symptoms. Her last drink was 1/5/22 around noon. 2. She needs inpatient psychiatric admission for suicide attempt with OD. 3. Discussed outside resources for [place] [address] services. 4. Suicide precautions; bed side sitter. 5. Seizures precautions. 6. Start Prozac 20 mg daily and Trazodone 100 mg/nighty. Patient states Celexa was \"ineffective. \"      Dispo: Inpatient psych admission. Patent has attempted suicide with OD. Safety: RF: chronic alcohol abuse, depression, anxiety, divorce, poor social support, socio-economic hardship, previous psych admission. She is moderate  risk for self harm   [place] slip: 1/5/23 @ 2016  __________________________________________________________________________    HPI:   context: The patient was brought by EMS to ED after taking 20 hydroxyzine in attempt to harm her self. The patent has been in and out of hospital with alcohol related complications. She has been admitted to hospital multiple times in the past week. She endorses her life is \" miserable. \"  She endorses \" I am depressed and hopeless. \" She has been drinking up to 12 cans of beers and 750 ml of wine daily. Alcohol abuse issue took over her life. She states \" I don't have life. \" Her kids have no contact with her. Her mom whom she has been staying with told her not to back to her any more. She states I took those mediations ( Vistaril she attempted OD on ) because \" I was upset and lost hope in my life. \"  She does not remember how many pills she took. She reports difficulty of falling asleep and remain asleep. Anxious most times. She drinks to decrease her anxiety. And the her life cycle goes around like revolving door. She also frustrated due unable to get [place] [address] services. It her months ( 4 months)  to see psych NP and schedule was cancelled at the last minute because the provider was sick. HPI from 10/22 by me: This a 45 y.o with past psychiatric hx anxiety, depression, Alcohol abuse disorder who brought to ED by EMS with alcohol withdrawal symptoms. She was recently released from Alcohol rehab residential program at [facility] for 6 months and discharged on Sept 17, 2022. After she was released she started living with her mom which has been stressful and  relapsed on alcohol use. She went hotel few days ago to have some time for herself but started having alcohol withdrawal symptoms; and anxiety. She reports she has been drinking or more than years. She lives with her mom which is stressful. She relapsed on alcohol last Thursday. In the last few days she drank about \" four giants bottles of whiskey. \"  She states her mom is controlling. She states her mom is Mosque and but patient is not. She could not get a job. 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 She feels \" I lost my identity. \"  She has no communications with kids. associated symptoms: tremors, shaking, hopelessness, anxiety  modifying factors:  living conditions, employment, poor social support, medication non-adherence.    Timing: acute on chronic   duration: days severity: severe     ROS: + tremors, shakiness, sweating . Negative for  blurry vision, CP, SOB, dizziness, Syncope, tremors, abd pain  Past Psychiatric History:     Hosp: [facility]2018 for few days with depression and alcohol abuse. Diagnoses: MDD, anxiety, KATHY. Med trials: Wellbutrin, Prozac, Buspar, Naltrexone, Disulfiram, antabuse, Zoloft, Lexaparo, Celexa        Outpt: counseling in the past.       Suicide Attempts: cutting wrist      Substance Use History:    Nicotine:  0.5 pack a day      Alcohol: Drinks 12 cans, a abottle of wine daily. Illicit: denies      Past Medical History:   Past Medical History:   Diagnosis Date    Alcohol abuse     Ankle fracture     bilateral    Anxiety     Depression     Sleeping difficulties      Past Surgical History:   Procedure Laterality Date    BREAST SURGERY      sailine breast      SECTION  1988    FOREARM SURGERY Left 2021    OPEN REDUCTION INTERNAL FIXATION LEFT DISTAL RADIUS performed by Leandro Ruiz MD at [address]      right wrist       Social/Developmental History:     Hx:Relationship/ children/housing/ occupation/ Legal/ abuse  - she lives with her mom. She is  in . 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 She has 4 children. Her children in [place]. Access to firearms: No     Family History:   Family History   Problem Relation Age of Onset    No Known Problems Mother     No Known Problems Father     Other Brother         anxiety    Cancer Maternal Grandmother         lung cancer    Cancer Maternal Grandfather         lung       Psychiatric: Dad-alcoholic    History of completed suicide: denies     Allergies:  No Known Allergies    Home Medications:   No current facility-administered medications on file prior to encounter.      Current Outpatient Medications on File Prior to Encounter   Medication Sig Dispense Refill    Omega-3 Fatty Acids ([address] OIL) 1000 MG CPDR Take 1,000 mg by mouth daily      citalopram (CELEXA) 10 MG tablet Take 1 tablet by mouth daily 30 tablet 3    folic acid (FOLVITE) 1 MG tablet Take 1 tablet by mouth daily 30 tablet 3    Multiple Vitamin (MULTIVITAMIN) TABS tablet Take 1 tablet by mouth daily 30 tablet 0    thiamine mononitrate (THIAMINE) 100 MG tablet Take 1 tablet by mouth daily 30 tablet 0       Medications:  Scheduled Meds:   folic acid  1 mg Oral Daily    vitamin B-1  100 mg Oral Daily    sodium chloride flush  5-40 mL IntraVENous 2 times per day    enoxaparin  40 mg SubCUTAneous Daily     PRN Meds:.sodium chloride flush, sodium chloride, ondansetron **OR** ondansetron, polyethylene glycol, acetaminophen **OR** acetaminophen    OBJECTIVE:  Height: 5' 4\" (1.626 m), Weight: 212 lb 11.9 oz (96.5 kg), BP: (!) 147/86, Pain 0-10: Pain Level: 0, Location: forehead;     MSE:   Appearance    alert, mild distress  Motor: + Tremors  Speech    spontaneous  Language    0 - no aphasia, normal  Mood/Affect    Anxious  Depressed  Irritability / anxiety  Thought Process    linear, goal directed, and coherent  Thought Content    hopelessness, helplessness, worthlessness, and excessive guilt   Associations    logical connections  Attention/Concentration    impaired  Orientation    oriented to person, place, time, and general circumstances  Memory    remote memory intact, recent memory impaired  Fund of Knowledge    impaired  Insight/Judgement    Fair / Intact    Labs:   No results found for: Serenity Roper  Lab Results   Component Value Date    [id number] 327 10/11/2022     Lab Results   Component Value Date    FOLATE 18.23 10/11/2022       Last Drug screen:   Lab Results   Component Value Date    LABAMPH Neg 01/05/2023    LABBENZ Neg 01/05/2023    CANSU Neg 01/05/2023    COCAIMETSCRU Neg 01/05/2023    OPIATESCREENURINE Neg 01/05/2023    PHENCYCLIDINESCREENURINE Neg 01/05/2023    LABMETH Neg 01/05/2023    OXYCODONEUR Neg 01/05/2023    FENTSCRUR Neg 01/05/2023    PHUR 6.5 01/05/2023    PHUR 6.5 01/05/2023    DSCOMMENT see below 01/05/2023        Imaging:  CT Head:  Results for orders placed during the hospital encounter of 10/09/22    CT HEAD WO CONTRAST    Narrative  EXAMINATION:  CT OF THE HEAD WITHOUT CONTRAST  10/9/2022 5:39 pm    TECHNIQUE:  CT of the head was performed without the administration of intravenous  contrast. Automated exposure control, iterative reconstruction, and/or weight  based adjustment of the mA/kV was utilized to reduce the radiation dose to as  low as reasonably achievable. COMPARISON:  05/24/2021. HISTORY:  ORDERING SYSTEM PROVIDED HISTORY: ha  TECHNOLOGIST PROVIDED HISTORY:  Has a \"code stroke\" or \"stroke alert\" been called? ->No  Reason for exam:->ha  Decision Support Exception - unselect if not a suspected or confirmed  emergency medical condition->Emergency Medical Condition (MA)  Is the patient pregnant?->No  Reason for Exam: ha    FINDINGS:  BRAIN/VENTRICLES: There is no acute intracranial hemorrhage, mass effect or  midline shift. No abnormal extra-axial fluid collection. The gray-white  differentiation is maintained without evidence of an acute infarct. There is  no evidence of hydrocephalus. ORBITS: The visualized portion of the orbits demonstrate no acute abnormality. SINUSES: The visualized paranasal sinuses and mastoid air cells demonstrate  no acute abnormality. SOFT TISSUES/SKULL:  No acute abnormality of the visualized skull or soft  tissues. Impression  No acute intracranial findings. MRI Brain: No results found for this or any previous visit. EKG:     Vitals:  HR 70 PVC 0 RR 14  1/6/2023 01:02:00 Saved strip re?labeled to Sinus Rhythm ? MG? ID 0.16 QRS 0.10 QT 0.39    Camille Pressley, DNP, PMHNP-BC, CNP  [facility]  Thank you for this consult, please call the psychiatry consult line for further questions.

## 2023-01-06 NOTE — CARE COORDINATION
01/06/23 1216   Readmission Assessment   Number of Days since last admission? 1-7 days   Previous Disposition Home with Family   Who is being Cherene Steinberg   (chart review)   What was the patient's/caregiver's perception as to why they think they needed to return back to the hospital? Other (Comment)  (overdose, suicide attempt)   Did you visit your Primary Care Physician after you left the hospital, before you returned this time? No   Why weren't you able to visit your PCP? Did not have an appointment   Did you see a specialist, such as Cardiac, Pulmonary, Orthopedic Physician, etc. after you left the hospital? No   Who advised the patient to return to the hospital? Caregiver   Does the patient report anything that got in the way of taking their medications? No   In our efforts to provide the best possible care to you and others like you, can you think of anything that we could have done to help you after you left the hospital the first time, so that you might not have needed to return so soon?  Arrange for more help when leaving the hospital

## 2023-01-06 NOTE — PROGRESS NOTES
Hospitalist Progress Note      PCP: Beba Silva DO    Date of Admission: 1/5/2023    Hospital Course:     Impression:    Is a 24-year-old female medical history significant for anxiety depression and alcohol abuse. Recently discharged from [facility] [address] [facility] with readmission later in the day. Concern for intentional medication overdose & alcoholism with possible withdrawal symptoms. A/P:    1. Concern for intentional medication overdose: Patient denies suicidal ideations to me this morning on evaluation. Psychiatry service is consulted. Supposedly had taken multiple Vistaril and Celexa. 2.  Alcohol abuse with concern for possible withdrawal: slight anxiety but no overt symptoms of withdrawal today. Continue CIWA protocol    3. Anxiety depression: Patient is on Prozac    4. Tobacco abuse: Nicotine patch    CODE STATUS: Full code    DVT prophylaxis: Lovenox    Transition of care: Possibly inpatient psych. Anticipated date of discharge: Possibly 2 to 3 days    24 care plan:    Continue CIWA protocol IV fluids medical monitoring psychiatry service consulted case management assisting with dispo. Continue sitter for now suicide precautions. Addendum: reviewed psych note. clinically the patient is stable from medical perspective to discharge to inpatient psych when bed available. Subjective: does not make eye contact during interview, states she is fine.        Medications:  Reviewed    Infusion Medications    sodium chloride       Scheduled Medications    nicotine  1 patch TransDERmal Daily    FLUoxetine  20 mg Oral Daily    folic acid  1 mg Oral Daily    vitamin B-1  100 mg Oral Daily    sodium chloride flush  5-40 mL IntraVENous 2 times per day    enoxaparin  40 mg SubCUTAneous Daily     PRN Meds: traZODone, LORazepam **OR** LORazepam **OR** LORazepam **OR** LORazepam **OR** LORazepam **OR** LORazepam **OR** LORazepam **OR** LORazepam, sodium chloride flush, sodium chloride, ondansetron **OR** ondansetron, polyethylene glycol, acetaminophen **OR** acetaminophen      Intake/Output Summary (Last 24 hours) at 1/6/2023 1304  Last data filed at 1/6/2023 1205  Gross per 24 hour   Intake 480 ml   Output 400 ml   Net 80 ml       Exam:    BP (!) 147/86   Pulse 81   Temp 98.6 °F (37 °C) (Oral)   Resp 17   Ht 5' 4\" (1.626 m)   LMP  (LMP Unknown)   SpO2 94%   BMI 36.52 kg/m²     General appearance: No apparent distress, appears stated age and cooperative. HEENT: Pupils equal, round, and reactive to light. Conjunctivae/corneas clear. Neck: Supple, with full range of motion. No jugular venous distention. Trachea midline. Respiratory:  Normal respiratory effort. Clear to auscultation, bilaterally without Rales/Wheezes/Rhonchi. Cardiovascular: Regular rate and rhythm with normal S1/S2 without murmurs, rubs or gallops. Abdomen: Soft, non-tender, non-distended with normal bowel sounds. Musculoskeletal: No clubbing, cyanosis or edema bilaterally. Full range of motion without deformity. Skin: Skin color, texture, turgor normal.  No rashes or lesions. Neurologic:  Neurovascularly intact without any focal sensory/motor deficits. Cranial nerves: II-XII intact, grossly non-focal.  Psychiatric: Alert and oriented, thought content appropriate, normal insight  Capillary Refill: Brisk,< 3 seconds   Peripheral Pulses: +2 palpable, equal bilaterally       Labs:   Recent Labs     01/04/23 0537 01/05/23 2012   WBC 9.2 8.7   HGB 12.9 13.7   HCT 39.3 41.5    249     Recent Labs     01/05/23  0828 01/05/23 2012 01/06/23  0517    143 142   K 4.0 3.6 3.6    106 108   CO2 22 23 23   BUN 6* 6* 6*   CREATININE 0.6 0.7 0.7   CALCIUM 8.6 9.6 8.9     Recent Labs     01/05/23  0828 01/05/23 2012 01/06/23  0517   AST 33 41* 31   ALT 50* 54* 48*   BILITOT 0.3 <0.2 0.3   ALKPHOS 80 87 78     No results for input(s): INR in the last 72 hours.   No results for input(s): Kimberly Ramesh in the last 72 hours.    Urinalysis:      Lab Results   Component Value Date/Time    NITRU Negative 01/05/2023 09:27 PM    WBCUA 139 01/03/2023 12:08 AM    BACTERIA 4+ 01/03/2023 12:08 AM    RBCUA 8 01/03/2023 12:08 AM    BLOODU Negative 01/05/2023 09:27 PM    SPECGRAV 1.006 01/05/2023 09:27 PM    GLUCOSEU Negative 01/05/2023 09:27 PM       Radiology:  XR CHEST PORTABLE   Final Result      Lungs are clear                 Assessment/Plan:    Active Hospital Problems    Diagnosis Date Noted    Suicide attempt [facility]) Rob Deleon 01/05/2023     Priority: [address], DO

## 2023-01-06 NOTE — ED PROVIDER NOTES
ED Attending Attestation Note    This patient was seen by the advanced practice provider. I personally saw the patient and performed a substantive portion of the visit including all aspects of the medical decision making. Briefly, 45 y.o. female who  has a past medical history of Alcohol abuse, Ankle fracture, Anxiety, Depression, and Sleeping difficulties. presents with EMS for evaluation of overdose. Patient states that she took approximately 20 hydroxyzine and attempt to \"put myself to sleep\". Patient states she is been having trouble sleeping recently from being stressed out from work. States that she also has history of alcohol abuse and last drink alcohol at 1800 today. States she was drinking alcohol when she took the pills. States she spit out most of the pills prior to swallowing them and squad did see pills in the trash can. States she does have history of alcohol withdrawal and thinks she may go into alcohol withdrawal.    Focused exam:   Gen: 45 y.o. female, NAD  HEENT: NCAT. PERRL. EOMI. CV: RRR w/o MRG  Lungs: CTAB. No incr WOB. Abdomen: Soft, nontender, nondistended. No rebound/guarding. Neuro: GCS 15, cranial nerves II to XII intact, 5/5 strength throughout, light touch sensation grossly intact  PSych: Poor eye contact, normal speech pattern, withdrawn affect    The Ekg interpreted by me shows  Normal sinus rhythm with a rate of 91, normal axis, , cures 70, QTc 444, no ST elevations, nonspecific ST changes, no ST depressions, no significant change compared EKG from 1/2/2023. MDM:   Patient seen and evaluated. History and physical as above. Nontoxic and afebrile. Patient presents after intentional overdose on hydroxyzine. Patient does admit to taking the medications but states she was trying to sleep. Patient has been very stressed out at work. Poison control contacted and recommends observation for 8 hours.   Patient also has history of alcohol abuse and is currently starting to go through alcohol withdrawal.  Having some agitation and fidgetiness. Has been through alcohol withdrawal in the past.  Patient on a 72-hour psychiatric hold for intentional ingestion of hydroxyzine. Plan for admission for observation for her ingestion as well as alcohol withdrawal.    CRITICAL CARE  I personally saw the patient and independently provided 15 minutes of non-concurrent critical care out of the total shared critical care time provided. This excludes seperately billable procedures. Critical care time was provided for intentional overdose that required close evaluation and/or intervention with concern for potential patient decompensation. For further details of the patient's emergency department visit, please see the advanced practice provider's documentation. Betina Adair MD     This report has been produced using speech recognition software and may contain errors related to that system including errors in grammar, punctuation, and spelling, as well as words and phrases that may be inappropriate. If there are any questions or concerns please feel free to contact the dictating provider for clarification.         Betina Adair MD  01/05/23 5775

## 2023-01-07 ENCOUNTER — HOSPITAL ENCOUNTER (INPATIENT)
Age: 53
LOS: 3 days | Discharge: HOME | DRG: 751 | End: 2023-01-10
Attending: PSYCHIATRY & NEUROLOGY | Admitting: PSYCHIATRY & NEUROLOGY
Payer: MEDICAID

## 2023-01-07 PROBLEM — F33.9 MAJOR DEPRESSIVE DISORDER, RECURRENT (HCC): Status: ACTIVE | Noted: 2023-01-07

## 2023-01-07 LAB — TSH SERPL DL<=0.05 MIU/L-ACNC: 0.95 UIU/ML (ref 0.27–4.2)

## 2023-01-07 PROCEDURE — 80061 LIPID PANEL: CPT

## 2023-01-07 PROCEDURE — 36415 COLL VENOUS BLD VENIPUNCTURE: CPT

## 2023-01-07 PROCEDURE — 84443 ASSAY THYROID STIM HORMONE: CPT

## 2023-01-07 PROCEDURE — 1240000000 HC EMOTIONAL WELLNESS R&B

## 2023-01-07 PROCEDURE — 6370000000 HC RX 637 (ALT 250 FOR IP)

## 2023-01-07 PROCEDURE — 83036 HEMOGLOBIN GLYCOSYLATED A1C: CPT

## 2023-01-07 RX ORDER — OLANZAPINE 5 MG/1
5 TABLET ORAL EVERY 4 HOURS PRN
Status: DISCONTINUED | OUTPATIENT
Start: 2023-01-07 | End: 2023-01-10 | Stop reason: HOSPADM

## 2023-01-07 RX ORDER — NICOTINE 21 MG/24HR
1 PATCH, TRANSDERMAL 24 HOURS TRANSDERMAL DAILY PRN
Status: DISCONTINUED | OUTPATIENT
Start: 2023-01-07 | End: 2023-01-10 | Stop reason: HOSPADM

## 2023-01-07 RX ORDER — FLUOXETINE HYDROCHLORIDE 20 MG/1
20 CAPSULE ORAL DAILY
Status: ON HOLD | COMMUNITY
End: 2023-01-10 | Stop reason: SDUPTHER

## 2023-01-07 RX ORDER — MAGNESIUM HYDROXIDE/ALUMINUM HYDROXICE/SIMETHICONE 120; 1200; 1200 MG/30ML; MG/30ML; MG/30ML
30 SUSPENSION ORAL EVERY 6 HOURS PRN
Status: DISCONTINUED | OUTPATIENT
Start: 2023-01-07 | End: 2023-01-10 | Stop reason: HOSPADM

## 2023-01-07 RX ORDER — HYDROXYZINE 50 MG/1
50 TABLET, FILM COATED ORAL 3 TIMES DAILY PRN
Status: DISCONTINUED | OUTPATIENT
Start: 2023-01-07 | End: 2023-01-10 | Stop reason: HOSPADM

## 2023-01-07 RX ORDER — LORAZEPAM 2 MG/1
2 TABLET ORAL
Status: DISCONTINUED | OUTPATIENT
Start: 2023-01-07 | End: 2023-01-09

## 2023-01-07 RX ORDER — TRAZODONE HYDROCHLORIDE 100 MG/1
100 TABLET ORAL NIGHTLY PRN
Status: DISCONTINUED | OUTPATIENT
Start: 2023-01-07 | End: 2023-01-10 | Stop reason: HOSPADM

## 2023-01-07 RX ORDER — LANOLIN ALCOHOL/MO/W.PET/CERES
100 CREAM (GRAM) TOPICAL DAILY
Status: DISCONTINUED | OUTPATIENT
Start: 2023-01-07 | End: 2023-01-10 | Stop reason: HOSPADM

## 2023-01-07 RX ORDER — DIPHENHYDRAMINE HYDROCHLORIDE 50 MG/ML
50 INJECTION INTRAMUSCULAR; INTRAVENOUS EVERY 4 HOURS PRN
Status: DISCONTINUED | OUTPATIENT
Start: 2023-01-07 | End: 2023-01-10 | Stop reason: HOSPADM

## 2023-01-07 RX ORDER — M-VIT,TX,IRON,MINS/CALC/FOLIC 27MG-0.4MG
1 TABLET ORAL DAILY
Status: DISCONTINUED | OUTPATIENT
Start: 2023-01-07 | End: 2023-01-10 | Stop reason: HOSPADM

## 2023-01-07 RX ORDER — FOLIC ACID 1 MG/1
1 TABLET ORAL DAILY
Status: DISCONTINUED | OUTPATIENT
Start: 2023-01-07 | End: 2023-01-10 | Stop reason: HOSPADM

## 2023-01-07 RX ORDER — FLUOXETINE HYDROCHLORIDE 20 MG/1
20 CAPSULE ORAL DAILY
Status: DISCONTINUED | OUTPATIENT
Start: 2023-01-07 | End: 2023-01-10 | Stop reason: HOSPADM

## 2023-01-07 RX ORDER — POLYETHYLENE GLYCOL 3350 17 G
2 POWDER IN PACKET (EA) ORAL
Status: DISCONTINUED | OUTPATIENT
Start: 2023-01-07 | End: 2023-01-08

## 2023-01-07 RX ORDER — ACETAMINOPHEN 325 MG/1
650 TABLET ORAL EVERY 4 HOURS PRN
Status: DISCONTINUED | OUTPATIENT
Start: 2023-01-07 | End: 2023-01-10 | Stop reason: HOSPADM

## 2023-01-07 RX ORDER — LORAZEPAM 2 MG/1
4 TABLET ORAL
Status: DISCONTINUED | OUTPATIENT
Start: 2023-01-07 | End: 2023-01-09

## 2023-01-07 RX ORDER — LORAZEPAM 1 MG/1
1 TABLET ORAL
Status: DISCONTINUED | OUTPATIENT
Start: 2023-01-07 | End: 2023-01-09

## 2023-01-07 RX ORDER — LORAZEPAM 1 MG/1
1 TABLET ORAL EVERY 6 HOURS PRN
Status: ON HOLD | COMMUNITY
End: 2023-01-10

## 2023-01-07 RX ORDER — OMEGA-3 FATTY ACIDS CAP DELAYED RELEASE 1000 MG 1000 MG
1000 CAPSULE DELAYED RELEASE ORAL DAILY
Status: DISCONTINUED | OUTPATIENT
Start: 2023-01-07 | End: 2023-01-07

## 2023-01-07 RX ORDER — ONDANSETRON 4 MG/1
4 TABLET, ORALLY DISINTEGRATING ORAL EVERY 8 HOURS PRN
Status: ON HOLD | COMMUNITY
End: 2023-01-10

## 2023-01-07 RX ADMIN — LORAZEPAM 2 MG: 2 TABLET ORAL at 20:50

## 2023-01-07 RX ADMIN — FLUOXETINE 20 MG: 20 CAPSULE ORAL at 11:20

## 2023-01-07 RX ADMIN — LORAZEPAM 2 MG: 2 TABLET ORAL at 10:49

## 2023-01-07 RX ADMIN — Medication 100 MG: at 10:49

## 2023-01-07 RX ADMIN — LORAZEPAM 1 MG: 1 TABLET ORAL at 18:17

## 2023-01-07 RX ADMIN — ACETAMINOPHEN 650 MG: 325 TABLET ORAL at 19:18

## 2023-01-07 RX ADMIN — LORAZEPAM 1 MG: 1 TABLET ORAL at 20:49

## 2023-01-07 RX ADMIN — TRAZODONE HYDROCHLORIDE 100 MG: 100 TABLET ORAL at 20:51

## 2023-01-07 RX ADMIN — NICOTINE POLACRILEX 2 MG: 2 LOZENGE ORAL at 19:18

## 2023-01-07 RX ADMIN — FOLIC ACID 1 MG: 1 TABLET ORAL at 11:20

## 2023-01-07 RX ADMIN — MULTIPLE VITAMINS W/ MINERALS TAB 1 TABLET: TAB at 10:49

## 2023-01-07 RX ADMIN — LORAZEPAM 2 MG: 2 TABLET ORAL at 02:54

## 2023-01-07 ASSESSMENT — SLEEP AND FATIGUE QUESTIONNAIRES
AVERAGE NUMBER OF SLEEP HOURS: 9
DO YOU USE A SLEEP AID: YES
SLEEP PATTERN: RESTFUL
DO YOU HAVE DIFFICULTY SLEEPING: NO

## 2023-01-07 ASSESSMENT — PAIN - FUNCTIONAL ASSESSMENT: PAIN_FUNCTIONAL_ASSESSMENT: ACTIVITIES ARE NOT PREVENTED

## 2023-01-07 ASSESSMENT — PAIN SCALES - GENERAL
PAINLEVEL_OUTOF10: 4
PAINLEVEL_OUTOF10: 0

## 2023-01-07 ASSESSMENT — PAIN DESCRIPTION - LOCATION: LOCATION: HEAD

## 2023-01-07 ASSESSMENT — PATIENT HEALTH QUESTIONNAIRE - PHQ9: SUM OF ALL RESPONSES TO PHQ QUESTIONS 1-9: 14

## 2023-01-07 ASSESSMENT — LIFESTYLE VARIABLES
HOW MANY STANDARD DRINKS CONTAINING ALCOHOL DO YOU HAVE ON A TYPICAL DAY: 7 TO 9
HOW OFTEN DO YOU HAVE A DRINK CONTAINING ALCOHOL: 2-4 TIMES A MONTH

## 2023-01-07 ASSESSMENT — PAIN DESCRIPTION - DESCRIPTORS: DESCRIPTORS: ACHING;THROBBING

## 2023-01-07 NOTE — PROGRESS NOTES
Behavioral Services  Medicare Certification Upon Admission    I certify that this patient's inpatient psychiatric hospital admission is medically necessary for:    [x] (1) Treatment which could reasonably be expected to improve this patient's condition,       [x] (2) Or for diagnostic study;     AND     [x](2) The inpatient psychiatric services are provided while the individual is under the care of a physician and are included in the individualized plan of care.     Estimated length of stay/service 2-5 days    Plan for post-hospital care outpatient services    Electronically signed by DWIGHT Pedro CNP on 1/7/2023 at 9:09 AM
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
## 2023-01-07 NOTE — PROGRESS NOTES
This nurse escorts lab staff into pt's room this morning for a blood draw. Blood specimen obtained. Pt. Tolerated and no problems noted.

## 2023-01-07 NOTE — PROGRESS NOTES
Patient will not receive OTC fish oil while hospitalized per [facility] P+T committee policy. The medication can be reordered at discharge.

## 2023-01-07 NOTE — H&P
Patient has been seen and evaluated within 30 days by IM provider and medically cleared for admission to [facility]. Please refer to medical H&P from 1/6/22. Vitals reviewed and stable. Labs reviewed and nothing to follow. Orders reviewed. Please contact with IM provider with concerns.     DWIGHT Haynes CNP   1/7/2023 2:00 PM

## 2023-01-07 NOTE — BH NOTE
[facility]  Admission Note     Admission Type:   Admission Type: Involuntary    Reason for admission:  Reason for Admission: Suicidal Ideation      Addictive Behavior:   Addictive Behavior  In the Past 3 Months, Have You Felt or Has Someone Told You That You Have a Problem With  : None    Medical Problems:   Past Medical History:   Diagnosis Date    Alcohol abuse     Ankle fracture 2022    bilateral    Anxiety     Depression     Sleeping difficulties        Status EXAM:  Mental Status and Behavioral Exam  Normal: No  Level of Assistance: Independent/Self  Facial Expression: Flat, Sad  Affect: Appropriate  Level of Consciousness: Alert  Frequency of Checks: 4 times per hour, close  Mood:Normal: No  Mood: Depressed, Anxious, Sad, Despair, Helpless, Worthless, low self-esteem, Suspicious  Motor Activity:Normal: Yes  Eye Contact: Good  Observed Behavior: Impulsive, Cooperative, Guarded  Sexual Misconduct History: Current - no  Preception: [place] to person, [place] to time, [place] to place  Attention:Normal: Yes  Thought Processes: Perseveration  Thought Content:Normal: Yes  Depression Symptoms: Appetite change, Change in energy level, Feelings of helplessness, Feelings of hopelessess, Feelings of worthlessness, Isolative, Loss of interest  Anxiety Symptoms: Generalized, Panic attack  Danielle Symptoms: No problems reported or observed.   Hallucinations: None (Patient denies)  Delusions: No  Memory:Normal: Yes  Insight and Judgment: No  Insight and Judgment: Poor judgment, Poor insight    Tobacco Screening:  Practical Counseling, on admission, chasity X, if applicable and completed (first 3 are required if patient doesn't refused)           (X) Recognizing danger situations (included triggers and roadblocks)                    (X)\ Coping skills (new ways to manage stress,relaxation techniques, changing routine, distraction)                                                           (X) Basic information about quitting (benefits of quitting, techniques in how to quit, available resources  ( ) Referral for counseling faxed to [facility]                                                                                                                   ( ) Patient refused counseling  ( ) Patient has not smoked in the last 30 days    Metabolic Screening:    No results found for: LABA1C    Lab Results   Component Value Date    CHOL 179 09/02/2020     Lab Results   Component Value Date    TRIG 58 09/02/2020     Lab Results   Component Value Date    HDL 43 09/02/2020     No components found for: [facility] [place]  Lab Results   Component Value Date    LABVLDL 12 09/02/2020         Body mass index is 36.39 kg/m².     BP Readings from Last 2 Encounters:   01/07/23 132/87   01/06/23 (!) 156/106           Pt admitted with followings belongings:  Dental Appliances: None  Vision - Corrective Lenses: Eyeglasses  Hearing Aid: None  Jewelry: None  Body Piercings Removed: N/A    Radu Aguirre RN

## 2023-01-07 NOTE — PLAN OF CARE
Problem: Risk for Elopement  Goal: Patient will not exit the unit/facility without proper excort  Outcome: Not Progressing     Problem: Self Harm/Suicidality  Goal: Will have no self-injury during hospital stay  Description: INTERVENTIONS:  1. Ensure constant observer at bedside with Q15M safety checks  2. Maintain a safe environment  3. Secure patient belongings  4. Ensure family/visitors adhere to safety recommendations  5. Ensure safety tray has been added to patient's diet order  6. Every shift and PRN: Re-assess suicidal risk via Frequent Screener    Outcome: Not Progressing     Problem: Behavior  Goal: Pt/Family maintain appropriate behavior and adhere to behavioral management agreement, if implemented  Description: INTERVENTIONS:  1. Assess patient/family's coping skills and  non-compliant behavior (including use of illegal substances)  2. Notify security of behavior or suspected illegal substances which indicate the need for search of the family and/or belongings  3. Encourage verbalization of thoughts and concerns in a socially appropriate manner  4. Utilize positive, consistent limit setting strategies supporting safety of patient, staff and others  5. Encourage participation in the decision making process about the behavioral management agreement  6. If a visitor's behavior poses a threat to safety call refer to organization policy. 7. Initiate consult with , Psychosocial CNS, Spiritual Care as appropriate  Outcome: Not Progressing     Problem: Anxiety  Goal: Will report anxiety at manageable levels  Description: INTERVENTIONS:  1. Administer medication as ordered  2. Teach and rehearse alternative coping skills  3. Provide emotional support with 1:1 interaction with staff  Outcome: Not Progressing     Problem: Involuntary Admit  Goal: Will cooperate with staff recommendations and doctor's orders and will demonstrate appropriate behavior  Description: INTERVENTIONS:  1.  Treat underlying conditions and offer medication as ordered  2. Educate regarding involuntary admission procedures and rules  3.  Contain excessive/inappropriate behavior per unit and hospital policies  Outcome: Not Progressing

## 2023-01-07 NOTE — PROGRESS NOTES
Pt discharged to [facility] via [address] [facility] [address] IV taken out, pt in paper gown. Pt educated on paperwork. Paperwork and pt's belongings with pt. Pt denies any questions. Report called to GINO Triplett.      Electronically signed by Jason De Souza RN on 1/7/2023 at 12:04 AM

## 2023-01-07 NOTE — BH NOTE
When inventorying patient's belonging patient had a empty bottle of Jsrafklchs-G-Mvthwrlt in her purse. Witness by the charge nurse Rajan Lovett RN empty bottle was place back in patient's purse.             I witnessed the empty bottle of Hydrocodone-E-Acetamin being placed in patient's purse by BEATRICE Harmon RN

## 2023-01-07 NOTE — DISCHARGE SUMMARY
Hospital Medicine Discharge Summary    Patient ID: Tiffanie [facility]      Patient's PCP: Diane Montes DO    Admit Date: 1/7/2023     Discharge Date:   1/6/2023    Admitting Physician: Brianda Olmstead MD     Discharge Physician: Nuris Mejia DO     Discharge Diagnoses: Active Hospital Problems    Diagnosis Date Noted    Major depressive disorder, recurrent ([facility] [address] [F33.9] 01/07/2023     Priority: Medium       The patient was seen and examined on day of discharge and this discharge summary is in conjunction with any daily progress note from day of discharge. Hospital Course:      78-year-old female medical history significant for anxiety depression and alcohol abuse. Recently discharged from [facility] with readmission later in the day. Concern for intentional medication overdose. psychiatry service consulted reviewed psych note. clinically the patient is stable from medical perspective to discharge to inpatient psych when bed available         Exam:     /88   Pulse 76   Temp 98.2 °F (36.8 °C) (Oral)   Resp 16   Ht 5' 4\" (1.626 m)   Wt 212 lb (96.2 kg)   LMP 11/13/2022   SpO2 96%   BMI 36.39 kg/m²       General appearance:  No apparent distress, appears stated age and cooperative. HEENT:  Normal cephalic, atraumatic without obvious deformity. Pupils equal, round, and reactive to light. Extra ocular muscles intact. Conjunctivae/corneas clear. Neck: Supple, with full range of motion. No jugular venous distention. Trachea midline. Respiratory:  Normal respiratory effort. Clear to auscultation, bilaterally without Rales/Wheezes/Rhonchi. Cardiovascular:  Regular rate and rhythm with normal S1/S2 without murmurs, rubs or gallops. Abdomen: Soft, non-tender, non-distended with normal bowel sounds. Musculoskeletal:  No clubbing, cyanosis or edema bilaterally. Full range of motion without deformity. Skin: Skin color, texture, turgor normal.  No rashes or lesions.   Neurologic: Neurovascularly intact without any focal sensory/motor deficits. Cranial nerves: II-XII intact, grossly non-focal.  Psychiatric:  Alert and oriented, thought content appropriate, normal insight  Capillary Refill: Brisk,< 3 seconds   Peripheral Pulses: +2 palpable, equal bilaterally       Labs: For convenience and continuity at follow-up the following most recent labs are provided:      CBC:    Lab Results   Component Value Date/Time    WBC 8.7 01/05/2023 08:12 PM    HGB 13.7 01/05/2023 08:12 PM    HCT 41.5 01/05/2023 08:12 PM     01/05/2023 08:12 PM       Renal:    Lab Results   Component Value Date/Time     01/06/2023 05:17 AM    K 3.6 01/06/2023 05:17 AM     01/06/2023 05:17 AM    CO2 23 01/06/2023 05:17 AM    BUN 6 01/06/2023 05:17 AM    CREATININE 0.7 01/06/2023 05:17 AM    CALCIUM 8.9 01/06/2023 05:17 AM    PHOS 2.5 06/04/2021 05:48 AM         Significant Diagnostic Studies    Radiology:   No orders to display          Consults:     IP CONSULT TO HOSPITALIST  IP CONSULT TO SOCIAL WORK    Disposition:  inpatient psych     Condition at Discharge: Stable    Discharge Instructions/Follow-up:  per psych service.      Code Status:  Prior      Activity: activity as tolerated    Diet: regular diet      Discharge Medications:     Current Discharge Medication List             Details   FLUoxetine (PROZAC) 20 MG capsule Take 20 mg by mouth daily      Omega-3 Fatty Acids (FISH OIL) 1000 MG CPDR Take 1,000 mg by mouth daily      citalopram (CELEXA) 10 MG tablet Take 1 tablet by mouth daily  Qty: 30 tablet, Refills: 3      folic acid (FOLVITE) 1 MG tablet Take 1 tablet by mouth daily  Qty: 30 tablet, Refills: 3      Multiple Vitamin (MULTIVITAMIN) TABS tablet Take 1 tablet by mouth daily  Qty: 30 tablet, Refills: 0      thiamine mononitrate (THIAMINE) 100 MG tablet Take 1 tablet by mouth daily  Qty: 30 tablet, Refills: 0             Time Spent on discharge is more than 30 minutes in the examination, evaluation, counseling and review of medications and discharge plan. Signed: Sandra Coleman DO   1/7/2023      Thank you Alban Thompson DO for the opportunity to be involved in this patient's care. If you have any questions or concerns please feel free to contact me at [phone].

## 2023-01-07 NOTE — BH NOTE
Patient denies being suicidal and contracts for safety with writer. Notified Rabia Saravia NP regarding order to discontinue continuous suicidal precaution. Note new order to discontinue continuous suicide precaution.

## 2023-01-07 NOTE — BH NOTE
Patient arrived via a stretcher with ambulance attendants accompanying patient @ 00:45. Patirent alert and oriented x 3. Patient signed admission paperwork and verbalized understanding with writer. Patient denies being suicidal and contracts for safety with writer.

## 2023-01-08 LAB
CHOLESTEROL, TOTAL: 223 MG/DL (ref 0–199)
HDLC SERPL-MCNC: 46 MG/DL (ref 40–60)
LDL CHOLESTEROL CALCULATED: 158 MG/DL
TRIGL SERPL-MCNC: 95 MG/DL (ref 0–150)
VLDLC SERPL CALC-MCNC: 19 MG/DL

## 2023-01-08 PROCEDURE — 1240000000 HC EMOTIONAL WELLNESS R&B

## 2023-01-08 PROCEDURE — 6370000000 HC RX 637 (ALT 250 FOR IP)

## 2023-01-08 RX ADMIN — LORAZEPAM 2 MG: 2 TABLET ORAL at 16:44

## 2023-01-08 RX ADMIN — ACETAMINOPHEN 650 MG: 325 TABLET ORAL at 16:44

## 2023-01-08 RX ADMIN — MULTIPLE VITAMINS W/ MINERALS TAB 1 TABLET: TAB at 10:43

## 2023-01-08 RX ADMIN — TRAZODONE HYDROCHLORIDE 100 MG: 100 TABLET ORAL at 20:30

## 2023-01-08 RX ADMIN — NICOTINE POLACRILEX 2 MG: 2 LOZENGE ORAL at 10:51

## 2023-01-08 RX ADMIN — FOLIC ACID 1 MG: 1 TABLET ORAL at 10:43

## 2023-01-08 RX ADMIN — FLUOXETINE 20 MG: 20 CAPSULE ORAL at 10:43

## 2023-01-08 RX ADMIN — Medication 100 MG: at 10:43

## 2023-01-08 RX ADMIN — NICOTINE POLACRILEX 4 MG: 4 GUM, CHEWING BUCCAL at 16:47

## 2023-01-08 RX ADMIN — LORAZEPAM 2 MG: 2 TABLET ORAL at 20:30

## 2023-01-08 ASSESSMENT — PAIN SCALES - GENERAL: PAINLEVEL_OUTOF10: 0
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
## 2023-01-08 NOTE — DISCHARGE SUMMARY
Hospital Medicine Discharge Summary    Patient ID: Cal Garcia      Patient's PCP: Margot Lloyd DO    Admit Date: 1/5/2023     Discharge Date: 1/7/2023       Admitting Physician: Pj Gil MD     Discharge Physician: Ankit Mackenzie DO     Discharge Diagnoses: Active Hospital Problems    Diagnosis Date Noted    Suicide attempt [facility]) Katarzyna Powell 01/05/2023     Priority: Medium       The patient was seen and examined on day of discharge and this discharge summary is in conjunction with any daily progress note from day of discharge. Hospital Course:      57-year-old female medical history significant for anxiety depression and alcohol abuse. Recently discharged from [facility] with readmission later in the day. Concern for intentional medication overdose. psychiatry service consulted reviewed psych note. clinically the patient is stable from medical perspective to discharge to inpatient psych when bed available         Exam:     BP (!) 156/106   Pulse 90   Temp 99 °F (37.2 °C) (Oral)   Resp 18   Ht 5' 4\" (1.626 m)   LMP  (LMP Unknown)   SpO2 93%   BMI 36.52 kg/m²       General appearance:  No apparent distress, appears stated age and cooperative. HEENT:  Normal cephalic, atraumatic without obvious deformity. Pupils equal, round, and reactive to light. Extra ocular muscles intact. Conjunctivae/corneas clear. Neck: Supple, with full range of motion. No jugular venous distention. Trachea midline. Respiratory:  Normal respiratory effort. Clear to auscultation, bilaterally without Rales/Wheezes/Rhonchi. Cardiovascular:  Regular rate and rhythm with normal S1/S2 without murmurs, rubs or gallops. Abdomen: Soft, non-tender, non-distended with normal bowel sounds. Musculoskeletal:  No clubbing, cyanosis or edema bilaterally. Full range of motion without deformity. Skin: Skin color, texture, turgor normal.  No rashes or lesions.   Neurologic:  Neurovascularly intact without any focal sensory/motor deficits. Cranial nerves: II-XII intact, grossly non-focal.  Psychiatric:  Alert and oriented, thought content appropriate, normal insight  Capillary Refill: Brisk,< 3 seconds   Peripheral Pulses: +2 palpable, equal bilaterally       Labs: For convenience and continuity at follow-up the following most recent labs are provided:      CBC:    Lab Results   Component Value Date/Time    WBC 8.7 01/05/2023 08:12 PM    HGB 13.7 01/05/2023 08:12 PM    HCT 41.5 01/05/2023 08:12 PM     01/05/2023 08:12 PM       Renal:    Lab Results   Component Value Date/Time     01/06/2023 05:17 AM    K 3.6 01/06/2023 05:17 AM     01/06/2023 05:17 AM    CO2 23 01/06/2023 05:17 AM    BUN 6 01/06/2023 05:17 AM    CREATININE 0.7 01/06/2023 05:17 AM    CALCIUM 8.9 01/06/2023 05:17 AM    PHOS 2.5 06/04/2021 05:48 AM         Significant Diagnostic Studies    Radiology:   XR CHEST PORTABLE   Final Result      Lungs are clear                Consults:     IP CONSULT TO HOSPITALIST  IP CONSULT TO PSYCHIATRY  IP CONSULT TO SOCIAL WORK    Disposition:  inpatient psych     Condition at Discharge: Stable    Discharge Instructions/Follow-up:  per psych service.      Code Status:  Prior      Activity: activity as tolerated    Diet: regular diet      Discharge Medications:     Discharge Medication List as of 1/6/2023 10:13 PM             Details   Omega-3 Fatty Acids (FISH OIL) 1000 MG CPDR Take 1,000 mg by mouth dailyHistorical Med      citalopram (CELEXA) 10 MG tablet Take 1 tablet by mouth daily, Disp-30 tablet, [id number]      folic acid (FOLVITE) 1 MG tablet Take 1 tablet by mouth daily, Disp-30 tablet, R-3Normal      Multiple Vitamin (MULTIVITAMIN) TABS tablet Take 1 tablet by mouth daily, Disp-30 tablet, R-0Normal      thiamine mononitrate (THIAMINE) 100 MG tablet Take 1 tablet by mouth daily, Disp-30 tablet, R-0Normal             Time Spent on discharge is more than 30 minutes in the examination, evaluation, counseling and review of medications and discharge plan. Signed: Laney Leyden, DO   1/8/2023      Thank you Kyaa Selby DO for the opportunity to be involved in this patient's care. If you have any questions or concerns please feel free to contact me at [phone].

## 2023-01-08 NOTE — PLAN OF CARE
Problem: Risk for Elopement  Goal: Patient will not exit the unit/facility without proper excort  1/8/2023 0533 by Fede Herrmann RN  Outcome: Progressing     Problem: Self Harm/Suicidality  Goal: Will have no self-injury during hospital stay  Description: INTERVENTIONS:  1. Ensure constant observer at bedside with Q15M safety checks  2. Maintain a safe environment  3. Secure patient belongings  4. Ensure family/visitors adhere to safety recommendations  5. Ensure safety tray has been added to patient's diet order  6. Every shift and PRN: Re-assess suicidal risk via Frequent Screener    1/8/2023 0533 by eFde Herrmann RN  Outcome: Not Progressing     Problem: Anxiety  Goal: Will report anxiety at manageable levels  Description: INTERVENTIONS:  1. Administer medication as ordered  2. Teach and rehearse alternative coping skills  3. Provide emotional support with 1:1 interaction with staff  1/8/2023 0533 by Fede Herrmann RN  Outcome: Not Progressing   Pt seems to have some mild SI, but was able to contract for safety. Pt tearful. Has no contact with her children which are grown. Feels like there is not a lot in her life. Patient has no job. She is depressed and sad. Mostly isolative to room. 2042 VS WNL. COWS was 17. Given Ativan 3 mg and Trazodone 100mg. Pt rechecked at [address]. Pt was sleeping well. Awakened for VS which were normal and COWS score was 0. Pt went right back to sleep and seemed to be sleeping well.

## 2023-01-08 NOTE — PLAN OF CARE
Problem: Self Harm/Suicidality  Goal: Will have no self-injury during hospital stay  Description: INTERVENTIONS:  1. Ensure constant observer at bedside with Q15M safety checks  2. Maintain a safe environment  3. Secure patient belongings  4. Ensure family/visitors adhere to safety recommendations  5. Ensure safety tray has been added to patient's diet order  6. Every shift and PRN: Re-assess suicidal risk via Frequent Screener    1/8/2023 1300 by Minh Dietrich RN  Outcome: Not Progressing  1/8/2023 0533 by Jim Mcbride RN  Outcome: Not Progressing     Problem: Anxiety  Goal: Will report anxiety at manageable levels  Description: INTERVENTIONS:  1. Administer medication as ordered  2. Teach and rehearse alternative coping skills  3.  Provide emotional support with 1:1 interaction with staff  1/8/2023 0533 by Jim Mcbride RN  Outcome: Not Progressing

## 2023-01-09 LAB
ESTIMATED AVERAGE GLUCOSE: 108.3 MG/DL
HBA1C MFR BLD: 5.4 %

## 2023-01-09 PROCEDURE — 6370000000 HC RX 637 (ALT 250 FOR IP): Performed by: PSYCHIATRY & NEUROLOGY

## 2023-01-09 PROCEDURE — 6370000000 HC RX 637 (ALT 250 FOR IP)

## 2023-01-09 PROCEDURE — 1240000000 HC EMOTIONAL WELLNESS R&B

## 2023-01-09 RX ORDER — DIMETHICONE, OXYBENZONE, AND PADIMATE O 2; 2.5; 6.6 G/100G; G/100G; G/100G
STICK TOPICAL PRN
Status: DISCONTINUED | OUTPATIENT
Start: 2023-01-09 | End: 2023-01-10 | Stop reason: HOSPADM

## 2023-01-09 RX ADMIN — MAGNESIUM HYDROXIDE 30 ML: 400 SUSPENSION ORAL at 19:37

## 2023-01-09 RX ADMIN — NICOTINE POLACRILEX 4 MG: 4 GUM, CHEWING BUCCAL at 09:57

## 2023-01-09 RX ADMIN — MULTIPLE VITAMINS W/ MINERALS TAB 1 TABLET: TAB at 09:53

## 2023-01-09 RX ADMIN — TRAZODONE HYDROCHLORIDE 100 MG: 100 TABLET ORAL at 21:03

## 2023-01-09 RX ADMIN — FLUOXETINE 20 MG: 20 CAPSULE ORAL at 09:53

## 2023-01-09 RX ADMIN — Medication: at 17:33

## 2023-01-09 RX ADMIN — FOLIC ACID 1 MG: 1 TABLET ORAL at 09:53

## 2023-01-09 RX ADMIN — Medication 100 MG: at 09:53

## 2023-01-09 RX ADMIN — NICOTINE POLACRILEX 4 MG: 4 GUM, CHEWING BUCCAL at 17:33

## 2023-01-09 ASSESSMENT — PAIN SCALES - GENERAL: PAINLEVEL_OUTOF10: 0

## 2023-01-09 NOTE — PLAN OF CARE
Problem: Self Harm/Suicidality  Goal: Will have no self-injury during hospital stay  Description: INTERVENTIONS:  1. Ensure constant observer at bedside with Q15M safety checks  2. Maintain a safe environment  3. Secure patient belongings  4. Ensure family/visitors adhere to safety recommendations  5. Ensure safety tray has been added to patient's diet order  6. Every shift and PRN: Re-assess suicidal risk via Frequent Screener    Outcome: Progressing     Problem: Anxiety  Goal: Will report anxiety at manageable levels  Description: INTERVENTIONS:  1. Administer medication as ordered  2. Teach and rehearse alternative coping skills  3.  Provide emotional support with 1:1 interaction with staff  Outcome: Progressing

## 2023-01-09 NOTE — GROUP NOTE
Group Therapy Note    Date: 1/9/2023    Group Start Time: 1100  Group End Time: 3782  Group Topic: Psychoeducation    [facility]    ROSEMARIE Phan        Group Therapy Note  Clinician introduce the Wellness Recovery Action Plan and the 6 areas of self-care for them to create their toolbox around. Attendees: 9       Patient's Goal:      Notes:  Patient was cooperative and fully engaged in the group discussion and activity. Patient was able to ask clarifying questions about the action plan and share thoughts about the 6 areas of self-care with the group. Status After Intervention:  Improved    Participation Level:  Active Listener and Interactive    Participation Quality: Appropriate, Attentive, Sharing, and Supportive      Speech:  normal      Thought Process/Content: Logical      Affective Functioning: Congruent      Mood: anxious      Level of consciousness:  Attentive      Response to Learning: Able to verbalize/acknowledge new learning      Endings: None Reported    Modes of Intervention: Education, Support, and Activity      Discipline Responsible: /Counselor      Signature:  ROSEMARIE Phan

## 2023-01-09 NOTE — PROGRESS NOTES
Department of Psychiatry  AttendingProgress Note  Chief Complaint: depression   Alanna Villanueva reviewed history with me. She stated that she started drinking at age 34 and heavily for 4 years and has been bingeing 2 x s a month and drinks 2 bottles of wine or 12 pack. Relapsed 10/10/2022 and has been on and off drinking. Was in [facility] 12/21 for 3 months and [facility] 7 months . No current withdrawal. Would like [facility] again but not residential rehab. Patient's chart was reviewed and collaborated with  about the treatment plan. SUBJECTIVE:    Patient is feeling better. Suicidal ideation:  denies suicidal ideation. Patient does not have medication side effects. ROS: Patient has new complaints: no  Sleeping adequately:  Yes   Appetite adequate: Yes  Attending groups: Yes  Visitors:No    OBJECTIVE    Physical  VITALS:  /62   Pulse 84   Temp 97.6 °F (36.4 °C) (Oral)   Resp 16   Ht 5' 4\" (1.626 m)   Wt 212 lb (96.2 kg)   LMP 11/13/2022   SpO2 98%   BMI 36.39 kg/m²     Mental Status Examination:  Patients appearance was street clothes. Thoughts are Goal directed. Homicidal ideations none. No abnormal movements, tics or mannerisms. Memory intact Aims 0. Concentration Fair. Alert and oriented X 4. Insight and Judgement impaired insight. Patient was cooperative. Patient gait normal. Mood euthymic, affect normal affect Hallucinations Absent, suicidal ideations no specific plan to harm self Speech normal volume  Data  Labs:   Admission on 01/07/2023   Component Date Value Ref Range Status    TSH 01/07/2023 0.95  0.27 - 4.20 uIU/mL Final    Cholesterol, Total 01/07/2023 223 (A)  0 - 199 mg/dL Final    Triglycerides 01/07/2023 95  0 - 150 mg/dL Final    HDL 01/07/2023 46  40 - 60 mg/dL Final    Comment: An HDL cholesterol less than 40 mg/dL is low and  constitutes a coronary heart disease risk factor.   An HDL cholesterol greater than 60 mg/dL is a  negative risk factor for coronary heart disease. LDL Calculated 01/07/2023 158 (A)  <100 mg/dL Final    VLDL Cholesterol Calculated 01/07/2023 19  Not Established mg/dL Final    Hemoglobin A1C 01/07/2023 5.4  See comment % Final    Comment: Comment:  Diagnosis of Diabetes: > or = 6.5%  Increased risk of diabetes (Prediabetes): 5.7-6.4%  Glycemic Control: Nonpregnant Adults: <7.0%                    Pregnant: <6.0%        eAG 01/07/2023 108.3  mg/dL Final            Medications  Current Facility-Administered Medications: nicotine polacrilex (NICORETTE) gum 4 mg, 4 mg, Oral, Q2H PRN  acetaminophen (TYLENOL) tablet 650 mg, 650 mg, Oral, Q4H PRN  magnesium hydroxide (MILK OF MAGNESIA) 400 MG/5ML suspension 30 mL, 30 mL, Oral, Daily PRN  aluminum & magnesium hydroxide-simethicone (MAALOX) 200-200-20 MG/5ML suspension 30 mL, 30 mL, Oral, Q6H PRN  hydrOXYzine HCl (ATARAX) tablet 50 mg, 50 mg, Oral, TID PRN  OLANZapine (ZYPREXA) tablet 5 mg, 5 mg, Oral, Q4H PRN **OR** OLANZapine (ZYPREXA) 10 mg in sterile water 2 mL injection, 10 mg, IntraMUSCular, Q4H PRN  diphenhydrAMINE (BENADRYL) injection 50 mg, 50 mg, IntraMUSCular, Q4H PRN  traZODone (DESYREL) tablet 100 mg, 100 mg, Oral, Nightly PRN  thiamine tablet 100 mg, 100 mg, Oral, Daily  therapeutic multivitamin-minerals 1 tablet, 1 tablet, Oral, Daily  LORazepam (ATIVAN) tablet 1 mg, 1 mg, Oral, Q1H PRN **OR** LORazepam (ATIVAN) tablet 2 mg, 2 mg, Oral, Q1H PRN **OR** LORazepam (ATIVAN) tablet 3 mg, 3 mg, Oral, Q1H PRN **OR** LORazepam (ATIVAN) tablet 4 mg, 4 mg, Oral, Q1H PRN  FLUoxetine (PROZAC) capsule 20 mg, 20 mg, Oral, Daily  folic acid (FOLVITE) tablet 1 mg, 1 mg, Oral, Daily  nicotine (NICODERM CQ) 21 MG/24HR 1 patch, 1 patch, TransDERmal, Daily PRN    ASSESSMENT AND PLAN    Principal Problem:    Major depressive disorder, recurrent (HCC)  Resolved Problems:    * No resolved hospital problems. *       1. Patient s symptoms   are improving  2. Probable discharge is 2-3 d  3. Discharge planning is incomplete  4. Suicidal ideation is better  5. Total time with patient was 50 minutes and more than 50 % of that time was spent counseling the patient on their symptoms, treatment and expected goals.           Mathieu Gallagher MD  Physician Psychiatry

## 2023-01-09 NOTE — PROGRESS NOTES
Pt up ad reid. Sociable with peers. Pleasant and cooperative. Attending groups and eating well. Pt has denied all alcohol withdrawal symptoms, CIWA discontinued per Dr. Tere Macedo. Pt is future oriented, states she is feeling much better and wants to live life. Will continue to monitor for safety and comfort.

## 2023-01-09 NOTE — PLAN OF CARE
Problem: Self Harm/Suicidality  Goal: Will have no self-injury during hospital stay  Description: INTERVENTIONS:  1. Ensure constant observer at bedside with Q15M safety checks  2. Maintain a safe environment  3. Secure patient belongings  4. Ensure family/visitors adhere to safety recommendations  5. Ensure safety tray has been added to patient's diet order  6. Every shift and PRN: Re-assess suicidal risk via Frequent Screener    Outcome: Not Progressing     Problem: Behavior  Goal: Pt/Family maintain appropriate behavior and adhere to behavioral management agreement, if implemented  Description: INTERVENTIONS:  1. Assess patient/family's coping skills and  non-compliant behavior (including use of illegal substances)  2. Notify security of behavior or suspected illegal substances which indicate the need for search of the family and/or belongings  3. Encourage verbalization of thoughts and concerns in a socially appropriate manner  4. Utilize positive, consistent limit setting strategies supporting safety of patient, staff and others  5. Encourage participation in the decision making process about the behavioral management agreement  6. If a visitor's behavior poses a threat to safety call refer to organization policy. 7. Initiate consult with , Psychosocial CNS, Spiritual Care as appropriate  1/8/2023 2059 by Berhane Camacho RN  Outcome: Not Progressing  1/8/2023 1300 by Berhane Camacho RN  Outcome: Progressing     Problem: Anxiety  Goal: Will report anxiety at manageable levels  Description: INTERVENTIONS:  1. Administer medication as ordered  2. Teach and rehearse alternative coping skills  3.  Provide emotional support with 1:1 interaction with staff  Outcome: Not Progressing

## 2023-01-09 NOTE — PROGRESS NOTES
Group Therapy Note    Date: 1/9/2023  Start Time: 1300  End Time:  1400  Number of Participants: 12    Type of Group: Music Group    Notes:  Pt present for part of Music Group. While present, Pt actively participated by making song selections and singing along to music. Participation Level:  Active Listener and Interactive    Participation Quality: Appropriate and Attentive      Speech:  normal      Affective Functioning: Congruent      Endings: None Reported    Modes of Intervention: Support, Socialization, Activity, and Media      Discipline Responsible: Music Group      Keyana Rebolledo       01/09/23 1405   Encounter Summary   Encounter Overview/Reason  Behavioral Health   Service Provided For: Patient   Last Encounter    (1/9 Music Group)   Complexity of Encounter Moderate   Begin Time 1300   End Time  1320   Total Time Calculated 20 min   Behavioral Health    Type  Spirituality Group

## 2023-01-09 NOTE — GROUP NOTE
Group Therapy Note    Date: 1/9/2023    Group Start Time: 1000  Group End Time: 9965  Group Topic: Psychoeducation    [facility]        Group Therapy Note    Topic: Courage, Strength, & Vulnerability    Group facilitator led discussion of theories and definitions of courage, vulnerability and strength. Group members processed how these ideals relate to their personal situations, engagement in recovery and care providers. Attendees: 8       Notes: This pt was present and engaged across discussions. Receptive to information discussion and resources provided. Group members collaborated in discussion of treatment engagement, participation in sessions with care providers and therapists, ways to elicit honest communication with family and support system. Status After Intervention:  Improved    Participation Level:  Active Listener and Interactive    Participation Quality: Sharing and Supportive      Speech:  normal      Thought Process/Content: Logical      Affective Functioning: Congruent      Mood: euthymic      Level of consciousness:  Alert and Oriented x4      Response to Learning: Capable of insight and Progressing to goal      Endings: None Reported    Modes of Intervention: Education, Support, Socialization, Exploration, and Problem-solving      Discipline Responsible: Psychoeducational Specialist      Signature:  Juanjose Maciel MM, MT-BC

## 2023-01-09 NOTE — GROUP NOTE
Group Therapy Note    Date: 1/9/2023    Group Start Time: 1600  Group End Time: 1700  Group Topic: Healthy Living/Wellness    Satnam Morejon LPN        Group Therapy Note    Attendees: 6       Patient's Goal:  to identify today's accomplisments    Notes:  was able to identify the processes to accomplish task    Status After Intervention:  Improved    Participation Level:  Active Listener and Interactive    Participation Quality: Appropriate, Attentive, and Sharing      Speech:  normal      Thought Process/Content: Logical  Linear      Affective Functioning: Congruent      Mood: euthymic      Level of consciousness:  Alert, Oriented x4, and Attentive      Response to Learning: Able to verbalize current knowledge/experience, Able to verbalize/acknowledge new learning, Able to retain information, and Capable of insight      Endings: None Reported    Modes of Intervention: Education, Support, Socialization, and Problem-solving      Discipline Responsible: Licensed Practical Nurse      Signature:  Jostin Baker LPN

## 2023-01-10 VITALS
HEIGHT: 64 IN | OXYGEN SATURATION: 97 % | HEART RATE: 70 BPM | DIASTOLIC BLOOD PRESSURE: 84 MMHG | SYSTOLIC BLOOD PRESSURE: 119 MMHG | BODY MASS INDEX: 36.19 KG/M2 | RESPIRATION RATE: 16 BRPM | WEIGHT: 212 LBS | TEMPERATURE: 98.4 F

## 2023-01-10 PROBLEM — F10.10 ALCOHOL ABUSE: Status: ACTIVE | Noted: 2023-01-10

## 2023-01-10 PROCEDURE — 6370000000 HC RX 637 (ALT 250 FOR IP)

## 2023-01-10 PROCEDURE — 5130000000 HC BRIDGE APPOINTMENT

## 2023-01-10 RX ORDER — LANOLIN ALCOHOL/MO/W.PET/CERES
100 CREAM (GRAM) TOPICAL DAILY
Qty: 30 TABLET | Refills: 0 | Status: SHIPPED | OUTPATIENT
Start: 2023-01-11

## 2023-01-10 RX ORDER — FOLIC ACID 1 MG/1
1 TABLET ORAL DAILY
Qty: 30 TABLET | Refills: 0 | Status: SHIPPED | OUTPATIENT
Start: 2023-01-10

## 2023-01-10 RX ORDER — M-VIT,TX,IRON,MINS/CALC/FOLIC 27MG-0.4MG
1 TABLET ORAL DAILY
Qty: 30 TABLET | Refills: 0 | Status: SHIPPED | OUTPATIENT
Start: 2023-01-11

## 2023-01-10 RX ORDER — FLUOXETINE HYDROCHLORIDE 20 MG/1
20 CAPSULE ORAL DAILY
Qty: 30 CAPSULE | Refills: 0 | Status: SHIPPED | OUTPATIENT
Start: 2023-01-10

## 2023-01-10 RX ADMIN — MULTIPLE VITAMINS W/ MINERALS TAB 1 TABLET: TAB at 08:07

## 2023-01-10 RX ADMIN — FLUOXETINE 20 MG: 20 CAPSULE ORAL at 08:07

## 2023-01-10 RX ADMIN — FOLIC ACID 1 MG: 1 TABLET ORAL at 08:07

## 2023-01-10 RX ADMIN — Medication 100 MG: at 08:07

## 2023-01-10 RX ADMIN — NICOTINE POLACRILEX 4 MG: 4 GUM, CHEWING BUCCAL at 10:02

## 2023-01-10 NOTE — BH NOTE
Purposeful Rounding     Patient Location: Day room     Patient willing to engage in conversation: Yes     Presentation/behavior: Cooperative     Affect: Brightens with interaction     Concerns reported: None     PRN medications given: N/A     Environmental assessment: No safety hazards noted     Fall prevention interventions in place: Lighting appropriate, Room free of clutter, and Clear path to bathroom

## 2023-01-10 NOTE — PLAN OF CARE
Problem: Self Harm/Suicidality  Goal: Will have no self-injury during hospital stay  Description: INTERVENTIONS:  1.  Ensure constant observer at bedside with Q15M safety checks  2.  Maintain a safe environment  3.  Secure patient belongings  4.  Ensure family/visitors adhere to safety recommendations  5.  Ensure safety tray has been added to patient's diet order  6.  Every shift and PRN: Re-assess suicidal risk via Frequent Screener    1/9/2023 2106 by Phil Ortez RN (Peters)  Outcome: Progressing  1/9/2023 1754 by Phil Ortez (Peters) RN  Outcome: Progressing     Problem: Behavior  Goal: Pt/Family maintain appropriate behavior and adhere to behavioral management agreement, if implemented  Description: INTERVENTIONS:  1. Assess patient/family's coping skills and  non-compliant behavior (including use of illegal substances)  2. Notify security of behavior or suspected illegal substances which indicate the need for search of the family and/or belongings  3. Encourage verbalization of thoughts and concerns in a socially appropriate manner  4. Utilize positive, consistent limit setting strategies supporting safety of patient, staff and others  5. Encourage participation in the decision making process about the behavioral management agreement  6. If a visitor's behavior poses a threat to safety call refer to organization policy.  7. Initiate consult with , Psychosocial CNS, Spiritual Care as appropriate  Outcome: Progressing     Problem: Anxiety  Goal: Will report anxiety at manageable levels  Description: INTERVENTIONS:  1. Administer medication as ordered  2. Teach and rehearse alternative coping skills  3. Provide emotional support with 1:1 interaction with staff  1/9/2023 2106 by Phil Ortez RN (Peters)  Outcome: Progressing  1/9/2023 1754 by Phil Ortez (Peters) RN  Outcome: Progressing

## 2023-01-10 NOTE — GROUP NOTE
Group Therapy Note    Date: 1/10/2023    Group Start Time: 1100  Group End Time: 7547  Group Topic: Psychoeducation    [facility]        Group Therapy Note    Topic: The Cycle of Anxiety    Group members processed the cycle of anxiety, avoidance as short-term relief from symptoms but directly contributing to long-term anxiety growth. Group members explored the process of breaking the cycle, methods of challenging anxiety through behavioral modification and cognitive reframing. Attendees: 8       Notes: This pt was present and attentive across session, engaged in collaborative discussions when prompted by facilitator and peers. Verbalized understanding of information relayed, collaborated with peers while identifying alternatives to avoidance. No needs verbalized at conclusion of session. Status After Intervention:  Improved    Participation Level:  Active Listener and Interactive    Participation Quality: Appropriate and Attentive      Speech:  normal      Thought Process/Content: Logical      Affective Functioning: Congruent      Mood: euthymic      Level of consciousness:  Alert and Attentive      Response to Learning: Able to verbalize/acknowledge new learning, Capable of insight, and Progressing to goal      Endings: None Reported    Modes of Intervention: Education, Socialization, Exploration, and Problem-solving      Discipline Responsible: Psychoeducational Specialist      Signature:  Haylee Duque MM, MT-BC

## 2023-01-10 NOTE — CARE COORDINATION
[facility]  Treatment Team Note  Day 3    Review Date & Time: 0900  1/9/2023    Patient was not in treatment team      Status EXAM:   Mental Status and Behavioral Exam  Normal: No  Level of Assistance: Independent/Self  Facial Expression: Sad  Affect: Congruent  Level of Consciousness: Alert  Frequency of Checks: 4 times per hour, close  Mood:Normal: No  Mood: Anxious, Sad, Depressed  Motor Activity:Normal: Yes  Eye Contact: Good  Observed Behavior: Cooperative, Friendly  Sexual Misconduct History: Current - no  Preception: Edna to person, Edna to time, Edna to place, Edna to situation  Attention:Normal: Yes  Thought Processes:  (linear)  Thought Content:Normal: Yes  Depression Symptoms: Impaired concentration, Feelings of hopelessess  Anxiety Symptoms: Generalized  Danielle Symptoms: No problems reported or observed. Hallucinations: None  Delusions: No  Memory:Normal: Yes  Memory:  (NA)  Insight and Judgment: No  Insight and Judgment: Poor judgment, Poor insight      Suicide Risk CSSR-S:  1) Within the past month, have you wished you were dead or wished you could go to sleep and not wake up? : Yes  2) Have you actually had any thoughts of killing yourself? : Yes  3) Have you been thinking about how you might kill yourself? : No  5) Have you started to work out or worked out the details of how to kill yourself? Do you intend to carry out this plan? : No  6) Have you ever done anything, started to do anything, or prepared to do anything to end your life?: No      PLAN/TREATMENT RECOMMENDATIONS UPDATE: Patient will take medication as prescribed, eat 75% of meals, attend groups, participate in milieu activities, participate in treatment team and care planning for discharge and follow up.       Gildardo Avila RN

## 2023-01-10 NOTE — DISCHARGE INSTRUCTIONS
Advanced Directives:  Patient does not have a surrogate decision maker appointed   Name (if yes): declined Phone Number:   Patient does not have a psychiatric and/ or medical advanced directive or power of . Patient was offered psychiatric and/ or medical advanced directive or power of  information/completion but declined to complete   Why not? declined    Discharge Planning is Complete. Discharge Date: 1/10/2023  Reason for Hospitalization: Suicidal Ideation  Discharge Diagnosis: Major Depressive Disorder  Discharging to: Private Domicile    Your instructions: Your physician here was Isabella Burgos MD. If you have any questions please call the unit at [phone] for the adult unit or [phone] for [facility]. Please note that we have a patient family advisory [place] that meets the second Wednesday of January and the second Wednesday of July at [address] [facility] in [place] at [facility]. Department leadership would love for you to attend to give feedback on what we are doing well and areas in which we can improve our patient care. Please come if you are able and feel free to reach out to [facility] at [phone] with any questions. The National Suicide and Crisis Hotline Number is 65. You can call, chat, or text this number at any time to access emergency mental health services. [facility] is [phone]-MIJT (2875). You can use this number at any time to access emergency mental health services. Please follow up with your PCP regarding any pending labs. You are going to [facility] [address] for residential treatment. You have an appointment to check in tomorrow. Name of Provider: [facility] [address]  Provider specialty/license: residential treatment  Date and time of appointment:  Wednesday, January 11th, 2023  The type/s of services requested are: residential AOD treatment  Agency name: [facility]  Address: [address] [place] [address],[address], [address]  Phone: [phone]  Special instructions (what to bring to appointment, etc.):     Discharge Completed By: Laree Lundborg LSW  Fax to: Follow up provider name and number      The following personal items were collected during your admission and were returned to you:    Belongings  Dental Appliances: None  Vision - Corrective Lenses: None  Hearing Aid: None  Clothing: Footwear, Undergarments, Sweater, Pants, Shirt  Jewelry: None  Body Piercings Removed: No  Electronic Devices:  ($9.93, 3 packs of cigarettes, 3 lighters, cell phone and 1-visa debit card.)  Weapons (Notify Protective Services/Security): None  Other Valuables: Other (Comment)  Home Medications: None  Valuables Given To: Becca  Provide Name(s) of Who Valuable(s) Were Given To: locker  Patient approves for provider to speak to responsible person post operatively:  (N/A)    By signing below, I understand and acknowledge receipt of the instructions indicated above.

## 2023-01-10 NOTE — BH NOTE
Purposeful Rounding    Patient Location: Patient room    Patient willing to engage in conversation: Yes    Presentation/behavior: Cooperative    Affect: Brightens with interaction    Concerns reported: None    PRN medications given: N/A    Environmental assessment: No safety hazards noted    Fall prevention interventions in place: Lighting appropriate, Room free of clutter, and Clear path to bathroom

## 2023-01-10 NOTE — BH NOTE
Dr. Carlene Olivares notified that patient is supposed to be at [facility] tomorrow at 2 PM.

## 2023-01-10 NOTE — GROUP NOTE
Group Therapy Note    Date: 1/9/2023    Group Start Time: 2040  Group End Time: 2100  Group Topic: [address], RN        Group Therapy Note    Attendees: 12       Patient's Goal: Reinforce the idea that life is not over. Focus on acceptance of role in situation and build new goals. Notes: Patient able to accomplish goal    Status After Intervention:  Improved    Participation Level:  Active Listener and Interactive    Participation Quality: Appropriate and Attentive      Speech:  normal      Thought Process/Content: Logical  Linear      Affective Functioning: Congruent      Mood: Calm and cooperative      Level of consciousness:  Alert and Oriented x4      Response to Learning: Able to verbalize current knowledge/experience and Able to verbalize/acknowledge new learning      Endings: None Reported    Modes of Intervention: Support and Activity      Discipline Responsible: Registered Nurse      Signature:  Alondra Howe RN

## 2023-01-10 NOTE — PROGRESS NOTES
Pt received PRN trazodone for sleep per her request.  Will continue to monitor for safety and comfort.

## 2023-01-10 NOTE — PLAN OF CARE
[facility]  Discharge Note    Pt discharged with followings belongings:   Dental Appliances: None  Vision - Corrective Lenses: None  Hearing Aid: None  Jewelry: None  Body Piercings Removed: No  Clothing: Footwear, Undergarments, Sweater, Pants, Shirt  Other Valuables: Other (Comment)   Valuables sent home with patient. Patient educated on aftercare instructions: yes  Information faxed to by charge RN  at 4:16 PM .Patient verbalize understanding of AVS:  yes. Status EXAM upon discharge:  Mental Status and Behavioral Exam  Normal: Yes  Level of Assistance: Independent/Self  Facial Expression: Brightened  Affect: Appropriate  Level of Consciousness: Alert  Frequency of Checks: 4 times per hour, close  Mood:Normal: No  Mood: Other (comment) (Euthymic)  Motor Activity:Normal: Yes  Eye Contact: Good  Observed Behavior: Cooperative, Friendly  Sexual Misconduct History: Current - no  Preception: [place] to person, Griffith to time, Griffith to place, [place] to situation  Attention:Normal: Yes  Thought Processes: Other (comment) (WNL)  Thought Content:Normal: Yes  Depression Symptoms: No problems reported or observed. Anxiety Symptoms: Generalized  Danielle Symptoms: No problems reported or observed.   Hallucinations: None  Delusions: No  Memory:Normal: Yes  Memory:  (NA)  Insight and Judgment: No  Insight and Judgment: Poor judgment    Tobacco Screening:  Practical Counseling, on admission, chasity X, if applicable and completed (first 3 are required if patient doesn't refuse):            ( ) Recognizing danger situations (included triggers and roadblocks)                    ( ) Coping skills (new ways to manage stress,relaxation techniques, changing routine, distraction)                                                           ( ) Basic information about quitting (benefits of quitting, techniques in how to quit, available resources  ( ) Referral for counseling faxed to [facility] ( ) Patient refused counseling  ( x) Patient refused referral  ( ) Patient refused prescription upon discharge  ( ) Patient has not smoked in the last 30 days    Metabolic Screening:    Lab Results   Component Value Date    LABA1C 5.4 01/07/2023       Lab Results   Component Value Date    CHOL 223 (H) 01/07/2023    CHOL 179 09/02/2020     Lab Results   Component Value Date    TRIG 95 01/07/2023    TRIG 58 09/02/2020     Lab Results   Component Value Date    HDL 46 01/07/2023    HDL 43 09/02/2020     No components found for: [facility] EVALUATION AND TREATMENT [place]  Lab Results   Component Value Date    LABVLDL 19 01/07/2023    LABVLDL 12 09/02/2020       Bridge Appointment completed: Reviewed Discharge Instructions with patient. Patient verbalizes understanding and agreement with the discharge plan using the teachback method.        Vaccinations (chasity X if applicable and completed):  ( ) Patient states already received influenza vaccine elsewhere  ( ) Patient received influenza vaccine during this hospitalization  ( x) Patient refused influenza vaccine at this time     Elizabeth Willoughby RN

## 2023-01-10 NOTE — PLAN OF CARE
Problem: Risk for Elopement  Goal: Patient will not exit the unit/facility without proper excort  Outcome: Progressing     Problem: Self Harm/Suicidality  Goal: Will have no self-injury during hospital stay  Description: INTERVENTIONS:  1. Ensure constant observer at bedside with Q15M safety checks  2. Maintain a safe environment  3. Secure patient belongings  4. Ensure family/visitors adhere to safety recommendations  5. Ensure safety tray has been added to patient's diet order  6. Every shift and PRN: Re-assess suicidal risk via Frequent Screener    1/10/2023 0800 by Shiv Cornejo RN  Outcome: Progressing     Problem: Behavior  Goal: Pt/Family maintain appropriate behavior and adhere to behavioral management agreement, if implemented  Description: INTERVENTIONS:  1. Assess patient/family's coping skills and  non-compliant behavior (including use of illegal substances)  2. Notify security of behavior or suspected illegal substances which indicate the need for search of the family and/or belongings  3. Encourage verbalization of thoughts and concerns in a socially appropriate manner  4. Utilize positive, consistent limit setting strategies supporting safety of patient, staff and others  5. Encourage participation in the decision making process about the behavioral management agreement  6. If a visitor's behavior poses a threat to safety call refer to organization policy. 7. Initiate consult with , Psychosocial CNS, Spiritual Care as appropriate  1/10/2023 0800 by Shiv Cornejo RN  Outcome: Progressing     Problem: Anxiety  Goal: Will report anxiety at manageable levels  Description: INTERVENTIONS:  1. Administer medication as ordered  2. Teach and rehearse alternative coping skills  3.  Provide emotional support with 1:1 interaction with staff  1/10/2023 0800 by Shiv Cornejo RN  Outcome: Progressing     Problem: Involuntary Admit  Goal: Will cooperate with staff recommendations and doctor's orders and will demonstrate appropriate behavior  Description: INTERVENTIONS:  1. Treat underlying conditions and offer medication as ordered  2. Educate regarding involuntary admission procedures and rules  3. Contain excessive/inappropriate behavior per unit and hospital policies  Outcome: Progressing     Problem: Pain  Goal: Verbalizes/displays adequate comfort level or baseline comfort level  Outcome: Progressing    Patient is interactive with staff. Patient denies SI/HI/AVH. Patient states they slept good last night. She reports feeling \"very happy. \" Patient compliant with medications. Patient is calm and cooperative.

## 2023-01-10 NOTE — PROGRESS NOTES
Pt sociable with peers, attended wrap up group. Received PRN trazodone and is currently resting in room. Will continue to monitor for safety and comfort.
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
## 2023-01-14 NOTE — PROGRESS NOTES
Physician Progress Note      Cony Vernon  CSN #:                  [id number]  :                       1970  ADMIT DATE:       2023 11:29 PM  [address] [place] DATE:        2023 2:12 PM  RESPONDING  PROVIDER #:        Vincent Florentino MD          QUERY TEXT:    Pt admitted with Alcohol withdraw and UTI. Pt noted to have elevated HR and   WBC. If possible, please document in the progress notes and discharge summary   if you are evaluating and /or treating any of the following: The medical record reflects the following:  Risk Factors: Alcohol withdraw  UTI  Clinical Indicators: WBC   18.9--17.5, HR > 100 Urine culture > 100,000 E coli  Treatment: In ED IV Rocephin   IV Ativan  IV LR 1L Bolus  Options provided:  -- Possible  Sepsis, 2/2 to UTI present on admission  -- SIRS of non-infectious origin due to Alcohol withdraw  without acute organ   dysfunction  -- Other - I will add my own diagnosis  -- Disagree - Not applicable / Not valid  -- Disagree - Clinically unable to determine / Unknown  -- Refer to Clinical Documentation Reviewer    PROVIDER RESPONSE TEXT:    This patient has possible sepsis , d/t UTI which was present on admission.     Query created by: Lena Meng on 2023 1:22 AM      Electronically signed by:  Vincent Florentino MD 2023 12:17 PM

## 2023-04-02 ENCOUNTER — HOSPITAL ENCOUNTER (INPATIENT)
Age: 53
LOS: 2 days | Discharge: HOME | End: 2023-04-04
Attending: EMERGENCY MEDICINE | Admitting: INTERNAL MEDICINE
Payer: MEDICAID

## 2023-04-02 DIAGNOSIS — F10.939 ALCOHOL WITHDRAWAL SYNDROME WITH COMPLICATION (HCC): Primary | ICD-10-CM

## 2023-04-02 DIAGNOSIS — E83.42 HYPOMAGNESEMIA: ICD-10-CM

## 2023-04-02 PROBLEM — F10.931 ALCOHOL WITHDRAWAL DELIRIUM, ACUTE, HYPERACTIVE (HCC): Status: ACTIVE | Noted: 2023-04-02

## 2023-04-02 LAB
ALBUMIN SERPL-MCNC: 4.6 G/DL (ref 3.4–5)
ALBUMIN/GLOB SERPL: 1.6 {RATIO} (ref 1.1–2.2)
ALP SERPL-CCNC: 84 U/L (ref 40–129)
ALT SERPL-CCNC: 43 U/L (ref 10–40)
AMPHETAMINES UR QL SCN>1000 NG/ML: NORMAL
ANION GAP SERPL CALCULATED.3IONS-SCNC: 18 MMOL/L (ref 3–16)
AST SERPL-CCNC: 59 U/L (ref 15–37)
BACTERIA URNS QL MICRO: NORMAL /HPF
BARBITURATES UR QL SCN>200 NG/ML: NORMAL
BASOPHILS # BLD: 0.1 K/UL (ref 0–0.2)
BASOPHILS NFR BLD: 0.4 %
BENZODIAZ UR QL SCN>200 NG/ML: NORMAL
BILIRUB SERPL-MCNC: 0.3 MG/DL (ref 0–1)
BILIRUB UR QL STRIP.AUTO: NEGATIVE
BUN SERPL-MCNC: 4 MG/DL (ref 7–20)
CALCIUM SERPL-MCNC: 9.1 MG/DL (ref 8.3–10.6)
CANNABINOIDS UR QL SCN>50 NG/ML: NORMAL
CHLORIDE SERPL-SCNC: 103 MMOL/L (ref 99–110)
CLARITY UR: CLEAR
CO2 SERPL-SCNC: 22 MMOL/L (ref 21–32)
COCAINE UR QL SCN: NORMAL
COLOR UR: YELLOW
CREAT SERPL-MCNC: 0.7 MG/DL (ref 0.6–1.1)
DEPRECATED RDW RBC AUTO: 13.4 % (ref 12.4–15.4)
DRUG SCREEN COMMENT UR-IMP: NORMAL
EOSINOPHIL # BLD: 0 K/UL (ref 0–0.6)
EOSINOPHIL NFR BLD: 0 %
EPI CELLS #/AREA URNS AUTO: 2 /HPF (ref 0–5)
ETHANOLAMINE SERPL-MCNC: 179 MG/DL (ref 0–0.08)
FENTANYL SCREEN, URINE: NORMAL
GFR SERPLBLD CREATININE-BSD FMLA CKD-EPI: >60 ML/MIN/{1.73_M2}
GLUCOSE SERPL-MCNC: 97 MG/DL (ref 70–99)
GLUCOSE UR STRIP.AUTO-MCNC: 500 MG/DL
HCG SERPL QL: NEGATIVE
HCT VFR BLD AUTO: 42.7 % (ref 36–48)
HGB BLD-MCNC: 14.2 G/DL (ref 12–16)
HGB UR QL STRIP.AUTO: NEGATIVE
HYALINE CASTS #/AREA URNS AUTO: 0 /LPF (ref 0–8)
KETONES UR STRIP.AUTO-MCNC: ABNORMAL MG/DL
LEUKOCYTE ESTERASE UR QL STRIP.AUTO: NEGATIVE
LYMPHOCYTES # BLD: 1.9 K/UL (ref 1–5.1)
LYMPHOCYTES NFR BLD: 12.5 %
MAGNESIUM SERPL-MCNC: 1.6 MG/DL (ref 1.8–2.4)
MCH RBC QN AUTO: 29.8 PG (ref 26–34)
MCHC RBC AUTO-ENTMCNC: 33.3 G/DL (ref 31–36)
MCV RBC AUTO: 89.5 FL (ref 80–100)
METHADONE UR QL SCN>300 NG/ML: NORMAL
MONOCYTES # BLD: 1.5 K/UL (ref 0–1.3)
MONOCYTES NFR BLD: 9.9 %
NEUTROPHILS # BLD: 11.7 K/UL (ref 1.7–7.7)
NEUTROPHILS NFR BLD: 77.2 %
NITRITE UR QL STRIP.AUTO: NEGATIVE
OPIATES UR QL SCN>300 NG/ML: NORMAL
OXYCODONE UR QL SCN: NORMAL
PCP UR QL SCN>25 NG/ML: NORMAL
PH UR STRIP.AUTO: 6 [PH] (ref 5–8)
PH UR STRIP: 6 [PH]
PLATELET # BLD AUTO: 344 K/UL (ref 135–450)
PMV BLD AUTO: 7.2 FL (ref 5–10.5)
POTASSIUM SERPL-SCNC: 3.4 MMOL/L (ref 3.5–5.1)
PROT SERPL-MCNC: 7.5 G/DL (ref 6.4–8.2)
PROT UR STRIP.AUTO-MCNC: ABNORMAL MG/DL
RBC # BLD AUTO: 4.77 M/UL (ref 4–5.2)
RBC CLUMPS #/AREA URNS AUTO: 1 /HPF (ref 0–4)
SODIUM SERPL-SCNC: 143 MMOL/L (ref 136–145)
SP GR UR STRIP.AUTO: 1.01 (ref 1–1.03)
UA COMPLETE W REFLEX CULTURE PNL UR: ABNORMAL
UA DIPSTICK W REFLEX MICRO PNL UR: YES
URN SPEC COLLECT METH UR: ABNORMAL
UROBILINOGEN UR STRIP-ACNC: 0.2 E.U./DL
WBC # BLD AUTO: 15.2 K/UL (ref 4–11)
WBC #/AREA URNS AUTO: 3 /HPF (ref 0–5)

## 2023-04-02 PROCEDURE — 96361 HYDRATE IV INFUSION ADD-ON: CPT

## 2023-04-02 PROCEDURE — 96375 TX/PRO/DX INJ NEW DRUG ADDON: CPT

## 2023-04-02 PROCEDURE — 2580000003 HC RX 258: Performed by: NURSE PRACTITIONER

## 2023-04-02 PROCEDURE — 6370000000 HC RX 637 (ALT 250 FOR IP): Performed by: NURSE PRACTITIONER

## 2023-04-02 PROCEDURE — 36415 COLL VENOUS BLD VENIPUNCTURE: CPT

## 2023-04-02 PROCEDURE — 6360000002 HC RX W HCPCS: Performed by: NURSE PRACTITIONER

## 2023-04-02 PROCEDURE — 80307 DRUG TEST PRSMV CHEM ANLYZR: CPT

## 2023-04-02 PROCEDURE — 84703 CHORIONIC GONADOTROPIN ASSAY: CPT

## 2023-04-02 PROCEDURE — 80053 COMPREHEN METABOLIC PANEL: CPT

## 2023-04-02 PROCEDURE — 82077 ASSAY SPEC XCP UR&BREATH IA: CPT

## 2023-04-02 PROCEDURE — 81001 URINALYSIS AUTO W/SCOPE: CPT

## 2023-04-02 PROCEDURE — 83735 ASSAY OF MAGNESIUM: CPT

## 2023-04-02 PROCEDURE — 2060000000 HC ICU INTERMEDIATE R&B

## 2023-04-02 PROCEDURE — 99285 EMERGENCY DEPT VISIT HI MDM: CPT

## 2023-04-02 PROCEDURE — HZ2ZZZZ DETOXIFICATION SERVICES FOR SUBSTANCE ABUSE TREATMENT: ICD-10-PCS | Performed by: INTERNAL MEDICINE

## 2023-04-02 PROCEDURE — 96374 THER/PROPH/DIAG INJ IV PUSH: CPT

## 2023-04-02 PROCEDURE — 85025 COMPLETE CBC W/AUTO DIFF WBC: CPT

## 2023-04-02 PROCEDURE — 2500000003 HC RX 250 WO HCPCS: Performed by: NURSE PRACTITIONER

## 2023-04-02 RX ORDER — ONDANSETRON 4 MG/1
4 TABLET, ORALLY DISINTEGRATING ORAL EVERY 8 HOURS PRN
Status: DISCONTINUED | OUTPATIENT
Start: 2023-04-02 | End: 2023-04-04 | Stop reason: HOSPADM

## 2023-04-02 RX ORDER — MAGNESIUM SULFATE IN WATER 40 MG/ML
2000 INJECTION, SOLUTION INTRAVENOUS ONCE
Status: COMPLETED | OUTPATIENT
Start: 2023-04-02 | End: 2023-04-03

## 2023-04-02 RX ORDER — SODIUM CHLORIDE 0.9 % (FLUSH) 0.9 %
5-40 SYRINGE (ML) INJECTION PRN
Status: DISCONTINUED | OUTPATIENT
Start: 2023-04-02 | End: 2023-04-04 | Stop reason: HOSPADM

## 2023-04-02 RX ORDER — LORAZEPAM 1 MG/1
2 TABLET ORAL
Status: DISCONTINUED | OUTPATIENT
Start: 2023-04-02 | End: 2023-04-04 | Stop reason: HOSPADM

## 2023-04-02 RX ORDER — ACETAMINOPHEN 650 MG/1
650 SUPPOSITORY RECTAL EVERY 6 HOURS PRN
Status: DISCONTINUED | OUTPATIENT
Start: 2023-04-02 | End: 2023-04-04 | Stop reason: HOSPADM

## 2023-04-02 RX ORDER — POLYETHYLENE GLYCOL 3350 17 G/17G
17 POWDER, FOR SOLUTION ORAL DAILY PRN
Status: DISCONTINUED | OUTPATIENT
Start: 2023-04-02 | End: 2023-04-04 | Stop reason: HOSPADM

## 2023-04-02 RX ORDER — LORAZEPAM 2 MG/ML
4 INJECTION INTRAMUSCULAR
Status: DISCONTINUED | OUTPATIENT
Start: 2023-04-02 | End: 2023-04-04 | Stop reason: HOSPADM

## 2023-04-02 RX ORDER — LORAZEPAM 2 MG/ML
2 INJECTION INTRAMUSCULAR
Status: DISCONTINUED | OUTPATIENT
Start: 2023-04-02 | End: 2023-04-04 | Stop reason: HOSPADM

## 2023-04-02 RX ORDER — MAGNESIUM HYDROXIDE/ALUMINUM HYDROXICE/SIMETHICONE 120; 1200; 1200 MG/30ML; MG/30ML; MG/30ML
30 SUSPENSION ORAL EVERY 6 HOURS PRN
Status: DISCONTINUED | OUTPATIENT
Start: 2023-04-02 | End: 2023-04-04 | Stop reason: HOSPADM

## 2023-04-02 RX ORDER — LORAZEPAM 1 MG/1
1 TABLET ORAL
Status: DISCONTINUED | OUTPATIENT
Start: 2023-04-02 | End: 2023-04-04 | Stop reason: HOSPADM

## 2023-04-02 RX ORDER — SODIUM CHLORIDE 0.9 % (FLUSH) 0.9 %
5-40 SYRINGE (ML) INJECTION EVERY 12 HOURS SCHEDULED
Status: DISCONTINUED | OUTPATIENT
Start: 2023-04-02 | End: 2023-04-04 | Stop reason: HOSPADM

## 2023-04-02 RX ORDER — POTASSIUM CHLORIDE 20 MEQ/1
20 TABLET, EXTENDED RELEASE ORAL ONCE
Status: COMPLETED | OUTPATIENT
Start: 2023-04-02 | End: 2023-04-02

## 2023-04-02 RX ORDER — NICOTINE 21 MG/24HR
1 PATCH, TRANSDERMAL 24 HOURS TRANSDERMAL DAILY
Status: DISCONTINUED | OUTPATIENT
Start: 2023-04-03 | End: 2023-04-04 | Stop reason: HOSPADM

## 2023-04-02 RX ORDER — 0.9 % SODIUM CHLORIDE 0.9 %
1000 INTRAVENOUS SOLUTION INTRAVENOUS ONCE
Status: COMPLETED | OUTPATIENT
Start: 2023-04-02 | End: 2023-04-02

## 2023-04-02 RX ORDER — GAUZE BANDAGE 2" X 2"
100 BANDAGE TOPICAL DAILY
Status: DISCONTINUED | OUTPATIENT
Start: 2023-04-03 | End: 2023-04-04 | Stop reason: HOSPADM

## 2023-04-02 RX ORDER — M-VIT,TX,IRON,MINS/CALC/FOLIC 27MG-0.4MG
1 TABLET ORAL DAILY
Status: DISCONTINUED | OUTPATIENT
Start: 2023-04-03 | End: 2023-04-04 | Stop reason: HOSPADM

## 2023-04-02 RX ORDER — FLUOXETINE HYDROCHLORIDE 20 MG/1
40 CAPSULE ORAL DAILY
Status: DISCONTINUED | OUTPATIENT
Start: 2023-04-03 | End: 2023-04-04 | Stop reason: HOSPADM

## 2023-04-02 RX ORDER — LORAZEPAM 1 MG/1
4 TABLET ORAL
Status: DISCONTINUED | OUTPATIENT
Start: 2023-04-02 | End: 2023-04-04 | Stop reason: HOSPADM

## 2023-04-02 RX ORDER — LORAZEPAM 1 MG/1
3 TABLET ORAL
Status: DISCONTINUED | OUTPATIENT
Start: 2023-04-02 | End: 2023-04-04 | Stop reason: HOSPADM

## 2023-04-02 RX ORDER — TRAZODONE HYDROCHLORIDE 50 MG/1
100 TABLET ORAL NIGHTLY
Status: DISCONTINUED | OUTPATIENT
Start: 2023-04-02 | End: 2023-04-04 | Stop reason: HOSPADM

## 2023-04-02 RX ORDER — LORAZEPAM 2 MG/ML
1 INJECTION INTRAMUSCULAR
Status: DISCONTINUED | OUTPATIENT
Start: 2023-04-02 | End: 2023-04-04 | Stop reason: HOSPADM

## 2023-04-02 RX ORDER — ACETAMINOPHEN 325 MG/1
650 TABLET ORAL EVERY 6 HOURS PRN
Status: DISCONTINUED | OUTPATIENT
Start: 2023-04-02 | End: 2023-04-04 | Stop reason: HOSPADM

## 2023-04-02 RX ORDER — LORAZEPAM 2 MG/ML
2 INJECTION INTRAMUSCULAR ONCE
Status: COMPLETED | OUTPATIENT
Start: 2023-04-02 | End: 2023-04-02

## 2023-04-02 RX ORDER — ONDANSETRON 2 MG/ML
4 INJECTION INTRAMUSCULAR; INTRAVENOUS EVERY 6 HOURS PRN
Status: DISCONTINUED | OUTPATIENT
Start: 2023-04-02 | End: 2023-04-04 | Stop reason: HOSPADM

## 2023-04-02 RX ORDER — SODIUM CHLORIDE 9 MG/ML
INJECTION, SOLUTION INTRAVENOUS PRN
Status: DISCONTINUED | OUTPATIENT
Start: 2023-04-02 | End: 2023-04-04 | Stop reason: HOSPADM

## 2023-04-02 RX ORDER — BUSPIRONE HYDROCHLORIDE 15 MG/1
15 TABLET ORAL 3 TIMES DAILY PRN
COMMUNITY

## 2023-04-02 RX ORDER — DIAZEPAM 5 MG/ML
5 INJECTION, SOLUTION INTRAMUSCULAR; INTRAVENOUS ONCE
Status: DISCONTINUED | OUTPATIENT
Start: 2023-04-02 | End: 2023-04-02

## 2023-04-02 RX ORDER — LORAZEPAM 2 MG/ML
3 INJECTION INTRAMUSCULAR
Status: DISCONTINUED | OUTPATIENT
Start: 2023-04-02 | End: 2023-04-04 | Stop reason: HOSPADM

## 2023-04-02 RX ORDER — DIAZEPAM 5 MG/ML
2.5 INJECTION, SOLUTION INTRAMUSCULAR; INTRAVENOUS ONCE
Status: COMPLETED | OUTPATIENT
Start: 2023-04-02 | End: 2023-04-02

## 2023-04-02 RX ORDER — ENOXAPARIN SODIUM 100 MG/ML
40 INJECTION SUBCUTANEOUS DAILY
Status: DISCONTINUED | OUTPATIENT
Start: 2023-04-03 | End: 2023-04-04 | Stop reason: HOSPADM

## 2023-04-02 RX ORDER — TRAZODONE HYDROCHLORIDE 100 MG/1
100 TABLET ORAL NIGHTLY
COMMUNITY

## 2023-04-02 RX ORDER — SODIUM CHLORIDE, SODIUM LACTATE, POTASSIUM CHLORIDE, CALCIUM CHLORIDE 600; 310; 30; 20 MG/100ML; MG/100ML; MG/100ML; MG/100ML
INJECTION, SOLUTION INTRAVENOUS CONTINUOUS
Status: DISCONTINUED | OUTPATIENT
Start: 2023-04-02 | End: 2023-04-04 | Stop reason: HOSPADM

## 2023-04-02 RX ADMIN — POTASSIUM CHLORIDE 20 MEQ: 1500 TABLET, EXTENDED RELEASE ORAL at 23:20

## 2023-04-02 RX ADMIN — CHLORDIAZEPOXIDE HYDROCHLORIDE 25 MG: 10 CAPSULE ORAL at 23:20

## 2023-04-02 RX ADMIN — TRAZODONE HYDROCHLORIDE 100 MG: 50 TABLET ORAL at 23:20

## 2023-04-02 RX ADMIN — ONDANSETRON 4 MG: 2 INJECTION INTRAMUSCULAR; INTRAVENOUS at 22:49

## 2023-04-02 RX ADMIN — SODIUM CHLORIDE, PRESERVATIVE FREE 10 ML: 5 INJECTION INTRAVENOUS at 22:46

## 2023-04-02 RX ADMIN — SODIUM CHLORIDE 1000 ML: 9 INJECTION, SOLUTION INTRAVENOUS at 16:26

## 2023-04-02 RX ADMIN — LORAZEPAM 2 MG: 2 INJECTION INTRAMUSCULAR; INTRAVENOUS at 16:25

## 2023-04-02 RX ADMIN — LORAZEPAM 3 MG: 2 INJECTION INTRAMUSCULAR; INTRAVENOUS at 23:20

## 2023-04-02 RX ADMIN — SODIUM CHLORIDE, POTASSIUM CHLORIDE, SODIUM LACTATE AND CALCIUM CHLORIDE: 600; 310; 30; 20 INJECTION, SOLUTION INTRAVENOUS at 22:52

## 2023-04-02 RX ADMIN — FOLIC ACID: 5 INJECTION, SOLUTION INTRAMUSCULAR; INTRAVENOUS; SUBCUTANEOUS at 16:26

## 2023-04-02 RX ADMIN — DIAZEPAM 2.5 MG: 5 INJECTION, SOLUTION INTRAMUSCULAR; INTRAVENOUS at 22:00

## 2023-04-02 RX ADMIN — MAGNESIUM SULFATE HEPTAHYDRATE 2000 MG: 40 INJECTION, SOLUTION INTRAVENOUS at 22:53

## 2023-04-02 ASSESSMENT — PAIN - FUNCTIONAL ASSESSMENT: PAIN_FUNCTIONAL_ASSESSMENT: 0-10

## 2023-04-02 ASSESSMENT — ENCOUNTER SYMPTOMS
SHORTNESS OF BREATH: 0
VOMITING: 0
RESPIRATORY NEGATIVE: 1
CHEST TIGHTNESS: 0
DIARRHEA: 0
ABDOMINAL PAIN: 0
NAUSEA: 1
EYES NEGATIVE: 1

## 2023-04-02 ASSESSMENT — LIFESTYLE VARIABLES
HOW OFTEN DO YOU HAVE A DRINK CONTAINING ALCOHOL: 2-3 TIMES A WEEK
HOW MANY STANDARD DRINKS CONTAINING ALCOHOL DO YOU HAVE ON A TYPICAL DAY: 10 OR MORE

## 2023-04-02 ASSESSMENT — PAIN SCALES - GENERAL
PAINLEVEL_OUTOF10: 7
PAINLEVEL_OUTOF10: 0

## 2023-04-02 NOTE — LETTER
[facility] Emergency Department  [address] [facility], [address]             April 2, 2023    Patient: Suki Andrade   YOB: 2019   Date of Visit: 4/2/2023       To Whom It May Concern:    Suki Andrade was seen and treated in our emergency department on 4/2/2023. She may return to school on 4/3/2023 .       Sincerely,         Sumanth Orosco CNP

## 2023-04-02 NOTE — ED PROVIDER NOTES
patient's alcohol withdrawal.    FINAL IMPRESSION:    1. Alcohol withdrawal syndrome with complication ([address]    2.  Hypomagnesemia       DISPOSITION Admitted 04/02/2023 09:20:41 PM           Rafaela Gil MD  04/02/23 1442
unremarkable. Patient given a banana bag as well as a liter of IV fluids and 2 mg of Ativan. She is resting comfortably at time of reexam.  She will be dispositioned by attending physician as her visit does exceed the length of my shift. Please see Dr. Alicia Pelayo note. Disposition Considerations (include 1 Tests not done, Shared Decision Making, Pt Expectation of Test or Tx.): shared decision making used throughout    I did consider uds  See dr. Alicia Pelayo note      I am the Primary Clinician of Record. FINAL IMPRESSION      1. Alcohol withdrawal syndrome with complication ([facility]    2. Hypomagnesemia          DISPOSITION/PLAN     DISPOSITION Admitted 04/02/2023 09:20:41 PM      PATIENT REFERRED TO:  No follow-up provider specified.     DISCHARGE MEDICATIONS:  Current Discharge Medication List          DISCONTINUED MEDICATIONS:  Current Discharge Medication List                 (Please note that portions of this note were completed with a voice recognition program.  Efforts were made to edit the dictations but occasionally words are mis-transcribed.)    DWIGHT Baird CNP (electronically signed)           DWIGHT Baird CNP  04/02/23 DWIGHT Fenton CNP  04/03/23 1004

## 2023-04-03 PROBLEM — R07.9 CHEST PAIN: Status: ACTIVE | Noted: 2023-04-03

## 2023-04-03 PROBLEM — F19.94 SUBSTANCE OR MEDICATION-INDUCED DEPRESSIVE DISORDER (HCC): Status: ACTIVE | Noted: 2023-04-03

## 2023-04-03 PROBLEM — F10.20 ALCOHOL USE DISORDER, SEVERE, DEPENDENCE (HCC): Status: ACTIVE | Noted: 2021-07-22

## 2023-04-03 PROBLEM — J44.1 COPD WITH ACUTE EXACERBATION (HCC): Status: ACTIVE | Noted: 2023-04-03

## 2023-04-03 LAB
ALBUMIN SERPL-MCNC: 3.5 G/DL (ref 3.4–5)
ALBUMIN/GLOB SERPL: 1.5 {RATIO} (ref 1.1–2.2)
ALP SERPL-CCNC: 77 U/L (ref 40–129)
ALT SERPL-CCNC: 39 U/L (ref 10–40)
ANION GAP SERPL CALCULATED.3IONS-SCNC: 8 MMOL/L (ref 3–16)
AST SERPL-CCNC: 52 U/L (ref 15–37)
BASOPHILS # BLD: 0.1 K/UL (ref 0–0.2)
BASOPHILS NFR BLD: 0.6 %
BILIRUB SERPL-MCNC: 0.7 MG/DL (ref 0–1)
BUN SERPL-MCNC: 5 MG/DL (ref 7–20)
CALCIUM SERPL-MCNC: 8.1 MG/DL (ref 8.3–10.6)
CHLORIDE SERPL-SCNC: 105 MMOL/L (ref 99–110)
CO2 SERPL-SCNC: 26 MMOL/L (ref 21–32)
CREAT SERPL-MCNC: 0.7 MG/DL (ref 0.6–1.1)
DEPRECATED RDW RBC AUTO: 13.6 % (ref 12.4–15.4)
EOSINOPHIL # BLD: 0.1 K/UL (ref 0–0.6)
EOSINOPHIL NFR BLD: 0.7 %
GFR SERPLBLD CREATININE-BSD FMLA CKD-EPI: >60 ML/MIN/{1.73_M2}
GLUCOSE SERPL-MCNC: 102 MG/DL (ref 70–99)
HCT VFR BLD AUTO: 37 % (ref 36–48)
HGB BLD-MCNC: 12.3 G/DL (ref 12–16)
LYMPHOCYTES # BLD: 3.1 K/UL (ref 1–5.1)
LYMPHOCYTES NFR BLD: 25.9 %
MCH RBC QN AUTO: 30 PG (ref 26–34)
MCHC RBC AUTO-ENTMCNC: 33.2 G/DL (ref 31–36)
MCV RBC AUTO: 90.4 FL (ref 80–100)
MONOCYTES # BLD: 0.9 K/UL (ref 0–1.3)
MONOCYTES NFR BLD: 7.4 %
NEUTROPHILS # BLD: 7.8 K/UL (ref 1.7–7.7)
NEUTROPHILS NFR BLD: 65.4 %
PLATELET # BLD AUTO: 267 K/UL (ref 135–450)
PMV BLD AUTO: 7.5 FL (ref 5–10.5)
POTASSIUM SERPL-SCNC: 3.8 MMOL/L (ref 3.5–5.1)
PROT SERPL-MCNC: 5.9 G/DL (ref 6.4–8.2)
RBC # BLD AUTO: 4.09 M/UL (ref 4–5.2)
SODIUM SERPL-SCNC: 139 MMOL/L (ref 136–145)
WBC # BLD AUTO: 11.9 K/UL (ref 4–11)

## 2023-04-03 PROCEDURE — 85025 COMPLETE CBC W/AUTO DIFF WBC: CPT

## 2023-04-03 PROCEDURE — 36415 COLL VENOUS BLD VENIPUNCTURE: CPT

## 2023-04-03 PROCEDURE — 2580000003 HC RX 258: Performed by: NURSE PRACTITIONER

## 2023-04-03 PROCEDURE — 80053 COMPREHEN METABOLIC PANEL: CPT

## 2023-04-03 PROCEDURE — 6370000000 HC RX 637 (ALT 250 FOR IP): Performed by: NURSE PRACTITIONER

## 2023-04-03 PROCEDURE — 6360000002 HC RX W HCPCS: Performed by: NURSE PRACTITIONER

## 2023-04-03 PROCEDURE — 2060000000 HC ICU INTERMEDIATE R&B

## 2023-04-03 PROCEDURE — 6370000000 HC RX 637 (ALT 250 FOR IP): Performed by: INTERNAL MEDICINE

## 2023-04-03 PROCEDURE — 94761 N-INVAS EAR/PLS OXIMETRY MLT: CPT

## 2023-04-03 PROCEDURE — 2700000000 HC OXYGEN THERAPY PER DAY

## 2023-04-03 PROCEDURE — 94640 AIRWAY INHALATION TREATMENT: CPT

## 2023-04-03 PROCEDURE — 6360000002 HC RX W HCPCS: Performed by: INTERNAL MEDICINE

## 2023-04-03 RX ORDER — ALBUTEROL SULFATE 90 UG/1
2 AEROSOL, METERED RESPIRATORY (INHALATION) 4 TIMES DAILY
Status: DISCONTINUED | OUTPATIENT
Start: 2023-04-03 | End: 2023-04-04 | Stop reason: HOSPADM

## 2023-04-03 RX ORDER — ALBUTEROL SULFATE 90 UG/1
2 AEROSOL, METERED RESPIRATORY (INHALATION) EVERY 4 HOURS PRN
Status: DISCONTINUED | OUTPATIENT
Start: 2023-04-03 | End: 2023-04-04 | Stop reason: HOSPADM

## 2023-04-03 RX ORDER — METHYLPREDNISOLONE SODIUM SUCCINATE 40 MG/ML
40 INJECTION, POWDER, LYOPHILIZED, FOR SOLUTION INTRAMUSCULAR; INTRAVENOUS EVERY 6 HOURS
Status: DISCONTINUED | OUTPATIENT
Start: 2023-04-03 | End: 2023-04-04 | Stop reason: HOSPADM

## 2023-04-03 RX ORDER — KETOROLAC TROMETHAMINE 30 MG/ML
30 INJECTION, SOLUTION INTRAMUSCULAR; INTRAVENOUS EVERY 6 HOURS
Status: DISCONTINUED | OUTPATIENT
Start: 2023-04-03 | End: 2023-04-04 | Stop reason: HOSPADM

## 2023-04-03 RX ORDER — IPRATROPIUM BROMIDE AND ALBUTEROL SULFATE 2.5; .5 MG/3ML; MG/3ML
1 SOLUTION RESPIRATORY (INHALATION) 2 TIMES DAILY
Status: DISCONTINUED | OUTPATIENT
Start: 2023-04-03 | End: 2023-04-03

## 2023-04-03 RX ORDER — IPRATROPIUM BROMIDE AND ALBUTEROL SULFATE 2.5; .5 MG/3ML; MG/3ML
1 SOLUTION RESPIRATORY (INHALATION)
Status: DISCONTINUED | OUTPATIENT
Start: 2023-04-03 | End: 2023-04-03

## 2023-04-03 RX ORDER — PANTOPRAZOLE SODIUM 40 MG/1
40 TABLET, DELAYED RELEASE ORAL
Status: DISCONTINUED | OUTPATIENT
Start: 2023-04-03 | End: 2023-04-04 | Stop reason: HOSPADM

## 2023-04-03 RX ORDER — LEVOFLOXACIN 5 MG/ML
750 INJECTION, SOLUTION INTRAVENOUS EVERY 24 HOURS
Status: DISCONTINUED | OUTPATIENT
Start: 2023-04-03 | End: 2023-04-04 | Stop reason: HOSPADM

## 2023-04-03 RX ORDER — SUCRALFATE 1 G/1
1 TABLET ORAL EVERY 6 HOURS SCHEDULED
Status: DISCONTINUED | OUTPATIENT
Start: 2023-04-03 | End: 2023-04-04 | Stop reason: HOSPADM

## 2023-04-03 RX ORDER — LORAZEPAM 1 MG/1
2 TABLET ORAL EVERY 6 HOURS
Status: DISCONTINUED | OUTPATIENT
Start: 2023-04-03 | End: 2023-04-04

## 2023-04-03 RX ORDER — ALBUTEROL SULFATE 2.5 MG/3ML
2.5 SOLUTION RESPIRATORY (INHALATION) EVERY 4 HOURS PRN
Status: DISCONTINUED | OUTPATIENT
Start: 2023-04-03 | End: 2023-04-04 | Stop reason: HOSPADM

## 2023-04-03 RX ADMIN — THIAMINE HCL TAB 100 MG 100 MG: 100 TAB at 09:31

## 2023-04-03 RX ADMIN — Medication 2 PUFF: at 20:46

## 2023-04-03 RX ADMIN — Medication 1 TABLET: at 09:31

## 2023-04-03 RX ADMIN — METHYLPREDNISOLONE SODIUM SUCCINATE 40 MG: 40 INJECTION, POWDER, FOR SOLUTION INTRAMUSCULAR; INTRAVENOUS at 21:21

## 2023-04-03 RX ADMIN — KETOROLAC TROMETHAMINE 30 MG: 30 INJECTION, SOLUTION INTRAMUSCULAR at 09:35

## 2023-04-03 RX ADMIN — METHYLPREDNISOLONE SODIUM SUCCINATE 40 MG: 40 INJECTION, POWDER, FOR SOLUTION INTRAMUSCULAR; INTRAVENOUS at 15:36

## 2023-04-03 RX ADMIN — LORAZEPAM 1 MG: 1 TABLET ORAL at 12:12

## 2023-04-03 RX ADMIN — Medication 2 PUFF: at 13:13

## 2023-04-03 RX ADMIN — PANTOPRAZOLE SODIUM 40 MG: 40 TABLET, DELAYED RELEASE ORAL at 15:36

## 2023-04-03 RX ADMIN — LORAZEPAM 2 MG: 1 TABLET ORAL at 15:36

## 2023-04-03 RX ADMIN — SODIUM CHLORIDE, POTASSIUM CHLORIDE, SODIUM LACTATE AND CALCIUM CHLORIDE: 600; 310; 30; 20 INJECTION, SOLUTION INTRAVENOUS at 23:12

## 2023-04-03 RX ADMIN — KETOROLAC TROMETHAMINE 30 MG: 30 INJECTION, SOLUTION INTRAMUSCULAR at 15:36

## 2023-04-03 RX ADMIN — SUCRALFATE 1 G: 1 TABLET ORAL at 12:12

## 2023-04-03 RX ADMIN — LORAZEPAM 2 MG: 1 TABLET ORAL at 21:21

## 2023-04-03 RX ADMIN — TRAZODONE HYDROCHLORIDE 100 MG: 50 TABLET ORAL at 21:21

## 2023-04-03 RX ADMIN — KETOROLAC TROMETHAMINE 30 MG: 30 INJECTION, SOLUTION INTRAMUSCULAR at 21:21

## 2023-04-03 RX ADMIN — SODIUM CHLORIDE, PRESERVATIVE FREE 10 ML: 5 INJECTION INTRAVENOUS at 09:36

## 2023-04-03 RX ADMIN — FLUOXETINE 40 MG: 20 CAPSULE ORAL at 09:32

## 2023-04-03 RX ADMIN — LORAZEPAM 2 MG: 1 TABLET ORAL at 09:31

## 2023-04-03 RX ADMIN — IPRATROPIUM BROMIDE AND ALBUTEROL SULFATE 1 AMPULE: .5; 3 SOLUTION RESPIRATORY (INHALATION) at 09:51

## 2023-04-03 RX ADMIN — Medication 2 PUFF: at 09:55

## 2023-04-03 RX ADMIN — ONDANSETRON 4 MG: 2 INJECTION INTRAMUSCULAR; INTRAVENOUS at 21:20

## 2023-04-03 RX ADMIN — METHYLPREDNISOLONE SODIUM SUCCINATE 40 MG: 40 INJECTION, POWDER, FOR SOLUTION INTRAMUSCULAR; INTRAVENOUS at 09:36

## 2023-04-03 RX ADMIN — SODIUM CHLORIDE, POTASSIUM CHLORIDE, SODIUM LACTATE AND CALCIUM CHLORIDE: 600; 310; 30; 20 INJECTION, SOLUTION INTRAVENOUS at 12:14

## 2023-04-03 RX ADMIN — ENOXAPARIN SODIUM 40 MG: 100 INJECTION SUBCUTANEOUS at 09:36

## 2023-04-03 RX ADMIN — SUCRALFATE 1 G: 1 TABLET ORAL at 17:37

## 2023-04-03 RX ADMIN — LEVOFLOXACIN 750 MG: 5 INJECTION, SOLUTION INTRAVENOUS at 09:42

## 2023-04-03 ASSESSMENT — PAIN SCALES - GENERAL
PAINLEVEL_OUTOF10: 0
PAINLEVEL_OUTOF10: 3
PAINLEVEL_OUTOF10: 3
PAINLEVEL_OUTOF10: 0
PAINLEVEL_OUTOF10: 7
PAINLEVEL_OUTOF10: 7

## 2023-04-03 ASSESSMENT — PAIN DESCRIPTION - ORIENTATION: ORIENTATION: MID

## 2023-04-03 ASSESSMENT — PAIN DESCRIPTION - LOCATION
LOCATION: HEAD
LOCATION: CHEST
LOCATION: CHEST

## 2023-04-03 NOTE — CONSULTS
attempts including [place] for 3 months then sober living for 7 months. Prior treatments at [facility]. 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    Family History   Problem Relation Age of Onset    No Known Problems Mother     No Known Problems Father     Other Brother         anxiety    Cancer Maternal Grandmother         lung cancer    Cancer Maternal Grandfather         lung   Father - alcoholism  PGF    - alcoholism     No Known Allergies    Medications:  Scheduled Meds:   LORazepam  2 mg Oral Q6H    methylPREDNISolone  40 mg IntraVENous Q6H    mometasone-formoterol  2 puff Inhalation BID    sucralfate  1 g Oral 4 times per day    pantoprazole  40 mg Oral BID AC    ketorolac  30 mg IntraVENous Q6H    levofloxacin  750 mg IntraVENous Q24H    albuterol sulfate HFA  2 puff Inhalation 4x daily    And    ipratropium  2 puff Inhalation 4x daily    traZODone  100 mg Oral Nightly    therapeutic multivitamin-minerals  1 tablet Oral Daily    FLUoxetine  40 mg Oral Daily    sodium chloride flush  5-40 mL IntraVENous 2 times per day    enoxaparin  40 mg SubCUTAneous Daily    thiamine  100 mg Oral Daily    nicotine  1 patch TransDERmal Daily       OBJECTIVE:  Height: 5' 4\" (1.626 m), Weight: 185 lb 12.8 oz (84.3 kg), BP: (!) 141/8, Pain 0-10: Pain Level: 7;     MSE:   Appearance    alert, cooperative, poor eye contact  Motor: No abnormal movements, tics or mannerisms.   Speech    spontaneous and normal rate  Language    0 - no aphasia, normal  Mood/Affect    Anxious / constricted,

## 2023-04-03 NOTE — RT PROTOCOL NOTE
using Per Protocol order mode. 4-6 - enter or revise RT Bronchodilator order(s) to two equivalent RT bronchodilator orders with one order with BID Frequency and one order with Frequency of every 4 hours PRN wheezing or increased work of breathing using Per Protocol order mode. 7-10 - enter or revise RT Bronchodilator order(s) to two equivalent RT bronchodilator orders with one order with TID Frequency and one order with Frequency of every 4 hours PRN wheezing or increased work of breathing using Per Protocol order mode. 11-13 - enter or revise RT Bronchodilator order(s) to one equivalent RT bronchodilator order with QID Frequency and an Albuterol order with Frequency of every 4 hours PRN wheezing or increased work of breathing using Per Protocol order mode. Greater than 13 - enter or revise RT Bronchodilator order(s) to one equivalent RT bronchodilator order with every 4 hours Frequency and an Albuterol order with Frequency of every 2 hours PRN wheezing or increased work of breathing using Per Protocol order mode. RT to enter RT Home Evaluation for COPD & MDI Assessment order using Per Protocol order mode.     Electronically signed by Raquel Lacey RCP on 4/3/2023 at 9:16 AM

## 2023-04-03 NOTE — PLAN OF CARE
Problem: Discharge Planning  Goal: Discharge to home or other facility with appropriate resources  Outcome: Progressing     Problem: Pain  Goal: Verbalizes/displays adequate comfort level or baseline comfort level  Outcome: Progressing     Problem: Risk for Elopement  Goal: Patient will not exit the unit/facility without proper excort  Outcome: Progressing  Flowsheets (Taken 4/2/2023 1526 by Caitlyn Cardenas RN)  Nursing Interventions for Elopement Risk:   Assist with personal care needs such as toileting, eating, dressing, as needed to reduce the risk of wandering   Collaborate with treatment team for drug withdrawal symptoms treatment   Collaborate with treatment team for nicotine replacement   Collaborate with family members/caregivers to mitigate the elopement risk   Communicate/escalate to charge nurse the risk of elopement     Problem: Safety - Adult  Goal: Free from fall injury  Outcome: Progressing     Problem: ABCDS Injury Assessment  Goal: Absence of physical injury  Outcome: Progressing

## 2023-04-03 NOTE — H&P
history of Alcohol abuse, Ankle fracture, Anxiety, Depression, and Sleeping difficulties. Past Surgical History:   has a past surgical history that includes  section (); fracture surgery (); Breast surgery (); and Forearm surgery (Left, 2021). Medications:  No current facility-administered medications on file prior to encounter. Current Outpatient Medications on File Prior to Encounter   Medication Sig Dispense Refill    busPIRone (BUSPAR) 15 MG tablet Take 15 mg by mouth as needed      traZODone (DESYREL) 100 MG tablet Take 1 tablet by mouth nightly      Cholecalciferol (VITAMIN D3) 125 MCG (5000 UT) TABS Take by mouth      Multiple Vitamin (DAILY CHANDAN PO) Take by mouth daily      Multiple Vitamins-Minerals (THERAPEUTIC MULTIVITAMIN-MINERALS) tablet Take 1 tablet by mouth daily 30 tablet 0    thiamine 100 MG tablet Take 1 tablet by mouth daily (Patient not taking: Reported on 2023) 30 tablet 0    FLUoxetine (PROZAC) 20 MG capsule Take 1 capsule by mouth daily (Patient taking differently: Take 2 capsules by mouth daily) 30 capsule 0    folic acid (FOLVITE) 1 MG tablet Take 1 tablet by mouth daily (Patient not taking: Reported on 2023) 30 tablet 0       Allergies:  No Known Allergies     Social History:  Patient Lives was at sober living   reports that she has been smoking cigarettes. She has a 4.00 pack-year smoking history. She has never used smokeless tobacco. She reports current alcohol use. She reports that she does not use drugs. Family History:  family history includes Cancer in her maternal grandfather and maternal grandmother; No Known Problems in her father and mother; Other in her brother. ,     Physical Exam:  /78   Pulse 94   Temp 99.2 °F (37.3 °C) (Oral)   Resp 22   Ht 5' 4\" (1.626 m)   Wt 175 lb (79.4 kg)   LMP  (LMP Unknown)   SpO2 94%   BMI 30.04 kg/m²   Physical Exam  Vitals and nursing note reviewed.    Constitutional:       Appearance:

## 2023-04-04 ENCOUNTER — APPOINTMENT (OUTPATIENT)
Dept: GENERAL RADIOLOGY | Age: 53
End: 2023-04-04
Payer: MEDICAID

## 2023-04-04 ENCOUNTER — HOSPITAL ENCOUNTER (INPATIENT)
Age: 53
LOS: 2 days | Discharge: HOME | End: 2023-04-06
Attending: EMERGENCY MEDICINE | Admitting: INTERNAL MEDICINE
Payer: MEDICAID

## 2023-04-04 VITALS
HEIGHT: 64 IN | SYSTOLIC BLOOD PRESSURE: 143 MMHG | BODY MASS INDEX: 31.72 KG/M2 | TEMPERATURE: 97.9 F | RESPIRATION RATE: 18 BRPM | WEIGHT: 185.8 LBS | DIASTOLIC BLOOD PRESSURE: 77 MMHG | OXYGEN SATURATION: 94 % | HEART RATE: 74 BPM

## 2023-04-04 DIAGNOSIS — F10.939 ALCOHOL WITHDRAWAL SYNDROME WITH COMPLICATION (HCC): Primary | ICD-10-CM

## 2023-04-04 PROBLEM — J69.0 ASPIRATION PNEUMONIA (HCC): Status: ACTIVE | Noted: 2023-04-04

## 2023-04-04 LAB
ALBUMIN SERPL-MCNC: 3.9 G/DL (ref 3.4–5)
ALBUMIN/GLOB SERPL: 1.6 {RATIO} (ref 1.1–2.2)
ALP SERPL-CCNC: 80 U/L (ref 40–129)
ALT SERPL-CCNC: 52 U/L (ref 10–40)
AMPHETAMINES UR QL SCN>1000 NG/ML: NORMAL
ANION GAP SERPL CALCULATED.3IONS-SCNC: 14 MMOL/L (ref 3–16)
ANION GAP SERPL CALCULATED.3IONS-SCNC: 8 MMOL/L (ref 3–16)
AST SERPL-CCNC: 66 U/L (ref 15–37)
BARBITURATES UR QL SCN>200 NG/ML: NORMAL
BASOPHILS # BLD: 0 K/UL (ref 0–0.2)
BASOPHILS # BLD: 0.1 K/UL (ref 0–0.2)
BASOPHILS NFR BLD: 0.1 %
BASOPHILS NFR BLD: 0.3 %
BENZODIAZ UR QL SCN>200 NG/ML: NORMAL
BILIRUB SERPL-MCNC: 0.3 MG/DL (ref 0–1)
BILIRUB UR QL STRIP.AUTO: NEGATIVE
BUN SERPL-MCNC: 7 MG/DL (ref 7–20)
BUN SERPL-MCNC: 8 MG/DL (ref 7–20)
CALCIUM SERPL-MCNC: 9.1 MG/DL (ref 8.3–10.6)
CALCIUM SERPL-MCNC: 9.2 MG/DL (ref 8.3–10.6)
CANNABINOIDS UR QL SCN>50 NG/ML: NORMAL
CHLORIDE SERPL-SCNC: 104 MMOL/L (ref 99–110)
CHLORIDE SERPL-SCNC: 96 MMOL/L (ref 99–110)
CLARITY UR: CLEAR
CO2 SERPL-SCNC: 21 MMOL/L (ref 21–32)
CO2 SERPL-SCNC: 24 MMOL/L (ref 21–32)
COCAINE UR QL SCN: NORMAL
COLOR UR: YELLOW
CREAT SERPL-MCNC: 0.6 MG/DL (ref 0.6–1.1)
CREAT SERPL-MCNC: 0.7 MG/DL (ref 0.6–1.1)
DEPRECATED RDW RBC AUTO: 13 % (ref 12.4–15.4)
DEPRECATED RDW RBC AUTO: 13.4 % (ref 12.4–15.4)
DRUG SCREEN COMMENT UR-IMP: NORMAL
EOSINOPHIL # BLD: 0 K/UL (ref 0–0.6)
EOSINOPHIL # BLD: 0 K/UL (ref 0–0.6)
EOSINOPHIL NFR BLD: 0 %
EOSINOPHIL NFR BLD: 0 %
ETHANOLAMINE SERPL-MCNC: NORMAL MG/DL (ref 0–0.08)
FENTANYL SCREEN, URINE: NORMAL
GFR SERPLBLD CREATININE-BSD FMLA CKD-EPI: >60 ML/MIN/{1.73_M2}
GFR SERPLBLD CREATININE-BSD FMLA CKD-EPI: >60 ML/MIN/{1.73_M2}
GLUCOSE SERPL-MCNC: 133 MG/DL (ref 70–99)
GLUCOSE SERPL-MCNC: 176 MG/DL (ref 70–99)
GLUCOSE UR STRIP.AUTO-MCNC: NEGATIVE MG/DL
HCT VFR BLD AUTO: 36.9 % (ref 36–48)
HCT VFR BLD AUTO: 37 % (ref 36–48)
HGB BLD-MCNC: 12.2 G/DL (ref 12–16)
HGB BLD-MCNC: 12.4 G/DL (ref 12–16)
HGB UR QL STRIP.AUTO: NEGATIVE
KETONES UR STRIP.AUTO-MCNC: NEGATIVE MG/DL
LEUKOCYTE ESTERASE UR QL STRIP.AUTO: NEGATIVE
LYMPHOCYTES # BLD: 0.6 K/UL (ref 1–5.1)
LYMPHOCYTES # BLD: 0.7 K/UL (ref 1–5.1)
LYMPHOCYTES NFR BLD: 3.5 %
LYMPHOCYTES NFR BLD: 6.1 %
MAGNESIUM SERPL-MCNC: 1.6 MG/DL (ref 1.8–2.4)
MCH RBC QN AUTO: 29.8 PG (ref 26–34)
MCH RBC QN AUTO: 30.3 PG (ref 26–34)
MCHC RBC AUTO-ENTMCNC: 33 G/DL (ref 31–36)
MCHC RBC AUTO-ENTMCNC: 33.7 G/DL (ref 31–36)
MCV RBC AUTO: 90 FL (ref 80–100)
MCV RBC AUTO: 90.3 FL (ref 80–100)
METHADONE UR QL SCN>300 NG/ML: NORMAL
MONOCYTES # BLD: 0.2 K/UL (ref 0–1.3)
MONOCYTES # BLD: 0.5 K/UL (ref 0–1.3)
MONOCYTES NFR BLD: 1.5 %
MONOCYTES NFR BLD: 2.7 %
NEUTROPHILS # BLD: 18.3 K/UL (ref 1.7–7.7)
NEUTROPHILS # BLD: 9.2 K/UL (ref 1.7–7.7)
NEUTROPHILS NFR BLD: 92.3 %
NEUTROPHILS NFR BLD: 93.5 %
NITRITE UR QL STRIP.AUTO: NEGATIVE
OPIATES UR QL SCN>300 NG/ML: NORMAL
OXYCODONE UR QL SCN: NORMAL
PCP UR QL SCN>25 NG/ML: NORMAL
PH UR STRIP.AUTO: 7 [PH] (ref 5–8)
PH UR STRIP: 8 [PH]
PLATELET # BLD AUTO: 244 K/UL (ref 135–450)
PLATELET # BLD AUTO: 254 K/UL (ref 135–450)
PMV BLD AUTO: 7.9 FL (ref 5–10.5)
PMV BLD AUTO: 8 FL (ref 5–10.5)
POTASSIUM SERPL-SCNC: 3.9 MMOL/L (ref 3.5–5.1)
POTASSIUM SERPL-SCNC: 4 MMOL/L (ref 3.5–5.1)
PROT SERPL-MCNC: 6.4 G/DL (ref 6.4–8.2)
PROT UR STRIP.AUTO-MCNC: NEGATIVE MG/DL
RBC # BLD AUTO: 4.09 M/UL (ref 4–5.2)
RBC # BLD AUTO: 4.11 M/UL (ref 4–5.2)
SODIUM SERPL-SCNC: 131 MMOL/L (ref 136–145)
SODIUM SERPL-SCNC: 136 MMOL/L (ref 136–145)
SP GR UR STRIP.AUTO: 1 (ref 1–1.03)
UA COMPLETE W REFLEX CULTURE PNL UR: NORMAL
UA DIPSTICK W REFLEX MICRO PNL UR: NORMAL
URN SPEC COLLECT METH UR: NORMAL
UROBILINOGEN UR STRIP-ACNC: 0.2 E.U./DL
WBC # BLD AUTO: 10 K/UL (ref 4–11)
WBC # BLD AUTO: 19.5 K/UL (ref 4–11)

## 2023-04-04 PROCEDURE — 80307 DRUG TEST PRSMV CHEM ANLYZR: CPT

## 2023-04-04 PROCEDURE — 71046 X-RAY EXAM CHEST 2 VIEWS: CPT

## 2023-04-04 PROCEDURE — 85025 COMPLETE CBC W/AUTO DIFF WBC: CPT

## 2023-04-04 PROCEDURE — 6370000000 HC RX 637 (ALT 250 FOR IP): Performed by: NURSE PRACTITIONER

## 2023-04-04 PROCEDURE — 99285 EMERGENCY DEPT VISIT HI MDM: CPT

## 2023-04-04 PROCEDURE — 96375 TX/PRO/DX INJ NEW DRUG ADDON: CPT

## 2023-04-04 PROCEDURE — 94761 N-INVAS EAR/PLS OXIMETRY MLT: CPT

## 2023-04-04 PROCEDURE — 81003 URINALYSIS AUTO W/O SCOPE: CPT

## 2023-04-04 PROCEDURE — 6360000002 HC RX W HCPCS: Performed by: PHYSICIAN ASSISTANT

## 2023-04-04 PROCEDURE — 36415 COLL VENOUS BLD VENIPUNCTURE: CPT

## 2023-04-04 PROCEDURE — 2580000003 HC RX 258: Performed by: NURSE PRACTITIONER

## 2023-04-04 PROCEDURE — 2060000000 HC ICU INTERMEDIATE R&B

## 2023-04-04 PROCEDURE — 96374 THER/PROPH/DIAG INJ IV PUSH: CPT

## 2023-04-04 PROCEDURE — 94640 AIRWAY INHALATION TREATMENT: CPT

## 2023-04-04 PROCEDURE — 80053 COMPREHEN METABOLIC PANEL: CPT

## 2023-04-04 PROCEDURE — 83735 ASSAY OF MAGNESIUM: CPT

## 2023-04-04 PROCEDURE — 82077 ASSAY SPEC XCP UR&BREATH IA: CPT

## 2023-04-04 PROCEDURE — 6360000002 HC RX W HCPCS: Performed by: NURSE PRACTITIONER

## 2023-04-04 PROCEDURE — 6370000000 HC RX 637 (ALT 250 FOR IP): Performed by: INTERNAL MEDICINE

## 2023-04-04 PROCEDURE — 6360000002 HC RX W HCPCS: Performed by: INTERNAL MEDICINE

## 2023-04-04 PROCEDURE — 2580000003 HC RX 258: Performed by: PHYSICIAN ASSISTANT

## 2023-04-04 PROCEDURE — 80048 BASIC METABOLIC PNL TOTAL CA: CPT

## 2023-04-04 RX ORDER — ONDANSETRON 2 MG/ML
4 INJECTION INTRAMUSCULAR; INTRAVENOUS ONCE
Status: COMPLETED | OUTPATIENT
Start: 2023-04-04 | End: 2023-04-04

## 2023-04-04 RX ORDER — 0.9 % SODIUM CHLORIDE 0.9 %
500 INTRAVENOUS SOLUTION INTRAVENOUS ONCE
Status: COMPLETED | OUTPATIENT
Start: 2023-04-04 | End: 2023-04-04

## 2023-04-04 RX ORDER — LORAZEPAM 2 MG/ML
1 INJECTION INTRAMUSCULAR ONCE
Status: COMPLETED | OUTPATIENT
Start: 2023-04-05 | End: 2023-04-05

## 2023-04-04 RX ORDER — NICOTINE 21 MG/24HR
1 PATCH, TRANSDERMAL 24 HOURS TRANSDERMAL DAILY
Status: DISCONTINUED | OUTPATIENT
Start: 2023-04-05 | End: 2023-04-06 | Stop reason: HOSPADM

## 2023-04-04 RX ORDER — LORAZEPAM 2 MG/ML
1 INJECTION INTRAMUSCULAR ONCE
Status: COMPLETED | OUTPATIENT
Start: 2023-04-04 | End: 2023-04-04

## 2023-04-04 RX ADMIN — SUCRALFATE 1 G: 1 TABLET ORAL at 00:15

## 2023-04-04 RX ADMIN — METHYLPREDNISOLONE SODIUM SUCCINATE 40 MG: 40 INJECTION, POWDER, FOR SOLUTION INTRAMUSCULAR; INTRAVENOUS at 03:02

## 2023-04-04 RX ADMIN — SUCRALFATE 1 G: 1 TABLET ORAL at 11:37

## 2023-04-04 RX ADMIN — KETOROLAC TROMETHAMINE 30 MG: 30 INJECTION, SOLUTION INTRAMUSCULAR at 09:42

## 2023-04-04 RX ADMIN — LORAZEPAM 4 MG: 2 INJECTION INTRAMUSCULAR; INTRAVENOUS at 11:50

## 2023-04-04 RX ADMIN — Medication 2 PUFF: at 07:46

## 2023-04-04 RX ADMIN — LORAZEPAM 2 MG: 1 TABLET ORAL at 09:42

## 2023-04-04 RX ADMIN — SODIUM CHLORIDE 500 ML: 9 INJECTION, SOLUTION INTRAVENOUS at 20:28

## 2023-04-04 RX ADMIN — SUCRALFATE 1 G: 1 TABLET ORAL at 05:53

## 2023-04-04 RX ADMIN — LEVOFLOXACIN 750 MG: 5 INJECTION, SOLUTION INTRAVENOUS at 09:49

## 2023-04-04 RX ADMIN — ENOXAPARIN SODIUM 40 MG: 100 INJECTION SUBCUTANEOUS at 09:41

## 2023-04-04 RX ADMIN — LORAZEPAM 1 MG: 2 INJECTION INTRAMUSCULAR; INTRAVENOUS at 21:49

## 2023-04-04 RX ADMIN — THIAMINE HCL TAB 100 MG 100 MG: 100 TAB at 09:42

## 2023-04-04 RX ADMIN — Medication 1 TABLET: at 09:42

## 2023-04-04 RX ADMIN — SODIUM CHLORIDE, PRESERVATIVE FREE 10 ML: 5 INJECTION INTRAVENOUS at 03:01

## 2023-04-04 RX ADMIN — Medication 2 PUFF: at 12:26

## 2023-04-04 RX ADMIN — LORAZEPAM 4 MG: 2 INJECTION INTRAMUSCULAR; INTRAVENOUS at 13:24

## 2023-04-04 RX ADMIN — PANTOPRAZOLE SODIUM 40 MG: 40 TABLET, DELAYED RELEASE ORAL at 07:52

## 2023-04-04 RX ADMIN — FLUOXETINE 40 MG: 20 CAPSULE ORAL at 09:42

## 2023-04-04 RX ADMIN — KETOROLAC TROMETHAMINE 30 MG: 30 INJECTION, SOLUTION INTRAMUSCULAR at 03:02

## 2023-04-04 RX ADMIN — LORAZEPAM 2 MG: 1 TABLET ORAL at 03:02

## 2023-04-04 RX ADMIN — ONDANSETRON 4 MG: 2 INJECTION INTRAMUSCULAR; INTRAVENOUS at 20:29

## 2023-04-04 RX ADMIN — Medication 2 PUFF: at 12:27

## 2023-04-04 RX ADMIN — METHYLPREDNISOLONE SODIUM SUCCINATE 40 MG: 40 INJECTION, POWDER, FOR SOLUTION INTRAMUSCULAR; INTRAVENOUS at 09:42

## 2023-04-04 RX ADMIN — SODIUM CHLORIDE, PRESERVATIVE FREE 10 ML: 5 INJECTION INTRAVENOUS at 09:42

## 2023-04-04 ASSESSMENT — PAIN SCALES - GENERAL
PAINLEVEL_OUTOF10: 0
PAINLEVEL_OUTOF10: 8

## 2023-04-04 ASSESSMENT — PAIN - FUNCTIONAL ASSESSMENT: PAIN_FUNCTIONAL_ASSESSMENT: 0-10

## 2023-04-04 ASSESSMENT — PAIN DESCRIPTION - FREQUENCY: FREQUENCY: CONTINUOUS

## 2023-04-04 ASSESSMENT — PAIN DESCRIPTION - LOCATION: LOCATION: GENERALIZED;HEAD

## 2023-04-04 ASSESSMENT — LIFESTYLE VARIABLES
HOW OFTEN DO YOU HAVE A DRINK CONTAINING ALCOHOL: 2-4 TIMES A MONTH
HOW MANY STANDARD DRINKS CONTAINING ALCOHOL DO YOU HAVE ON A TYPICAL DAY: 10 OR MORE

## 2023-04-04 ASSESSMENT — PAIN DESCRIPTION - DESCRIPTORS: DESCRIPTORS: DISCOMFORT

## 2023-04-04 NOTE — DISCHARGE SUMMARY
Discharge Medication List as of 4/4/2023  3:10 PM        STOP taking these medications       Multiple Vitamin (DAILY CHANDAN PO) Comments:   Reason for Stopping:                 Discharge Exam:    BP (!) 143/77   Pulse 74   Temp 97.9 °F (36.6 °C) (Oral)   Resp 18   Ht 5' 4\" (1.626 m)   Wt 185 lb 12.8 oz (84.3 kg)   LMP  (LMP Unknown)   SpO2 94%   BMI 31.89 kg/m²   General appearance: No apparent distress, appears stated age and cooperative. HEENT: Pupils equal, round, and reactive to light. Conjunctivae/corneas clear. Neck: Supple, with full range of motion. No jugular venous distention. Trachea midline. Respiratory:  BL wheezing. No rales or rhonchi. Cardiovascular: Tachycardia without murmurs, rubs or gallops. Abdomen: Soft, non-tender, non-distended with normal bowel sounds. Musculoskeletal: No clubbing, cyanosis or edema bilaterally. Full range of motion without deformity. Skin: Skin color, texture, turgor normal.  No rashes or lesions. Neurologic:  R arm tremor (not on left). Neurovascularly intact without any focal sensory/motor deficits. Cranial nerves: II-XII intact, grossly non-focal.  Psychiatric: No hallucinations. No SI/HI. Capillary Refill: Brisk, 3 seconds, normal   Peripheral Pulses: +2 palpable, equal bilaterally      Labs:  For convenience and continuity at follow-up the following most recent labs are provided:    Lab Results   Component Value Date/Time    WBC 10.0 04/04/2023 08:33 AM    HGB 12.4 04/04/2023 08:33 AM    HCT 37.0 04/04/2023 08:33 AM    MCV 90.0 04/04/2023 08:33 AM     04/04/2023 08:33 AM     04/04/2023 08:33 AM    K 3.9 04/04/2023 08:33 AM     04/04/2023 08:33 AM    CO2 24 04/04/2023 08:33 AM    BUN 8 04/04/2023 08:33 AM    CREATININE 0.6 04/04/2023 08:33 AM    CALCIUM 9.2 04/04/2023 08:33 AM    PHOS 2.5 06/04/2021 05:48 AM    ALKPHOS 77 04/03/2023 05:23 AM    ALT 39 04/03/2023 05:23 AM    AST 52 04/03/2023 05:23 AM    BILITOT 0.7 04/03/2023

## 2023-04-04 NOTE — CARE COORDINATION
Case Management Assessment  Initial Evaluation    Date/Time of Evaluation: 4/4/2023 9:35 AM  Assessment Completed by: ROSEMARIE Sultana    If patient is discharged prior to next notation, then this note serves as note for discharge by case management. Patient Name: Dorcas Closs                   YOB: 1970  Diagnosis: Hypomagnesemia [E83.42]  Alcohol withdrawal delirium, acute, hyperactive ([facility] [address] [F10.931]  Alcohol withdrawal syndrome with complication [facility]) [L13.676]                   Date / Time: 4/2/2023  3:17 PM    Patient Admission Status: Inpatient   Readmission Risk (Low < 19, Mod (19-27), High > 27): Readmission Risk Score: 24.8    Current PCP: Juan Daniel Santos, DO  PCP verified by CM? Yes    Chart Reviewed: Yes      History Provided by: Patient  Patient Orientation: Alert and Oriented    Patient Cognition: Alert    Hospitalization in the last 30 days (Readmission):  No    If yes, Readmission Assessment in CM Navigator will be completed. Advance Directives:      Code Status: Full Code   Patient's Primary Decision Maker is: Legal Next of Kin      Discharge Planning:    Patient lives with: Alone Type of Home: House  Primary Care Giver: Self  Patient Support Systems include: Parent   Current Financial resources: Medicaid  Current community resources: None  Current services prior to admission: None            Current DME:              Type of Home Care services:  None    ADLS  Prior functional level: Independent in ADLs/IADLs  Current functional level: Independent in ADLs/IADLs    PT AM-PAC:   /24  OT AM-PAC:   /24    Family can provide assistance at DC: No  Would you like Case Management to discuss the discharge plan with any other family members/significant others, and if so, who?  Yes  Plans to Return to Present Housing: Unknown at present (Patient will be going to her mothers house in [place], [place] after D/C.)  Other Identified Issues/Barriers to RETURNING to current housing: Pt will

## 2023-04-04 NOTE — PROGRESS NOTES
04/03/23 0915   RT Protocol   History Pulmonary Disease 0   Respiratory pattern 0   Breath sounds 4   Cough 0   Bronchodilator Assessment Score 4
Patient told this RN of intentions to leave AMA around noon time today. This RN and RN manager were able to calm her down and convince her to stay and receive treatment. However, about 1430 the patient calls out and says she cannot do it anymore and when the RN enters the room, patient is dressed and ready to sign out AMA. This RN explains the risks and asks patient to reconsider, patient was adamant about leaving and understood the risks. AMA paper signed, IV's removed, MD notified. RN also made patient wait at least an hour from her last ativan dose before she could sign herself out.
Patient wanting to sign out AMA. Patient wants to smoke and wants phone from her car. Security times 2 with RN reviewed purse and no cigarettes in purse or contraband. Patient agreeable to try IV Ativan per CIWA protocol. Phone and  retrieved at patient request from car in ER parking lot with . Car locked after phone retrieved. Phone, pink , and keys returned to the patient. Patient resting in bed with lights dimmed and door closed. Patient remains on camera. Tearful.  [facility] Nurse Manager
Pharmacy Home Medication Reconciliation Note    A medication reconciliation has been completed for Rachelle Martinez 1970    Pharmacy: [facility] [id number] provided by: Patient    The patient's home medication list is as follows: No current facility-administered medications on file prior to encounter. Current Outpatient Medications on File Prior to Encounter   Medication Sig Dispense Refill    busPIRone (BUSPAR) 15 MG tablet Take 15 mg by mouth as needed      traZODone (DESYREL) 100 MG tablet Take 1 tablet by mouth nightly      Cholecalciferol (VITAMIN D3) 125 MCG (5000 UT) TABS Take by mouth      Multiple Vitamin (DAILY CHANDAN PO) Take by mouth daily      Multiple Vitamins-Minerals (THERAPEUTIC MULTIVITAMIN-MINERALS) tablet Take 1 tablet by mouth daily 30 tablet 0    thiamine 100 MG tablet Take 1 tablet by mouth daily (Patient not taking: Reported on 4/2/2023) 30 tablet 0    FLUoxetine (PROZAC) 20 MG capsule Take 1 capsule by mouth daily (Patient taking differently: Take 2 capsules by mouth daily) 30 capsule 0    folic acid (FOLVITE) 1 MG tablet Take 1 tablet by mouth daily (Patient not taking: Reported on 4/2/2023) 30 tablet 0       Patient is no longer taking Folic acid 1 Mg Tablet, and Thiamine 100 Mg Tablet    Timing of last doses updated.     Thank you,  Lázaro Bonilla CPhT
Pt started to have expiratory wheezing pt is a smoker and there are no breathing treatments ordered, but patient is tachycardic with a lot of anxiety will ask for something during hand off or after.
RN referral for patient requesting to see . Patient talked of health concerns, wanting to 'get better' and 'live peacefully'. Also exploring the role of esperanza/Taoist in her life. Patient shared life review and hopes for future. Provided patient a listening presence around above-named concerns. Also gave patient a prayer shawl which she verbalized was of comfort to her.
1183.65 ml       Physical Exam Performed:    BP (!) 141/8   Pulse (!) 102   Temp 98.9 °F (37.2 °C) (Oral)   Resp 18   Ht 5' 4\" (1.626 m)   Wt 185 lb 12.8 oz (84.3 kg)   LMP  (LMP Unknown)   SpO2 94%   BMI 31.89 kg/m²     General appearance: No apparent distress, appears stated age and cooperative. HEENT: Pupils equal, round, and reactive to light. Conjunctivae/corneas clear. Neck: Supple, with full range of motion. No jugular venous distention. Trachea midline. Respiratory:  BL wheezing. No rales or rhonchi. Cardiovascular: Tachycardia without murmurs, rubs or gallops. Abdomen: Soft, non-tender, non-distended with normal bowel sounds. Musculoskeletal: No clubbing, cyanosis or edema bilaterally. Full range of motion without deformity. Skin: Skin color, texture, turgor normal.  No rashes or lesions. Neurologic:  BL arm tremors. Neurovascularly intact without any focal sensory/motor deficits. Cranial nerves: II-XII intact, grossly non-focal.  Psychiatric: Auditory hallucinations. No SI/HI. Capillary Refill: Brisk, 3 seconds, normal   Peripheral Pulses: +2 palpable, equal bilaterally       Labs:   Recent Labs     04/02/23  1619 04/03/23  0523   WBC 15.2* 11.9*   HGB 14.2 12.3   HCT 42.7 37.0    267     Recent Labs     04/02/23  1619 04/03/23  0523    139   K 3.4* 3.8    105   CO2 22 26   BUN 4* 5*   CREATININE 0.7 0.7   CALCIUM 9.1 8.1*     Recent Labs     04/02/23  1619 04/03/23  0523   AST 59* 52*   ALT 43* 39   BILITOT 0.3 0.7   ALKPHOS 84 77     No results for input(s): INR in the last 72 hours. No results for input(s): [place] in the last 72 hours.     Urinalysis:      Lab Results   Component Value Date/Time    NITRU Negative 04/02/2023 04:15 PM    WBCUA 3 04/02/2023 04:15 PM    BACTERIA None Seen 04/02/2023 04:15 PM    RBCUA 1 04/02/2023 04:15 PM    BLOODU Negative 04/02/2023 04:15 PM    SPECGRAV 1.010 04/02/2023 04:15 PM    GLUCOSEU 500 04/02/2023 04:15 PM

## 2023-04-04 NOTE — PLAN OF CARE
Problem: Discharge Planning  Goal: Discharge to home or other facility with appropriate resources  4/4/2023 0045 by Huong Garcia RN  Outcome: Progressing  4/3/2023 1136 by Anum Clayton RN  Outcome: Progressing     Problem: Pain  Goal: Verbalizes/displays adequate comfort level or baseline comfort level  4/4/2023 0045 by Huong Garcia RN  Outcome: Progressing  4/3/2023 1136 by Anum Clayton RN  Outcome: Progressing     Problem: Risk for Elopement  Goal: Patient will not exit the unit/facility without proper excort  4/4/2023 0045 by Huong Garcia RN  Outcome: Progressing  4/3/2023 1136 by Anum Clayton RN  Outcome: Progressing     Problem: Safety - Adult  Goal: Free from fall injury  4/4/2023 0045 by Huong Garcia RN  Outcome: Progressing  4/3/2023 1136 by Anum Clayton RN  Outcome: Progressing     Problem: ABCDS Injury Assessment  Goal: Absence of physical injury  4/4/2023 0045 by Huong Garcia RN  Outcome: Progressing  4/3/2023 1136 by Anum Clayton RN  Outcome: Progressing

## 2023-04-04 NOTE — PLAN OF CARE
Problem: Discharge Planning  Goal: Discharge to home or other facility with appropriate resources  4/4/2023 1032 by Emry Nissen, RN  Outcome: Progressing  4/4/2023 0045 by Madelyn Tucker RN  Outcome: Progressing     Problem: Pain  Goal: Verbalizes/displays adequate comfort level or baseline comfort level  4/4/2023 1032 by Emry Nissen, RN  Outcome: Progressing  4/4/2023 0045 by Madelyn Tucker RN  Outcome: Progressing     Problem: Risk for Elopement  Goal: Patient will not exit the unit/facility without proper excort  4/4/2023 1032 by Emry Nissen, RN  Outcome: Progressing  Note: Patient seems to have a plan for discharge and is willing to participate in ETOH abuse programs  4/4/2023 0045 by Madelyn Tucker RN  Outcome: Progressing     Problem: Safety - Adult  Goal: Free from fall injury  4/4/2023 1032 by Emry Nissen, RN  Outcome: Progressing  4/4/2023 0045 by Madelyn Tucker RN  Outcome: Progressing     Problem: ABCDS Injury Assessment  Goal: Absence of physical injury  4/4/2023 1032 by Emry Nissen, RN  Outcome: Progressing  4/4/2023 0045 by Madelyn Tucker RN  Outcome: Progressing

## 2023-04-05 LAB
ALBUMIN SERPL-MCNC: 3.3 G/DL (ref 3.4–5)
ANION GAP SERPL CALCULATED.3IONS-SCNC: 10 MMOL/L (ref 3–16)
BASOPHILS # BLD: 0 K/UL (ref 0–0.2)
BASOPHILS NFR BLD: 0.1 %
BUN SERPL-MCNC: 7 MG/DL (ref 7–20)
CALCIUM SERPL-MCNC: 8.6 MG/DL (ref 8.3–10.6)
CHLORIDE SERPL-SCNC: 103 MMOL/L (ref 99–110)
CO2 SERPL-SCNC: 21 MMOL/L (ref 21–32)
CREAT SERPL-MCNC: 0.6 MG/DL (ref 0.6–1.1)
DEPRECATED RDW RBC AUTO: 13.1 % (ref 12.4–15.4)
EOSINOPHIL # BLD: 0 K/UL (ref 0–0.6)
EOSINOPHIL NFR BLD: 0 %
GFR SERPLBLD CREATININE-BSD FMLA CKD-EPI: >60 ML/MIN/{1.73_M2}
GLUCOSE SERPL-MCNC: 126 MG/DL (ref 70–99)
HCT VFR BLD AUTO: 34.8 % (ref 36–48)
HGB BLD-MCNC: 11.3 G/DL (ref 12–16)
LYMPHOCYTES # BLD: 1.4 K/UL (ref 1–5.1)
LYMPHOCYTES NFR BLD: 9.3 %
MAGNESIUM SERPL-MCNC: 1.7 MG/DL (ref 1.8–2.4)
MCH RBC QN AUTO: 29.6 PG (ref 26–34)
MCHC RBC AUTO-ENTMCNC: 32.3 G/DL (ref 31–36)
MCV RBC AUTO: 91.5 FL (ref 80–100)
MONOCYTES # BLD: 0.5 K/UL (ref 0–1.3)
MONOCYTES NFR BLD: 3.1 %
NEUTROPHILS # BLD: 13.2 K/UL (ref 1.7–7.7)
NEUTROPHILS NFR BLD: 87.5 %
PHOSPHATE SERPL-MCNC: 2.4 MG/DL (ref 2.5–4.9)
PLATELET # BLD AUTO: 188 K/UL (ref 135–450)
PMV BLD AUTO: 8.8 FL (ref 5–10.5)
POTASSIUM SERPL-SCNC: 3.8 MMOL/L (ref 3.5–5.1)
PROCALCITONIN SERPL IA-MCNC: 0.03 NG/ML (ref 0–0.15)
RBC # BLD AUTO: 3.8 M/UL (ref 4–5.2)
SODIUM SERPL-SCNC: 134 MMOL/L (ref 136–145)
WBC # BLD AUTO: 15.1 K/UL (ref 4–11)

## 2023-04-05 PROCEDURE — 83735 ASSAY OF MAGNESIUM: CPT

## 2023-04-05 PROCEDURE — 2580000003 HC RX 258: Performed by: STUDENT IN AN ORGANIZED HEALTH CARE EDUCATION/TRAINING PROGRAM

## 2023-04-05 PROCEDURE — 6360000002 HC RX W HCPCS: Performed by: STUDENT IN AN ORGANIZED HEALTH CARE EDUCATION/TRAINING PROGRAM

## 2023-04-05 PROCEDURE — 97116 GAIT TRAINING THERAPY: CPT | Performed by: PHYSICAL THERAPIST

## 2023-04-05 PROCEDURE — 97165 OT EVAL LOW COMPLEX 30 MIN: CPT

## 2023-04-05 PROCEDURE — 85025 COMPLETE CBC W/AUTO DIFF WBC: CPT

## 2023-04-05 PROCEDURE — 36415 COLL VENOUS BLD VENIPUNCTURE: CPT

## 2023-04-05 PROCEDURE — 80069 RENAL FUNCTION PANEL: CPT

## 2023-04-05 PROCEDURE — 6370000000 HC RX 637 (ALT 250 FOR IP): Performed by: STUDENT IN AN ORGANIZED HEALTH CARE EDUCATION/TRAINING PROGRAM

## 2023-04-05 PROCEDURE — 84145 PROCALCITONIN (PCT): CPT

## 2023-04-05 PROCEDURE — 97530 THERAPEUTIC ACTIVITIES: CPT

## 2023-04-05 PROCEDURE — 1200000000 HC SEMI PRIVATE

## 2023-04-05 PROCEDURE — 97161 PT EVAL LOW COMPLEX 20 MIN: CPT | Performed by: PHYSICAL THERAPIST

## 2023-04-05 RX ORDER — TRAZODONE HYDROCHLORIDE 100 MG/1
100 TABLET ORAL NIGHTLY
Status: DISCONTINUED | OUTPATIENT
Start: 2023-04-05 | End: 2023-04-06 | Stop reason: HOSPADM

## 2023-04-05 RX ORDER — AMOXICILLIN AND CLAVULANATE POTASSIUM 875; 125 MG/1; MG/1
1 TABLET, FILM COATED ORAL EVERY 12 HOURS SCHEDULED
Status: DISCONTINUED | OUTPATIENT
Start: 2023-04-05 | End: 2023-04-06 | Stop reason: HOSPADM

## 2023-04-05 RX ORDER — FOLIC ACID 1 MG/1
1 TABLET ORAL DAILY
Status: DISCONTINUED | OUTPATIENT
Start: 2023-04-05 | End: 2023-04-06 | Stop reason: HOSPADM

## 2023-04-05 RX ORDER — LORAZEPAM 2 MG/ML
4 INJECTION INTRAMUSCULAR
Status: DISCONTINUED | OUTPATIENT
Start: 2023-04-05 | End: 2023-04-06 | Stop reason: HOSPADM

## 2023-04-05 RX ORDER — LORAZEPAM 2 MG/ML
1 INJECTION INTRAMUSCULAR
Status: DISCONTINUED | OUTPATIENT
Start: 2023-04-05 | End: 2023-04-06 | Stop reason: HOSPADM

## 2023-04-05 RX ORDER — LORAZEPAM 1 MG/1
1 TABLET ORAL
Status: DISCONTINUED | OUTPATIENT
Start: 2023-04-05 | End: 2023-04-06 | Stop reason: HOSPADM

## 2023-04-05 RX ORDER — SODIUM CHLORIDE 9 MG/ML
INJECTION, SOLUTION INTRAVENOUS PRN
Status: DISCONTINUED | OUTPATIENT
Start: 2023-04-05 | End: 2023-04-06 | Stop reason: HOSPADM

## 2023-04-05 RX ORDER — SODIUM CHLORIDE 0.9 % (FLUSH) 0.9 %
5-40 SYRINGE (ML) INJECTION PRN
Status: DISCONTINUED | OUTPATIENT
Start: 2023-04-05 | End: 2023-04-06 | Stop reason: HOSPADM

## 2023-04-05 RX ORDER — MULTIVITAMIN WITH IRON
1 TABLET ORAL DAILY
Status: DISCONTINUED | OUTPATIENT
Start: 2023-04-05 | End: 2023-04-06 | Stop reason: HOSPADM

## 2023-04-05 RX ORDER — LORAZEPAM 1 MG/1
4 TABLET ORAL
Status: DISCONTINUED | OUTPATIENT
Start: 2023-04-05 | End: 2023-04-06 | Stop reason: HOSPADM

## 2023-04-05 RX ORDER — ENOXAPARIN SODIUM 100 MG/ML
40 INJECTION SUBCUTANEOUS DAILY
Status: DISCONTINUED | OUTPATIENT
Start: 2023-04-05 | End: 2023-04-06 | Stop reason: HOSPADM

## 2023-04-05 RX ORDER — ACETAMINOPHEN 650 MG/1
650 SUPPOSITORY RECTAL EVERY 6 HOURS PRN
Status: DISCONTINUED | OUTPATIENT
Start: 2023-04-05 | End: 2023-04-06 | Stop reason: HOSPADM

## 2023-04-05 RX ORDER — ONDANSETRON 2 MG/ML
4 INJECTION INTRAMUSCULAR; INTRAVENOUS EVERY 6 HOURS PRN
Status: DISCONTINUED | OUTPATIENT
Start: 2023-04-05 | End: 2023-04-06 | Stop reason: HOSPADM

## 2023-04-05 RX ORDER — SODIUM CHLORIDE 9 MG/ML
INJECTION, SOLUTION INTRAVENOUS CONTINUOUS
Status: ACTIVE | OUTPATIENT
Start: 2023-04-05 | End: 2023-04-05

## 2023-04-05 RX ORDER — SODIUM CHLORIDE 0.9 % (FLUSH) 0.9 %
5-40 SYRINGE (ML) INJECTION EVERY 12 HOURS SCHEDULED
Status: DISCONTINUED | OUTPATIENT
Start: 2023-04-05 | End: 2023-04-06 | Stop reason: HOSPADM

## 2023-04-05 RX ORDER — LORAZEPAM 2 MG/ML
3 INJECTION INTRAMUSCULAR
Status: DISCONTINUED | OUTPATIENT
Start: 2023-04-05 | End: 2023-04-06 | Stop reason: HOSPADM

## 2023-04-05 RX ORDER — LORAZEPAM 1 MG/1
3 TABLET ORAL
Status: DISCONTINUED | OUTPATIENT
Start: 2023-04-05 | End: 2023-04-06 | Stop reason: HOSPADM

## 2023-04-05 RX ORDER — ACETAMINOPHEN 325 MG/1
650 TABLET ORAL EVERY 6 HOURS PRN
Status: DISCONTINUED | OUTPATIENT
Start: 2023-04-05 | End: 2023-04-06 | Stop reason: HOSPADM

## 2023-04-05 RX ORDER — MAGNESIUM SULFATE IN WATER 40 MG/ML
2000 INJECTION, SOLUTION INTRAVENOUS PRN
Status: DISCONTINUED | OUTPATIENT
Start: 2023-04-05 | End: 2023-04-06 | Stop reason: HOSPADM

## 2023-04-05 RX ORDER — LORAZEPAM 2 MG/ML
2 INJECTION INTRAMUSCULAR
Status: DISCONTINUED | OUTPATIENT
Start: 2023-04-05 | End: 2023-04-06 | Stop reason: HOSPADM

## 2023-04-05 RX ORDER — GAUZE BANDAGE 2" X 2"
100 BANDAGE TOPICAL DAILY
Status: DISCONTINUED | OUTPATIENT
Start: 2023-04-05 | End: 2023-04-06 | Stop reason: HOSPADM

## 2023-04-05 RX ORDER — FLUOXETINE HYDROCHLORIDE 20 MG/1
40 CAPSULE ORAL DAILY
Status: DISCONTINUED | OUTPATIENT
Start: 2023-04-05 | End: 2023-04-06 | Stop reason: HOSPADM

## 2023-04-05 RX ORDER — LORAZEPAM 1 MG/1
2 TABLET ORAL
Status: DISCONTINUED | OUTPATIENT
Start: 2023-04-05 | End: 2023-04-06 | Stop reason: HOSPADM

## 2023-04-05 RX ADMIN — LORAZEPAM 1 MG: 2 INJECTION INTRAMUSCULAR; INTRAVENOUS at 02:21

## 2023-04-05 RX ADMIN — FLUOXETINE 40 MG: 20 CAPSULE ORAL at 08:28

## 2023-04-05 RX ADMIN — FOLIC ACID 1 MG: 1 TABLET ORAL at 08:27

## 2023-04-05 RX ADMIN — ACETAMINOPHEN 650 MG: 325 TABLET ORAL at 08:41

## 2023-04-05 RX ADMIN — TRAZODONE HYDROCHLORIDE 100 MG: 100 TABLET ORAL at 21:05

## 2023-04-05 RX ADMIN — ENOXAPARIN SODIUM 40 MG: 100 INJECTION SUBCUTANEOUS at 08:27

## 2023-04-05 RX ADMIN — Medication 100 MG: at 08:28

## 2023-04-05 RX ADMIN — LORAZEPAM 1 MG: 1 TABLET ORAL at 08:41

## 2023-04-05 RX ADMIN — Medication 10 ML: at 21:06

## 2023-04-05 RX ADMIN — SODIUM CHLORIDE 3000 MG: 900 INJECTION INTRAVENOUS at 01:20

## 2023-04-05 RX ADMIN — LORAZEPAM 1 MG: 2 INJECTION INTRAMUSCULAR; INTRAVENOUS at 00:04

## 2023-04-05 RX ADMIN — AMOXICILLIN AND CLAVULANATE POTASSIUM 1 TABLET: 875; 125 TABLET, FILM COATED ORAL at 21:04

## 2023-04-05 RX ADMIN — SODIUM CHLORIDE: 9 INJECTION, SOLUTION INTRAVENOUS at 03:35

## 2023-04-05 RX ADMIN — THERA TABS 1 TABLET: TAB at 08:28

## 2023-04-05 RX ADMIN — SODIUM CHLORIDE: 9 INJECTION, SOLUTION INTRAVENOUS at 01:17

## 2023-04-05 RX ADMIN — SODIUM CHLORIDE 3000 MG: 900 INJECTION INTRAVENOUS at 08:32

## 2023-04-05 ASSESSMENT — ENCOUNTER SYMPTOMS
ABDOMINAL PAIN: 0
COLOR CHANGE: 0
SHORTNESS OF BREATH: 0
NAUSEA: 1
VOMITING: 1

## 2023-04-05 ASSESSMENT — PAIN DESCRIPTION - DESCRIPTORS
DESCRIPTORS: DISCOMFORT
DESCRIPTORS: ACHING

## 2023-04-05 ASSESSMENT — PAIN DESCRIPTION - LOCATION
LOCATION: ABDOMEN
LOCATION: HEAD

## 2023-04-05 ASSESSMENT — PAIN DESCRIPTION - FREQUENCY: FREQUENCY: CONTINUOUS

## 2023-04-05 ASSESSMENT — PAIN DESCRIPTION - PAIN TYPE
TYPE: ACUTE PAIN
TYPE: ACUTE PAIN

## 2023-04-05 ASSESSMENT — PAIN SCALES - GENERAL
PAINLEVEL_OUTOF10: 1
PAINLEVEL_OUTOF10: 1
PAINLEVEL_OUTOF10: 6
PAINLEVEL_OUTOF10: 5
PAINLEVEL_OUTOF10: 3

## 2023-04-05 ASSESSMENT — PAIN DESCRIPTION - ORIENTATION
ORIENTATION: RIGHT;LEFT
ORIENTATION: RIGHT;LEFT

## 2023-04-05 ASSESSMENT — PAIN DESCRIPTION - ONSET: ONSET: ON-GOING

## 2023-04-05 ASSESSMENT — PAIN - FUNCTIONAL ASSESSMENT
PAIN_FUNCTIONAL_ASSESSMENT: ACTIVITIES ARE NOT PREVENTED
PAIN_FUNCTIONAL_ASSESSMENT: ACTIVITIES ARE NOT PREVENTED

## 2023-04-05 NOTE — ED TRIAGE NOTES
Pt states that she feels like she is going through alcohol poisoning. Pt states that her last drink was 1 hour ago. Pt states that she had 3 beers and a [address] today. Pt states that she had just been to [facility] today, but left AMA.

## 2023-04-05 NOTE — ED PROVIDER NOTES
ED Attending Attestation Note    This patient was seen by the advanced practice provider. I personally saw the patient and performed a substantive portion of the visit including all aspects of the medical decision making. Briefly, 45 y.o. female who  has a past medical history of Alcohol abuse, Ankle fracture, Anxiety, Depression, and Sleeping difficulties. presents with complaints of possible alcohol withdrawal.  Patient states that she last had alcohol 1 hour ago. Patient states she was admitted at [facility] over the past 2 days but signed out AMA today because she felt that she did not have anybody to talk to and is feeling lonely in the room by herself. Patient states she was being treated for alcohol withdrawal.  Patient states she has history of alcohol abuse and normally drinks liquor and occasionally beer. States she has gone through detox before and has been clean for 6 months. But then will relapse. No suicidal homicidal ideation. States she has a tremor and is feeling that she is going through withdrawal.  Also complains of a headache and nausea. Focused exam:   Gen: 45 y.o. female, appears agitated  HEENT: NCAT. PERRL. EOMI. CV: RRR w/o MRG  Lungs: CTAB. No incr WOB. Abdomen: Soft, nontender, nondistended. No rebound/guarding. MSK: No lower extremity swelling or calf tenderness bilaterally  Neuro: Tremor present in the right upper extremity, GCS 15, cranial nerves II through XII intact, 5/5 strength throughout, light touch sensation grossly intact      MDM:   Patient seen and evaluated. Distant physicals above. Patient presents for concern for alcohol withdrawal.  Patient does have a tremor in her upper extremities. Patient does appear agitated. No ethanol in patient's system at this time. Elevated white count at 19.5 but patient was receiving steroids during her admission for COPD exacerbation. My hyponatremia 131. Potassium normal 4.0. Creatinine 0.7 with BUN of 7.
2.5 oz (85.8 kg)      Height: 5' 4\" (1.626 m)          Patient was given the following medications:  Medications   ampicillin-sulbactam (UNASYN) 3,000 mg in sodium chloride 0.9 % 100 mL IVPB (mini-bag) (0 mg IntraVENous Stopped 4/5/23 0155)   traZODone (DESYREL) tablet 100 mg (has no administration in time range)   FLUoxetine (PROZAC) capsule 40 mg (has no administration in time range)   sodium chloride flush 0.9 % injection 5-40 mL (has no administration in time range)   sodium chloride flush 0.9 % injection 5-40 mL (has no administration in time range)   0.9 % sodium chloride infusion ( IntraVENous New Bag 4/5/23 0117)   thiamine mononitrate tablet 100 mg (has no administration in time range)   enoxaparin (LOVENOX) injection 40 mg (has no administration in time range)   acetaminophen (TYLENOL) tablet 650 mg (has no administration in time range)     Or   acetaminophen (TYLENOL) suppository 650 mg (has no administration in time range)   multivitamin 1 tablet (has no administration in time range)   folic acid (FOLVITE) tablet 1 mg (has no administration in time range)   ondansetron (ZOFRAN) injection 4 mg (has no administration in time range)   LORazepam (ATIVAN) tablet 1 mg (has no administration in time range)     Or   LORazepam (ATIVAN) injection 1 mg (has no administration in time range)     Or   LORazepam (ATIVAN) tablet 2 mg (has no administration in time range)     Or   LORazepam (ATIVAN) injection 2 mg (has no administration in time range)     Or   LORazepam (ATIVAN) tablet 3 mg (has no administration in time range)     Or   LORazepam (ATIVAN) injection 3 mg (has no administration in time range)     Or   LORazepam (ATIVAN) tablet 4 mg (has no administration in time range)     Or   LORazepam (ATIVAN) injection 4 mg (has no administration in time range)   nicotine (NICODERM CQ) 21 MG/24HR 1 patch (1 patch TransDERmal Patch Applied 4/5/23 0004)   ondansetron (ZOFRAN) injection 4 mg (4 mg IntraVENous Given 4/4/23

## 2023-04-05 NOTE — CARE COORDINATION
Case Management Assessment  Initial Evaluation    Date/Time of Evaluation: 4/5/2023 2:25 PM  Assessment Completed by: ROSEMARIE Velásquez    If patient is discharged prior to next notation, then this note serves as note for discharge by case management. Patient Name: Harpreet Degroot                   YOB: 1970  Diagnosis: Alcohol abuse [F10.10]  Alcohol withdrawal syndrome with complication [facility]) [Z50.883]                   Date / Time: 4/4/2023  7:53 PM    Patient Admission Status: Inpatient   Readmission Risk (Low < 19, Mod (19-27), High > 27): Readmission Risk Score: 27.2    Current PCP: No primary care provider on file. PCP verified by CM? Yes (Dr. Lola Urban)    Chart Reviewed: Yes      History Provided by: Patient  Patient Orientation: Alert and Oriented    Patient Cognition: Alert    Hospitalization in the last 30 days (Readmission):  Yes    If yes, Readmission Assessment in CM Navigator will be completed. Advance Directives:      Code Status: Full Code   Patient's Primary Decision Maker is: Legal Next of Kin    Primary Decision Maker: Sujatha Sheikh - Parent - [phone]    Secondary Decision Maker: Artur Kartik Brother/Sister - [phone]    Discharge Planning:    Patient lives with: Parent Type of Home: Other (Comment) (Patient came to hospital after being in a sober living community, but plans to discharge to her mother's home and pursue outpatient substance treatment.)  Primary Care Giver: Self  Patient Support Systems include: Parent, Family Members, Other (Comment) ([facility] and [facility])   Current Financial resources:    Current community resources: [employer]  Current services prior to admission: None            Current DME:  none, patient has a walker at home. Type of Home Care services:  None    ADLS  Prior functional level: Independent in ADLs/IADLs  Current functional level:  Independent in ADLs/IADLs    PT AM-PAC:

## 2023-04-05 NOTE — ACP (ADVANCE CARE PLANNING)
Advance Care Planning     Advance Care Planning Activator (Inpatient)  Conversation Note      Date of ACP Conversation: 4/5/2023     Conversation Conducted with: Patient with Decision Making Capacity    ACP Activator: ROSEMARIE King    Health Care Decision Maker:     Current Designated Health Care Decision Maker:     Primary Decision Maker: Fernando Barragan - Parent - [phone]    Secondary Decision Maker: Rhonda Rangel Brother/Sister - [phone]  Today we documented Decision Maker(s) consistent with Legal Next of Kin hierarchy. Care Preferences    Ventilation: \"If you were in your present state of health and suddenly became very ill and were unable to breathe on your own, what would your preference be about the use of a ventilator (breathing machine) if it were available to you? \"      Would the patient desire the use of ventilator (breathing machine)?: yes    \"If your health worsens and it becomes clear that your chance of recovery is unlikely, what would your preference be about the use of a ventilator (breathing machine) if it were available to you? \"     Would the patient desire the use of ventilator (breathing machine)?: No      Resuscitation  \"CPR works best to restart the heart when there is a sudden event, like a heart attack, in someone who is otherwise healthy. Unfortunately, CPR does not typically restart the heart for people who have serious health conditions or who are very sick. \"    \"In the event your heart stopped as a result of an underlying serious health condition, would you want attempts to be made to restart your heart (answer \"yes\" for attempt to resuscitate) or would you prefer a natural death (answer \"no\" for do not attempt to resuscitate)? \" yes       [] Yes   [x] No   Educated Patient / Altagracia Lopez regarding differences between Advance Directives and portable DNR orders.     Length of ACP Conversation in minutes:  5    Conversation Outcomes:  ACP discussion

## 2023-04-05 NOTE — H&P
Hospital Medicine History & Physical      PCP: No primary care provider on file. Date of Admission: 2023    Date of Service: Pt seen/examined on 2023 and admitted to inpatient    Chief Complaint: Generalized weakness, fatigue, tremors      History Of Present Illness: The patient is a 45 y.o. female with past medical history as below who presents to [facility] with concern of persistent worsening generalized weakness and tremors since just leaving from medical facility earlier today for similar alcohol abuse and possible aspiration ammonia symptoms. She went home patient while she was being taken care of at the time at the other facility but unfortunately as she was at home she was having even more tremors and came back to the facility. She apparently drinks some sort of alcoholic beverage prior to coming but apparently alcohol level here is 0 at the time. There is some concern for possibly patient is instigating her own symptoms but she does not seem to be very clear as a historian at this time. From what I can understand from patient, prior to even coming to the previous medical facility she was having alcohol withdrawal symptoms but is not having any other symptoms of fever, chills, dizziness, syncope, chest pain, shortness of breath, dysuria, blood in urine/stool/sputum, cough or sputum production.     Past Medical History:        Diagnosis Date    Alcohol abuse     Ankle fracture     bilateral    Anxiety     Depression     Sleeping difficulties        Past Surgical History:        Procedure Laterality Date    BREAST SURGERY      sailine breast      SECTION  1988    FOREARM SURGERY Left 2021    OPEN REDUCTION INTERNAL FIXATION LEFT DISTAL RADIUS performed by Ct Whatley MD at [facility]      right wrist

## 2023-04-05 NOTE — CARE COORDINATION
04/05/23 1429   Readmission Assessment   Number of Days since last admission? 1-7 days   Previous Disposition Home with Family   Who is being Interviewed Patient   What was the patient's/caregiver's perception as to why they think they needed to return back to the hospital? [place] discharge on prior admission   Did you visit your Primary Care Physician after you left the hospital, before you returned this time? No   Why weren't you able to visit your PCP? Did not have an appointment   Did you see a specialist, such as Cardiac, Pulmonary, Orthopedic Physician, etc. after you left the hospital? No   Who advised the patient to return to the hospital? Self-referral;Caregiver   Does the patient report anything that got in the way of taking their medications? No   In our efforts to provide the best possible care to you and others like you, can you think of anything that we could have done to help you after you left the hospital the first time, so that you might not have needed to return so soon?  Other (Comment)  (Patient reports nothing.)

## 2023-04-06 VITALS
HEART RATE: 69 BPM | TEMPERATURE: 98.1 F | DIASTOLIC BLOOD PRESSURE: 94 MMHG | OXYGEN SATURATION: 97 % | WEIGHT: 188.93 LBS | RESPIRATION RATE: 16 BRPM | SYSTOLIC BLOOD PRESSURE: 127 MMHG | BODY MASS INDEX: 32.26 KG/M2 | HEIGHT: 64 IN

## 2023-04-06 LAB
ALBUMIN SERPL-MCNC: 3.1 G/DL (ref 3.4–5)
ANION GAP SERPL CALCULATED.3IONS-SCNC: 13 MMOL/L (ref 3–16)
BASOPHILS # BLD: 0 K/UL (ref 0–0.2)
BASOPHILS NFR BLD: 0.2 %
BUN SERPL-MCNC: 11 MG/DL (ref 7–20)
CALCIUM SERPL-MCNC: 8.5 MG/DL (ref 8.3–10.6)
CHLORIDE SERPL-SCNC: 106 MMOL/L (ref 99–110)
CO2 SERPL-SCNC: 23 MMOL/L (ref 21–32)
CREAT SERPL-MCNC: 0.7 MG/DL (ref 0.6–1.1)
DEPRECATED RDW RBC AUTO: 13.2 % (ref 12.4–15.4)
EOSINOPHIL # BLD: 0.2 K/UL (ref 0–0.6)
EOSINOPHIL NFR BLD: 2 %
GFR SERPLBLD CREATININE-BSD FMLA CKD-EPI: >60 ML/MIN/{1.73_M2}
GLUCOSE SERPL-MCNC: 89 MG/DL (ref 70–99)
HCT VFR BLD AUTO: 36.7 % (ref 36–48)
HGB BLD-MCNC: 12.5 G/DL (ref 12–16)
LYMPHOCYTES # BLD: 3.8 K/UL (ref 1–5.1)
LYMPHOCYTES NFR BLD: 38.2 %
MAGNESIUM SERPL-MCNC: 1.7 MG/DL (ref 1.8–2.4)
MCH RBC QN AUTO: 30.7 PG (ref 26–34)
MCHC RBC AUTO-ENTMCNC: 34.1 G/DL (ref 31–36)
MCV RBC AUTO: 90.1 FL (ref 80–100)
MONOCYTES # BLD: 0.4 K/UL (ref 0–1.3)
MONOCYTES NFR BLD: 3.6 %
NEUTROPHILS # BLD: 5.6 K/UL (ref 1.7–7.7)
NEUTROPHILS NFR BLD: 56 %
PHOSPHATE SERPL-MCNC: 4.3 MG/DL (ref 2.5–4.9)
PLATELET # BLD AUTO: 204 K/UL (ref 135–450)
PMV BLD AUTO: 8.3 FL (ref 5–10.5)
POTASSIUM SERPL-SCNC: 3.7 MMOL/L (ref 3.5–5.1)
RBC # BLD AUTO: 4.07 M/UL (ref 4–5.2)
SODIUM SERPL-SCNC: 142 MMOL/L (ref 136–145)
WBC # BLD AUTO: 10 K/UL (ref 4–11)

## 2023-04-06 PROCEDURE — 80069 RENAL FUNCTION PANEL: CPT

## 2023-04-06 PROCEDURE — 6360000002 HC RX W HCPCS: Performed by: STUDENT IN AN ORGANIZED HEALTH CARE EDUCATION/TRAINING PROGRAM

## 2023-04-06 PROCEDURE — 36415 COLL VENOUS BLD VENIPUNCTURE: CPT

## 2023-04-06 PROCEDURE — 83735 ASSAY OF MAGNESIUM: CPT

## 2023-04-06 PROCEDURE — 85025 COMPLETE CBC W/AUTO DIFF WBC: CPT

## 2023-04-06 PROCEDURE — 6370000000 HC RX 637 (ALT 250 FOR IP): Performed by: STUDENT IN AN ORGANIZED HEALTH CARE EDUCATION/TRAINING PROGRAM

## 2023-04-06 RX ORDER — FOLIC ACID 1 MG/1
1 TABLET ORAL DAILY
Qty: 30 TABLET | Refills: 3 | Status: SHIPPED | OUTPATIENT
Start: 2023-04-07

## 2023-04-06 RX ORDER — THIAMINE MONONITRATE (VIT B1) 100 MG
100 TABLET ORAL DAILY
Qty: 30 TABLET | Refills: 3 | Status: SHIPPED | OUTPATIENT
Start: 2023-04-07

## 2023-04-06 RX ORDER — MULTIVITAMIN WITH IRON
1 TABLET ORAL DAILY
Qty: 30 TABLET | Refills: 3 | Status: SHIPPED | OUTPATIENT
Start: 2023-04-07

## 2023-04-06 RX ORDER — AMOXICILLIN AND CLAVULANATE POTASSIUM 875; 125 MG/1; MG/1
1 TABLET, FILM COATED ORAL EVERY 12 HOURS SCHEDULED
Qty: 8 TABLET | Refills: 0 | Status: SHIPPED | OUTPATIENT
Start: 2023-04-06 | End: 2023-04-10

## 2023-04-06 RX ORDER — CONJUGATED ESTROGENS 0.62 MG/G
0.5 CREAM VAGINAL DAILY
Qty: 1 G | Refills: 3 | Status: SHIPPED | OUTPATIENT
Start: 2023-04-06

## 2023-04-06 RX ADMIN — THERA TABS 1 TABLET: TAB at 08:11

## 2023-04-06 RX ADMIN — FOLIC ACID 1 MG: 1 TABLET ORAL at 08:11

## 2023-04-06 RX ADMIN — ACETAMINOPHEN 650 MG: 325 TABLET ORAL at 03:09

## 2023-04-06 RX ADMIN — Medication 100 MG: at 08:11

## 2023-04-06 RX ADMIN — AMOXICILLIN AND CLAVULANATE POTASSIUM 1 TABLET: 875; 125 TABLET, FILM COATED ORAL at 08:10

## 2023-04-06 RX ADMIN — ENOXAPARIN SODIUM 40 MG: 100 INJECTION SUBCUTANEOUS at 08:10

## 2023-04-06 RX ADMIN — FLUOXETINE 40 MG: 20 CAPSULE ORAL at 08:10

## 2023-04-06 ASSESSMENT — PAIN SCALES - GENERAL
PAINLEVEL_OUTOF10: 0
PAINLEVEL_OUTOF10: 3

## 2023-04-06 ASSESSMENT — PAIN DESCRIPTION - DESCRIPTORS: DESCRIPTORS: ACHING

## 2023-04-06 ASSESSMENT — PAIN DESCRIPTION - LOCATION: LOCATION: GENERALIZED

## 2023-04-06 NOTE — DISCHARGE SUMMARY
AM    HGB 12.5 04/06/2023 04:08 AM    HCT 36.7 04/06/2023 04:08 AM    MCV 90.1 04/06/2023 04:08 AM     04/06/2023 04:08 AM     04/06/2023 04:08 AM    K 3.7 04/06/2023 04:08 AM    K 3.9 04/04/2023 08:33 AM     04/06/2023 04:08 AM    CO2 23 04/06/2023 04:08 AM    BUN 11 04/06/2023 04:08 AM    CREATININE 0.7 04/06/2023 04:08 AM    CALCIUM 8.5 04/06/2023 04:08 AM    PHOS 4.3 04/06/2023 04:08 AM    ALKPHOS 80 04/04/2023 08:15 PM    ALT 52 04/04/2023 08:15 PM    AST 66 04/04/2023 08:15 PM    BILITOT 0.3 04/04/2023 08:15 PM    BILIDIR <0.2 06/03/2021 04:19 AM    LABALBU 3.1 04/06/2023 04:08 AM    LDLCALC 158 01/07/2023 06:43 AM    TRIG 95 01/07/2023 06:43 AM     Lab Results   Component Value Date    INR 0.99 10/09/2022       Radiology:  XR CHEST (2 VW)    Result Date: 4/4/2023  EXAMINATION: TWO XRAY VIEWS OF THE CHEST 4/4/2023 8:33 pm COMPARISON: 01/05/2023 HISTORY: ORDERING SYSTEM PROVIDED HISTORY: hx of aspiration pna, copd TECHNOLOGIST PROVIDED HISTORY: Reason for exam:->hx of aspiration pna, copd FINDINGS: Heart size is normal  Aorta is normal.  Lungs are normally expanded and clear. No pleural effusions. Mild spondylosis     Lungs are clear       EKG     Rhythm: normal sinus   Rate: normal  Clinical Impression: no acute changes        The patient was seen and examined on day of discharge and this discharge summary is in conjunction with any daily progress note from day of discharge. Time Spent on discharge is 30 minutes  in the examination, evaluation, counseling and review of medications and discharge plan. Note that more than 30 minutes was spent in preparing discharge papers, discussing discharge with patient, medication review, etc.       Signed:    Stacey Lopez MD   4/6/2023      Thank you No primary care provider on file. for the opportunity to be involved in this patient's care.  If you have any questions or concerns please feel free to contact me at [facility]

## 2023-04-06 NOTE — PLAN OF CARE
Problem: Discharge Planning  Goal: Discharge to home or other facility with appropriate resources  Outcome: Progressing     Problem: Pain  Goal: Verbalizes/displays adequate comfort level or baseline comfort level  Outcome: Progressing     Problem: Risk for Elopement  Goal: Patient will not exit the unit/facility without proper excort  Outcome: Progressing  Flowsheets (Taken 4/5/2023 0140)  Nursing Interventions for Elopement Risk:   Assist with personal care needs such as toileting, eating, dressing, as needed to reduce the risk of wandering   Collaborate with family members/caregivers to mitigate the elopement risk   Collaborate with treatment team for drug withdrawal symptoms treatment   Collaborate with treatment team for nicotine replacement     Problem: Safety - Adult  Goal: Free from fall injury  Outcome: Progressing
Completed  4/6/2023 0958 by Kenn Hamlin, RN  Outcome: Adequate for Discharge  Flowsheets (Taken 4/5/2023 6823 by Charleen Johnson RN)  Free From Fall Injury: Instruct family/caregiver on patient safety

## 2023-04-06 NOTE — PROGRESS NOTES
4 Eyes Skin Assessment     NAME:  Zakiya Cedillo  YOB: 1970  MEDICAL RECORD NUMBER:  [id number]    The patient is being assessed for  Admission    I agree that One RN has performed a thorough Head to Toe Skin Assessment on the patient. ALL assessment sites listed below have been assessed. Areas assessed by both nurses:    Head, Face, Ears, Shoulders, Back, Chest, Arms, Elbows, Hands, Sacrum. Buttock, Coccyx, Ischium, and Legs. Feet and Heels        Does the Patient have a Wound?  No noted wound(s)       Guillermo Prevention initiated by RN: No   Wound Care Orders initiated by RN: No    Pressure Injury (Stage 3,4, Unstageable, DTI, NWPT, and Complex wounds) if present, place referral order by RN under : No    New and Established Ostomies, if present place, referral order under : No      Nurse 1 eSignature: Electronically signed by Izzy Kennedy RN on 4/5/23 at 3:55 AM EDT    **SHARE this note so that the co-signing nurse can place an eSignature**    Nurse 2 eSignature: Electronically signed by Cintia Queen RN on 4/5/23 at 3:57 AM EDT
ED SBAR report provider to [facility]. Patient to be transported to Room 5103 via stretcher by RN. Patient transported with bedside cardiac monitor. IV site clean, dry, and intact. MEWS score and pain assessed as 0/10 and documented. Updated patient on plan of care.
Hospitalist Progress Note      PCP: No primary care provider on file. Date of Admission: 4/4/2023    Chief Complaint:   Alcohol dependency/abuse    Hospital Course: The patient is a 45 y.o. female with a history of alcohol abuse anxiety and depression. She was admitted for generalized weakness and tremors. Currently being managed for alcohol withdrawal.  She was also being managed for possible aspiration pneumonia although her procalcitonin was some diagnostic. Sienna Payan There is also concern that symptoms may supratentorial with the patient malingering. She denied fever, chills, dizziness, syncope, chest pain, shortness of breath, dysuria, blood in urine/stool/sputum, cough or sputum production. Subjective:   Feeling better today      Medications:  Reviewed    Infusion Medications    sodium chloride 10 mL/hr at 04/05/23 0117    sodium chloride 100 mL/hr at 04/05/23 0335     Scheduled Medications    traZODone  100 mg Oral Nightly    FLUoxetine  40 mg Oral Daily    sodium chloride flush  5-40 mL IntraVENous 2 times per day    thiamine  100 mg Oral Daily    enoxaparin  40 mg SubCUTAneous Daily    multivitamin  1 tablet Oral Daily    folic acid  1 mg Oral Daily    ampicillin-sulbactam  3,000 mg IntraVENous Q6H    nicotine  1 patch TransDERmal Daily     PRN Meds: sodium chloride flush, sodium chloride, acetaminophen **OR** acetaminophen, ondansetron, LORazepam **OR** LORazepam **OR** LORazepam **OR** LORazepam **OR** LORazepam **OR** LORazepam **OR** LORazepam **OR** LORazepam    No intake or output data in the 24 hours ending 04/05/23 1045    Physical Exam Performed:    BP (!) 148/96   Pulse 76   Temp 98.1 °F (36.7 °C) (Oral)   Resp 18   Ht 5' 4\" (1.626 m)   Wt 188 lb 4.4 oz (85.4 kg)   LMP 03/08/2023 (Approximate)   SpO2 93%   BMI 32.32 kg/m²     General appearance: No apparent distress, appears stated age and cooperative. HEENT: Pupils equal, round, and reactive to light.  Conjunctivae/corneas
Patient complaining that IV site is very sore. RN attempted to flush and was unable. This also caused the patient stinging pain. IV catheter removed without problems. Catheter intact and site WDL. All meds transitioned to PO. Patient tolerating food and fluids. Planned to discharge tomorrow. RN explained that should her status change or decline, the IV would need replaced. Patient understanding. For now, will not replace.
Patient discharge home with mother where she states she will be living temporary. She has been evicted from many sober livings. Plans to go to [place] [address] meetings with a friend. Discharge instructions given to patient, states she will go to pharmacy and pickup her own medications. Ambulated self to discharge area where mother was to pick her up with private vehicle.
Patient reports getting restful sleep through the night. Patient up to bathroom multiple times with good safety awareness, feeling more steady on her feet. No ativan needed per CIWA protocol. No tremors noted after initial assessment.
Patient ripped IV out and covered in blood and climbing out of bed. Nurse at bedside. Patient cleaned up and placed in new gown. Purwick in place. IV re-established. Patient agitated and stating staff is not helping her. Patient re-directed and explained again that lab work can take up to an hour to result. Patient remains agitated that she does not have a bed ready upstairs. Non-slip socks placed on patient. Side rails up x2 and bed locked in low position.  Call light within reach
Patient ripped all of tele wires off. Patient pulled purwick out of place and attempted to climb out of bed. Patient urinated all over floor. Patient cleaned with bath wipes. Complete linen change. Patient placed back on tele and call light within reach.
Patient upset with the care she is receiving, stated she was getting better care at previous hospital and wanted to leave AMA. Messaged the on call NP Cynthia who stated she would have Dr Malinda Narayan contact me. I spoke with the doctor and he stated it was up to her if she wanted to leave and she needed to make sure she had transportation out of hospital.  Patient stated she will stay for now.
Patient was alert and orientated when she arrived on the floor. She was upset that the doctor did not want to start fluids on her and she wanted ativan in a IV bag. She kept getting out of bed and removing wires and clothes. I reminded her several times she needed to use her call light if she wanted to get up due to I felt that she was unsteady. She continued to get out of bed without requesting help. She stated she wanted to leave and wanted to sign out AMA. Discussed this with the doctor and he said it was her decision. She stated we did not know what we were doing and not giving her the meds needed. Explained to her that I can only give medications that the doctor believes she needs. She decided to stay. I explained to her she needed to stay in her bed, leave the tele on, and use call light if she needed to get up. Has normal saline going at 100 ml/hr. One dose of 1mg of ativan was given during the shift.
Orientation  Overall Orientation Status: Within Functional Limits  Cognition  Overall Cognitive Status: WFL     Objective   Heart Rate: (!) 107  Heart Rate Source: Monitor  BP: (!) 132/92  BP Location: Left upper arm  BP Method: Automatic  Patient Position: Semi fowlers  MAP (Calculated): 105  Resp: 21  SpO2: 94 %  O2 Device: None (Room air)              AROM RLE (degrees)  RLE AROM: WFL  AROM LLE (degrees)  LLE AROM : WFL  Strength RLE  Strength RLE: WFL  Strength LLE  Strength LLE: WFL           Bed mobility  Supine to Sit: Independent  Sit to Supine:  (pt in chair at end of session)  Transfers  Sit to Stand: Independent  Stand to Sit: Independent  Ambulation  Surface: Level tile  Device: No Device  Assistance: Independent  Quality of Gait: no LOB or gait deviations  Distance: 125'     Balance  Sitting - Static: Good  Sitting - Dynamic: Good  Standing - Static: Good  Standing - Dynamic: Good           OutComes Score                                                  AM-PAC Score  AM-PAC Inpatient Mobility Raw Score : 23 (04/05/23 1449)  AM-PAC Inpatient T-Scale Score : 56.93 (04/05/23 1449)  Mobility Inpatient CMS 0-100% Score: 11.2 (04/05/23 1449)  Mobility Inpatient CMS G-Code Modifier : CI (04/05/23 1449)          Tinneti Score       Goals          Education  Patient Education  Education Given To: Patient  Education Provided: Role of Therapy  Education Method: Verbal  Education Outcome: Verbalized understanding      Therapy Time   Individual Concurrent Group Co-treatment   Time In 1422         Time Out 1450         Minutes 28                 JORDAN HSU PT     Electronically signed by JORDAN HSU PT on 4/5/2023 at 2:51 PM
independent with ADLs        Bed mobility  Supine to Sit: Independent  Sit to Supine:  (pt in chair at end of session)    Transfers  Sit to stand: Independent  Stand to sit: Independent  Transfer Comments: Pt ambulated around room and in hunt without AD. Pt with good balance    Vision  Vision: Impaired  Vision Exceptions: Wears glasses for reading  Hearing  Hearing: Within functional limits    Cognition  Overall Cognitive Status: WFL  Orientation  Overall Orientation Status: Within Functional Limits                  Education Given To: Patient  Education Provided: Role of Therapy;Transfer Training;Plan of Care  Education Method: Demonstration;Verbal  Barriers to Learning: None  Education Outcome: Verbalized understanding;Demonstrated understanding    LUE AROM (degrees)  LUE AROM : WFL  Left Hand AROM (degrees)  Left Hand AROM: WFL  RUE AROM (degrees)  RUE AROM : [facility]  Right Hand AROM (degrees)  Right Hand AROM: [facility]      AM-PAC Score  AM-[facility] Inpatient Daily Activity Raw Score: 24 (04/05/23 1445)  AM-PAC Inpatient ADL T-Scale Score : 57.54 (04/05/23 1445)  ADL Inpatient CMS 0-100% Score: 0 (04/05/23 1445)  ADL Inpatient CMS G-Code Modifier : CH (04/05/23 1445)    Goals  Short Term Goals  Time Frame for Short Term Goals: No acute OT goals identified  Patient Goals   Patient goals : to return home       Therapy Time   Individual Concurrent Group Co-treatment   Time In 1422         Time Out 1447         Minutes 25         Timed Code Treatment Minutes: 15 Minutes     This note to serve as OT d/c summary if pt is d/c-ed prior to next therapy session.     Neda Farrell, OTR/L

## 2023-04-06 NOTE — CARE COORDINATION
CASE MANAGEMENT DISCHARGE SUMMARY:    DISCHARGE DATE: 4/6/2023    DISCHARGED TO: Home with mother     Called to [place] [address] [address] [facility], [address]  P: [phone]  F: [phone]  Confirmed they have walk in hours: Monday through Friday between  0800 and 1500.      TRANSPORTATION: mother              TIME: [address]: [facility] ORTHOPEDIC AND SPINE [facility] AT [place]     ROSEMARIE Obrien, [place], Social Work/Case Management   [phone]  Electronically signed by ROSEMARIE Obrein on 4/6/2023 at 10:54 AM

## 2023-06-08 NOTE — PROGRESS NOTES
Medication Reconciliation    List of medications patient is currently taking is complete. Source of information: 1. Conversation with patient at bedside                                      2. EPIC records      Allergies  Patient has no known allergies.      Darlyn Cavazos [facility], PharmD, BCPS  1/3/2023 12:52 PM 4

## 2023-06-19 ENCOUNTER — HOSPITAL ENCOUNTER (OUTPATIENT)
Dept: PHYSICAL THERAPY | Age: 53
Setting detail: THERAPIES SERIES
Discharge: HOME | End: 2023-06-19
Payer: MEDICAID

## 2023-06-19 PROCEDURE — 97110 THERAPEUTIC EXERCISES: CPT

## 2023-06-19 PROCEDURE — 97112 NEUROMUSCULAR REEDUCATION: CPT

## 2023-06-19 PROCEDURE — 97140 MANUAL THERAPY 1/> REGIONS: CPT

## 2023-06-19 NOTE — FLOWSHEET NOTE
[facility] - Outpatient Rehabilitation and Therapy, [address] [place] [address]  Phone: [phone]   Fax:     [phone]      Physical Therapy Treatment Note/ Progress Report:     Date:  2023    Patient Name:  Opal Reyna    :  1970  MRN: [id number]      Medical/Treatment Diagnosis Information:  Diagnosis: M54.51 Lumbar Spine Pain  Treatment Diagnosis: M54.51 Low Back Pain    Insurance/Certification information:  PT Insurance Information: [address] [place] [address],[facility], allowed 30 visits per calendar year, does require prior authorization via [facility]; 12 visits approved thru 8/15/23  Physician Information:  Vinh Schumacher MD  Plan of care signed (Y/N): faxed 23    Date of Patient follow up with Physician:      Progress Report: []  Yes  [x]  No     Date Range for reporting period:  Beginnin23  Ending:      Progress report due (10 Rx/or 30 days whichever is less):     Recertification due (POC duration/ or 90 days whichever is less):      Visit # POC/Insurance Allowable Auth Needed   2  [x]Yes via [facility]  []No     Latex Allergy:  [x]NO      []YES  Preferred Language for Healthcare:   [x]English       []other:    History of Injury:  Patient reports a history of low back pain x 20 years. Patient was thrown off a horse, saw a chiropractor initially, didn't do much, so lived with it. 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 Patient reports tightness in am (patient is unable to lay flat on back) feels like needs to stretch back. Pain does not improve with movement or activity, pain gets worse the more she stands. Patient does have back brace with help. Patient took Medrol pack, pain was 90% improved. Patient is now taking Diclofenak. Patient had x-rays, L4-5 arthritis and DDD. Patient has to do 6 weeks of PT before insurance will allow an MRI.   Pain

## 2023-06-21 ENCOUNTER — HOSPITAL ENCOUNTER (OUTPATIENT)
Dept: PHYSICAL THERAPY | Age: 53
Setting detail: THERAPIES SERIES
Discharge: HOME | End: 2023-06-21
Payer: MEDICAID

## 2023-06-21 PROCEDURE — 97140 MANUAL THERAPY 1/> REGIONS: CPT

## 2023-06-21 PROCEDURE — 97112 NEUROMUSCULAR REEDUCATION: CPT

## 2023-06-21 PROCEDURE — 97110 THERAPEUTIC EXERCISES: CPT

## 2023-06-21 NOTE — FLOWSHEET NOTE
[facility] - Outpatient Rehabilitation and Therapy, [address] [place] [address]  Phone: [phone]   Fax:     [phone]      Physical Therapy Treatment Note/ Progress Report:     Date:  2023    Patient Name:  Fawad Gamino    :  1970  MRN: [id number]      Medical/Treatment Diagnosis Information:  Diagnosis: M54.51 Lumbar Spine Pain  Treatment Diagnosis: M54.51 Low Back Pain    Insurance/Certification information:  PT Insurance Information: [address] [place] [address],[facility], allowed 30 visits per calendar year, does require prior authorization via [facility]; 12 visits approved thru 8/15/23  Physician Information:  Cassie Shabazz MD  Plan of care signed (Y/N): faxed 23    Date of Patient follow up with Physician:      Progress Report: []  Yes  [x]  No     Date Range for reporting period:  Beginnin23  Ending:      Progress report due (10 Rx/or 30 days whichever is less):     Recertification due (POC duration/ or 90 days whichever is less):      Visit # POC/Insurance Allowable Auth Needed   3  [x]Yes via [facility]  []No     Latex Allergy:  [x]NO      []YES  Preferred Language for Healthcare:   [x]English       []other:    History of Injury:  Patient reports a history of low back pain x 20 years. Patient was thrown off a horse, saw a chiropractor initially, didn't do much, so lived with it. 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 Patient reports tightness in am (patient is unable to lay flat on back) feels like needs to stretch back. Pain does not improve with movement or activity, pain gets worse the more she stands. Patient does have back brace with help. Patient took Medrol pack, pain was 90% improved. Patient is now taking Diclofenak. Patient had x-rays, L4-5 arthritis and DDD. Patient has to do 6 weeks of PT before insurance will allow an MRI.   Pain

## 2023-06-26 ENCOUNTER — HOSPITAL ENCOUNTER (OUTPATIENT)
Dept: PHYSICAL THERAPY | Age: 53
Setting detail: THERAPIES SERIES
Discharge: HOME | End: 2023-06-26
Payer: MEDICAID

## 2023-06-26 PROCEDURE — 97110 THERAPEUTIC EXERCISES: CPT

## 2023-06-26 PROCEDURE — 97140 MANUAL THERAPY 1/> REGIONS: CPT

## 2023-06-26 PROCEDURE — 97112 NEUROMUSCULAR REEDUCATION: CPT

## 2023-06-28 ENCOUNTER — HOSPITAL ENCOUNTER (OUTPATIENT)
Dept: PHYSICAL THERAPY | Age: 53
Setting detail: THERAPIES SERIES
Discharge: HOME | End: 2023-06-28
Payer: MEDICAID

## 2023-06-28 PROCEDURE — 97112 NEUROMUSCULAR REEDUCATION: CPT

## 2023-06-28 PROCEDURE — 97110 THERAPEUTIC EXERCISES: CPT

## 2023-06-28 PROCEDURE — 97140 MANUAL THERAPY 1/> REGIONS: CPT

## 2023-07-03 ENCOUNTER — HOSPITAL ENCOUNTER (OUTPATIENT)
Dept: PHYSICAL THERAPY | Age: 53
Setting detail: THERAPIES SERIES
Discharge: HOME | End: 2023-07-03
Payer: MEDICAID

## 2023-07-03 PROCEDURE — 97140 MANUAL THERAPY 1/> REGIONS: CPT

## 2023-07-03 PROCEDURE — 97110 THERAPEUTIC EXERCISES: CPT

## 2023-07-03 PROCEDURE — 97112 NEUROMUSCULAR REEDUCATION: CPT

## 2023-07-03 NOTE — FLOWSHEET NOTE
[facility] - Outpatient Rehabilitation and Therapy, [address] [place] [address]  Phone: [phone]   Fax:     [phone]      Physical Therapy Treatment Note/ Progress Report:     Date:  7/3/2023    Patient Name:  Curt Bhatt    :  1970  MRN: [id number]      Medical/Treatment Diagnosis Information:  Diagnosis: M54.51 Lumbar Spine Pain  Treatment Diagnosis: M54.51 Low Back Pain    Insurance/Certification information:  PT Insurance Information:  [email or web address] [place], allowed 30 visits per calendar year, does require prior authorization via [facility]; 12 visits approved thru 8/15/23  Physician Information:  Humberto Jansen MD  Plan of care signed (Y/N): received    Date of Patient follow up with Physician:      Progress Report: []  Yes  [x]  No     Date Range for reporting period:  Beginnin23  Ending:      Progress report due (10 Rx/or 30 days whichever is less):     Recertification due (POC duration/ or 90 days whichever is less):      Visit # POC/Insurance Allowable Auth Needed   6  [x]Yes via [facility]  []No     Latex Allergy:  [x]NO      []YES  Preferred Language for Healthcare:   [x]English       []other:    History of Injury:  Patient reports a history of low back pain x 20 years. Patient was thrown off a horse, saw a chiropractor initially, didn't do much, so lived with it. 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 Patient reports tightness in am (patient is unable to lay flat on back) feels like needs to stretch back. Pain does not improve with movement or activity, pain gets worse the more she stands. Patient does have back brace with help. Patient took Medrol pack, pain was 90% improved. Patient is now taking Diclofenak. Patient had x-rays, L4-5 arthritis and DDD. Patient has to do 6 weeks of PT before insurance will allow an MRI.   Pain is

## 2023-07-05 ENCOUNTER — APPOINTMENT (OUTPATIENT)
Dept: PHYSICAL THERAPY | Age: 53
End: 2023-07-05
Payer: MEDICAID

## 2023-07-10 ENCOUNTER — HOSPITAL ENCOUNTER (OUTPATIENT)
Dept: PHYSICAL THERAPY | Age: 53
Setting detail: THERAPIES SERIES
Discharge: HOME | End: 2023-07-10
Payer: MEDICAID

## 2023-07-10 PROCEDURE — 97110 THERAPEUTIC EXERCISES: CPT

## 2023-07-10 PROCEDURE — 97112 NEUROMUSCULAR REEDUCATION: CPT

## 2023-07-10 PROCEDURE — 97140 MANUAL THERAPY 1/> REGIONS: CPT

## 2023-07-10 NOTE — FLOWSHEET NOTE
[facility] - Outpatient Rehabilitation and Therapy, [address] [facility] [address] [address]  Phone: [phone]   Fax:     [phone]      Physical Therapy Treatment Note/ Progress Report:     Date:  7/10/2023    Patient Name:  Chey Gonzalez    :  1970  MRN: [id number]      Medical/Treatment Diagnosis Information:  Diagnosis: M54.51 Lumbar Spine Pain  Treatment Diagnosis: M54.51 Low Back Pain    Insurance/Certification information:  PT Insurance Information:  [employer] [place], allowed 30 visits per calendar year, does require prior authorization via [facility]; 12 visits approved thru 8/15/23  Physician Information:  Brown Parsons MD  Plan of care signed (Y/N): received    Date of Patient follow up with Physician:      Progress Report: []  Yes  [x]  No     Date Range for reporting period:  Beginnin23  Ending:      Progress report due (10 Rx/or 30 days whichever is less):     Recertification due (POC duration/ or 90 days whichever is less):      Visit # POC/Insurance Allowable Auth Needed   7  [x]Yes via [facility]  []No     Latex Allergy:  [x]NO      []YES  Preferred Language for Healthcare:   [x]English       []other:    History of Injury:  Patient reports a history of low back pain x 20 years. Patient was thrown off a horse, saw a chiropractor initially, didn't do much, so lived with it. 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 Patient reports tightness in am (patient is unable to lay flat on back) feels like needs to stretch back. Pain does not improve with movement or activity, pain gets worse the more she stands. Patient does have back brace with help. Patient took Medrol pack, pain was 90% improved. Patient is now taking Diclofenak. Patient had x-rays, L4-5 arthritis and DDD. Patient has to do 6 weeks of PT before insurance will allow an MRI.   Pain is

## 2023-07-12 ENCOUNTER — HOSPITAL ENCOUNTER (OUTPATIENT)
Dept: PHYSICAL THERAPY | Age: 53
Setting detail: THERAPIES SERIES
Discharge: HOME | End: 2023-07-12
Payer: MEDICAID

## 2023-07-12 PROCEDURE — 97112 NEUROMUSCULAR REEDUCATION: CPT

## 2023-07-12 PROCEDURE — 97140 MANUAL THERAPY 1/> REGIONS: CPT

## 2023-07-12 PROCEDURE — 97110 THERAPEUTIC EXERCISES: CPT

## 2023-07-12 NOTE — FLOWSHEET NOTE
self care, mobility, lifting, and ambulation/stair navigation      Manual Treatments:  PROM / STM / Oscillations-Mobs:  G-I, II, III, IV (PA's, Inf., Post.)  [] (22971) Provided manual therapy to mobilize LE, proximal hip and/or LS spine soft tissue/joints for the purpose of modulating pain, promoting relaxation,  increasing ROM, reducing/eliminating soft tissue swelling/inflammation/restriction, improving soft tissue extensibility and allowing for proper ROM for normal function with self care, mobility, lifting and ambulation. Charges:  Timed Code Treatment Minutes: 45   Total Treatment Minutes: 45      [] EVAL (LOW) 65773 (typically 20 minutes face-to-face)  [] EVAL (MOD) 60734 (typically 30 minutes face-to-face)  [] EVAL (HIGH) 94676 (typically 45 minutes face-to-face)  [] RE-EVAL     [x] FG(60771) x     [] Dry needle 1 or 2 Muscles (75995)  [x] NMR (05801) x     [] Dry needle 3+ Muscles (37242)  [x] Manual (77734) x     [] Ultrasound (09276) x  [] TA (00635) x     [] Mech Traction (86591)  [] ES(attended) (76890)     [] ES (un) (01883):   [] Vasopump (88360) [] Ionto (62417)   [] Other:      Electronically signed by: Laci Caraballo, PTA [id number]    Note: If patient does not return for scheduled/recommended follow up visits, this note will serve as a discharge from care along with the most recent update on progress.

## 2023-07-17 ENCOUNTER — APPOINTMENT (OUTPATIENT)
Dept: PHYSICAL THERAPY | Age: 53
End: 2023-07-17
Payer: MEDICAID

## 2023-07-19 ENCOUNTER — HOSPITAL ENCOUNTER (OUTPATIENT)
Dept: PHYSICAL THERAPY | Age: 53
Setting detail: THERAPIES SERIES
Discharge: HOME | End: 2023-07-19
Payer: MEDICAID

## 2023-07-19 PROCEDURE — 97110 THERAPEUTIC EXERCISES: CPT

## 2023-07-19 PROCEDURE — 97112 NEUROMUSCULAR REEDUCATION: CPT

## 2023-07-19 PROCEDURE — 97140 MANUAL THERAPY 1/> REGIONS: CPT

## 2023-07-19 NOTE — FLOWSHEET NOTE
[facility] - Outpatient Rehabilitation and Therapy, [address] [place] [address]  Phone: [phone]   Fax:     [phone]      Physical Therapy Treatment Note/ Progress Report:     Date:  2023    Patient Name:  Chey Mcrae    :  1970  MRN: [id number]      Medical/Treatment Diagnosis Information:  Diagnosis: M54.51 Lumbar Spine Pain  Treatment Diagnosis: M54.51 Low Back Pain    Insurance/Certification information:  PT Insurance Information:  [employer], allowed 30 visits per calendar year, does require prior authorization via [facility]; 12 visits approved thru 8/15/23  Physician Information:  Eitan Yanez MD  Plan of care signed (Y/N): received    Date of Patient follow up with Physician:      Progress Report: []  Yes  [x]  No     Date Range for reporting period:  Beginnin23  Ending:      Progress report due (10 Rx/or 30 days whichever is less):     Recertification due (POC duration/ or 90 days whichever is less):      Visit # POC/Insurance Allowable Auth Needed   9  [x]Yes via [facility]  []No     Latex Allergy:  [x]NO      []YES  Preferred Language for Healthcare:   [x]English       []other:    History of Injury:  Patient reports a history of low back pain x 20 years. Patient was thrown off a horse, saw a chiropractor initially, didn't do much, so lived with it. 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 Patient reports tightness in am (patient is unable to lay flat on back) feels like needs to stretch back. Pain does not improve with movement or activity, pain gets worse the more she stands. Patient does have back brace with help. Patient took Medrol pack, pain was 90% improved. Patient is now taking Diclofenak. Patient had x-rays, L4-5 arthritis and DDD. Patient has to do 6 weeks of PT before insurance will allow an MRI.   Pain is

## 2023-07-24 ENCOUNTER — HOSPITAL ENCOUNTER (OUTPATIENT)
Dept: PHYSICAL THERAPY | Age: 53
Setting detail: THERAPIES SERIES
Discharge: HOME | End: 2023-07-24
Payer: MEDICAID

## 2023-07-24 ENCOUNTER — APPOINTMENT (OUTPATIENT)
Dept: PHYSICAL THERAPY | Age: 53
End: 2023-07-24
Payer: MEDICAID

## 2023-07-24 PROCEDURE — 97110 THERAPEUTIC EXERCISES: CPT

## 2023-07-24 PROCEDURE — 97140 MANUAL THERAPY 1/> REGIONS: CPT

## 2023-07-24 PROCEDURE — 97112 NEUROMUSCULAR REEDUCATION: CPT

## 2023-07-24 NOTE — FLOWSHEET NOTE
Patient is progressing as expected towards functional goals listed. [] Progression is slowed due to complexities/Impairments listed. [] Progression has been slowed due to co-morbidities. [x] Plan just implemented, too soon to assess goals progression <30days   [] Goals require adjustment due to lack of progress  [] Patient is not progressing as expected and requires additional follow up with physician  [] Other    Prognosis for POC: [x] Good [] Fair  [] Poor    Patient requires continued skilled intervention: [x] Yes  [] No        PLAN:  Monitor response.      [] Continue per plan of care [] Alter current plan (see comments)  [x] Plan of care initiated [] Hold pending MD visit [] Discharge    Therapeutic Exercise and NMR EXR  [] (08647) Provided verbal/tactile cueing for activities related to strengthening, flexibility, endurance, ROM for improvements in LE, proximal hip, and core control with self care, mobility, lifting, ambulation. [x] (73448) Provided verbal/tactile cueing for activities related to improving balance, coordination, kinesthetic sense, posture, motor skill, proprioception  to assist with LE, proximal hip, and core control in self care, mobility, lifting, ambulation and eccentric single leg control.      NMR and Therapeutic Activities:    [x] (57023 or 45272) Provided verbal/tactile cueing for activities related to improving balance, coordination, kinesthetic sense, posture, motor skill, proprioception and motor activation to allow for proper function of core, proximal hip and LE with self care and ADLs and functional mobility.   [] (48926) Gait Re-education- Provided training and instruction to the patient for proper LE, core and proximal hip recruitment and positioning and eccentric body weight control with ambulation re-education including up and down stairs     Home Exercise Program:    [] (14342) Reviewed/Progressed HEP activities related to strengthening, flexibility, endurance, ROM of

## 2023-07-26 ENCOUNTER — APPOINTMENT (OUTPATIENT)
Dept: PHYSICAL THERAPY | Age: 53
End: 2023-07-26
Payer: MEDICAID

## 2023-07-27 ENCOUNTER — HOSPITAL ENCOUNTER (OUTPATIENT)
Dept: PHYSICAL THERAPY | Age: 53
Setting detail: THERAPIES SERIES
Discharge: HOME | End: 2023-07-27
Payer: MEDICAID

## 2023-07-27 NOTE — FLOWSHEET NOTE
[address] [facility], [address] [facility] [address]  Phone: [phone]   Fax:     [phone]     Physical Therapy  Cancellation/No-show Note  Patient Name:  Amish Verma  :  1970   Date:  2023  Cancelled visits to date: 0  No-shows to date: 1    Patient status for today's appointment patient:  []  Cancelled  []  Rescheduled appointment  [x]  No-show     Reason given by patient:  []  Patient ill  []  Conflicting appointment  []  No transportation    []  Conflict with work  []  No reason given  []  Other:     Comments:      Phone call information:   [x]  Phone call made today to patient at _1:55  time at number provided:      [x]  Patient answered, conversation as follows: Patient's mother answered. Stated that her daughter realized earlier that she had missed her appt and was really upset about it. Reports that her daughter had been busy with other things this morning and just overlooked it.  Confirmed her next appt time   []  Patient did not answer, message left as follows:  []  Phone call not made today    Electronically signed by:  Garfield Harden PTA

## 2023-07-31 ENCOUNTER — HOSPITAL ENCOUNTER (OUTPATIENT)
Dept: PHYSICAL THERAPY | Age: 53
Setting detail: THERAPIES SERIES
Discharge: HOME | End: 2023-07-31
Payer: MEDICAID

## 2023-07-31 PROCEDURE — 97140 MANUAL THERAPY 1/> REGIONS: CPT

## 2023-07-31 PROCEDURE — 97110 THERAPEUTIC EXERCISES: CPT

## 2023-07-31 PROCEDURE — 97112 NEUROMUSCULAR REEDUCATION: CPT

## 2023-07-31 NOTE — FLOWSHEET NOTE
down stairs     Home Exercise Program:    [] (82192) Reviewed/Progressed HEP activities related to strengthening, flexibility, endurance, ROM of core, proximal hip and LE for functional self-care, mobility, lifting and ambulation/stair navigation   [x] (69169)Reviewed/Progressed HEP activities related to improving balance, coordination, kinesthetic sense, posture, motor skill, proprioception of core, proximal hip and LE for self care, mobility, lifting, and ambulation/stair navigation      Manual Treatments:  PROM / STM / Oscillations-Mobs:  G-I, II, III, IV (PA's, Inf., Post.)  [] (87266) Provided manual therapy to mobilize LE, proximal hip and/or LS spine soft tissue/joints for the purpose of modulating pain, promoting relaxation,  increasing ROM, reducing/eliminating soft tissue swelling/inflammation/restriction, improving soft tissue extensibility and allowing for proper ROM for normal function with self care, mobility, lifting and ambulation. Charges:  Timed Code Treatment Minutes: 40   Total Treatment Minutes: 40      [] EVAL (LOW) 79157 (typically 20 minutes face-to-face)  [] EVAL (MOD) 05172 (typically 30 minutes face-to-face)  [] EVAL (HIGH) 75927 (typically 45 minutes face-to-face)  [] RE-EVAL     [x] NK(17092) x     [] Dry needle 1 or 2 Muscles (85867)  [x] NMR (39709) x     [] Dry needle 3+ Muscles (49124)  [x] Manual (45035) x     [] Ultrasound (43933) x  [] TA (66439) x     [] Mech Traction (95477)  [] ES(attended) (28874)     [] ES (un) (61030):   [] Vasopump (68457) [] Ionto (63138)   [] Other:      Electronically signed by: Trevor London, PT [id number]    Note: If patient does not return for scheduled/recommended follow up visits, this note will serve as a discharge from care along with the most recent update on progress.

## 2023-08-01 ENCOUNTER — APPOINTMENT (OUTPATIENT)
Dept: PHYSICAL THERAPY | Age: 53
End: 2023-08-01
Payer: MEDICAID

## 2023-08-02 ENCOUNTER — APPOINTMENT (OUTPATIENT)
Dept: PHYSICAL THERAPY | Age: 53
End: 2023-08-02
Payer: MEDICAID

## 2023-08-03 ENCOUNTER — APPOINTMENT (OUTPATIENT)
Dept: PHYSICAL THERAPY | Age: 53
End: 2023-08-03
Payer: MEDICAID

## 2023-08-07 ENCOUNTER — APPOINTMENT (OUTPATIENT)
Dept: PHYSICAL THERAPY | Age: 53
End: 2023-08-07
Payer: MEDICAID

## 2023-08-09 ENCOUNTER — HOSPITAL ENCOUNTER (OUTPATIENT)
Dept: PHYSICAL THERAPY | Age: 53
Setting detail: THERAPIES SERIES
Discharge: HOME | End: 2023-08-09
Payer: MEDICAID

## 2023-08-09 PROCEDURE — 97112 NEUROMUSCULAR REEDUCATION: CPT

## 2023-08-09 PROCEDURE — 97110 THERAPEUTIC EXERCISES: CPT

## 2023-08-09 PROCEDURE — 97140 MANUAL THERAPY 1/> REGIONS: CPT

## 2023-08-09 ASSESSMENT — PAIN SCALES - QUEBEC BACK PAIN DISABILITY SCALE
TURN OVER IN BED: 4
CLIMB ONE FLIGHT OF STAIRS: 0
BEND OVER TO CLEAN THE BATHTUB: 2
MOVE A CHAIR: 2
LIFT AND CARRY A HEAVY SUITCASE: 2
MAKE YOUR BED: 1
PULL OR PUSH HEAVY DOORS: 1
CARRY TWO BAGS OF GROCERIES: 1
WALK A FEW BLOCKS OR 300 TO 400M: 1
RUN ONE BLOCK OR 100M: 3
RIDE IN A CAR: 1
PUT ON SOCKS OR PANYHOSE: 2
WALK SEVERAL KILOMETERS  OR MILES: 4
SLEEP THROUGH THE NIGHT: 5
GET OUT OF BED: 1
SIT IN A CHAIR FOR SEVERAL HOURS: 1
REACH UP TO HIGH SHELVES: 0
TAKE FOOD OUT OF THE REFRIGERATOR: 0
TOTAL SCORE: 34
STAND UP FOR 20 TO 30 MINUTES: 2
THROW A BALL: 1

## 2023-08-09 NOTE — PLAN OF CARE
[facility], [address] [facility] [address]  Phone: [phone]   Fax:     [phone]       Physical Therapy Discharge Summary    Dear Dr. Ritika Harkins,    We had the pleasure of treating the following patient for physical therapy services at [address] [place] [address]. A summary of our findings can be found in the discharge summary below. If you have any questions or concerns regarding these findings, please do not hesitate to contact me at the office phone number above.   Thank you for the referral.     Overall Response to Treatment:   []Patient is responding well to treatment and improvement is noted with regards  to goals   []Patient should continue to improve in reasonable time if they continue HEP   [x]Patient has plateaued and is no longer responding to skilled PT intervention    []Patient is getting worse and would benefit from return to referring MD   []Patient unable to adhere to initial POC   []Other:     Date range of Visits: 23 thru 23  Total Visits: 12     Physical Therapy Treatment Note/ Progress Report:     Date:  2023    Patient Name:  Alyssa Henderson    :  1970  MRN: [id number]      Medical/Treatment Diagnosis Information:  Diagnosis: M54.51 Lumbar Spine Pain  Treatment Diagnosis: M54.51 Low Back Pain    Insurance/Certification information:  PT Insurance Information: [address] [place] [address], allowed 30 visits per calendar year, does require prior authorization via [facility]; 12 visits approved thru 8/15/23  Physician Information:  Ally Echavarria MD  Plan of care signed (Y/N): received    Date of Patient follow up with Physician:      Progress Report: []  Yes  [x]  No     Date Range for reporting period:  Beginnin23  Endin23    Progress report due (10 Rx/or 30 days whichever is less):     Recertification due (POC duration/ or 90 days whichever is less):

## 2024-04-08 SDOH — HEALTH STABILITY: PHYSICAL HEALTH: ON AVERAGE, HOW MANY MINUTES DO YOU ENGAGE IN EXERCISE AT THIS LEVEL?: 30 MIN

## 2024-04-08 SDOH — HEALTH STABILITY: PHYSICAL HEALTH: ON AVERAGE, HOW MANY DAYS PER WEEK DO YOU ENGAGE IN MODERATE TO STRENUOUS EXERCISE (LIKE A BRISK WALK)?: 2 DAYS

## 2024-04-11 ENCOUNTER — OFFICE VISIT (OUTPATIENT)
Dept: FAMILY MEDICINE CLINIC | Age: 54
End: 2024-04-11
Payer: MEDICAID

## 2024-04-11 VITALS
HEART RATE: 74 BPM | HEIGHT: 64 IN | BODY MASS INDEX: 35.89 KG/M2 | DIASTOLIC BLOOD PRESSURE: 78 MMHG | OXYGEN SATURATION: 97 % | WEIGHT: 210.2 LBS | TEMPERATURE: 97.5 F | SYSTOLIC BLOOD PRESSURE: 122 MMHG

## 2024-04-11 DIAGNOSIS — R73.09 ELEVATED GLUCOSE: ICD-10-CM

## 2024-04-11 DIAGNOSIS — E66.01 CLASS 2 SEVERE OBESITY DUE TO EXCESS CALORIES WITH SERIOUS COMORBIDITY AND BODY MASS INDEX (BMI) OF 36.0 TO 36.9 IN ADULT (HCC): ICD-10-CM

## 2024-04-11 DIAGNOSIS — Z76.89 ENCOUNTER TO ESTABLISH CARE: Primary | ICD-10-CM

## 2024-04-11 DIAGNOSIS — F17.200 CURRENT SMOKER: ICD-10-CM

## 2024-04-11 DIAGNOSIS — F32.9 REACTIVE DEPRESSION: ICD-10-CM

## 2024-04-11 PROBLEM — K52.9 ACUTE GASTROENTERITIS: Status: RESOLVED | Noted: 2020-02-11 | Resolved: 2024-04-11

## 2024-04-11 PROBLEM — T14.91XA SUICIDE ATTEMPT (HCC): Status: RESOLVED | Noted: 2023-01-05 | Resolved: 2024-04-11

## 2024-04-11 PROBLEM — J69.0 ASPIRATION PNEUMONIA (HCC): Status: RESOLVED | Noted: 2023-04-04 | Resolved: 2024-04-11

## 2024-04-11 PROBLEM — F10.920 ALCOHOLIC INTOXICATION WITHOUT COMPLICATION (HCC): Status: RESOLVED | Noted: 2018-10-11 | Resolved: 2024-04-11

## 2024-04-11 PROBLEM — D72.829 LEUKOCYTOSIS: Status: RESOLVED | Noted: 2020-02-12 | Resolved: 2024-04-11

## 2024-04-11 PROBLEM — F19.94 SUBSTANCE OR MEDICATION-INDUCED DEPRESSIVE DISORDER (HCC): Status: RESOLVED | Noted: 2023-04-03 | Resolved: 2024-04-11

## 2024-04-11 PROBLEM — F10.939 ALCOHOL WITHDRAWAL, WITH UNSPECIFIED COMPLICATION (HCC): Status: RESOLVED | Noted: 2022-10-10 | Resolved: 2024-04-11

## 2024-04-11 PROBLEM — F10.931 ALCOHOL WITHDRAWAL DELIRIUM, ACUTE, HYPERACTIVE (HCC): Status: RESOLVED | Noted: 2023-04-02 | Resolved: 2024-04-11

## 2024-04-11 PROBLEM — F10.930 ALCOHOL WITHDRAWAL, UNCOMPLICATED (HCC): Status: RESOLVED | Noted: 2020-02-11 | Resolved: 2024-04-11

## 2024-04-11 PROBLEM — E87.20 LACTIC ACIDOSIS: Status: RESOLVED | Noted: 2018-10-11 | Resolved: 2024-04-11

## 2024-04-11 PROBLEM — E87.6 HYPOKALEMIA: Status: RESOLVED | Noted: 2020-02-11 | Resolved: 2024-04-11

## 2024-04-11 PROBLEM — R45.851 SUICIDAL IDEATION: Status: RESOLVED | Noted: 2018-10-11 | Resolved: 2024-04-11

## 2024-04-11 PROBLEM — R07.9 CHEST PAIN: Status: RESOLVED | Noted: 2023-04-03 | Resolved: 2024-04-11

## 2024-04-11 PROBLEM — J44.1 COPD WITH ACUTE EXACERBATION (HCC): Status: RESOLVED | Noted: 2023-04-03 | Resolved: 2024-04-11

## 2024-04-11 PROBLEM — F10.931 ALCOHOL WITHDRAWAL DELIRIUM (HCC): Status: RESOLVED | Noted: 2020-02-16 | Resolved: 2024-04-11

## 2024-04-11 PROBLEM — F10.20 ALCOHOL USE DISORDER, SEVERE, DEPENDENCE (HCC): Status: RESOLVED | Noted: 2021-07-22 | Resolved: 2024-04-11

## 2024-04-11 PROBLEM — E87.29 HIGH ANION GAP METABOLIC ACIDOSIS: Status: RESOLVED | Noted: 2020-02-11 | Resolved: 2024-04-11

## 2024-04-11 PROBLEM — R00.0 TACHYCARDIA: Status: RESOLVED | Noted: 2018-10-11 | Resolved: 2024-04-11

## 2024-04-11 PROBLEM — F10.939 ALCOHOL WITHDRAWAL SYNDROME WITH COMPLICATION, WITH UNSPECIFIED COMPLICATION (HCC): Status: RESOLVED | Noted: 2023-01-03 | Resolved: 2024-04-11

## 2024-04-11 PROBLEM — D50.9 MICROCYTIC ANEMIA: Status: RESOLVED | Noted: 2018-10-14 | Resolved: 2024-04-11

## 2024-04-11 PROBLEM — R56.9 ALCOHOL RELATED SEIZURE (HCC): Status: RESOLVED | Noted: 2021-05-24 | Resolved: 2024-04-11

## 2024-04-11 PROBLEM — F10.939 ALCOHOL WITHDRAWAL (HCC): Status: RESOLVED | Noted: 2018-10-11 | Resolved: 2024-04-11

## 2024-04-11 PROBLEM — F10.10 ALCOHOL ABUSE: Status: RESOLVED | Noted: 2023-01-10 | Resolved: 2024-04-11

## 2024-04-11 PROBLEM — E87.1 HYPONATREMIA: Status: RESOLVED | Noted: 2020-02-11 | Resolved: 2024-04-11

## 2024-04-11 PROBLEM — E83.42 HYPOMAGNESEMIA: Status: RESOLVED | Noted: 2020-02-11 | Resolved: 2024-04-11

## 2024-04-11 PROCEDURE — G8427 DOCREV CUR MEDS BY ELIG CLIN: HCPCS | Performed by: NURSE PRACTITIONER

## 2024-04-11 PROCEDURE — 99204 OFFICE O/P NEW MOD 45 MIN: CPT | Performed by: NURSE PRACTITIONER

## 2024-04-11 PROCEDURE — G8417 CALC BMI ABV UP PARAM F/U: HCPCS | Performed by: NURSE PRACTITIONER

## 2024-04-11 PROCEDURE — 4004F PT TOBACCO SCREEN RCVD TLK: CPT | Performed by: NURSE PRACTITIONER

## 2024-04-11 PROCEDURE — 3017F COLORECTAL CA SCREEN DOC REV: CPT | Performed by: NURSE PRACTITIONER

## 2024-04-11 RX ORDER — ARIPIPRAZOLE 10 MG/1
10 TABLET ORAL DAILY
COMMUNITY

## 2024-04-11 RX ORDER — BUPROPION HYDROCHLORIDE 150 MG/1
150 TABLET ORAL EVERY MORNING
Qty: 30 TABLET | Refills: 3 | Status: SHIPPED | OUTPATIENT
Start: 2024-04-11

## 2024-04-11 RX ORDER — FINASTERIDE 5 MG/1
5 TABLET, FILM COATED ORAL DAILY
COMMUNITY

## 2024-04-11 RX ORDER — ESCITALOPRAM OXALATE 5 MG/1
5 TABLET ORAL DAILY
COMMUNITY

## 2024-04-11 RX ORDER — NICOTINE 21 MG/24HR
1 PATCH, TRANSDERMAL 24 HOURS TRANSDERMAL EVERY 24 HOURS
Qty: 30 PATCH | Refills: 0 | Status: SHIPPED | OUTPATIENT
Start: 2024-04-11 | End: 2024-05-11

## 2024-04-11 RX ORDER — CLONAZEPAM 0.5 MG/1
0.5 TABLET ORAL 2 TIMES DAILY PRN
COMMUNITY

## 2024-04-11 SDOH — ECONOMIC STABILITY: FOOD INSECURITY: WITHIN THE PAST 12 MONTHS, THE FOOD YOU BOUGHT JUST DIDN'T LAST AND YOU DIDN'T HAVE MONEY TO GET MORE.: PATIENT DECLINED

## 2024-04-11 SDOH — ECONOMIC STABILITY: HOUSING INSECURITY
IN THE LAST 12 MONTHS, WAS THERE A TIME WHEN YOU DID NOT HAVE A STEADY PLACE TO SLEEP OR SLEPT IN A SHELTER (INCLUDING NOW)?: PATIENT DECLINED

## 2024-04-11 SDOH — ECONOMIC STABILITY: INCOME INSECURITY: HOW HARD IS IT FOR YOU TO PAY FOR THE VERY BASICS LIKE FOOD, HOUSING, MEDICAL CARE, AND HEATING?: PATIENT DECLINED

## 2024-04-11 SDOH — ECONOMIC STABILITY: FOOD INSECURITY: WITHIN THE PAST 12 MONTHS, YOU WORRIED THAT YOUR FOOD WOULD RUN OUT BEFORE YOU GOT MONEY TO BUY MORE.: PATIENT DECLINED

## 2024-04-11 ASSESSMENT — PATIENT HEALTH QUESTIONNAIRE - PHQ9
7. TROUBLE CONCENTRATING ON THINGS, SUCH AS READING THE NEWSPAPER OR WATCHING TELEVISION: NOT AT ALL
8. MOVING OR SPEAKING SO SLOWLY THAT OTHER PEOPLE COULD HAVE NOTICED. OR THE OPPOSITE, BEING SO FIGETY OR RESTLESS THAT YOU HAVE BEEN MOVING AROUND A LOT MORE THAN USUAL: NOT AT ALL
1. LITTLE INTEREST OR PLEASURE IN DOING THINGS: NOT AT ALL
5. POOR APPETITE OR OVEREATING: NOT AT ALL
SUM OF ALL RESPONSES TO PHQ9 QUESTIONS 1 & 2: 0
6. FEELING BAD ABOUT YOURSELF - OR THAT YOU ARE A FAILURE OR HAVE LET YOURSELF OR YOUR FAMILY DOWN: NOT AT ALL
3. TROUBLE FALLING OR STAYING ASLEEP: NOT AT ALL
4. FEELING TIRED OR HAVING LITTLE ENERGY: NOT AT ALL
2. FEELING DOWN, DEPRESSED OR HOPELESS: NOT AT ALL
9. THOUGHTS THAT YOU WOULD BE BETTER OFF DEAD, OR OF HURTING YOURSELF: NOT AT ALL
SUM OF ALL RESPONSES TO PHQ QUESTIONS 1-9: 0
10. IF YOU CHECKED OFF ANY PROBLEMS, HOW DIFFICULT HAVE THESE PROBLEMS MADE IT FOR YOU TO DO YOUR WORK, TAKE CARE OF THINGS AT HOME, OR GET ALONG WITH OTHER PEOPLE: NOT DIFFICULT AT ALL
SUM OF ALL RESPONSES TO PHQ QUESTIONS 1-9: 0

## 2024-04-11 NOTE — PROGRESS NOTES
sleep disturbance. The patient is not nervous/anxious.           Objective   Physical Exam  Constitutional:       General: She is not in acute distress.     Appearance: She is obese.   HENT:      Head: Normocephalic and atraumatic.   Eyes:      Extraocular Movements: Extraocular movements intact.      Conjunctiva/sclera: Conjunctivae normal.      Pupils: Pupils are equal, round, and reactive to light.   Neck:      Vascular: No carotid bruit.   Cardiovascular:      Rate and Rhythm: Normal rate and regular rhythm.      Heart sounds: Normal heart sounds.   Pulmonary:      Breath sounds: Normal breath sounds.   Abdominal:      General: Bowel sounds are normal.      Palpations: Abdomen is soft.      Tenderness: There is no abdominal tenderness.   Musculoskeletal:      Right lower leg: No edema.      Left lower leg: No edema.   Skin:     General: Skin is warm and dry.   Neurological:      Mental Status: She is alert and oriented to person, place, and time.   Psychiatric:         Mood and Affect: Mood normal.              --DWIGHT Delgado - NP

## 2024-04-12 DIAGNOSIS — R73.09 ELEVATED GLUCOSE: ICD-10-CM

## 2024-04-12 DIAGNOSIS — F17.200 CURRENT SMOKER: ICD-10-CM

## 2024-04-12 DIAGNOSIS — Z76.89 ENCOUNTER TO ESTABLISH CARE: ICD-10-CM

## 2024-04-12 DIAGNOSIS — E66.01 CLASS 2 SEVERE OBESITY DUE TO EXCESS CALORIES WITH SERIOUS COMORBIDITY AND BODY MASS INDEX (BMI) OF 36.0 TO 36.9 IN ADULT (HCC): ICD-10-CM

## 2024-04-12 LAB
25(OH)D3 SERPL-MCNC: 28.2 NG/ML
ALBUMIN SERPL-MCNC: 4.5 G/DL (ref 3.4–5)
ALBUMIN/GLOB SERPL: 2 {RATIO} (ref 1.1–2.2)
ALP SERPL-CCNC: 72 U/L (ref 40–129)
ALT SERPL-CCNC: 18 U/L (ref 10–40)
ANION GAP SERPL CALCULATED.3IONS-SCNC: 13 MMOL/L (ref 3–16)
AST SERPL-CCNC: 16 U/L (ref 15–37)
BILIRUB SERPL-MCNC: <0.2 MG/DL (ref 0–1)
BUN SERPL-MCNC: 13 MG/DL (ref 7–20)
CALCIUM SERPL-MCNC: 10.1 MG/DL (ref 8.3–10.6)
CHLORIDE SERPL-SCNC: 105 MMOL/L (ref 99–110)
CHOLEST SERPL-MCNC: 250 MG/DL (ref 0–199)
CO2 SERPL-SCNC: 23 MMOL/L (ref 21–32)
CREAT SERPL-MCNC: 0.9 MG/DL (ref 0.6–1.1)
GFR SERPLBLD CREATININE-BSD FMLA CKD-EPI: 75 ML/MIN/{1.73_M2}
GLUCOSE SERPL-MCNC: 92 MG/DL (ref 70–99)
HDLC SERPL-MCNC: 37 MG/DL (ref 40–60)
LDL CHOLESTEROL CALCULATED: 176 MG/DL
POTASSIUM SERPL-SCNC: 4.8 MMOL/L (ref 3.5–5.1)
PROT SERPL-MCNC: 6.8 G/DL (ref 6.4–8.2)
SODIUM SERPL-SCNC: 141 MMOL/L (ref 136–145)
TRIGL SERPL-MCNC: 185 MG/DL (ref 0–150)
TSH SERPL DL<=0.005 MIU/L-ACNC: 3.03 UIU/ML (ref 0.27–4.2)
VLDLC SERPL CALC-MCNC: 37 MG/DL

## 2024-04-12 ASSESSMENT — ENCOUNTER SYMPTOMS
ABDOMINAL PAIN: 0
COUGH: 0

## 2024-04-13 LAB
EST. AVERAGE GLUCOSE BLD GHB EST-MCNC: 114 MG/DL
HBA1C MFR BLD: 5.6 %

## 2024-04-15 ENCOUNTER — PATIENT MESSAGE (OUTPATIENT)
Dept: FAMILY MEDICINE CLINIC | Age: 54
End: 2024-04-15

## 2024-04-15 DIAGNOSIS — E66.01 CLASS 2 SEVERE OBESITY DUE TO EXCESS CALORIES WITH SERIOUS COMORBIDITY AND BODY MASS INDEX (BMI) OF 36.0 TO 36.9 IN ADULT (HCC): Primary | ICD-10-CM

## 2024-04-15 DIAGNOSIS — E78.2 MIXED HYPERLIPIDEMIA: ICD-10-CM

## 2024-04-15 RX ORDER — ATORVASTATIN CALCIUM 40 MG/1
40 TABLET, FILM COATED ORAL DAILY
Qty: 90 TABLET | Refills: 2 | Status: SHIPPED | OUTPATIENT
Start: 2024-04-15

## 2024-04-16 NOTE — TELEPHONE ENCOUNTER
From: KARI MANCINI  To: Erika Clark  Sent: 4/15/2024 8:28 AM EDT  Subject: lab results    LDL high 176, HDL low 37. Vera would recommend starting medication with having smoking history. Also doing with low fat diet and work on weight loss   Vit D is low, start daily Vit D 3 2000 IU supplement.   A1c good at 5.6   TSH is normal limits    Please let me know if you agree to take the recommended medication and Vera will send it in

## 2024-06-04 ENCOUNTER — TELEPHONE (OUTPATIENT)
Dept: BARIATRICS/WEIGHT MGMT | Age: 54
End: 2024-06-04

## 2024-06-05 ENCOUNTER — OFFICE VISIT (OUTPATIENT)
Dept: BARIATRICS/WEIGHT MGMT | Age: 54
End: 2024-06-05

## 2024-06-05 VITALS
BODY MASS INDEX: 35.68 KG/M2 | OXYGEN SATURATION: 99 % | WEIGHT: 209 LBS | RESPIRATION RATE: 18 BRPM | SYSTOLIC BLOOD PRESSURE: 120 MMHG | DIASTOLIC BLOOD PRESSURE: 82 MMHG | HEART RATE: 81 BPM | HEIGHT: 64 IN

## 2024-06-05 DIAGNOSIS — I10 ESSENTIAL HYPERTENSION: Primary | ICD-10-CM

## 2024-06-05 DIAGNOSIS — E66.9 OBESITY (BMI 35.0-39.9 WITHOUT COMORBIDITY): ICD-10-CM

## 2024-06-05 NOTE — PROGRESS NOTES
Erika Clark is a 53 y.o. female with a date of birth of 1970.    Vitals:    06/05/24 1229   Resp: 18   Weight: 94.8 kg (209 lb)   Height: 1.613 m (5' 3.5\")    BMI: Body mass index is 36.44 kg/m². Obesity Classification: Class II    Weight History:   Wt Readings from Last 3 Encounters:   06/05/24 94.8 kg (209 lb)   04/11/24 95.3 kg (210 lb 3.2 oz)   04/06/23 85.7 kg (188 lb 15 oz)       Pt attended Medical Weight Management Seminar. Patient was educated on low-carb diet protocol. Nutrition and habit guidelines were discussed and written information was provided. Bariatric Nutrition Questionnaire completed during class and scanned into media.       Goals  Weight: 160 lbs  Health Improvement: increase energy, increase flexibility, feel better, improve self image, increase self esteem    Assessment  Nutritional Needs: RMR=(9.99 x 94.8) + (6.25 x 161.3) - (4.92 x 53 y.o.) -161 = 1413 kcal x 1.3 (sedentary activity factor)= 1837 kcal - 500 (for 1 lb weight loss/week)= 1337 kcal.    Patient has participated in the following weight loss programs: keto, and low carb.   Patient has not participated in weight loss medications.  Patient has not participated in meal replacement/liquid diets.  Patient does not have history of bariatric surgery.     Plan  Plan/Recommendations: General weight loss/lifestyle modification strategies discussed (elicit support from others; identify saboteurs; non-food rewards, etc).  Start 1200 kcal LC meal plan   Optifast:  not interested in  Diet Medications:  interested in  Bariatric Surgery:  not interested in  1:1 RD Visit:  not interested in    PES Statement: Overweight/Obesity related to lack of exercise, sedentary lifestyle, unhealthy eating habits, and unsuccessful diet attempts as evidenced by BMI. Body mass index is 36.44 kg/m².    Will follow up as necessary.    Carli Terry, RD, LD

## 2024-06-13 ENCOUNTER — TELEMEDICINE (OUTPATIENT)
Dept: BARIATRICS/WEIGHT MGMT | Age: 54
End: 2024-06-13
Payer: MEDICAID

## 2024-06-13 DIAGNOSIS — Z71.3 DIETARY COUNSELING AND SURVEILLANCE: ICD-10-CM

## 2024-06-13 DIAGNOSIS — E66.9 CLASS 2 OBESITY: Primary | ICD-10-CM

## 2024-06-13 PROCEDURE — 3017F COLORECTAL CA SCREEN DOC REV: CPT | Performed by: FAMILY MEDICINE

## 2024-06-13 PROCEDURE — G8427 DOCREV CUR MEDS BY ELIG CLIN: HCPCS | Performed by: FAMILY MEDICINE

## 2024-06-13 PROCEDURE — 99203 OFFICE O/P NEW LOW 30 MIN: CPT | Performed by: FAMILY MEDICINE

## 2024-06-13 NOTE — PROGRESS NOTES
Patient: Erika Clark     Encounter Date: 6/13/2024    YOB: 1970               Age: 53 y.o.        Patient identification was verified at the start of the visit.         6/13/2024     5:02 PM   Patient-Reported Vitals   Patient-Reported Weight 209   Patient-Reported Height 5-4   Patient-Reported Systolic 120 mmHg   Patient-Reported Diastolic 82 mmHg   Patient-Reported Pulse 80   Patient-Reported Temperature 98.6   Patient-Reported SpO2 99         BP Readings from Last 1 Encounters:   06/05/24 120/82       BMI Readings from Last 1 Encounters:   06/05/24 36.44 kg/m²       Pulse Readings from Last 1 Encounters:   06/05/24 81                                             Wt Readings from Last 3 Encounters:   06/05/24 94.8 kg (209 lb)   04/11/24 95.3 kg (210 lb 3.2 oz)   04/06/23 85.7 kg (188 lb 15 oz)        Chief Complaint   Patient presents with    Bariatric, Initial Visit     MWGAYLE- NP       HPI:    53 y.o. female presents to establish care via video visit. She was referred by Vera Valentin NP for weight management. The patient's medical history is significant for class II obesity. The patient has a long-standing history of obesity which started gradually. The problem is moderate.  The patient has been gaining weight.  Risk factors include annual weight gain of >2 lbs (1 kg)/ year and sedentary lifestyle. Aggravating factors include poor diet and lack of physical activity. The patient has tried various diet/exercise plans which have been ineffective in the long-run. she is motivated to start losing weight to help improve her overall health.     When did you become overweight?  [] Childhood   [] Teens   [x] Adulthood   [] Pregnancy   [] Menopause    Highest adult weight: 209 pounds     Triggers for weight gain?   [] Stress   [] Illness   [] Medications   [] Travel  []Injury     [] Nightshift work   [] Insomnia  [] No specific triggers   [] Other    Food triggers:   [x] Stress   [x] Boredom   [x] Fast

## 2024-06-14 ENCOUNTER — TELEPHONE (OUTPATIENT)
Dept: BARIATRICS/WEIGHT MGMT | Age: 54
End: 2024-06-14

## 2024-06-27 ENCOUNTER — TELEPHONE (OUTPATIENT)
Dept: FAMILY MEDICINE CLINIC | Age: 54
End: 2024-06-27

## 2024-06-27 DIAGNOSIS — E66.01 CLASS 2 SEVERE OBESITY DUE TO EXCESS CALORIES WITH SERIOUS COMORBIDITY AND BODY MASS INDEX (BMI) OF 36.0 TO 36.9 IN ADULT (HCC): ICD-10-CM

## 2024-06-27 NOTE — TELEPHONE ENCOUNTER
Pt has found a compounding pharmacy at [facility] that is offering a program on Wegovy that will only cost the pt $200 a month and it does not use insurance. Pt wants to know if a rx can be sent to [facility] in [place] and their phone # is [phone]. Pl advise pt at [phone] and if no answer pl leave a detailed message.

## 2024-06-28 NOTE — TELEPHONE ENCOUNTER
Please call [facility] and give VVO to compound wegovy    Can you please call [facility] again and let them know it is okay to prescribe wegovy as a compound? They need it to be said that it's okay to be given as a compound. You can call them and verbally tell them also. Their number is [phone]. Thank you Vera so much!

## 2024-07-18 DIAGNOSIS — E66.01 CLASS 2 SEVERE OBESITY DUE TO EXCESS CALORIES WITH SERIOUS COMORBIDITY AND BODY MASS INDEX (BMI) OF 36.0 TO 36.9 IN ADULT (HCC): ICD-10-CM

## 2024-07-18 RX ORDER — SEMAGLUTIDE 0.5 MG/.5ML
0.5 INJECTION, SOLUTION SUBCUTANEOUS
Qty: 2 ML | Refills: 0 | Status: SHIPPED | OUTPATIENT
Start: 2024-07-18

## 2024-07-30 DIAGNOSIS — F17.200 CURRENT SMOKER: ICD-10-CM

## 2024-07-30 RX ORDER — BUPROPION HYDROCHLORIDE 150 MG/1
150 TABLET ORAL EVERY MORNING
Qty: 90 TABLET | Refills: 1 | Status: SHIPPED | OUTPATIENT
Start: 2024-07-30

## 2024-08-15 DIAGNOSIS — E66.01 CLASS 2 SEVERE OBESITY DUE TO EXCESS CALORIES WITH SERIOUS COMORBIDITY AND BODY MASS INDEX (BMI) OF 36.0 TO 36.9 IN ADULT: Primary | ICD-10-CM

## 2024-08-15 DIAGNOSIS — E66.812 CLASS 2 SEVERE OBESITY DUE TO EXCESS CALORIES WITH SERIOUS COMORBIDITY AND BODY MASS INDEX (BMI) OF 36.0 TO 36.9 IN ADULT: Primary | ICD-10-CM

## 2024-08-15 RX ORDER — SEMAGLUTIDE 0.5 MG/.5ML
0.5 INJECTION, SOLUTION SUBCUTANEOUS
Qty: 2 ML | Refills: 0 | Status: CANCELLED | OUTPATIENT
Start: 2024-08-15

## 2024-09-13 DIAGNOSIS — E66.01 CLASS 2 SEVERE OBESITY DUE TO EXCESS CALORIES WITH SERIOUS COMORBIDITY AND BODY MASS INDEX (BMI) OF 36.0 TO 36.9 IN ADULT (HCC): ICD-10-CM

## 2024-09-13 RX ORDER — SEMAGLUTIDE 1.7 MG/.75ML
1.7 INJECTION, SOLUTION SUBCUTANEOUS
Qty: 3 ML | Refills: 0 | Status: SHIPPED | OUTPATIENT
Start: 2024-09-13

## 2024-10-10 ENCOUNTER — PATIENT MESSAGE (OUTPATIENT)
Dept: FAMILY MEDICINE CLINIC | Age: 54
End: 2024-10-10

## 2024-10-10 DIAGNOSIS — E66.01 CLASS 2 SEVERE OBESITY DUE TO EXCESS CALORIES WITH SERIOUS COMORBIDITY AND BODY MASS INDEX (BMI) OF 36.0 TO 36.9 IN ADULT: ICD-10-CM

## 2024-10-10 DIAGNOSIS — E66.812 CLASS 2 SEVERE OBESITY DUE TO EXCESS CALORIES WITH SERIOUS COMORBIDITY AND BODY MASS INDEX (BMI) OF 36.0 TO 36.9 IN ADULT: ICD-10-CM

## 2024-10-10 RX ORDER — SEMAGLUTIDE 1.7 MG/.75ML
1.7 INJECTION, SOLUTION SUBCUTANEOUS
Qty: 3 ML | Refills: 0 | Status: CANCELLED | OUTPATIENT
Start: 2024-10-10

## 2024-10-10 NOTE — TELEPHONE ENCOUNTER
Patient is calling in asking to increase to the maximum dose.     Please advise.     Please send prescription to [facility].

## 2024-11-07 DIAGNOSIS — E66.01 CLASS 2 SEVERE OBESITY DUE TO EXCESS CALORIES WITH SERIOUS COMORBIDITY AND BODY MASS INDEX (BMI) OF 36.0 TO 36.9 IN ADULT: ICD-10-CM

## 2024-11-07 DIAGNOSIS — E66.812 CLASS 2 SEVERE OBESITY DUE TO EXCESS CALORIES WITH SERIOUS COMORBIDITY AND BODY MASS INDEX (BMI) OF 36.0 TO 36.9 IN ADULT: ICD-10-CM

## 2024-12-05 ENCOUNTER — TELEPHONE (OUTPATIENT)
Dept: FAMILY MEDICINE CLINIC | Age: 54
End: 2024-12-05

## 2024-12-05 NOTE — TELEPHONE ENCOUNTER
Dakota patient. She stopped taking Semaglutide-weight maagment(Wegovy). Patient has lost 30 pounds since August. Patient says that she can no longer afford this prescription price at this time. Patient is going to try to lose the rest of the weight on her own.

## 2025-01-10 DIAGNOSIS — E78.2 MIXED HYPERLIPIDEMIA: ICD-10-CM

## 2025-01-10 RX ORDER — ATORVASTATIN CALCIUM 40 MG/1
40 TABLET, FILM COATED ORAL DAILY
Qty: 90 TABLET | Refills: 0 | Status: SHIPPED | OUTPATIENT
Start: 2025-01-10

## 2025-03-21 DIAGNOSIS — Z12.11 COLON CANCER SCREENING: Primary | ICD-10-CM

## 2025-04-06 LAB — NONINV COLON CA DNA+OCC BLD SCRN STL QL: NEGATIVE

## 2025-04-07 ENCOUNTER — RESULTS FOLLOW-UP (OUTPATIENT)
Dept: FAMILY MEDICINE CLINIC | Age: 55
End: 2025-04-07

## 2025-04-07 ENCOUNTER — TELEPHONE (OUTPATIENT)
Dept: FAMILY MEDICINE CLINIC | Age: 55
End: 2025-04-07

## 2025-04-07 NOTE — TELEPHONE ENCOUNTER
Patient due for Mammogram. Patient can schedule mammogram van on Monday July 21st 2025.   LM for patient to call the office.

## 2025-04-08 ASSESSMENT — PATIENT HEALTH QUESTIONNAIRE - PHQ9
8. MOVING OR SPEAKING SO SLOWLY THAT OTHER PEOPLE COULD HAVE NOTICED. OR THE OPPOSITE, BEING SO FIGETY OR RESTLESS THAT YOU HAVE BEEN MOVING AROUND A LOT MORE THAN USUAL: NOT AT ALL
6. FEELING BAD ABOUT YOURSELF - OR THAT YOU ARE A FAILURE OR HAVE LET YOURSELF OR YOUR FAMILY DOWN: SEVERAL DAYS
7. TROUBLE CONCENTRATING ON THINGS, SUCH AS READING THE NEWSPAPER OR WATCHING TELEVISION: SEVERAL DAYS
3. TROUBLE FALLING OR STAYING ASLEEP: NOT AT ALL
SUM OF ALL RESPONSES TO PHQ QUESTIONS 1-9: 7
4. FEELING TIRED OR HAVING LITTLE ENERGY: SEVERAL DAYS
8. MOVING OR SPEAKING SO SLOWLY THAT OTHER PEOPLE COULD HAVE NOTICED. OR THE OPPOSITE - BEING SO FIDGETY OR RESTLESS THAT YOU HAVE BEEN MOVING AROUND A LOT MORE THAN USUAL: NOT AT ALL
4. FEELING TIRED OR HAVING LITTLE ENERGY: SEVERAL DAYS
1. LITTLE INTEREST OR PLEASURE IN DOING THINGS: SEVERAL DAYS
2. FEELING DOWN, DEPRESSED OR HOPELESS: SEVERAL DAYS
10. IF YOU CHECKED OFF ANY PROBLEMS, HOW DIFFICULT HAVE THESE PROBLEMS MADE IT FOR YOU TO DO YOUR WORK, TAKE CARE OF THINGS AT HOME, OR GET ALONG WITH OTHER PEOPLE: SOMEWHAT DIFFICULT
7. TROUBLE CONCENTRATING ON THINGS, SUCH AS READING THE NEWSPAPER OR WATCHING TELEVISION: SEVERAL DAYS
SUM OF ALL RESPONSES TO PHQ QUESTIONS 1-9: 7
9. THOUGHTS THAT YOU WOULD BE BETTER OFF DEAD, OR OF HURTING YOURSELF: NOT AT ALL
SUM OF ALL RESPONSES TO PHQ QUESTIONS 1-9: 7
6. FEELING BAD ABOUT YOURSELF - OR THAT YOU ARE A FAILURE OR HAVE LET YOURSELF OR YOUR FAMILY DOWN: SEVERAL DAYS
10. IF YOU CHECKED OFF ANY PROBLEMS, HOW DIFFICULT HAVE THESE PROBLEMS MADE IT FOR YOU TO DO YOUR WORK, TAKE CARE OF THINGS AT HOME, OR GET ALONG WITH OTHER PEOPLE: SOMEWHAT DIFFICULT
SUM OF ALL RESPONSES TO PHQ QUESTIONS 1-9: 7
9. THOUGHTS THAT YOU WOULD BE BETTER OFF DEAD, OR OF HURTING YOURSELF: NOT AT ALL
3. TROUBLE FALLING OR STAYING ASLEEP: NOT AT ALL
2. FEELING DOWN, DEPRESSED OR HOPELESS: SEVERAL DAYS
SUM OF ALL RESPONSES TO PHQ QUESTIONS 1-9: 7
5. POOR APPETITE OR OVEREATING: MORE THAN HALF THE DAYS
1. LITTLE INTEREST OR PLEASURE IN DOING THINGS: SEVERAL DAYS
5. POOR APPETITE OR OVEREATING: MORE THAN HALF THE DAYS

## 2025-04-11 ENCOUNTER — OFFICE VISIT (OUTPATIENT)
Dept: FAMILY MEDICINE CLINIC | Age: 55
End: 2025-04-11
Payer: MEDICAID

## 2025-04-11 VITALS
HEIGHT: 64 IN | BODY MASS INDEX: 33.97 KG/M2 | SYSTOLIC BLOOD PRESSURE: 118 MMHG | WEIGHT: 199 LBS | TEMPERATURE: 97.3 F | DIASTOLIC BLOOD PRESSURE: 72 MMHG | HEART RATE: 89 BPM | OXYGEN SATURATION: 98 %

## 2025-04-11 DIAGNOSIS — Z12.31 ENCOUNTER FOR SCREENING MAMMOGRAM FOR BREAST CANCER: ICD-10-CM

## 2025-04-11 DIAGNOSIS — E78.2 MIXED HYPERLIPIDEMIA: ICD-10-CM

## 2025-04-11 DIAGNOSIS — F33.9 RECURRENT MAJOR DEPRESSIVE DISORDER, REMISSION STATUS UNSPECIFIED: ICD-10-CM

## 2025-04-11 DIAGNOSIS — R73.09 ELEVATED GLUCOSE: ICD-10-CM

## 2025-04-11 DIAGNOSIS — Z00.00 ANNUAL PHYSICAL EXAM: Primary | ICD-10-CM

## 2025-04-11 PROCEDURE — 99396 PREV VISIT EST AGE 40-64: CPT | Performed by: NURSE PRACTITIONER

## 2025-04-11 PROCEDURE — 3078F DIAST BP <80 MM HG: CPT | Performed by: NURSE PRACTITIONER

## 2025-04-11 PROCEDURE — 3074F SYST BP LT 130 MM HG: CPT | Performed by: NURSE PRACTITIONER

## 2025-04-11 RX ORDER — MINOXIDIL 2.5 MG/1
2.5 TABLET ORAL DAILY
COMMUNITY

## 2025-04-11 RX ORDER — DESVENLAFAXINE 50 MG/1
50 TABLET, FILM COATED, EXTENDED RELEASE ORAL DAILY
COMMUNITY

## 2025-04-11 SDOH — ECONOMIC STABILITY: FOOD INSECURITY: WITHIN THE PAST 12 MONTHS, THE FOOD YOU BOUGHT JUST DIDN'T LAST AND YOU DIDN'T HAVE MONEY TO GET MORE.: PATIENT DECLINED

## 2025-04-11 SDOH — ECONOMIC STABILITY: FOOD INSECURITY: WITHIN THE PAST 12 MONTHS, YOU WORRIED THAT YOUR FOOD WOULD RUN OUT BEFORE YOU GOT MONEY TO BUY MORE.: PATIENT DECLINED

## 2025-04-11 ASSESSMENT — ENCOUNTER SYMPTOMS
RESPIRATORY NEGATIVE: 1
GASTROINTESTINAL NEGATIVE: 1

## 2025-04-11 NOTE — PROGRESS NOTES
Abdominal:      General: Bowel sounds are normal.      Palpations: Abdomen is soft.      Tenderness: There is no abdominal tenderness.   Musculoskeletal:      Right lower leg: No edema.      Left lower leg: No edema.   Skin:     General: Skin is warm and dry.   Neurological:      Mental Status: She is alert and oriented to person, place, and time.   Psychiatric:         Mood and Affect: Mood normal.          --DWIGHT Delgado - NP

## 2025-04-14 DIAGNOSIS — E78.2 MIXED HYPERLIPIDEMIA: ICD-10-CM

## 2025-04-14 RX ORDER — ATORVASTATIN CALCIUM 40 MG/1
40 TABLET, FILM COATED ORAL DAILY
Qty: 30 TABLET | Refills: 0 | Status: SHIPPED | OUTPATIENT
Start: 2025-04-14

## 2025-04-16 DIAGNOSIS — E78.2 MIXED HYPERLIPIDEMIA: ICD-10-CM

## 2025-04-16 DIAGNOSIS — R73.09 ELEVATED GLUCOSE: ICD-10-CM

## 2025-04-16 LAB
ALBUMIN SERPL-MCNC: 4.6 G/DL (ref 3.4–5)
ALBUMIN/GLOB SERPL: 2.1 {RATIO} (ref 1.1–2.2)
ALP SERPL-CCNC: 80 U/L (ref 40–129)
ALT SERPL-CCNC: 39 U/L (ref 10–40)
ANION GAP SERPL CALCULATED.3IONS-SCNC: 12 MMOL/L (ref 3–16)
AST SERPL-CCNC: 29 U/L (ref 15–37)
BILIRUB SERPL-MCNC: <0.2 MG/DL (ref 0–1)
BUN SERPL-MCNC: 10 MG/DL (ref 7–20)
CALCIUM SERPL-MCNC: 9.8 MG/DL (ref 8.3–10.6)
CHLORIDE SERPL-SCNC: 104 MMOL/L (ref 99–110)
CHOLEST SERPL-MCNC: 155 MG/DL (ref 0–199)
CO2 SERPL-SCNC: 23 MMOL/L (ref 21–32)
CREAT SERPL-MCNC: 0.7 MG/DL (ref 0.6–1.1)
EST. AVERAGE GLUCOSE BLD GHB EST-MCNC: 108.3 MG/DL
GFR SERPLBLD CREATININE-BSD FMLA CKD-EPI: >90 ML/MIN/{1.73_M2}
GLUCOSE SERPL-MCNC: 93 MG/DL (ref 70–99)
HBA1C MFR BLD: 5.4 %
HDLC SERPL-MCNC: 45 MG/DL (ref 40–60)
LDL CHOLESTEROL: 93 MG/DL
POTASSIUM SERPL-SCNC: 4.6 MMOL/L (ref 3.5–5.1)
PROT SERPL-MCNC: 6.8 G/DL (ref 6.4–8.2)
SODIUM SERPL-SCNC: 139 MMOL/L (ref 136–145)
TRIGL SERPL-MCNC: 87 MG/DL (ref 0–150)
VLDLC SERPL CALC-MCNC: 17 MG/DL

## 2025-04-17 ENCOUNTER — RESULTS FOLLOW-UP (OUTPATIENT)
Dept: FAMILY MEDICINE CLINIC | Age: 55
End: 2025-04-17

## 2025-05-19 DIAGNOSIS — E78.2 MIXED HYPERLIPIDEMIA: ICD-10-CM

## 2025-05-19 RX ORDER — ATORVASTATIN CALCIUM 40 MG/1
40 TABLET, FILM COATED ORAL DAILY
Qty: 90 TABLET | Refills: 1 | Status: SHIPPED | OUTPATIENT
Start: 2025-05-19

## 2025-07-21 ENCOUNTER — HOSPITAL ENCOUNTER (OUTPATIENT)
Dept: MAMMOGRAPHY | Age: 55
Discharge: HOME | End: 2025-07-26
Payer: MEDICAID

## 2025-07-21 VITALS — BODY MASS INDEX: 34.15 KG/M2 | WEIGHT: 200 LBS | HEIGHT: 64 IN

## 2025-07-21 DIAGNOSIS — Z12.31 VISIT FOR SCREENING MAMMOGRAM: ICD-10-CM

## 2025-07-21 PROCEDURE — 77063 BREAST TOMOSYNTHESIS BI: CPT

## 2025-07-21 PROCEDURE — G0279 TOMOSYNTHESIS, MAMMO: HCPCS

## (undated) DEVICE — MINOR SET UP PK

## (undated) DEVICE — GOWN SIRUS NONREIN LG W/TWL: Brand: MEDLINE INDUSTRIES, INC.

## (undated) DEVICE — SOLUTION IV IRRIG POUR BRL 0.9% SODIUM CHL 2F7124

## (undated) DEVICE — SUTURE VCRL SZ 3-0 L18IN ABSRB UD L26MM SH 1/2 CIR J864D

## (undated) DEVICE — PENCIL ES L3M BTTN SWCH S STL HEX LOK BLDE ELECTRD HOLSTER

## (undated) DEVICE — KIRSCHNER WIRE
Type: IMPLANTABLE DEVICE | Site: WRIST | Status: NON-FUNCTIONAL
Brand: VARIAX
Removed: 2021-07-16

## (undated) DEVICE — NEEDLE HYPO 25GA L1.5IN BVL ORIENTED ECLIPSE

## (undated) DEVICE — INTENDED FOR TISSUE SEPARATION, AND OTHER PROCEDURES THAT REQUIRE A SHARP SURGICAL BLADE TO PUNCTURE OR CUT.: Brand: BARD-PARKER ® STAINLESS STEEL BLADES

## (undated) DEVICE — UNTHREADED GUIDE WIRE: Brand: FIXOS

## (undated) DEVICE — DRAPE,U/SHT,SPLIT,FILM,60X84,STERILE: Brand: MEDLINE

## (undated) DEVICE — GLOVE SURG SZ 65 THK91MIL LTX FREE SYN POLYISOPRENE

## (undated) DEVICE — SYRINGE MED 10ML LUERLOCK TIP W/O SFTY DISP

## (undated) DEVICE — DRESSING,GAUZE,XEROFORM,CURAD,1"X8",ST: Brand: CURAD

## (undated) DEVICE — GLOVE SURG SZ 65 L12IN FNGR THK79MIL GRN LTX FREE

## (undated) DEVICE — GLOVE SURG SZ 8 CRM LTX FREE POLYISOPRENE POLYMER BEAD ANTI

## (undated) DEVICE — SUTURE MCRYL SZ 4-0 L18IN ABSRB UD L19MM PS-2 3/8 CIR PRIM Y496G

## (undated) DEVICE — BANDAGE COMPR W4INXL12FT E DISP ESMARCH EVEN

## (undated) DEVICE — C-ARM PACK: Brand: C-ARM COVER

## (undated) DEVICE — T-DRAPE,EXTREMITY,STERILE: Brand: MEDLINE

## (undated) DEVICE — APPLICATOR MEDICATED 26 CC SOLUTION HI LT ORNG CHLORAPREP

## (undated) DEVICE — DRAPE HND W114XL142IN BLU POLYPR W O PCH FEN CRD AND TB HLDR

## (undated) DEVICE — BANDAGE COMPR W4INXL15FT BGE E SGL LAYERED CLP CLSR

## (undated) DEVICE — OVERDRILL AO, DIA2.7MM X 122MM: Brand: VARIAX

## (undated) DEVICE — SYRINGE IRRIG 60ML SFT PLIABLE BLB EZ TO GRP 1 HND USE W/

## (undated) DEVICE — STRIP,CLOSURE,WOUND,MEDI-STRIP,1/2X4: Brand: MEDLINE

## (undated) DEVICE — LOCKING SCREW, FULLY THREADED,T8
Type: IMPLANTABLE DEVICE | Site: WRIST | Status: NON-FUNCTIONAL
Brand: VARIAX

## (undated) DEVICE — GLOVE SURG SZ 6 L12IN FNGR THK79MIL GRN LTX FREE

## (undated) DEVICE — ZIMMER® STERILE DISPOSABLE TOURNIQUET CUFF WITH PLC, DUAL PORT, SINGLE BLADDER, 18 IN. (46 CM)

## (undated) DEVICE — SHEET,DRAPE,53X77,STERILE: Brand: MEDLINE

## (undated) DEVICE — SPLINT ORTH W3XL12IN LAYERED FBRGLS FOAM PD BRTH BK MOLD

## (undated) DEVICE — ELECTRODE PT RET AD L9FT HI MOIST COND ADH HYDRGEL CORDED

## (undated) DEVICE — SPEEDGUIDE DRILL AO: Brand: VARIAX

## (undated) DEVICE — DRILL, AO, SCALED: Brand: VARIAX

## (undated) DEVICE — CANISTER, RIGID, 1200CC: Brand: MEDLINE INDUSTRIES, INC.

## (undated) DEVICE — COVER LT HNDL BLU PLAS

## (undated) DEVICE — 3M™ STERI-STRIP™ COMPOUND BENZOIN TINCTURE 40 BAGS/CARTON 4 CARTONS/CASE C1544: Brand: 3M™ STERI-STRIP™